# Patient Record
Sex: FEMALE | Race: WHITE | NOT HISPANIC OR LATINO
[De-identification: names, ages, dates, MRNs, and addresses within clinical notes are randomized per-mention and may not be internally consistent; named-entity substitution may affect disease eponyms.]

---

## 2019-04-04 ENCOUNTER — RX RENEWAL (OUTPATIENT)
Age: 74
End: 2019-04-04

## 2019-04-04 PROBLEM — Z00.00 ENCOUNTER FOR PREVENTIVE HEALTH EXAMINATION: Status: ACTIVE | Noted: 2019-04-04

## 2019-04-15 ENCOUNTER — MEDICATION RENEWAL (OUTPATIENT)
Age: 74
End: 2019-04-15

## 2019-04-15 ENCOUNTER — RX RENEWAL (OUTPATIENT)
Age: 74
End: 2019-04-15

## 2019-04-15 RX ORDER — BLOOD SUGAR DIAGNOSTIC
STRIP MISCELLANEOUS 3 TIMES DAILY
Qty: 3 | Refills: 3 | Status: ACTIVE | COMMUNITY
Start: 2019-04-04 | End: 1900-01-01

## 2019-05-20 ENCOUNTER — MEDICATION RENEWAL (OUTPATIENT)
Age: 74
End: 2019-05-20

## 2019-06-17 ENCOUNTER — RX RENEWAL (OUTPATIENT)
Age: 74
End: 2019-06-17

## 2019-07-24 ENCOUNTER — MEDICATION RENEWAL (OUTPATIENT)
Age: 74
End: 2019-07-24

## 2019-08-01 ENCOUNTER — RECORD ABSTRACTING (OUTPATIENT)
Age: 74
End: 2019-08-01

## 2019-08-01 DIAGNOSIS — E53.8 DEFICIENCY OF OTHER SPECIFIED B GROUP VITAMINS: ICD-10-CM

## 2019-08-01 DIAGNOSIS — H54.62 UNQUALIFIED VISUAL LOSS, LEFT EYE, NORMAL VISION RIGHT EYE: ICD-10-CM

## 2019-08-01 DIAGNOSIS — Z78.9 OTHER SPECIFIED HEALTH STATUS: ICD-10-CM

## 2019-08-01 DIAGNOSIS — R60.0 LOCALIZED EDEMA: ICD-10-CM

## 2019-08-01 DIAGNOSIS — Z82.49 FAMILY HISTORY OF ISCHEMIC HEART DISEASE AND OTHER DISEASES OF THE CIRCULATORY SYSTEM: ICD-10-CM

## 2019-08-01 DIAGNOSIS — Z86.59 PERSONAL HISTORY OF OTHER MENTAL AND BEHAVIORAL DISORDERS: ICD-10-CM

## 2019-08-01 DIAGNOSIS — H35.81 RETINAL EDEMA: ICD-10-CM

## 2019-08-01 DIAGNOSIS — Z86.39 PERSONAL HISTORY OF OTHER ENDOCRINE, NUTRITIONAL AND METABOLIC DISEASE: ICD-10-CM

## 2019-08-01 DIAGNOSIS — Z79.899 OTHER LONG TERM (CURRENT) DRUG THERAPY: ICD-10-CM

## 2019-08-01 DIAGNOSIS — Z87.898 PERSONAL HISTORY OF OTHER SPECIFIED CONDITIONS: ICD-10-CM

## 2019-08-05 ENCOUNTER — RX RENEWAL (OUTPATIENT)
Age: 74
End: 2019-08-05

## 2019-08-15 ENCOUNTER — RX RENEWAL (OUTPATIENT)
Age: 74
End: 2019-08-15

## 2019-08-15 ENCOUNTER — MEDICATION RENEWAL (OUTPATIENT)
Age: 74
End: 2019-08-15

## 2019-09-10 ENCOUNTER — RX RENEWAL (OUTPATIENT)
Age: 74
End: 2019-09-10

## 2019-09-10 ENCOUNTER — MEDICATION RENEWAL (OUTPATIENT)
Age: 74
End: 2019-09-10

## 2019-09-19 ENCOUNTER — RX RENEWAL (OUTPATIENT)
Age: 74
End: 2019-09-19

## 2019-09-26 ENCOUNTER — APPOINTMENT (OUTPATIENT)
Dept: CARDIOLOGY | Facility: CLINIC | Age: 74
End: 2019-09-26

## 2019-09-26 ENCOUNTER — APPOINTMENT (OUTPATIENT)
Dept: ENDOCRINOLOGY | Facility: CLINIC | Age: 74
End: 2019-09-26
Payer: MEDICARE

## 2019-09-26 ENCOUNTER — LABORATORY RESULT (OUTPATIENT)
Age: 74
End: 2019-09-26

## 2019-09-26 ENCOUNTER — NON-APPOINTMENT (OUTPATIENT)
Age: 74
End: 2019-09-26

## 2019-09-26 ENCOUNTER — APPOINTMENT (OUTPATIENT)
Dept: CARDIOLOGY | Facility: CLINIC | Age: 74
End: 2019-09-26
Payer: MEDICARE

## 2019-09-26 VITALS
HEIGHT: 57.09 IN | OXYGEN SATURATION: 96 % | WEIGHT: 203 LBS | HEART RATE: 82 BPM | DIASTOLIC BLOOD PRESSURE: 62 MMHG | SYSTOLIC BLOOD PRESSURE: 128 MMHG | BODY MASS INDEX: 43.8 KG/M2

## 2019-09-26 VITALS
BODY MASS INDEX: 43.9 KG/M2 | TEMPERATURE: 98 F | HEIGHT: 57.09 IN | HEART RATE: 86 BPM | OXYGEN SATURATION: 96 % | SYSTOLIC BLOOD PRESSURE: 120 MMHG | DIASTOLIC BLOOD PRESSURE: 80 MMHG | WEIGHT: 203.5 LBS

## 2019-09-26 DIAGNOSIS — Z86.79 PERSONAL HISTORY OF OTHER DISEASES OF THE CIRCULATORY SYSTEM: ICD-10-CM

## 2019-09-26 DIAGNOSIS — E11.319 TYPE 2 DIABETES MELLITUS WITH UNSPECIFIED DIABETIC RETINOPATHY W/OUT MACULAR EDEMA: ICD-10-CM

## 2019-09-26 DIAGNOSIS — Z00.00 ENCOUNTER FOR GENERAL ADULT MEDICAL EXAMINATION W/OUT ABNORMAL FINDINGS: ICD-10-CM

## 2019-09-26 DIAGNOSIS — I73.9 PERIPHERAL VASCULAR DISEASE, UNSPECIFIED: ICD-10-CM

## 2019-09-26 DIAGNOSIS — I35.1 NONRHEUMATIC AORTIC (VALVE) INSUFFICIENCY: ICD-10-CM

## 2019-09-26 DIAGNOSIS — R09.89 OTHER SPECIFIED SYMPTOMS AND SIGNS INVOLVING THE CIRCULATORY AND RESPIRATORY SYSTEMS: ICD-10-CM

## 2019-09-26 DIAGNOSIS — R01.1 CARDIAC MURMUR, UNSPECIFIED: ICD-10-CM

## 2019-09-26 DIAGNOSIS — E11.9 TYPE 2 DIABETES MELLITUS W/OUT COMPLICATIONS: ICD-10-CM

## 2019-09-26 DIAGNOSIS — E11.51 TYPE 2 DIABETES MELLITUS WITH DIABETIC PERIPHERAL ANGIOPATHY W/OUT GANGRENE: ICD-10-CM

## 2019-09-26 DIAGNOSIS — E78.5 HYPERLIPIDEMIA, UNSPECIFIED: ICD-10-CM

## 2019-09-26 DIAGNOSIS — E03.9 HYPOTHYROIDISM, UNSPECIFIED: ICD-10-CM

## 2019-09-26 DIAGNOSIS — Z91.19 PATIENT'S NONCOMPLIANCE WITH OTHER MEDICAL TREATMENT AND REGIMEN: ICD-10-CM

## 2019-09-26 DIAGNOSIS — R26.81 UNSTEADINESS ON FEET: ICD-10-CM

## 2019-09-26 PROCEDURE — 99215 OFFICE O/P EST HI 40 MIN: CPT

## 2019-09-26 PROCEDURE — 82962 GLUCOSE BLOOD TEST: CPT

## 2019-09-26 PROCEDURE — 99214 OFFICE O/P EST MOD 30 MIN: CPT

## 2019-09-26 PROCEDURE — 83036 HEMOGLOBIN GLYCOSYLATED A1C: CPT | Mod: QW

## 2019-09-26 PROCEDURE — 93000 ELECTROCARDIOGRAM COMPLETE: CPT

## 2019-09-26 RX ORDER — INSULIN HUMAN 100 [IU]/ML
100 INJECTION, SUSPENSION SUBCUTANEOUS
Qty: 4 | Refills: 2 | Status: ACTIVE | COMMUNITY
Start: 2019-09-26 | End: 1900-01-01

## 2019-09-28 PROBLEM — E11.319 TYPE 2 DIABETES MELLITUS WITH RETINOPATHY: Status: ACTIVE | Noted: 2019-08-01

## 2019-09-28 PROBLEM — Z91.19 MEDICALLY NONCOMPLIANT: Status: ACTIVE | Noted: 2019-09-28

## 2019-09-28 PROBLEM — Z00.00 PREVENTATIVE HEALTH CARE: Status: ACTIVE | Noted: 2019-09-28

## 2019-09-28 LAB
GLUCOSE BLDC GLUCOMTR-MCNC: 187
HBA1C MFR BLD HPLC: 7.7

## 2019-09-28 NOTE — REASON FOR VISIT
[FreeTextEntry1] : The patient presents for evaluation of high blood pressure. Patient is currently tolerating the current antihypertensive regime and they deny headaches, stiff neck, visual changes, pedal Edema or PND. They also are here for follow-up of elevated cholesterol and continued care of diabetes mellitus. Patient is currently tolerating medication and does not complain of new  muscle pain, joint pain, back pain, tea, nausea, vomiting, abdominal pain or diarrhea. The patient is trying to follow a low cholesterol diet.  The patient is following the diabetic regimen and is tolerating hypoglycemic agents and following the diet. \par The patient admits to noncompliance with my medical  and endo  follow-up, treatment plan.  The patient states that they do understand that noncompliance can lead to their worsening overall health and severe health consequences.  Patient was accompanied by her daughter today and all questions were answered in full.\par \par \par

## 2019-09-28 NOTE — PHYSICAL EXAM
[General Appearance - Well Developed] : well developed [Well Groomed] : well groomed [Normal Appearance] : normal appearance [General Appearance - Well Nourished] : well nourished [No Deformities] : no deformities [Normal Conjunctiva] : the conjunctiva exhibited no abnormalities [General Appearance - In No Acute Distress] : no acute distress [Eyelids - No Xanthelasma] : the eyelids demonstrated no xanthelasmas [Normal Oral Mucosa] : normal oral mucosa [No Oral Pallor] : no oral pallor [No Oral Cyanosis] : no oral cyanosis [Normal Jugular Venous V Waves Present] : normal jugular venous V waves present [Normal Jugular Venous A Waves Present] : normal jugular venous A waves present [No Jugular Venous Santana A Waves] : no jugular venous santana A waves [Exaggerated Use Of Accessory Muscles For Inspiration] : no accessory muscle use [Respiration, Rhythm And Depth] : normal respiratory rhythm and effort [Auscultation Breath Sounds / Voice Sounds] : lungs were clear to auscultation bilaterally [Abdomen Soft] : soft [Abdomen Tenderness] : non-tender [Abdomen Mass (___ Cm)] : no abdominal mass palpated [Abnormal Walk] : normal gait [Gait - Sufficient For Exercise Testing] : the gait was sufficient for exercise testing [Nail Clubbing] : no clubbing of the fingernails [Cyanosis, Localized] : no localized cyanosis [Petechial Hemorrhages (___cm)] : no petechial hemorrhages [Skin Color & Pigmentation] : normal skin color and pigmentation [No Venous Stasis] : no venous stasis [] : no rash [No Skin Ulcers] : no skin ulcer [Skin Lesions] : no skin lesions [Oriented To Time, Place, And Person] : oriented to person, place, and time [No Xanthoma] : no  xanthoma was observed [Mood] : the mood was normal [Affect] : the affect was normal [No Anxiety] : not feeling anxious [FreeTextEntry1] : Pt has _1+edema, pitting bilaterally legs

## 2019-09-30 ENCOUNTER — RX RENEWAL (OUTPATIENT)
Age: 74
End: 2019-09-30

## 2019-09-30 LAB
ALBUMIN SERPL ELPH-MCNC: 4.6 G/DL
ALP BLD-CCNC: 76 U/L
ALT SERPL-CCNC: 19 U/L
ANION GAP SERPL CALC-SCNC: 16 MMOL/L
APPEARANCE: CLEAR
AST SERPL-CCNC: 14 U/L
BACTERIA: NEGATIVE
BASOPHILS # BLD AUTO: 0.1 K/UL
BASOPHILS NFR BLD AUTO: 1 %
BILIRUB SERPL-MCNC: 0.2 MG/DL
BILIRUBIN URINE: NEGATIVE
BLOOD URINE: NEGATIVE
BUN SERPL-MCNC: 34 MG/DL
CALCIUM SERPL-MCNC: 10.4 MG/DL
CHLORIDE SERPL-SCNC: 103 MMOL/L
CHOLEST SERPL-MCNC: 196 MG/DL
CHOLEST/HDLC SERPL: 3.6 RATIO
CO2 SERPL-SCNC: 20 MMOL/L
COLOR: NORMAL
CREAT 24H UR-MCNC: NORMAL G/24 H
CREAT ?TM UR-MCNC: 63 MG/DL
CREAT SERPL-MCNC: 1.4 MG/DL
EOSINOPHIL # BLD AUTO: 0.55 K/UL
EOSINOPHIL NFR BLD AUTO: 5.7 %
ESTIMATED AVERAGE GLUCOSE: 171 MG/DL
GLUCOSE QUALITATIVE U: NEGATIVE
GLUCOSE SERPL-MCNC: 173 MG/DL
HBA1C MFR BLD HPLC: 7.6 %
HCT VFR BLD CALC: 35.6 %
HDLC SERPL-MCNC: 54 MG/DL
HGB BLD-MCNC: 11.2 G/DL
HYALINE CASTS: 0 /LPF
IMM GRANULOCYTES NFR BLD AUTO: 0.3 %
KETONES URINE: NEGATIVE
LDLC SERPL CALC-MCNC: 88 MG/DL
LEUKOCYTE ESTERASE URINE: ABNORMAL
LYMPHOCYTES # BLD AUTO: 1.96 K/UL
LYMPHOCYTES NFR BLD AUTO: 20.3 %
MAGNESIUM SERPL-MCNC: 1.6 MG/DL
MAN DIFF?: NORMAL
MCHC RBC-ENTMCNC: 29.5 PG
MCHC RBC-ENTMCNC: 31.5 GM/DL
MCV RBC AUTO: 93.7 FL
MICROALBUMIN 24H UR DL<=1MG/L-MCNC: 2.2 MG/DL
MICROALBUMIN 24H UR DL<=1MG/L-MRATE: NORMAL MG/24HR
MICROALBUMIN ?TM UR-MRATE: NORMAL UG/MIN
MICROSCOPIC-UA: NORMAL
MONOCYTES # BLD AUTO: 0.77 K/UL
MONOCYTES NFR BLD AUTO: 8 %
NEUTROPHILS # BLD AUTO: 6.25 K/UL
NEUTROPHILS NFR BLD AUTO: 64.7 %
NITRITE URINE: NEGATIVE
PH URINE: 5.5
PHOSPHATE SERPL-MCNC: 4.1 MG/DL
PLATELET # BLD AUTO: 292 K/UL
POTASSIUM SERPL-SCNC: 5.2 MMOL/L
PROT ?TM UR-MCNC: NORMAL
PROT SERPL-MCNC: 7.3 G/DL
PROTEIN URINE: NORMAL
RBC # BLD: 3.8 M/UL
RBC # FLD: 13 %
RED BLOOD CELLS URINE: 3 /HPF
SODIUM SERPL-SCNC: 139 MMOL/L
SPECIFIC GRAVITY URINE: 1.02
SPECIMEN VOL 24H UR: NORMAL
SQUAMOUS EPITHELIAL CELLS: 1 /HPF
T3RU NFR SERPL: 0.9 TBI
T4 FREE SERPL-MCNC: 1.3 NG/DL
T4 SERPL-MCNC: 6.6 UG/DL
TRIGL SERPL-MCNC: 271 MG/DL
TSH SERPL-ACNC: 2.81 UIU/ML
URATE SERPL-MCNC: 9.6 MG/DL
UROBILINOGEN URINE: NORMAL
WBC # FLD AUTO: 9.66 K/UL
WHITE BLOOD CELLS URINE: 8 /HPF

## 2019-09-30 RX ORDER — FLUOXETINE HYDROCHLORIDE 20 MG/1
20 TABLET ORAL
Qty: 30 | Refills: 1 | Status: DISCONTINUED | COMMUNITY
Start: 2019-09-26 | End: 2019-09-30

## 2019-09-30 RX ORDER — SYRINGE AND NEEDLE,INSULIN,1ML 31 GX5/16"
31G X 5/16" SYRINGE, EMPTY DISPOSABLE MISCELLANEOUS
Qty: 1 | Refills: 3 | Status: ACTIVE | COMMUNITY
Start: 2019-09-30 | End: 1900-01-01

## 2019-10-04 ENCOUNTER — APPOINTMENT (OUTPATIENT)
Dept: CARDIOLOGY | Facility: CLINIC | Age: 74
End: 2019-10-04
Payer: MEDICARE

## 2019-10-04 PROCEDURE — 93306 TTE W/DOPPLER COMPLETE: CPT

## 2019-10-04 PROCEDURE — 93925 LOWER EXTREMITY STUDY: CPT

## 2019-10-04 PROCEDURE — 93880 EXTRACRANIAL BILAT STUDY: CPT

## 2019-10-09 PROBLEM — R09.89 DECREASED DORSALIS PEDIS PULSE: Status: ACTIVE | Noted: 2019-08-01

## 2019-10-09 PROBLEM — E11.9 DIABETES: Status: ACTIVE | Noted: 2019-04-04

## 2019-10-09 PROBLEM — E03.9 HYPOTHYROIDISM: Status: ACTIVE | Noted: 2019-08-01

## 2019-10-09 PROBLEM — R09.89 CAROTID BRUIT: Status: ACTIVE | Noted: 2019-08-01

## 2019-10-09 PROBLEM — E78.5 HYPERLIPEMIA: Status: ACTIVE | Noted: 2019-09-28

## 2019-10-15 ENCOUNTER — MEDICATION RENEWAL (OUTPATIENT)
Age: 74
End: 2019-10-15

## 2019-10-15 RX ORDER — GLIMEPIRIDE 2 MG/1
2 TABLET ORAL DAILY
Qty: 90 | Refills: 3 | Status: ACTIVE | COMMUNITY
Start: 2019-10-15 | End: 1900-01-01

## 2019-10-15 RX ORDER — SITAGLIPTIN 50 MG/1
50 TABLET, FILM COATED ORAL
Qty: 90 | Refills: 0 | Status: DISCONTINUED | COMMUNITY
Start: 2019-08-15 | End: 2019-10-15

## 2019-10-15 RX ORDER — SIMVASTATIN 20 MG/1
20 TABLET, FILM COATED ORAL
Qty: 45 | Refills: 1 | Status: ACTIVE | COMMUNITY
Start: 2019-05-20 | End: 1900-01-01

## 2019-10-22 ENCOUNTER — MEDICATION RENEWAL (OUTPATIENT)
Age: 74
End: 2019-10-22

## 2019-10-22 RX ORDER — ATENOLOL 50 MG/1
50 TABLET ORAL
Qty: 90 | Refills: 1 | Status: ACTIVE | COMMUNITY
Start: 2019-04-15 | End: 1900-01-01

## 2019-10-28 ENCOUNTER — RX RENEWAL (OUTPATIENT)
Age: 74
End: 2019-10-28

## 2019-12-06 ENCOUNTER — MEDICATION RENEWAL (OUTPATIENT)
Age: 74
End: 2019-12-06

## 2019-12-10 ENCOUNTER — MEDICATION RENEWAL (OUTPATIENT)
Age: 74
End: 2019-12-10

## 2019-12-11 ENCOUNTER — RX RENEWAL (OUTPATIENT)
Age: 74
End: 2019-12-11

## 2019-12-16 ENCOUNTER — RX RENEWAL (OUTPATIENT)
Age: 74
End: 2019-12-16

## 2019-12-16 RX ORDER — HUMAN INSULIN 100 [IU]/ML
100 INJECTION, SUSPENSION SUBCUTANEOUS
Qty: 6 | Refills: 1 | Status: ACTIVE | COMMUNITY
Start: 2019-09-30 | End: 1900-01-01

## 2020-01-15 RX ORDER — INDAPAMIDE 2.5 MG/1
2.5 TABLET, FILM COATED ORAL DAILY
Qty: 90 | Refills: 1 | Status: ACTIVE | COMMUNITY
Start: 2020-01-15 | End: 1900-01-01

## 2020-01-16 ENCOUNTER — RX RENEWAL (OUTPATIENT)
Age: 75
End: 2020-01-16

## 2020-01-16 RX ORDER — FLUOXETINE HYDROCHLORIDE 20 MG/1
20 CAPSULE ORAL
Qty: 30 | Refills: 1 | Status: ACTIVE | COMMUNITY
Start: 2019-09-30 | End: 1900-01-01

## 2020-01-24 ENCOUNTER — RX RENEWAL (OUTPATIENT)
Age: 75
End: 2020-01-24

## 2020-01-24 RX ORDER — METFORMIN HYDROCHLORIDE 1000 MG/1
1000 TABLET, COATED ORAL
Qty: 180 | Refills: 0 | Status: ACTIVE | COMMUNITY
Start: 2019-06-17 | End: 1900-01-01

## 2020-03-06 RX ORDER — AMLODIPINE BESYLATE 5 MG/1
5 TABLET ORAL
Qty: 90 | Refills: 1 | Status: ACTIVE | COMMUNITY
Start: 2019-08-15 | End: 1900-01-01

## 2020-03-09 RX ORDER — LEVOTHYROXINE SODIUM 25 UG/1
25 TABLET ORAL
Qty: 135 | Refills: 0 | Status: ACTIVE | COMMUNITY
Start: 2019-09-10 | End: 1900-01-01

## 2020-03-10 RX ORDER — IRBESARTAN 300 MG/1
300 TABLET ORAL
Qty: 90 | Refills: 1 | Status: ACTIVE | COMMUNITY
Start: 2019-09-10 | End: 1900-01-01

## 2020-03-26 ENCOUNTER — INPATIENT (INPATIENT)
Facility: HOSPITAL | Age: 75
LOS: 20 days | DRG: 870 | End: 2020-04-16
Attending: HOSPITALIST | Admitting: HOSPITALIST
Payer: MEDICARE

## 2020-03-26 ENCOUNTER — TRANSCRIPTION ENCOUNTER (OUTPATIENT)
Age: 75
End: 2020-03-26

## 2020-03-26 VITALS
HEIGHT: 58 IN | WEIGHT: 199.96 LBS | TEMPERATURE: 101 F | OXYGEN SATURATION: 88 % | HEART RATE: 76 BPM | DIASTOLIC BLOOD PRESSURE: 68 MMHG | RESPIRATION RATE: 18 BRPM | SYSTOLIC BLOOD PRESSURE: 117 MMHG

## 2020-03-26 DIAGNOSIS — F41.9 ANXIETY DISORDER, UNSPECIFIED: ICD-10-CM

## 2020-03-26 DIAGNOSIS — E78.5 HYPERLIPIDEMIA, UNSPECIFIED: ICD-10-CM

## 2020-03-26 DIAGNOSIS — E03.9 HYPOTHYROIDISM, UNSPECIFIED: ICD-10-CM

## 2020-03-26 DIAGNOSIS — B34.2 CORONAVIRUS INFECTION, UNSPECIFIED: ICD-10-CM

## 2020-03-26 DIAGNOSIS — B97.21 SARS-ASSOCIATED CORONAVIRUS AS THE CAUSE OF DISEASES CLASSIFIED ELSEWHERE: ICD-10-CM

## 2020-03-26 DIAGNOSIS — E11.9 TYPE 2 DIABETES MELLITUS WITHOUT COMPLICATIONS: ICD-10-CM

## 2020-03-26 DIAGNOSIS — J96.01 ACUTE RESPIRATORY FAILURE WITH HYPOXIA: ICD-10-CM

## 2020-03-26 LAB
ALBUMIN SERPL ELPH-MCNC: 2.9 G/DL — LOW (ref 3.3–5)
ALP SERPL-CCNC: 61 U/L — SIGNIFICANT CHANGE UP (ref 30–120)
ALT FLD-CCNC: 24 U/L DA — SIGNIFICANT CHANGE UP (ref 10–60)
ANION GAP SERPL CALC-SCNC: 13 MMOL/L — SIGNIFICANT CHANGE UP (ref 5–17)
APPEARANCE UR: ABNORMAL
AST SERPL-CCNC: 19 U/L — SIGNIFICANT CHANGE UP (ref 10–40)
BASOPHILS # BLD AUTO: 0.01 K/UL — SIGNIFICANT CHANGE UP (ref 0–0.2)
BASOPHILS NFR BLD AUTO: 0.1 % — SIGNIFICANT CHANGE UP (ref 0–2)
BILIRUB SERPL-MCNC: 0.4 MG/DL — SIGNIFICANT CHANGE UP (ref 0.2–1.2)
BILIRUB UR-MCNC: NEGATIVE — SIGNIFICANT CHANGE UP
BUN SERPL-MCNC: 42 MG/DL — HIGH (ref 7–23)
CALCIUM SERPL-MCNC: 8.9 MG/DL — SIGNIFICANT CHANGE UP (ref 8.4–10.5)
CHLORIDE SERPL-SCNC: 100 MMOL/L — SIGNIFICANT CHANGE UP (ref 96–108)
CO2 SERPL-SCNC: 21 MMOL/L — LOW (ref 22–31)
COLOR SPEC: YELLOW — SIGNIFICANT CHANGE UP
CREAT SERPL-MCNC: 1.91 MG/DL — HIGH (ref 0.5–1.3)
DIFF PNL FLD: ABNORMAL
EOSINOPHIL # BLD AUTO: 0.01 K/UL — SIGNIFICANT CHANGE UP (ref 0–0.5)
EOSINOPHIL NFR BLD AUTO: 0.1 % — SIGNIFICANT CHANGE UP (ref 0–6)
GLUCOSE BLDC GLUCOMTR-MCNC: 372 MG/DL — HIGH (ref 70–99)
GLUCOSE SERPL-MCNC: 175 MG/DL — HIGH (ref 70–99)
GLUCOSE UR QL: 250 MG/DL
HCT VFR BLD CALC: 28.4 % — LOW (ref 34.5–45)
HGB BLD-MCNC: 9.2 G/DL — LOW (ref 11.5–15.5)
IMM GRANULOCYTES NFR BLD AUTO: 0.5 % — SIGNIFICANT CHANGE UP (ref 0–1.5)
KETONES UR-MCNC: NEGATIVE — SIGNIFICANT CHANGE UP
LACTATE SERPL-SCNC: 1.9 MMOL/L — SIGNIFICANT CHANGE UP (ref 0.7–2)
LEUKOCYTE ESTERASE UR-ACNC: NEGATIVE — SIGNIFICANT CHANGE UP
LYMPHOCYTES # BLD AUTO: 0.78 K/UL — LOW (ref 1–3.3)
LYMPHOCYTES # BLD AUTO: 7.5 % — LOW (ref 13–44)
MCHC RBC-ENTMCNC: 28.8 PG — SIGNIFICANT CHANGE UP (ref 27–34)
MCHC RBC-ENTMCNC: 32.4 GM/DL — SIGNIFICANT CHANGE UP (ref 32–36)
MCV RBC AUTO: 89 FL — SIGNIFICANT CHANGE UP (ref 80–100)
MONOCYTES # BLD AUTO: 0.39 K/UL — SIGNIFICANT CHANGE UP (ref 0–0.9)
MONOCYTES NFR BLD AUTO: 3.8 % — SIGNIFICANT CHANGE UP (ref 2–14)
NEUTROPHILS # BLD AUTO: 9.14 K/UL — HIGH (ref 1.8–7.4)
NEUTROPHILS NFR BLD AUTO: 88 % — HIGH (ref 43–77)
NITRITE UR-MCNC: NEGATIVE — SIGNIFICANT CHANGE UP
NRBC # BLD: 0 /100 WBCS — SIGNIFICANT CHANGE UP (ref 0–0)
PH UR: 5 — SIGNIFICANT CHANGE UP (ref 5–8)
PLATELET # BLD AUTO: 271 K/UL — SIGNIFICANT CHANGE UP (ref 150–400)
POTASSIUM SERPL-MCNC: 4 MMOL/L — SIGNIFICANT CHANGE UP (ref 3.5–5.3)
POTASSIUM SERPL-SCNC: 4 MMOL/L — SIGNIFICANT CHANGE UP (ref 3.5–5.3)
PROT SERPL-MCNC: 7.8 G/DL — SIGNIFICANT CHANGE UP (ref 6–8.3)
PROT UR-MCNC: 100 MG/DL
RAPID RVP RESULT: SIGNIFICANT CHANGE UP
RBC # BLD: 3.19 M/UL — LOW (ref 3.8–5.2)
RBC # FLD: 13.5 % — SIGNIFICANT CHANGE UP (ref 10.3–14.5)
SODIUM SERPL-SCNC: 134 MMOL/L — LOW (ref 135–145)
SP GR SPEC: 1.02 — SIGNIFICANT CHANGE UP (ref 1.01–1.02)
UROBILINOGEN FLD QL: NEGATIVE MG/DL — SIGNIFICANT CHANGE UP
WBC # BLD: 10.38 K/UL — SIGNIFICANT CHANGE UP (ref 3.8–10.5)
WBC # FLD AUTO: 10.38 K/UL — SIGNIFICANT CHANGE UP (ref 3.8–10.5)

## 2020-03-26 PROCEDURE — 99223 1ST HOSP IP/OBS HIGH 75: CPT

## 2020-03-26 PROCEDURE — 71045 X-RAY EXAM CHEST 1 VIEW: CPT | Mod: 26,77

## 2020-03-26 PROCEDURE — 93010 ELECTROCARDIOGRAM REPORT: CPT

## 2020-03-26 PROCEDURE — 71045 X-RAY EXAM CHEST 1 VIEW: CPT | Mod: 26

## 2020-03-26 PROCEDURE — 99285 EMERGENCY DEPT VISIT HI MDM: CPT | Mod: CS

## 2020-03-26 RX ORDER — HYDROXYCHLOROQUINE SULFATE 200 MG
400 TABLET ORAL EVERY 12 HOURS
Refills: 0 | Status: COMPLETED | OUTPATIENT
Start: 2020-03-26 | End: 2020-03-27

## 2020-03-26 RX ORDER — PROPOFOL 10 MG/ML
73.5 INJECTION, EMULSION INTRAVENOUS
Qty: 1000 | Refills: 0 | Status: DISCONTINUED | OUTPATIENT
Start: 2020-03-26 | End: 2020-04-06

## 2020-03-26 RX ORDER — AZITHROMYCIN 500 MG/1
500 TABLET, FILM COATED ORAL ONCE
Refills: 0 | Status: COMPLETED | OUTPATIENT
Start: 2020-03-26 | End: 2020-03-26

## 2020-03-26 RX ORDER — HEPARIN SODIUM 5000 [USP'U]/ML
5000 INJECTION INTRAVENOUS; SUBCUTANEOUS EVERY 8 HOURS
Refills: 0 | Status: DISCONTINUED | OUTPATIENT
Start: 2020-03-26 | End: 2020-03-29

## 2020-03-26 RX ORDER — INSULIN LISPRO 100/ML
VIAL (ML) SUBCUTANEOUS EVERY 6 HOURS
Refills: 0 | Status: DISCONTINUED | OUTPATIENT
Start: 2020-03-26 | End: 2020-04-16

## 2020-03-26 RX ORDER — AZITHROMYCIN 500 MG/1
500 TABLET, FILM COATED ORAL EVERY 24 HOURS
Refills: 0 | Status: DISCONTINUED | OUTPATIENT
Start: 2020-03-27 | End: 2020-03-27

## 2020-03-26 RX ORDER — INSULIN LISPRO 100/ML
VIAL (ML) SUBCUTANEOUS
Refills: 0 | Status: DISCONTINUED | OUTPATIENT
Start: 2020-03-26 | End: 2020-03-26

## 2020-03-26 RX ORDER — SIMVASTATIN 20 MG/1
20 TABLET, FILM COATED ORAL AT BEDTIME
Refills: 0 | Status: DISCONTINUED | OUTPATIENT
Start: 2020-03-26 | End: 2020-04-16

## 2020-03-26 RX ORDER — HYDROXYCHLOROQUINE SULFATE 200 MG
TABLET ORAL
Refills: 0 | Status: COMPLETED | OUTPATIENT
Start: 2020-03-26 | End: 2020-03-31

## 2020-03-26 RX ORDER — DEXTROSE 50 % IN WATER 50 %
25 SYRINGE (ML) INTRAVENOUS ONCE
Refills: 0 | Status: DISCONTINUED | OUTPATIENT
Start: 2020-03-26 | End: 2020-04-16

## 2020-03-26 RX ORDER — ACETAMINOPHEN 500 MG
650 TABLET ORAL EVERY 6 HOURS
Refills: 0 | Status: DISCONTINUED | OUTPATIENT
Start: 2020-03-26 | End: 2020-04-16

## 2020-03-26 RX ORDER — FENTANYL CITRATE 50 UG/ML
50 INJECTION INTRAVENOUS ONCE
Refills: 0 | Status: DISCONTINUED | OUTPATIENT
Start: 2020-03-26 | End: 2020-03-26

## 2020-03-26 RX ORDER — SODIUM CHLORIDE 9 MG/ML
1000 INJECTION, SOLUTION INTRAVENOUS
Refills: 0 | Status: DISCONTINUED | OUTPATIENT
Start: 2020-03-26 | End: 2020-04-16

## 2020-03-26 RX ORDER — CEFTRIAXONE 500 MG/1
1000 INJECTION, POWDER, FOR SOLUTION INTRAMUSCULAR; INTRAVENOUS ONCE
Refills: 0 | Status: COMPLETED | OUTPATIENT
Start: 2020-03-26 | End: 2020-03-26

## 2020-03-26 RX ORDER — VECURONIUM BROMIDE 20 MG/1
1 INJECTION, POWDER, FOR SOLUTION INTRAVENOUS
Qty: 100 | Refills: 0 | Status: DISCONTINUED | OUTPATIENT
Start: 2020-03-26 | End: 2020-03-31

## 2020-03-26 RX ORDER — CHLORHEXIDINE GLUCONATE 213 G/1000ML
15 SOLUTION TOPICAL EVERY 12 HOURS
Refills: 0 | Status: DISCONTINUED | OUTPATIENT
Start: 2020-03-26 | End: 2020-04-13

## 2020-03-26 RX ORDER — DEXTROSE 50 % IN WATER 50 %
12.5 SYRINGE (ML) INTRAVENOUS ONCE
Refills: 0 | Status: DISCONTINUED | OUTPATIENT
Start: 2020-03-26 | End: 2020-04-16

## 2020-03-26 RX ORDER — SODIUM CHLORIDE 9 MG/ML
1000 INJECTION INTRAMUSCULAR; INTRAVENOUS; SUBCUTANEOUS ONCE
Refills: 0 | Status: COMPLETED | OUTPATIENT
Start: 2020-03-26 | End: 2020-03-26

## 2020-03-26 RX ORDER — CEFTRIAXONE 500 MG/1
1000 INJECTION, POWDER, FOR SOLUTION INTRAMUSCULAR; INTRAVENOUS EVERY 24 HOURS
Refills: 0 | Status: DISCONTINUED | OUTPATIENT
Start: 2020-03-27 | End: 2020-03-27

## 2020-03-26 RX ORDER — GLUCAGON INJECTION, SOLUTION 0.5 MG/.1ML
1 INJECTION, SOLUTION SUBCUTANEOUS ONCE
Refills: 0 | Status: DISCONTINUED | OUTPATIENT
Start: 2020-03-26 | End: 2020-04-16

## 2020-03-26 RX ORDER — PANTOPRAZOLE SODIUM 20 MG/1
40 TABLET, DELAYED RELEASE ORAL DAILY
Refills: 0 | Status: DISCONTINUED | OUTPATIENT
Start: 2020-03-26 | End: 2020-04-16

## 2020-03-26 RX ORDER — DEXTROSE 50 % IN WATER 50 %
15 SYRINGE (ML) INTRAVENOUS ONCE
Refills: 0 | Status: DISCONTINUED | OUTPATIENT
Start: 2020-03-26 | End: 2020-04-16

## 2020-03-26 RX ORDER — FENTANYL CITRATE 50 UG/ML
1 INJECTION INTRAVENOUS
Qty: 2500 | Refills: 0 | Status: DISCONTINUED | OUTPATIENT
Start: 2020-03-26 | End: 2020-04-01

## 2020-03-26 RX ORDER — ACETAMINOPHEN 500 MG
650 TABLET ORAL ONCE
Refills: 0 | Status: COMPLETED | OUTPATIENT
Start: 2020-03-26 | End: 2020-03-26

## 2020-03-26 RX ADMIN — HEPARIN SODIUM 5000 UNIT(S): 5000 INJECTION INTRAVENOUS; SUBCUTANEOUS at 22:07

## 2020-03-26 RX ADMIN — AZITHROMYCIN 500 MILLIGRAM(S): 500 TABLET, FILM COATED ORAL at 16:30

## 2020-03-26 RX ADMIN — SODIUM CHLORIDE 1000 MILLILITER(S): 9 INJECTION INTRAMUSCULAR; INTRAVENOUS; SUBCUTANEOUS at 15:10

## 2020-03-26 RX ADMIN — Medication 650 MILLIGRAM(S): at 15:32

## 2020-03-26 RX ADMIN — SODIUM CHLORIDE 1000 MILLILITER(S): 9 INJECTION INTRAMUSCULAR; INTRAVENOUS; SUBCUTANEOUS at 13:59

## 2020-03-26 RX ADMIN — FENTANYL CITRATE 50 MICROGRAM(S): 50 INJECTION INTRAVENOUS at 22:31

## 2020-03-26 RX ADMIN — Medication 650 MILLIGRAM(S): at 13:58

## 2020-03-26 RX ADMIN — FENTANYL CITRATE 50 MICROGRAM(S): 50 INJECTION INTRAVENOUS at 22:01

## 2020-03-26 RX ADMIN — PROPOFOL 40 MICROGRAM(S)/KG/MIN: 10 INJECTION, EMULSION INTRAVENOUS at 20:32

## 2020-03-26 RX ADMIN — FENTANYL CITRATE 1.81 MICROGRAM(S)/KG/HR: 50 INJECTION INTRAVENOUS at 23:04

## 2020-03-26 RX ADMIN — FENTANYL CITRATE 50 MICROGRAM(S): 50 INJECTION INTRAVENOUS at 22:09

## 2020-03-26 RX ADMIN — VECURONIUM BROMIDE 5.44 MICROGRAM(S)/KG/MIN: 20 INJECTION, POWDER, FOR SOLUTION INTRAVENOUS at 23:50

## 2020-03-26 RX ADMIN — PROPOFOL 40 MICROGRAM(S)/KG/MIN: 10 INJECTION, EMULSION INTRAVENOUS at 23:07

## 2020-03-26 RX ADMIN — AZITHROMYCIN 255 MILLIGRAM(S): 500 TABLET, FILM COATED ORAL at 15:27

## 2020-03-26 RX ADMIN — Medication 10: at 23:13

## 2020-03-26 RX ADMIN — CEFTRIAXONE 100 MILLIGRAM(S): 500 INJECTION, POWDER, FOR SOLUTION INTRAMUSCULAR; INTRAVENOUS at 15:27

## 2020-03-26 RX ADMIN — CEFTRIAXONE 1000 MILLIGRAM(S): 500 INJECTION, POWDER, FOR SOLUTION INTRAMUSCULAR; INTRAVENOUS at 16:00

## 2020-03-26 NOTE — H&P ADULT - HISTORY OF PRESENT ILLNESS
Patient is a 74y old  Female who presents with a chief complaint of shob    HPI:  75 y/o F with hx of DM, HTN, HLD, hypothyroid, anxiety biba with c/o fever and cough x 6 days. Pt states that Tmax was 101.8F, took one tab of tylenol XS at 9am this morning. Pt states that she has associated dry cough, body aches and mild generalized weakness. States that she has had one episode of loose stool daily. Denies recent travel, sick contacts, known covid-19 exposure, CP, SOB, runny nose, sore throat, headache, rash, urinary symptoms.  she was sating 94% ON 4l and was then put on non rebreather and was transferred to spcu.    2 hr after initial exam around 7pm  due to increased work of breathing and shob anesthesisa was called for intubation.  covid was sent.        PAST MEDICAL & SURGICAL HISTORY:  DJD (degenerative joint disease)  DM (diabetes mellitus)  Hyperlipidemia  Hypothyroid  HTN (hypertension)  History of vitamin D deficiency  B12 deficiency  Bronchitis  Anxiety  S/P cataract surgery  S/P eye surgery: left eye retinal surgery x2  S/P  section: x2      FAMILY HISTORY:      SOCIAL HISTORY:    Allergies    No Known Allergies    Intolerances          REVIEW OF SYSEMS:  General: + weakness, + fever/chills, no weight loss/gain  Skin/Breast: no rash, no jaundice  Ophthalmologic: no vision changes, no dry eyes   Respiratory and Thorax: no cough, no wheezing, no hemoptysis, no dyspnea  Cardiovascular: no chest pain, no shortness of breath, no orthopnea  Gastrointestinal: no n/v/d, no abdominal pain, no dysphagia   Genitourinary: no dysuria, no frequency, no nocturia, no hematuria  Musculoskeletal: no trauma, no sprain/strain, no myalgias, no arthralgias, no fracture  Neurological: no HA, no dizziness, no weakness, no numbness  Psychiatric: no depression, no SI/HI  Hematology/Lymphatics: no easy bruising  Endocrine: no heat or cold intolerance. no weight gain or loss  Allergic/Immunologic: no allergy or recent reaction       Vital Signs Last 24 Hrs  T(C): 36.7 (26 Mar 2020 17:53), Max: 38.4 (26 Mar 2020 13:17)  T(F): 98 (26 Mar 2020 17:53), Max: 101.2 (26 Mar 2020 13:17)  HR: 74 (26 Mar 2020 17:53) (70 - 76)  BP: 129/72 (26 Mar 2020 17:53) (117/68 - 129/72)  BP(mean): --  RR: 17 (26 Mar 2020 17:53) (16 - 18)  SpO2: 84% (26 Mar 2020 17:53) (84% - 94%)  I&O's Summary      PHYSICAL EXAM:  GENERAL: on nc  HEAD:  Atraumatic, Normocephalic  EYES: EOMI, PERRLA, conjunctiva and sclera clear  NECK: Supple, No JVD  CHEST/LUNG: + rhonchi  HEART: Regular rate and rhythm; No murmurs, rubs, or gallops  ABDOMEN: Soft, Nontender, Nondistended; Bowel sounds present  Neuro: AAOx3, no focal deficit, 5/5 b/l extremities  EXTREMITIES:  2+ Peripheral Pulses, No clubbing, cyanosis, or edema  SKIN: No rashes or lesions    LABS:                        9.2    10.38 )-----------( 271      ( 26 Mar 2020 14:21 )             28.4     03-26    134<L>  |  100  |  42<H>  ----------------------------<  175<H>  4.0   |  21<L>  |  1.91<H>    Ca    8.9      26 Mar 2020 14:21    TPro  7.8  /  Alb  2.9<L>  /  TBili  0.4  /  DBili  x   /  AST  19  /  ALT  24  /  AlkPhos  61  03-26      CAPILLARY BLOOD GLUCOSE                RADIOLOGY & ADDITIONAL TESTS:    Imaging Personally Reviewed:  [x] YES  [ ] NO    Consultant(s) Notes Reviewed:  [x] YES  [ ] NO      MEDICATIONS  (STANDING):  azithromycin  IVPB 500 milliGRAM(s) IV Intermittent every 24 hours  cefTRIAXone   IVPB 1000 milliGRAM(s) IV Intermittent every 24 hours    MEDICATIONS  (PRN):  acetaminophen   Tablet .. 650 milliGRAM(s) Oral every 6 hours PRN Temp greater or equal to 38C (100.4F), Mild Pain (1 - 3)      Care Discussed with Consultants/Other Providers [x] YES  [ ] NO    HEALTH ISSUES - PROBLEM Dx:

## 2020-03-26 NOTE — ED PROVIDER NOTE - ENMT, MLM
DISPLAY PLAN FREE TEXT Airway patent, Nasal mucosa clear. Mouth with normal mucosa. Throat has no vesicles, no oropharyngeal exudates and uvula is midline.

## 2020-03-26 NOTE — ED PROVIDER NOTE - PROGRESS NOTE DETAILS
Anika FRANCISCO for ED Attending Dr. Wade: 73 y/o female with PMHx of DM, HTN, HLD, hypothyroidism presents to the ED c/o fever, cough x6 days. Tmax of 101.8F. Pt also with nonproductive cough, generalized myalgias, loose stool. Denies HA, SOB, abd pain, CP, sore throat, urinary complaints, N/V, rash, neck stiffness, photophobia. No recent travel. No sick contacts. Last took Tylenol at 9am.  on exam, Well developed, well nourished, NAD. pink conjunctivae. Moist mucous membranes. heart S1 S2 Regular rate and rhythm. lungs CTA b/l. abd soft, nontender. Spoke to hospitalist, Dr. Redmond, discussed case and results, accepted admission. Pt with abnormal cxr c/w possible covid-19, will admit pt. Spoke to hospitalist, Dr. Redmond, discussed case and results, accepted admission. As per RN, pt c/o difficulty breathing and noted to be hypoxic at 76% on 4L oxygen via NC. Spoke to hospitalist, Dr. Redmond, advised to place on 100% NRB and will see pt in ED, also will upgrade to [facility] as per hospitalist.

## 2020-03-26 NOTE — H&P ADULT - ASSESSMENT
73 y/o F with hx of DM, HTN, HLD, hypothyroid, anxiety biba with c/o fever and cough x 6 days. Pt states that Tmax was 101.8F, took one tab of tylenol XS at 9am this morning. Pt states that she has associated dry cough, body aches and mild generalized weakness concerning for sars pna.

## 2020-03-26 NOTE — ED ADULT NURSE NOTE - CHPI ED NUR SYMPTOMS POS
[FreeTextEntry1] : Dietary education and behavioral modification to begin. \par Seek insurance certification but if denied appeal through [facility]. 
FEVER/COUGH/body aches

## 2020-03-26 NOTE — ED PROVIDER NOTE - LAB INTERPRETATION
Rapid Respiratory Viral Panel (03.26.20 @ 14:21)    Rapid RVP Result: NotDetec: This Respiratory Panel does NOT detect the 2019 novel coronavirus  (2019-nCoV) involved with the outbreak originating in [facility].  This Respiratory Panel uses polymerase chain reaction (PCR) to detect for  adenovirus; coronavirus (HKU1, NL63, 229E, OC43); human metapneumovirus  (hMPV); human enterovirus/rhinovirus (Entero/RV); influenza A; influenza  A/H1; influenza A/H3; influenza A/H1-2009; influenza B; parainfluenza  viruses 1, 2, 3, 4; respiratory syncytial virus; Mycoplasma pneumoniae;  and Chlamydophila pneumoniae.

## 2020-03-26 NOTE — PROCEDURE NOTE - NSTRACHINTUB_RESP_A_CORE
VACCINE ADMINISTRATION RECORD  PARENT / GUARDIAN APPROVAL  Date: 10/23/2024  Vaccine administered to: Cha Duffy     : 2007    MRN: [id number]    A copy of the appropriate Centers for Disease Control and Prevention Vaccine Information statement has been provided. I have read or have had explained the information about the diseases and the vaccines listed below. There was an opportunity to ask questions and any questions were answered satisfactorily. I believe that I understand the benefits and risks of the vaccine cited and ask that the vaccine(s) listed below be given to me or to the person named above (for whom I am authorized to make this request).    VACCINES ADMINISTERED:  Menveo  Flu vaccine    I have read and hereby agree to be bound by the terms of this agreement as stated above. My signature is valid until revoked by me in writing.  This document is signed by parent, relationship: parent on 10/23/2024.:                                                                                                                                         Parent / Guardian Signature                                                Date    Marisela Erazo RN served as a witness to authentication that the identity of the person signing electronically is in fact the person represented as signing.    This document was generated by Marisela Erazo RN on 10/23/2024.   glidescope

## 2020-03-26 NOTE — PROCEDURE NOTE - NSTRACHPOSTINTU_RESP_A_CORE
Breath sounds bilateral/Breath sounds equal/Chest excursion noted/Chest X-Ray/Appropriate capnography

## 2020-03-26 NOTE — ED ADULT NURSE NOTE - OBJECTIVE STATEMENT
75 yo female presents to the ED with cough and fever weakness and generalized body aches  for more than a week , denies SOB , dizziness ,chest pain and chills at this time. Lungs CTA , denies any pain , cough non-productive cough.

## 2020-03-26 NOTE — ED ADULT NURSE REASSESSMENT NOTE - NS ED NURSE REASSESS COMMENT FT1
Spoke with Dr Redmond in regarding pt O2sat going down to 77% on non-rebreather , states she needs to be intubated and he will get into touch with family, no orders given at this time.

## 2020-03-26 NOTE — GOALS OF CARE CONVERSATION - ADVANCED CARE PLANNING - CONVERSATION DETAILS
Explained the Severity of COVID-18 in patients age group with her comorbidities.  Explained that she is now on Full Life support with Ventilator, The difficulty with intubation and the brief Cardiac Arrest she had during.    I explained in detail the extent of her CXR findings and difficulty she is having in oxygenation.  Explained the Code process and how neurologic outcomes are worse in cardiac Arrests precipitated by hypoxia, and the inability to correct the oxygenation in profound PNA / ARDS from COVID-19.    They both understand the severity and plan going forward tonight.  They both were in agreement that the patient would NOT  wish to be resuscitated, but want all other aggressive measures that are required including central access, NGT ECT. Explained the Severity of COVID-19 in patients age group with her comorbidities.  Explained that she is now on Full Life support with Ventilator.  I explained the difficulty that was had with intubation and the brief Cardiac Arrest she had during.    I explained in detail the extent of her CXR findings and difficulty she is having in oxygenation despite MAX ventilator support.  I  explained the Code process and how neurologic outcomes are worse in Cardiac Arrests precipitated by hypoxia, and the inability to correct the oxygenation in profound PNA / ARDS from COVID-19.    They both understand the severity and plan going forward tonight.  They both were in agreement that the patient would NOT  wish to be resuscitated, but want all other aggressive measures that are required including central access, NGT ECT.    Patient is now a DNR.

## 2020-03-26 NOTE — ED PROVIDER NOTE - CLINICAL SUMMARY MEDICAL DECISION MAKING FREE TEXT BOX
75 y/o 75 y/o F with hx of dm, htn co fever (Tmax:101.8F), cough, bodyaches and weakness, one loose stools daily x 6 days, went to urgent care today for covid-19 testing and noted to be hypoxic and sent to ED, O2 sat 88% RA, temp: 101.2F in ED, took tylenol at 9am, lungs cta, no n/v/sob/cp, will get labs, cultures, cxr, ekg, covid and rvp testing, ivf, tylenol, abx and admission if warranted

## 2020-03-26 NOTE — ED ADULT NURSE NOTE - CHPI ED NUR SYMPTOMS NEG
no wheezing/no chest pain/no chills/no diaphoresis/no hemoptysis/no edema/no headache/no shortness of breath

## 2020-03-26 NOTE — H&P ADULT - PROBLEM SELECTOR PLAN 6
pt in resp distress likely 2/2 viral pna and will get eval for intuabtion now    scd and gi ppx  guarded prognosis

## 2020-03-26 NOTE — ED ADULT NURSE NOTE - PMH
Anxiety    B12 deficiency    Bronchitis    DJD (degenerative joint disease)    DM (diabetes mellitus)    History of vitamin D deficiency    HTN (hypertension)    Hyperlipidemia    Hypothyroid

## 2020-03-27 DIAGNOSIS — R57.9 SHOCK, UNSPECIFIED: ICD-10-CM

## 2020-03-27 LAB
ALBUMIN SERPL ELPH-MCNC: 2.3 G/DL — LOW (ref 3.3–5)
ALP SERPL-CCNC: 55 U/L — SIGNIFICANT CHANGE UP (ref 30–120)
ALT FLD-CCNC: 44 U/L DA — SIGNIFICANT CHANGE UP (ref 10–60)
ANION GAP SERPL CALC-SCNC: 11 MMOL/L — SIGNIFICANT CHANGE UP (ref 5–17)
AST SERPL-CCNC: 51 U/L — HIGH (ref 10–40)
BASE EXCESS BLDA CALC-SCNC: -10.9 MMOL/L — LOW (ref -2–2)
BASOPHILS # BLD AUTO: 0 K/UL — SIGNIFICANT CHANGE UP (ref 0–0.2)
BASOPHILS NFR BLD AUTO: 0 % — SIGNIFICANT CHANGE UP (ref 0–2)
BILIRUB SERPL-MCNC: 0.2 MG/DL — SIGNIFICANT CHANGE UP (ref 0.2–1.2)
BLOOD GAS SOURCE: SIGNIFICANT CHANGE UP
BUN SERPL-MCNC: 45 MG/DL — HIGH (ref 7–23)
CALCIUM SERPL-MCNC: 8.1 MG/DL — LOW (ref 8.4–10.5)
CHLORIDE SERPL-SCNC: 102 MMOL/L — SIGNIFICANT CHANGE UP (ref 96–108)
CO2 SERPL-SCNC: 21 MMOL/L — LOW (ref 22–31)
CREAT SERPL-MCNC: 2.42 MG/DL — HIGH (ref 0.5–1.3)
CRP SERPL-MCNC: 12.72 MG/DL — HIGH (ref 0–0.4)
EOSINOPHIL # BLD AUTO: 0 K/UL — SIGNIFICANT CHANGE UP (ref 0–0.5)
EOSINOPHIL NFR BLD AUTO: 0 % — SIGNIFICANT CHANGE UP (ref 0–6)
GLUCOSE BLDC GLUCOMTR-MCNC: 163 MG/DL — HIGH (ref 70–99)
GLUCOSE BLDC GLUCOMTR-MCNC: 189 MG/DL — HIGH (ref 70–99)
GLUCOSE BLDC GLUCOMTR-MCNC: 194 MG/DL — HIGH (ref 70–99)
GLUCOSE BLDC GLUCOMTR-MCNC: 241 MG/DL — HIGH (ref 70–99)
GLUCOSE SERPL-MCNC: 233 MG/DL — HIGH (ref 70–99)
HBA1C BLD-MCNC: 8.1 % — HIGH (ref 4–5.6)
HCO3 BLDA-SCNC: 16 MMOL/L — LOW (ref 21–29)
HCT VFR BLD CALC: 27 % — LOW (ref 34.5–45)
HCV AB S/CO SERPL IA: 0.68 S/CO — SIGNIFICANT CHANGE UP (ref 0–0.99)
HCV AB SERPL-IMP: SIGNIFICANT CHANGE UP
HGB BLD-MCNC: 8.5 G/DL — LOW (ref 11.5–15.5)
LACTATE SERPL-SCNC: 1 MMOL/L — SIGNIFICANT CHANGE UP (ref 0.7–2)
LDH SERPL L TO P-CCNC: 473 U/L — HIGH (ref 50–242)
LYMPHOCYTES # BLD AUTO: 0.8 K/UL — LOW (ref 1–3.3)
LYMPHOCYTES # BLD AUTO: 6 % — LOW (ref 13–44)
MANUAL SMEAR VERIFICATION: SIGNIFICANT CHANGE UP
MCHC RBC-ENTMCNC: 29 PG — SIGNIFICANT CHANGE UP (ref 27–34)
MCHC RBC-ENTMCNC: 31.5 GM/DL — LOW (ref 32–36)
MCV RBC AUTO: 92.2 FL — SIGNIFICANT CHANGE UP (ref 80–100)
MONOCYTES # BLD AUTO: 0.4 K/UL — SIGNIFICANT CHANGE UP (ref 0–0.9)
MONOCYTES NFR BLD AUTO: 3 % — SIGNIFICANT CHANGE UP (ref 2–14)
NEUTROPHILS # BLD AUTO: 12.17 K/UL — HIGH (ref 1.8–7.4)
NEUTROPHILS NFR BLD AUTO: 76 % — SIGNIFICANT CHANGE UP (ref 43–77)
NEUTS BAND # BLD: 15 % — HIGH (ref 0–8)
NRBC # BLD: 0 — SIGNIFICANT CHANGE UP
NRBC # BLD: SIGNIFICANT CHANGE UP /100 WBCS (ref 0–0)
PCO2 BLDA: 34 MMHG — SIGNIFICANT CHANGE UP (ref 32–46)
PH BLD: 7.26 — LOW (ref 7.35–7.45)
PLAT MORPH BLD: NORMAL — SIGNIFICANT CHANGE UP
PLATELET # BLD AUTO: 296 K/UL — SIGNIFICANT CHANGE UP (ref 150–400)
PO2 BLDA: 97 MMHG — SIGNIFICANT CHANGE UP (ref 74–108)
POTASSIUM SERPL-MCNC: 4.2 MMOL/L — SIGNIFICANT CHANGE UP (ref 3.5–5.3)
POTASSIUM SERPL-SCNC: 4.2 MMOL/L — SIGNIFICANT CHANGE UP (ref 3.5–5.3)
PROCALCITONIN SERPL-MCNC: 34.2 NG/ML — HIGH (ref 0.02–0.1)
PROT SERPL-MCNC: 6.7 G/DL — SIGNIFICANT CHANGE UP (ref 6–8.3)
RBC # BLD: 2.93 M/UL — LOW (ref 3.8–5.2)
RBC # FLD: 14.2 % — SIGNIFICANT CHANGE UP (ref 10.3–14.5)
RBC BLD AUTO: NORMAL — SIGNIFICANT CHANGE UP
SAO2 % BLDA: 96 % — SIGNIFICANT CHANGE UP (ref 92–96)
SARS-COV-2 RNA SPEC QL NAA+PROBE: DETECTED
SODIUM SERPL-SCNC: 134 MMOL/L — LOW (ref 135–145)
WBC # BLD: 13.37 K/UL — HIGH (ref 3.8–10.5)
WBC # FLD AUTO: 13.37 K/UL — HIGH (ref 3.8–10.5)

## 2020-03-27 PROCEDURE — 71045 X-RAY EXAM CHEST 1 VIEW: CPT | Mod: 26

## 2020-03-27 PROCEDURE — 99233 SBSQ HOSP IP/OBS HIGH 50: CPT

## 2020-03-27 RX ORDER — CHLORHEXIDINE GLUCONATE 213 G/1000ML
1 SOLUTION TOPICAL
Refills: 0 | Status: DISCONTINUED | OUTPATIENT
Start: 2020-03-27 | End: 2020-04-16

## 2020-03-27 RX ORDER — HYDROXYCHLOROQUINE SULFATE 200 MG
200 TABLET ORAL EVERY 12 HOURS
Refills: 0 | Status: COMPLETED | OUTPATIENT
Start: 2020-03-28 | End: 2020-03-31

## 2020-03-27 RX ORDER — PIPERACILLIN AND TAZOBACTAM 4; .5 G/20ML; G/20ML
3.38 INJECTION, POWDER, LYOPHILIZED, FOR SOLUTION INTRAVENOUS EVERY 8 HOURS
Refills: 0 | Status: DISCONTINUED | OUTPATIENT
Start: 2020-03-27 | End: 2020-03-30

## 2020-03-27 RX ORDER — NOREPINEPHRINE BITARTRATE/D5W 8 MG/250ML
0.05 PLASTIC BAG, INJECTION (ML) INTRAVENOUS
Qty: 8 | Refills: 0 | Status: DISCONTINUED | OUTPATIENT
Start: 2020-03-27 | End: 2020-03-31

## 2020-03-27 RX ORDER — PIPERACILLIN AND TAZOBACTAM 4; .5 G/20ML; G/20ML
3.38 INJECTION, POWDER, LYOPHILIZED, FOR SOLUTION INTRAVENOUS ONCE
Refills: 0 | Status: COMPLETED | OUTPATIENT
Start: 2020-03-27 | End: 2020-03-27

## 2020-03-27 RX ORDER — SODIUM CHLORIDE 9 MG/ML
10 INJECTION INTRAMUSCULAR; INTRAVENOUS; SUBCUTANEOUS
Refills: 0 | Status: DISCONTINUED | OUTPATIENT
Start: 2020-03-27 | End: 2020-04-16

## 2020-03-27 RX ADMIN — Medication 2: at 17:42

## 2020-03-27 RX ADMIN — PROPOFOL 40 MICROGRAM(S)/KG/MIN: 10 INJECTION, EMULSION INTRAVENOUS at 05:09

## 2020-03-27 RX ADMIN — CEFTRIAXONE 100 MILLIGRAM(S): 500 INJECTION, POWDER, FOR SOLUTION INTRAMUSCULAR; INTRAVENOUS at 14:00

## 2020-03-27 RX ADMIN — Medication 400 MILLIGRAM(S): at 06:10

## 2020-03-27 RX ADMIN — PROPOFOL 40 MICROGRAM(S)/KG/MIN: 10 INJECTION, EMULSION INTRAVENOUS at 14:00

## 2020-03-27 RX ADMIN — HEPARIN SODIUM 5000 UNIT(S): 5000 INJECTION INTRAVENOUS; SUBCUTANEOUS at 05:09

## 2020-03-27 RX ADMIN — PANTOPRAZOLE SODIUM 40 MILLIGRAM(S): 20 TABLET, DELAYED RELEASE ORAL at 11:09

## 2020-03-27 RX ADMIN — CHLORHEXIDINE GLUCONATE 1 APPLICATION(S): 213 SOLUTION TOPICAL at 06:37

## 2020-03-27 RX ADMIN — HEPARIN SODIUM 5000 UNIT(S): 5000 INJECTION INTRAVENOUS; SUBCUTANEOUS at 13:41

## 2020-03-27 RX ADMIN — Medication 650 MILLIGRAM(S): at 17:42

## 2020-03-27 RX ADMIN — CHLORHEXIDINE GLUCONATE 15 MILLILITER(S): 213 SOLUTION TOPICAL at 05:11

## 2020-03-27 RX ADMIN — SIMVASTATIN 20 MILLIGRAM(S): 20 TABLET, FILM COATED ORAL at 23:39

## 2020-03-27 RX ADMIN — Medication 4: at 05:15

## 2020-03-27 RX ADMIN — Medication 8.5 MICROGRAM(S)/KG/MIN: at 15:09

## 2020-03-27 RX ADMIN — Medication 8.5 MICROGRAM(S)/KG/MIN: at 07:46

## 2020-03-27 RX ADMIN — Medication 2: at 23:48

## 2020-03-27 RX ADMIN — Medication 650 MILLIGRAM(S): at 06:56

## 2020-03-27 RX ADMIN — Medication 650 MILLIGRAM(S): at 06:10

## 2020-03-27 RX ADMIN — HEPARIN SODIUM 5000 UNIT(S): 5000 INJECTION INTRAVENOUS; SUBCUTANEOUS at 23:39

## 2020-03-27 RX ADMIN — CHLORHEXIDINE GLUCONATE 15 MILLILITER(S): 213 SOLUTION TOPICAL at 17:42

## 2020-03-27 RX ADMIN — Medication 650 MILLIGRAM(S): at 18:04

## 2020-03-27 RX ADMIN — Medication 400 MILLIGRAM(S): at 17:42

## 2020-03-27 RX ADMIN — PIPERACILLIN AND TAZOBACTAM 200 GRAM(S): 4; .5 INJECTION, POWDER, LYOPHILIZED, FOR SOLUTION INTRAVENOUS at 20:30

## 2020-03-27 RX ADMIN — AZITHROMYCIN 255 MILLIGRAM(S): 500 TABLET, FILM COATED ORAL at 15:09

## 2020-03-27 RX ADMIN — Medication 2: at 11:08

## 2020-03-27 RX ADMIN — PROPOFOL 40 MICROGRAM(S)/KG/MIN: 10 INJECTION, EMULSION INTRAVENOUS at 22:27

## 2020-03-27 NOTE — CONSULT NOTE ADULT - SUBJECTIVE AND OBJECTIVE BOX
I  HPI:  75 y/o F with hx of DM, HTN, HLD, hypothyroid, anxiety presented with CC of fever and cough x 6 days decompensated in ED and was intubated, Had cardiorespiratory arrest. Febrile to 101.2. Pt unable to provide history. On vent and on pressors. COVID 19 +.    Infectious Disease consult was called to evaluate pt.    Past Medical & Surgical Hx:  PAST MEDICAL & SURGICAL HISTORY:  DJD (degenerative joint disease)  DM (diabetes mellitus)  Hyperlipidemia  Hypothyroid  HTN (hypertension)  History of vitamin D deficiency  B12 deficiency  Bronchitis  Anxiety  S/P cataract surgery  S/P eye surgery: left eye retinal surgery x2  S/P  section: x2    Social History--  EtOH: denies   Tobacco: denies   Drug Use: denies     FAMILY HISTORY:  Noncontributory    Allergies  No Known Allergies    Intolerances  NONE    Home Medications:    Current Inpatient Medications :    ANTIBIOTICS:   azithromycin  IVPB 500 milliGRAM(s) IV Intermittent every 24 hours  cefTRIAXone   IVPB 1000 milliGRAM(s) IV Intermittent every 24 hours  hydroxychloroquine   Oral       OTHER RELEVANT MEDICATIONS :  acetaminophen   Tablet .. 650 milliGRAM(s) Oral every 6 hours PRN  chlorhexidine 0.12% Liquid 15 milliLiter(s) Oral Mucosa every 12 hours  chlorhexidine 2% Cloths 1 Application(s) Topical <User Schedule>  dextrose 40% Gel 15 Gram(s) Oral once PRN  dextrose 5%. 1000 milliLiter(s) IV Continuous <Continuous>  dextrose 50% Injectable 12.5 Gram(s) IV Push once  dextrose 50% Injectable 25 Gram(s) IV Push once  dextrose 50% Injectable 25 Gram(s) IV Push once  fentaNYL   Infusion. 1 MICROgram(s)/kG/Hr IV Continuous <Continuous>  glucagon  Injectable 1 milliGRAM(s) IntraMuscular once PRN  heparin  Injectable 5000 Unit(s) SubCutaneous every 8 hours  insulin lispro (HumaLOG) corrective regimen sliding scale   SubCutaneous every 6 hours  norepinephrine Infusion 0.05 MICROgram(s)/kG/Min IV Continuous <Continuous>  pantoprazole  Injectable 40 milliGRAM(s) IV Push daily  propofol Infusion 73.502 MICROgram(s)/kG/Min IV Continuous <Continuous>  simvastatin 20 milliGRAM(s) Oral at bedtime  sodium chloride 0.9% lock flush 10 milliLiter(s) IV Push every 1 hour PRN  veCURonium Infusion 1 MICROgram(s)/kG/Min IV Continuous <Continuous>    ROS:  Unable to obtain due to : Pt's condition    I&O's Detail    26 Mar 2020 07:01  -  27 Mar 2020 07:00  --------------------------------------------------------  IN:    fentaNYL Infusion.: 23.4 mL    propofol Infusion: 228.5 mL    veCURonium Infusion: 47.7 mL  Total IN: 299.6 mL    OUT:    Indwelling Catheter - Urethral: 100 mL  Total OUT: 100 mL    Total NET: 199.6 mL      27 Mar 2020 07:01  -  27 Mar 2020 17:51  --------------------------------------------------------  IN:    fentaNYL Infusion.: 27 mL    IV PiggyBack: 300 mL    norepinephrine Infusion: 212.5 mL    propofol Infusion: 109 mL    veCURonium Infusion: 5.9 mL  Total IN: 654.4 mL    OUT:    Indwelling Catheter - Urethral: 150 mL  Total OUT: 150 mL    Total NET: 504.4 mL    Physical Exam:  Vital Signs Last 24 Hrs  T(C): 38.3 (27 Mar 2020 17:30), Max: 38.6 (27 Mar 2020 04:30)  T(F): 101 (27 Mar 2020 17:30), Max: 101.4 (27 Mar 2020 04:30)  HR: 84 (27 Mar 2020 17:30) (55 - 98)  BP: 144/72 (27 Mar 2020 17:30) (79/41 - 178/72)  BP(mean): 91 (27 Mar 2020 17:30) (53 - 109)  RR: 27 (27 Mar 2020 17:30) (0 - 42)  SpO2: 99% (27 Mar 2020 17:30) (76% - 100%)      General: vented sedated obese  Eyes: sclera anicteric, pupils equal and reactive to light  ENMT: buccal mucosa moist, pharynx not injected  Neck: supple, trachea midline  Lungs: Decreased, no wheeze/rhonchi  Cardiovascular: regular rate and rhythm, S1 S2  Abdomen: soft, nontender, no organomegaly present, bowel sounds normal  Neurological: sedated  Skin:no increased ecchymosis/petechiae/purpura  Lymph Nodes: no palpable cervical/supraclavicular lymph nodes enlargements  Extremities: no cyanosis/clubbing/edema    Labs:                         8.5    13.37 )-----------( 296      ( 27 Mar 2020 07:23 )             27.0       134<L>  |  102  |  45<H>  ----------------------------<  233<H>  4.2   |  21<L>  |  2.42<H>    Ca    8.1<L>      27 Mar 2020 06:54    TPro  6.7  /  Alb  2.3<L>  /  TBili  0.2  /  DBili  x   /  AST  51<H>  /  ALT  44  /  AlkPhos  55      Procalcitonin, Serum (20 @ 11:29)    Procalcitonin, Serum: 34.20    Lactate Dehydrogenase, Serum (20 @ 12:10)    Lactate Dehydrogenase, Serum: 473 U/L    RECENT CULTURES:  pending    COVID-19 PCR . (20 @ 22:21)    COVID-19 PCR: Detected: All “detected” results on this new test are considered presumptively  positive results, are clinically actionable, and specimens will be  forwarded to [facility] for confirmation testing.  Another report (corrected report) will only be issued if discordant  results occur.  This test has been validated by [employer] to be accurate;  though it has not been FDA cleared/approved by the usual pathway.  As with all laboratory tests, results should be correlated with clinical  findings.      RADIOLOGY & ADDITIONAL STUDIES:  EXAM:  XR CHEST PORTABLE IMMED 1V                            PROCEDURE DATE:  2020      INTERPRETATION:  CLINICAL STATEMENT: Follow-up chest pain.    TECHNIQUE: AP view of the chest.    COMPARISON: 3/26/2020    FINDINGS/  IMPRESSION:  ET tube and feeding tube noted. Tip of ET tube and feeding tube not well seen.    Increased interstitial lung markings without significant change. Superimposed bilateral airspace opacities overall improving.      Assessment :   75 y/o F with hx of DM, HTN, HLD, hypothyroid, anxiety presented admitted with severe sepsis with septic shock and acute hypoxic respiratory failure with MSOF sec COVID 19 pneumonia. Sp Cardiorespiratory arrest. Febrile to 101.2.  Procalcitonin markedly elevated- concern for superimposed bacterial process.    Plan :   Cont Hydroxychloroquine  Start Zosyn  Get sputum culture  Fu cultures  Vent per pulm ICU  Wean off pressors  Trend temps    COVID-19---high risk period for decompensation  (7-14 days post symptom onset), avoid aerosolizing procedures,  steroids, NSAIDs ---no compelling evidence to date DO NOT recommend any specific therapies outside of established protocols or controlled trials -would support focus on supportive care  avoid excessive blood draws but do recommend for hospitalized patients  -daily CBC w diff to follow eos, lymphocytes and neutrophils and daily NLR calculation (NLR <3 low vs >5 high) -other labs that may be selectively used to risk stratify and predict clinical course,   Procalcitonin (<0.2 associated with more severe disease),  Ferritin (lower risk <450 vs >850) CRP (low risk <2 and higher risk >6) D-dimer (<1,000 vs >1,000) LDH, ESR, PT/PTT, CK, lactate, troponin, IL-6  -antibiotics only if there is concern for a bacterial process  neutrophil/lymphocyte ratio 15    D/w  [facility] team    Continue with present regime .  Approptiate use of antibiotics and adverse effects reviewed.      I have discussed the above plan of care with patient/family in detail. They expressed understanding of the treatment plan . Risks, benefits and alternatives discussed in detail. I have asked if they have any questions or concerns and appropriately addressed them to the best of my ability .      > 45 minutes spent in direct patient care reviewing  the notes, lab data/ imaging , discussion with multidisciplinary team. All questions were addressed and answered to the best of my capacity .    Thank you for allowing me to participate in the care of your patient .      Eder Brown MD  Infectious Disease  [phone] HPI:  73 y/o F with hx of DM, HTN, HLD, hypothyroid, anxiety presented with CC of fever and cough x 6 days decompensated in ED and was intubated, Had cardiorespiratory arrest. Febrile to 101.2. Pt unable to provide history. On vent and on pressors. COVID 19 +.    Infectious Disease consult was called to evaluate pt.    Past Medical & Surgical Hx:  PAST MEDICAL & SURGICAL HISTORY:  DJD (degenerative joint disease)  DM (diabetes mellitus)  Hyperlipidemia  Hypothyroid  HTN (hypertension)  History of vitamin D deficiency  B12 deficiency  Bronchitis  Anxiety  S/P cataract surgery  S/P eye surgery: left eye retinal surgery x2  S/P  section: x2    Social History--  EtOH: denies   Tobacco: denies   Drug Use: denies     FAMILY HISTORY:  Noncontributory    Allergies  No Known Allergies    Intolerances  NONE    Home Medications:    Current Inpatient Medications :    ANTIBIOTICS:   azithromycin  IVPB 500 milliGRAM(s) IV Intermittent every 24 hours  cefTRIAXone   IVPB 1000 milliGRAM(s) IV Intermittent every 24 hours  hydroxychloroquine   Oral       OTHER RELEVANT MEDICATIONS :  acetaminophen   Tablet .. 650 milliGRAM(s) Oral every 6 hours PRN  chlorhexidine 0.12% Liquid 15 milliLiter(s) Oral Mucosa every 12 hours  chlorhexidine 2% Cloths 1 Application(s) Topical <User Schedule>  dextrose 40% Gel 15 Gram(s) Oral once PRN  dextrose 5%. 1000 milliLiter(s) IV Continuous <Continuous>  dextrose 50% Injectable 12.5 Gram(s) IV Push once  dextrose 50% Injectable 25 Gram(s) IV Push once  dextrose 50% Injectable 25 Gram(s) IV Push once  fentaNYL   Infusion. 1 MICROgram(s)/kG/Hr IV Continuous <Continuous>  glucagon  Injectable 1 milliGRAM(s) IntraMuscular once PRN  heparin  Injectable 5000 Unit(s) SubCutaneous every 8 hours  insulin lispro (HumaLOG) corrective regimen sliding scale   SubCutaneous every 6 hours  norepinephrine Infusion 0.05 MICROgram(s)/kG/Min IV Continuous <Continuous>  pantoprazole  Injectable 40 milliGRAM(s) IV Push daily  propofol Infusion 73.502 MICROgram(s)/kG/Min IV Continuous <Continuous>  simvastatin 20 milliGRAM(s) Oral at bedtime  sodium chloride 0.9% lock flush 10 milliLiter(s) IV Push every 1 hour PRN  veCURonium Infusion 1 MICROgram(s)/kG/Min IV Continuous <Continuous>    ROS:  Unable to obtain due to : Pt's condition    I&O's Detail    26 Mar 2020 07:01  -  27 Mar 2020 07:00  --------------------------------------------------------  IN:    fentaNYL Infusion.: 23.4 mL    propofol Infusion: 228.5 mL    veCURonium Infusion: 47.7 mL  Total IN: 299.6 mL    OUT:    Indwelling Catheter - Urethral: 100 mL  Total OUT: 100 mL    Total NET: 199.6 mL      27 Mar 2020 07:01  -  27 Mar 2020 17:51  --------------------------------------------------------  IN:    fentaNYL Infusion.: 27 mL    IV PiggyBack: 300 mL    norepinephrine Infusion: 212.5 mL    propofol Infusion: 109 mL    veCURonium Infusion: 5.9 mL  Total IN: 654.4 mL    OUT:    Indwelling Catheter - Urethral: 150 mL  Total OUT: 150 mL    Total NET: 504.4 mL    Physical Exam:  Vital Signs Last 24 Hrs  T(C): 38.3 (27 Mar 2020 17:30), Max: 38.6 (27 Mar 2020 04:30)  T(F): 101 (27 Mar 2020 17:30), Max: 101.4 (27 Mar 2020 04:30)  HR: 84 (27 Mar 2020 17:30) (55 - 98)  BP: 144/72 (27 Mar 2020 17:30) (79/41 - 178/72)  BP(mean): 91 (27 Mar 2020 17:30) (53 - 109)  RR: 27 (27 Mar 2020 17:30) (0 - 42)  SpO2: 99% (27 Mar 2020 17:30) (76% - 100%)      General: vented sedated obese  Eyes: sclera anicteric, pupils equal and reactive to light  ENMT: buccal mucosa moist, pharynx not injected  Neck: supple, trachea midline  Lungs: Decreased, no wheeze/rhonchi  Cardiovascular: regular rate and rhythm, S1 S2  Abdomen: soft, nontender, no organomegaly present, bowel sounds normal  Neurological: sedated  Skin:no increased ecchymosis/petechiae/purpura  Lymph Nodes: no palpable cervical/supraclavicular lymph nodes enlargements  Extremities: no cyanosis/clubbing/edema    Labs:                         8.5    13.37 )-----------( 296      ( 27 Mar 2020 07:23 )             27.0       134<L>  |  102  |  45<H>  ----------------------------<  233<H>  4.2   |  21<L>  |  2.42<H>    Ca    8.1<L>      27 Mar 2020 06:54    TPro  6.7  /  Alb  2.3<L>  /  TBili  0.2  /  DBili  x   /  AST  51<H>  /  ALT  44  /  AlkPhos  55      Procalcitonin, Serum (20 @ 11:29)    Procalcitonin, Serum: 34.20    Lactate Dehydrogenase, Serum (20 @ 12:10)    Lactate Dehydrogenase, Serum: 473 U/L    RECENT CULTURES:  pending    COVID-19 PCR . (20 @ 22:21)    COVID-19 PCR: Detected: All “detected” results on this new test are considered presumptively  positive results, are clinically actionable, and specimens will be  forwarded to [facility] for confirmation testing.  Another report (corrected report) will only be issued if discordant  results occur.  This test has been validated by [employer] to be accurate;  though it has not been FDA cleared/approved by the usual pathway.  As with all laboratory tests, results should be correlated with clinical  findings.      RADIOLOGY & ADDITIONAL STUDIES:  EXAM:  XR CHEST PORTABLE IMMED 1V                            PROCEDURE DATE:  2020      INTERPRETATION:  CLINICAL STATEMENT: Follow-up chest pain.    TECHNIQUE: AP view of the chest.    COMPARISON: 3/26/2020    FINDINGS/  IMPRESSION:  ET tube and feeding tube noted. Tip of ET tube and feeding tube not well seen.    Increased interstitial lung markings without significant change. Superimposed bilateral airspace opacities overall improving.      Assessment :   73 y/o F with hx of DM, HTN, HLD, hypothyroid, anxiety presented admitted with severe sepsis with septic shock and acute hypoxic respiratory failure with MSOF sec COVID 19 pneumonia. Sp Cardiorespiratory arrest. Febrile to 101.2.  Procalcitonin markedly elevated- concern for superimposed bacterial process.    Plan :   Cont Hydroxychloroquine  Start Zosyn  Get sputum culture  Fu cultures  Vent per pulm ICU  Wean off pressors  Trend temps    COVID-19---high risk period for decompensation  (7-14 days post symptom onset), avoid aerosolizing procedures,  steroids, NSAIDs ---no compelling evidence to date DO NOT recommend any specific therapies outside of established protocols or controlled trials -would support focus on supportive care  avoid excessive blood draws but do recommend for hospitalized patients  -daily CBC w diff to follow eos, lymphocytes and neutrophils and daily NLR calculation (NLR <3 low vs >5 high) -other labs that may be selectively used to risk stratify and predict clinical course,   Procalcitonin (<0.2 associated with more severe disease),  Ferritin (lower risk <450 vs >850) CRP (low risk <2 and higher risk >6) D-dimer (<1,000 vs >1,000) LDH, ESR, PT/PTT, CK, lactate, troponin, IL-6  -antibiotics only if there is concern for a bacterial process  neutrophil/lymphocyte ratio 15    D/w  [facility] team    Continue with present regime .  Approptiate use of antibiotics and adverse effects reviewed.      I have discussed the above plan of care with patient/family in detail. They expressed understanding of the treatment plan . Risks, benefits and alternatives discussed in detail. I have asked if they have any questions or concerns and appropriately addressed them to the best of my ability .      Critical care >65 minutes spent in direct patient care reviewing  the notes, lab data/ imaging , discussion with multidisciplinary team. All questions were addressed and answered to the best of my capacity .    Thank you for allowing me to participate in the care of your patient .      Eder Brown MD  Infectious Disease  [phone]

## 2020-03-27 NOTE — PROGRESS NOTE ADULT - ASSESSMENT
75 y/o F with hx of DM, HTN, HLD, hypothyroid, anxiety biba with c/o fever and cough x 6 days. Pt states that Tmax was 101.8F, took one tab of tylenol XS at 9am this morning. Pt states that she has associated dry cough, body aches and mild generalized weakness concerning for sars pna.

## 2020-03-27 NOTE — DIETITIAN INITIAL EVALUATION ADULT. - PERTINENT LABORATORY DATA
3/27 - WBC 13.37H, H/H 8.5L/27.0L, Na+ 134L, Co2 21L, BUN 45H, Creat 2.42H, Glucose 233H, Ca++ 8.1L, Alb 2.3L, GFR 19L; POCT [phone]

## 2020-03-27 NOTE — PROGRESS NOTE ADULT - ASSESSMENT
74 year old female PMHx Morbid Obesity,  DM2, HTN, HLD, Hypothyroid, Anxiety.   Presents via EMS to ED with fever and cough x 6 days.   Acute hypoxic Respiratory Failure Likely from Viral PNA / COVID-19     Hosp day 1  Vent day 1      Neuro - Sedation neuromuscular blockade to facilitate safe ventilation - Fentanyl Gtt / Diprivan to achieve RASS -2 to -4 and Veccuronium Gtt to 2/4    CV -  Pressor support as needed to maintain MAP 65.  Avoiding fluid challenges.  QTC monitoring while on azithro and  hydroxychloroquine.    Pulm -  ARDS-NET 4-6cc/kg IBW TV as able to maintain plateau pressures <30.             Prone ventilation consideration as feasible            Patient not yet at [place] Bodyweight TV Goal 200-300 current 350 PIP's OK             No SAT / SBT given High vent requirements               GI -  PPI  Enteric feeds as tolerated in tandem with NMB and prone ventilation    Renal - Even to negative fluid balance as tolerated by hemodynamics and renal fx.  Feeds to be provided in lieu of IVF.     Heme -  Pharmacologic DVT PPx in addition to SCD's    ID - ABX discontinuation based on discussion with ID in conjunction with clinical features, culture data, and judicious procalcitonin monitoring.           Endo - Aggressive glycemic control to limit FS glucose to < 180mg/dl.      COVID 19 specific considerations and therapeutic options based on the available and rapidly changing literature    Goals of care considerations:  See Separate GOC Note - Patient Now DNR 74 year old female PMHx Morbid Obesity,  DM2, HTN, HLD, Hypothyroid, Anxiety.   Presents via EMS to ED with fever and cough x 6 days.   Acute hypoxic Respiratory Failure Likely from Viral PNA / COVID-19     Hosp day 1  Vent day 1      Neuro - Sedation neuromuscular blockade to facilitate safe ventilation - Fentanyl Gtt / Diprivan to achieve RASS -2 to -4 and Veccuronium Gtt to 2/4    CV -  Pressor support as needed to maintain MAP 65.  Avoiding fluid challenges.  QTC monitoring while on azithro and  hydroxychloroquine.    Pulm - ARDS-NET 4-6cc/kg IBW TV as able to maintain plateau pressures <30.             Prone ventilation consideration as feasible            Patient not yet at [place] Bodyweight TV Goal 200-300 current 350 PIP's OK             No SAT / SBT given High vent requirements             Vent bundle in place               GI -  PPI  Enteric feeds as tolerated in tandem with NMB and prone ventilation    Renal - Even to negative fluid balance as tolerated by hemodynamics and renal fx.  Feeds to be provided in lieu of IVF.     Heme -  Pharmacologic DVT PPx in addition to SCD's    ID - ABX discontinuation based on discussion with ID in conjunction with clinical features, culture data, and judicious procalcitonin monitoring.                    Endo - Aggressive glycemic control to limit FS glucose to < 180mg/dl.    COVID 19 specific considerations and therapeutic options based on the available and rapidly changing literature    Goals of care considerations:  See Separate GOC Note - Patient Now DNR

## 2020-03-27 NOTE — PROVIDER CONTACT NOTE (EICU) - SITUATION
E-alerted by bedside RN who reports pt is acutely hypotensive, currently intubated for for ARDS secondary to SARS-CoV2. Pt also receiving paralytics with minimal sedation however not overbreathing ventilator. Will start on levohped and discuss with bedside critical care PA.

## 2020-03-27 NOTE — CONSULT NOTE ADULT - SUBJECTIVE AND OBJECTIVE BOX
Date/Time Patient Seen:  		  Referring MD:   Data Reviewed	       Patient is a 74y old  Female who presents with a chief complaint of shob (27 Mar 2020 03:05)      Subjective/HPI    in bed  seen and examined  vs and meds reviewed  H and P reviewed  ER provider note reviewed  labs reviewed  cxr reviewed  covid testing in progress    HPI:  75 y/o F with hx of DM, HTN, HLD, hypothyroid, anxiety biba with c/o fever and cough x 6 days. Pt states that Tmax was 101.8F, took one tab of tylenol XS at 9am this morning. Pt states that she has associated dry cough, body aches and mild generalized weakness. States that she has had one episode of loose stool daily. Denies recent travel, sick contacts, known covid-19 exposure, CP, SOB, runny nose, sore throat, headache, rash, urinary symptoms.  she was sating 94% ON 4l and was then put on non rebreather and was transferred to sp[facility].    2 hr after initial exam around 7pm  due to increased work of breathing and shob anesthesisa was called for intubation.  covid was sent.         PAST MEDICAL & SURGICAL HISTORY:  DJD (degenerative joint disease)  DM (diabetes mellitus)  Hyperlipidemia  Hypothyroid  HTN (hypertension)  History of vitamin D deficiency  B12 deficiency  Bronchitis  Anxiety  S/P cataract surgery  S/P eye surgery: left eye retinal surgery x2  S/P  section: x2        Medication list         MEDICATIONS  (STANDING):  azithromycin  IVPB 500 milliGRAM(s) IV Intermittent every 24 hours  cefTRIAXone   IVPB 1000 milliGRAM(s) IV Intermittent every 24 hours  chlorhexidine 0.12% Liquid 15 milliLiter(s) Oral Mucosa every 12 hours  chlorhexidine 2% Cloths 1 Application(s) Topical <User Schedule>  dextrose 5%. 1000 milliLiter(s) (50 mL/Hr) IV Continuous <Continuous>  dextrose 50% Injectable 12.5 Gram(s) IV Push once  dextrose 50% Injectable 25 Gram(s) IV Push once  dextrose 50% Injectable 25 Gram(s) IV Push once  fentaNYL   Infusion. 1 MICROgram(s)/kG/Hr (1.81 mL/Hr) IV Continuous <Continuous>  heparin  Injectable 5000 Unit(s) SubCutaneous every 8 hours  hydroxychloroquine 400 milliGRAM(s) Oral every 12 hours  hydroxychloroquine   Oral   insulin lispro (HumaLOG) corrective regimen sliding scale   SubCutaneous every 6 hours  pantoprazole  Injectable 40 milliGRAM(s) IV Push daily  propofol Infusion 73.502 MICROgram(s)/kG/Min (40 mL/Hr) IV Continuous <Continuous>  simvastatin 20 milliGRAM(s) Oral at bedtime  veCURonium Infusion 1 MICROgram(s)/kG/Min (5.44 mL/Hr) IV Continuous <Continuous>    MEDICATIONS  (PRN):  acetaminophen   Tablet .. 650 milliGRAM(s) Oral every 6 hours PRN Temp greater or equal to 38C (100.4F), Mild Pain (1 - 3)  dextrose 40% Gel 15 Gram(s) Oral once PRN Blood Glucose LESS THAN 70 milliGRAM(s)/deciliter  glucagon  Injectable 1 milliGRAM(s) IntraMuscular once PRN Glucose LESS THAN 70 milligrams/deciliter  sodium chloride 0.9% lock flush 10 milliLiter(s) IV Push every 1 hour PRN Pre/post blood products, medications, blood draw, and to maintain line patency         Vitals log        ICU Vital Signs Last 24 Hrs  T(C): 38.6 (27 Mar 2020 04:30), Max: 38.6 (27 Mar 2020 04:30)  T(F): 101.4 (27 Mar 2020 04:30), Max: 101.4 (27 Mar 2020 04:30)  HR: 61 (27 Mar 2020 06:40) (55 - 98)  BP: 111/55 (27 Mar 2020 06:30) (79/41 - 178/72)  BP(mean): 70 (27 Mar 2020 06:30) (53 - 109)  ABP: 110/45 (27 Mar 2020 06:30) (93/44 - 174/70)  ABP(mean): 64 (27 Mar 2020 06:30) (58 - 104)  RR: 26 (27 Mar 2020 06:40) (16 - 42)  SpO2: 96% (27 Mar 2020 06:40) (76% - 99%)       Mode: AC/ CMV (Assist Control/ Continuous Mandatory Ventilation)  RR (machine): 26  TV (machine): 350  FiO2: 80  PEEP: 16  ITime: 1  MAP: 20  PIP: 32      Input and Output:  I&O's Detail    26 Mar 2020 07:01  -  27 Mar 2020 07:00  --------------------------------------------------------  IN:    fentaNYL Infusion.: 23.4 mL    propofol Infusion: 228.5 mL    veCURonium Infusion: 47.7 mL  Total IN: 299.6 mL    OUT:    Indwelling Catheter - Urethral: 100 mL  Total OUT: 100 mL    Total NET: 199.6 mL          Lab Data                        9.2    10.38 )-----------( 271      ( 26 Mar 2020 14:21 )             28.4     03-26    134<L>  |  100  |  42<H>  ----------------------------<  175<H>  4.0   |  21<L>  |  1.91<H>    Ca    8.9      26 Mar 2020 14:21    TPro  7.8  /  Alb  2.9<L>  /  TBili  0.4  /  DBili  x   /  AST  19  /  ALT  24  /  AlkPhos  61      ABG - ( 27 Mar 2020 01:32 )  pH, Arterial: x     pH, Blood: 7.26  /  pCO2: 34    /  pO2: 97    / HCO3: 16    / Base Excess: -10.9 /  SaO2: 96                      Review of Systems	  resp failure    Objective     Physical Examination  obese  head at  head nc  heart s1s2  lung dec BS        Pertinent Lab findings & Imaging      Justin:  NO   Adequate UO     I&O's Detail    26 Mar 2020 07:01  -  27 Mar 2020 07:00  --------------------------------------------------------  IN:    fentaNYL Infusion.: 23.4 mL    propofol Infusion: 228.5 mL    veCURonium Infusion: 47.7 mL  Total IN: 299.6 mL    OUT:    Indwelling Catheter - Urethral: 100 mL  Total OUT: 100 mL    Total NET: 199.6 mL               Discussed with:     Cultures:	        Radiology          EXAM:  XR CHEST PORTABLE URGENT 1V                                  PROCEDURE DATE:  2020          INTERPRETATION:  Chest one view    HISTORY: Intubation, CPR    COMPARISON STUDY: Earlier the same day    Frontal expiratory view of the chest shows the heart to be similar in size. Endotracheal tube has been placed. The lungs show progression of right upper lobe and mid left lung infiltrates. Bilateral subcutaneous emphysema is present and there is no definite evidence of pneumothorax.     IMPRESSION:  Progression of infiltrates. Postintubation. No definite pneumothorax.    Thank you for the courtesy of this referral.                  MARCELLUS ARREDONDO M.D., ATTENDING RADIOLOGIST  This document has been electronically signed. Mar 26 2020  8:10PM

## 2020-03-27 NOTE — CONSULT NOTE ADULT - SUBJECTIVE AND OBJECTIVE BOX
History of Present Illness: The patient is a 74 year old female with a history of HTN, DM, HL, hypothyroidism, anxiety who presented with fevers, cough, shortness of breath. The patient is unable to provide history to me. She was found to have worsening hypoxia on NRB and emergently intubated. Due to difficult intubation, she briefly went into cardiac arrest; ROSC achieved after ventilation and brief CPR.    Past Medical/Surgical History:  HTN, DM, HL, hypothyroidism, anxiety    Medications:  MEDICATIONS  (STANDING):  azithromycin  IVPB 500 milliGRAM(s) IV Intermittent every 24 hours  cefTRIAXone   IVPB 1000 milliGRAM(s) IV Intermittent every 24 hours  chlorhexidine 0.12% Liquid 15 milliLiter(s) Oral Mucosa every 12 hours  chlorhexidine 2% Cloths 1 Application(s) Topical <User Schedule>  dextrose 5%. 1000 milliLiter(s) (50 mL/Hr) IV Continuous <Continuous>  dextrose 50% Injectable 12.5 Gram(s) IV Push once  dextrose 50% Injectable 25 Gram(s) IV Push once  dextrose 50% Injectable 25 Gram(s) IV Push once  fentaNYL   Infusion. 1 MICROgram(s)/kG/Hr (1.81 mL/Hr) IV Continuous <Continuous>  heparin  Injectable 5000 Unit(s) SubCutaneous every 8 hours  hydroxychloroquine 400 milliGRAM(s) Oral every 12 hours  hydroxychloroquine   Oral   insulin lispro (HumaLOG) corrective regimen sliding scale   SubCutaneous every 6 hours  norepinephrine Infusion 0.05 MICROgram(s)/kG/Min (8.5 mL/Hr) IV Continuous <Continuous>  pantoprazole  Injectable 40 milliGRAM(s) IV Push daily  propofol Infusion 73.502 MICROgram(s)/kG/Min (40 mL/Hr) IV Continuous <Continuous>  simvastatin 20 milliGRAM(s) Oral at bedtime  veCURonium Infusion 1 MICROgram(s)/kG/Min (5.44 mL/Hr) IV Continuous <Continuous>    MEDICATIONS  (PRN):  acetaminophen   Tablet .. 650 milliGRAM(s) Oral every 6 hours PRN Temp greater or equal to 38C (100.4F), Mild Pain (1 - 3)  dextrose 40% Gel 15 Gram(s) Oral once PRN Blood Glucose LESS THAN 70 milliGRAM(s)/deciliter  glucagon  Injectable 1 milliGRAM(s) IntraMuscular once PRN Glucose LESS THAN 70 milligrams/deciliter  sodium chloride 0.9% lock flush 10 milliLiter(s) IV Push every 1 hour PRN Pre/post blood products, medications, blood draw, and to maintain line patency      Family History: Non-contributory family history of premature cardiovascular atherosclerotic disease    Social History: No tobacco, alcohol or drug use    Review of Systems:  General: No fevers, chills, weight loss or gain  Skin: No rashes, color changes  Cardiovascular: No chest pain, orthopnea  Respiratory: No shortness of breath, cough  Gastrointestinal: No nausea, abdominal pain  Genitourinary: No incontinence, pain with urination  Musculoskeletal: No pain, swelling, decreased range of motion  Neurological: No headache, weakness  Psychiatric: No depression, anxiety  Endocrine: No weight loss or gain, increased thirst  All other systems are comprehensively negative.    Physical Exam:  Vitals:        Vital Signs Last 24 Hrs  T(C): 37.8 (27 Mar 2020 08:30), Max: 38.6 (27 Mar 2020 04:30)  T(F): 100.1 (27 Mar 2020 08:30), Max: 101.4 (27 Mar 2020 04:30)  HR: 60 (27 Mar 2020 11:00) (55 - 98)  BP: 116/48 (27 Mar 2020 11:00) (79/41 - 178/72)  BP(mean): 68 (27 Mar 2020 11:00) (53 - 109)  RR: 26 (27 Mar 2020 11:00) (0 - 42)  SpO2: 98% (27 Mar 2020 11:00) (76% - 99%)  General: NAD  HEENT: MMM  Neck: No JVD, no carotid bruit  Lungs: CTAB  CV: RRR, nl S1/S2, no M/R/G  Abdomen: S/NT/ND, +BS  Extremities: No LE edema, no cyanosis  Neuro: AAOx3, non-focal  Skin: No rash    Labs:                        8.5    13.37 )-----------( 296      ( 27 Mar 2020 07:23 )             27.0     03-27    134<L>  |  102  |  45<H>  ----------------------------<  233<H>  4.2   |  21<L>  |  2.42<H>    Ca    8.1<L>      27 Mar 2020 06:54    TPro  6.7  /  Alb  2.3<L>  /  TBili  0.2  /  DBili  x   /  AST  51<H>  /  ALT  44  /  AlkPhos  55  03-27            ECG: NSR, normal axis, no ST abnormality

## 2020-03-27 NOTE — PROGRESS NOTE ADULT - SUBJECTIVE AND OBJECTIVE BOX
Patient is a 74y old  Female who presents with a chief complaint of shob (27 Mar 2020 07:16)      SUBJECTIVE / OVERNIGHT EVENTS:  pt seen and examined. intubated, dicussed with family        Vital Signs Last 24 Hrs  T(C): 37.8 (27 Mar 2020 08:30), Max: 38.6 (27 Mar 2020 04:30)  T(F): 100.1 (27 Mar 2020 08:30), Max: 101.4 (27 Mar 2020 04:30)  HR: 60 (27 Mar 2020 11:00) (55 - 98)  BP: 116/48 (27 Mar 2020 11:00) (79/41 - 178/72)  BP(mean): 68 (27 Mar 2020 11:00) (53 - 109)  RR: 26 (27 Mar 2020 11:00) (0 - 42)  SpO2: 98% (27 Mar 2020 11:00) (76% - 99%)  I&O's Summary    26 Mar 2020 07:  -  27 Mar 2020 07:00  --------------------------------------------------------  IN: 299.6 mL / OUT: 100 mL / NET: 199.6 mL    27 Mar 2020 07:01  -  27 Mar 2020 11:31  --------------------------------------------------------  IN: 138.5 mL / OUT: 0 mL / NET: 138.5 mL        PHYSICAL EXAM:  GENERAL: intubated  HEAD:  Atraumatic, Normocephalic  CHEST/LUNG: dec bs at bases  HEART: Regular rate and rhythm; No murmurs, rubs, or gallops  ABDOMEN: Soft, Nontender, Nondistended; Bowel sounds present  SKIN: No rashes or lesions    LABS:                        8.5    13.37 )-----------( 296      ( 27 Mar 2020 07:23 )             27.0     03-27    134<L>  |  102  |  45<H>  ----------------------------<  233<H>  4.2   |  21<L>  |  2.42<H>    Ca    8.1<L>      27 Mar 2020 06:54    TPro  6.7  /  Alb  2.3<L>  /  TBili  0.2  /  DBili  x   /  AST  51<H>  /  ALT  44  /  AlkPhos  55  03-      CAPILLARY BLOOD GLUCOSE      POCT Blood Glucose.: 163 mg/dL (27 Mar 2020 11:06)  POCT Blood Glucose.: 241 mg/dL (27 Mar 2020 05:13)  POCT Blood Glucose.: 372 mg/dL (26 Mar 2020 23:11)        Urinalysis Basic - ( 26 Mar 2020 22:14 )    Color: Yellow / Appearance: Slightly Turbid / S.020 / pH: x  Gluc: x / Ketone: Negative  / Bili: Negative / Urobili: Negative mg/dL   Blood: x / Protein: 100 mg/dL / Nitrite: Negative   Leuk Esterase: Negative / RBC: 6-10 /HPF / WBC 0-2   Sq Epi: x / Non Sq Epi: Few / Bacteria: Moderate        RADIOLOGY & ADDITIONAL TESTS:    Imaging Personally Reviewed:  [x] YES  [ ] NO    Consultant(s) Notes Reviewed:  [x] YES  [ ] NO      MEDICATIONS  (STANDING):  azithromycin  IVPB 500 milliGRAM(s) IV Intermittent every 24 hours  cefTRIAXone   IVPB 1000 milliGRAM(s) IV Intermittent every 24 hours  chlorhexidine 0.12% Liquid 15 milliLiter(s) Oral Mucosa every 12 hours  chlorhexidine 2% Cloths 1 Application(s) Topical <User Schedule>  dextrose 5%. 1000 milliLiter(s) (50 mL/Hr) IV Continuous <Continuous>  dextrose 50% Injectable 12.5 Gram(s) IV Push once  dextrose 50% Injectable 25 Gram(s) IV Push once  dextrose 50% Injectable 25 Gram(s) IV Push once  fentaNYL   Infusion. 1 MICROgram(s)/kG/Hr (1.81 mL/Hr) IV Continuous <Continuous>  heparin  Injectable 5000 Unit(s) SubCutaneous every 8 hours  hydroxychloroquine 400 milliGRAM(s) Oral every 12 hours  hydroxychloroquine   Oral   insulin lispro (HumaLOG) corrective regimen sliding scale   SubCutaneous every 6 hours  norepinephrine Infusion 0.05 MICROgram(s)/kG/Min (8.5 mL/Hr) IV Continuous <Continuous>  pantoprazole  Injectable 40 milliGRAM(s) IV Push daily  propofol Infusion 73.502 MICROgram(s)/kG/Min (40 mL/Hr) IV Continuous <Continuous>  simvastatin 20 milliGRAM(s) Oral at bedtime  veCURonium Infusion 1 MICROgram(s)/kG/Min (5.44 mL/Hr) IV Continuous <Continuous>    MEDICATIONS  (PRN):  acetaminophen   Tablet .. 650 milliGRAM(s) Oral every 6 hours PRN Temp greater or equal to 38C (100.4F), Mild Pain (1 - 3)  dextrose 40% Gel 15 Gram(s) Oral once PRN Blood Glucose LESS THAN 70 milliGRAM(s)/deciliter  glucagon  Injectable 1 milliGRAM(s) IntraMuscular once PRN Glucose LESS THAN 70 milligrams/deciliter  sodium chloride 0.9% lock flush 10 milliLiter(s) IV Push every 1 hour PRN Pre/post blood products, medications, blood draw, and to maintain line patency      Care Discussed with Consultants/Other Providers [x] YES  [ ] NO    HEALTH ISSUES - PROBLEM Dx:  Anxiety: Anxiety  Hypothyroid: Hypothyroid  Hyperlipidemia: Hyperlipidemia  DM (diabetes mellitus): DM (diabetes mellitus)  SARS (severe acute respiratory syndrome): SARS (severe acute respiratory syndrome)  Acute respiratory failure with hypoxia: Acute respiratory failure with hypoxia

## 2020-03-27 NOTE — DIETITIAN INITIAL EVALUATION ADULT. - PERTINENT MEDS FT
diprivan, protonix, zocor, tylenol, heparin, humalog, plaquenil, NaCl IV, peridex, fentanyl infusion, levophed, norcuron

## 2020-03-27 NOTE — CONSULT NOTE ADULT - PROBLEM SELECTOR RECOMMENDATION 9
ards  likely covid  testing in progress  on plaquenil and ABX regimen -   I and O  peep and fio2 titration as tolerated - lung protective vent support - monitor Plateau pressures -   paralyzed - Verc. - sedated - RASS - monitoring -   icu care and management  permissive hypercapnea  prognosis guarded to poor  multiple comorbidities  severe and acute lung injury  strong suspicion for Covid 19 illness  pt is DNR  family aware  will follow

## 2020-03-27 NOTE — PROGRESS NOTE ADULT - PROBLEM SELECTOR PLAN 6
pt in resp distress likely 2/2 viral pna and will get eval for intuabtion now    scd and gi ppx  guarded prognosis on levothyroxine

## 2020-03-27 NOTE — PROGRESS NOTE ADULT - SUBJECTIVE AND OBJECTIVE BOX
Patient is a 74y old  Female who presents with a chief complaint of shob (26 Mar 2020 18:50)      BRIEF HOSPITAL COURSE: 74 year old female PMHx Morbid Obesity,  DM2, HTN, HLD, Hypothyroid, Anxiety.   Presents via EMS to ED with fever and cough x 6 days. Pt states that Tmax was 101.8F, took  this morning.  Acoriding to chart record the patient had stated  that she has associated dry cough, body aches and mild generalized weaknes and an episode of loose stool daily.  Chart record indicated that patient had no  recent travel, sick contacts, known covid-19 exposure, CP, SOB, runny nose, sore throat, headache, rash, urinary symptoms.  Suspect COVID-19.  patient unable to assist in HPI or ROS given her Respiratory distress.       Events last 24 hours: Upon Arrival to see patient in ED   - The patient was in Respiratory Extremis with o2 sats 70's on NRM.  Patient emergently intubated by myself with RSI and glydescope with difficulty secondary to body habitus and profoundly anterior Cords.  Brief Cardiac Arrest during intubation ( CPR only - No meds given) and eventually intubated with ROSC.   Now Sedated and paralysed on Full vent support.  FIO2 100% Peep +16cm.    PAST MEDICAL & SURGICAL HISTORY:  DJD (degenerative joint disease)  DM (diabetes mellitus)  Hyperlipidemia  Hypothyroid  HTN (hypertension)  History of vitamin D deficiency  B12 deficiency  Bronchitis  Anxiety  S/P cataract surgery  S/P eye surgery: left eye retinal surgery x2  S/P  section: x2      Review of Systems: Unable to access       Medications:  azithromycin  IVPB 500 milliGRAM(s) IV Intermittent every 24 hours  cefTRIAXone   IVPB 1000 milliGRAM(s) IV Intermittent every 24 hours  hydroxychloroquine 400 milliGRAM(s) Oral every 12 hours  hydroxychloroquine   Oral   acetaminophen   Tablet .. 650 milliGRAM(s) Oral every 6 hours PRN  fentaNYL   Infusion. 1 MICROgram(s)/kG/Hr IV Continuous <Continuous>  propofol Infusion 73.502 MICROgram(s)/kG/Min IV Continuous <Continuous>  veCURonium Infusion 1 MICROgram(s)/kG/Min IV Continuous <Continuous>  heparin  Injectable 5000 Unit(s) SubCutaneous every 8 hours  pantoprazole  Injectable 40 milliGRAM(s) IV Push daily  dextrose 40% Gel 15 Gram(s) Oral once PRN  dextrose 50% Injectable 12.5 Gram(s) IV Push once  dextrose 50% Injectable 25 Gram(s) IV Push once  dextrose 50% Injectable 25 Gram(s) IV Push once  glucagon  Injectable 1 milliGRAM(s) IntraMuscular once PRN  insulin lispro (HumaLOG) corrective regimen sliding scale   SubCutaneous every 6 hours  simvastatin 20 milliGRAM(s) Oral at bedtime  dextrose 5%. 1000 milliLiter(s) IV Continuous <Continuous>  chlorhexidine 0.12% Liquid 15 milliLiter(s) Oral Mucosa every 12 hours        Mode: AC/ CMV (Assist Control/ Continuous Mandatory Ventilation)  RR (machine): 26  TV (machine): 350  FiO2: 100  PEEP: 16  ITime: 0.6  MAP: 14  PIP: 21      ICU Vital Signs Last 24 Hrs  T(C): 36.4 (26 Mar 2020 20:32), Max: 38.4 (26 Mar 2020 13:17)  T(F): 97.6 (26 Mar 2020 20:32), Max: 101.2 (26 Mar 2020 13:17)  HR: 57 (27 Mar 2020 02:30) (55 - 98)  BP: 105/45 (27 Mar 2020 02:30) (79/41 - 178/72)  BP(mean): 62 (27 Mar 2020 02:30) (53 - 109)  ABP: 105/45 (27 Mar 2020 02:30) (93/44 - 105/45)  ABP(mean): 62 (27 Mar 2020 02:30) (58 - 62)  RR: 26 (27 Mar 2020 02:30) (16 - 42)  SpO2: 98% (27 Mar 2020 02:30) (76% - 99%)      ABG - ( 27 Mar 2020 01:32 )  pH, Arterial: x     pH, Blood: 7.26  /  pCO2: 34    /  pO2: 97    / HCO3: 16    / Base Excess: -10.9 /  SaO2: 96                  I&O's Detail    26 Mar 2020 07:01  -  27 Mar 2020 03:05  --------------------------------------------------------  IN:    fentaNYL Infusion.: 9.9 mL    IV PiggyBack: 19.5 mL    propofol Infusion: 160.5 mL  Total IN: 189.9 mL    OUT:  Total OUT: 0 mL    Total NET: 189.9 mL          LABS:                        9.2    10.38 )-----------( 271      ( 26 Mar 2020 14:21 )             28.4         134<L>  |  100  |  42<H>  ----------------------------<  175<H>  4.0   |  21<L>  |  1.91<H>    Ca    8.9      26 Mar 2020 14:21    TPro  7.8  /  Alb  2.9<L>  /  TBili  0.4  /  DBili  x   /  AST  19  /  ALT  24  /  AlkPhos  61            Urinalysis Basic - ( 26 Mar 2020 22:14 )    Color: Yellow / Appearance: Slightly Turbid / S.020 / pH: x  Gluc: x / Ketone: Negative  / Bili: Negative / Urobili: Negative mg/dL   Blood: x / Protein: 100 mg/dL / Nitrite: Negative   Leuk Esterase: Negative / RBC: 6-10 /HPF / WBC 0-2   Sq Epi: x / Non Sq Epi: Few / Bacteria: Moderate      CULTURES:  Rapid RVP Result: NotDetec ( @ 14:21)          Physical Examination:    General:  Sedated / paralysed     HEENT: Crepitis to B/L neck - large Neck initially difficult to access added swelling, No JVD     PULM:  Coarse rhonchi predominate all anterior fields, No Sifnigigant sputum production     CVS: S1, S2,  no murmurs, rubs, or gallops    ABD: Soft, Obese, nondistended, nontender, normoactive bowel sounds, no masses    EXT: No edema, nontender    SKIN: Warm and well perfused, no rashes noted.        RADIOLOGY: < from: Xray Chest 1 View- PORTABLE-Urgent (20 @ 20:07) >  XAM:  XR CHEST PORTABLE URGENT 1V                                  PROCEDURE DATE:  2020          INTERPRETATION:  Chest one view    HISTORY: Intubation, CPR    COMPARISON STUDY: Earlier the same day    Frontal expiratory view of the chest shows the heart to be similar in size. Endotracheal tube has been placed. The lungs show progression of right upper lobe and mid left lung infiltrates. Bilateral subcutaneous emphysema is present and there is no definite evidence of pneumothorax.     IMPRESSION:  Progression of infiltrates. Postintubation. No definite pneumothorax.    Thank you for the courtesy of this referral.    < end of copied text >      CRITICAL CARE TIME SPENT: 38 minutes of critical care time spent in the management of this critically ill COVID-19 patient/PUI patient with continuous assessments and interventions based on the interpretation of multiple databases.        (Assessing presenting problems of acute illness, which pose high probability of life threatening deterioration or end organ damage/dysfunction, as well as medical decision making including initiating plan of care, reviewing data, reviewing radiologic exams, discussing with multidisciplinary team,  discussing goals of care with patient/family, and writing this note.  Non-inclusive of procedures performed)

## 2020-03-27 NOTE — DIETITIAN INITIAL EVALUATION ADULT. - OTHER INFO
73 yo female due for assessment per policy of SCU LOS; patient is NPO @ this time; admit to the hospital to r/o COVID19, requiring intubation and central line insertion; patient is sedated with full vent support; patient is also s/p cardiac arrest and required CPR; as per records, patient c/o fever/cough x 6Days PTA along with body aches, general weakness and loose stools; patient is now a DNR;  spoke with Dtr Zahra and son Rodrigo via phone; they report that mother has not eaten well > 1 week and generally eats ~50-75% of her meals; patient lives with the dtr and grandson who cook and shop for her; no weight loss reported and UBW ~200#; patient controls her BS readings and normal range 180-230mg/dl; however, BS readings have reached > 300mg/dl @ times over the last week; patient normally tests her BS readings and administers insulin without difficulty; diet education is not warranted @ present due to NPO status; unable to perform NFPE due to COVID19 precautions; due to intubation, patient would benefit from alternate means of nutrition/hydration; recommendation for enteral feedings of Glucerna 1.5 calorie @ 30mL/hr x 20 hrs with 10mL increase every 8hours until goal rate of 50mL/hr x 20 hrs (total formula = 1000mL, [id number] and 82.5gm protein); Dtr and son agreeable to the same; will monitor; RD to remain available

## 2020-03-28 DIAGNOSIS — I48.91 UNSPECIFIED ATRIAL FIBRILLATION: ICD-10-CM

## 2020-03-28 LAB
ALBUMIN SERPL ELPH-MCNC: 2 G/DL — LOW (ref 3.3–5)
ALP SERPL-CCNC: 64 U/L — SIGNIFICANT CHANGE UP (ref 30–120)
ALT FLD-CCNC: 30 U/L DA — SIGNIFICANT CHANGE UP (ref 10–60)
ANION GAP SERPL CALC-SCNC: 13 MMOL/L — SIGNIFICANT CHANGE UP (ref 5–17)
AST SERPL-CCNC: 26 U/L — SIGNIFICANT CHANGE UP (ref 10–40)
BASE EXCESS BLDA CALC-SCNC: -9.6 MMOL/L — LOW (ref -2–2)
BASOPHILS # BLD AUTO: 0.02 K/UL — SIGNIFICANT CHANGE UP (ref 0–0.2)
BASOPHILS NFR BLD AUTO: 0.2 % — SIGNIFICANT CHANGE UP (ref 0–2)
BILIRUB SERPL-MCNC: 0.3 MG/DL — SIGNIFICANT CHANGE UP (ref 0.2–1.2)
BLOOD GAS SOURCE: SIGNIFICANT CHANGE UP
BUN SERPL-MCNC: 56 MG/DL — HIGH (ref 7–23)
CALCIUM SERPL-MCNC: 8.4 MG/DL — SIGNIFICANT CHANGE UP (ref 8.4–10.5)
CHLORIDE SERPL-SCNC: 102 MMOL/L — SIGNIFICANT CHANGE UP (ref 96–108)
CO2 SERPL-SCNC: 20 MMOL/L — LOW (ref 22–31)
CREAT SERPL-MCNC: 3.14 MG/DL — HIGH (ref 0.5–1.3)
CULTURE RESULTS: NO GROWTH — SIGNIFICANT CHANGE UP
EOSINOPHIL # BLD AUTO: 0.1 K/UL — SIGNIFICANT CHANGE UP (ref 0–0.5)
EOSINOPHIL NFR BLD AUTO: 0.8 % — SIGNIFICANT CHANGE UP (ref 0–6)
GLUCOSE BLDC GLUCOMTR-MCNC: 152 MG/DL — HIGH (ref 70–99)
GLUCOSE BLDC GLUCOMTR-MCNC: 166 MG/DL — HIGH (ref 70–99)
GLUCOSE BLDC GLUCOMTR-MCNC: 194 MG/DL — HIGH (ref 70–99)
GLUCOSE BLDC GLUCOMTR-MCNC: 282 MG/DL — HIGH (ref 70–99)
GLUCOSE SERPL-MCNC: 172 MG/DL — HIGH (ref 70–99)
HCO3 BLDA-SCNC: 17 MMOL/L — LOW (ref 21–29)
HCT VFR BLD CALC: 26.4 % — LOW (ref 34.5–45)
HGB BLD-MCNC: 8.4 G/DL — LOW (ref 11.5–15.5)
HOROWITZ INDEX BLDA+IHG-RTO: 60 — SIGNIFICANT CHANGE UP
IMM GRANULOCYTES NFR BLD AUTO: 1.4 % — SIGNIFICANT CHANGE UP (ref 0–1.5)
LEGIONELLA AG UR QL: NEGATIVE — SIGNIFICANT CHANGE UP
LYMPHOCYTES # BLD AUTO: 1 K/UL — SIGNIFICANT CHANGE UP (ref 1–3.3)
LYMPHOCYTES # BLD AUTO: 7.7 % — LOW (ref 13–44)
MAGNESIUM SERPL-MCNC: 1.6 MG/DL — SIGNIFICANT CHANGE UP (ref 1.6–2.6)
MCHC RBC-ENTMCNC: 28.4 PG — SIGNIFICANT CHANGE UP (ref 27–34)
MCHC RBC-ENTMCNC: 31.8 GM/DL — LOW (ref 32–36)
MCV RBC AUTO: 89.2 FL — SIGNIFICANT CHANGE UP (ref 80–100)
MONOCYTES # BLD AUTO: 0.49 K/UL — SIGNIFICANT CHANGE UP (ref 0–0.9)
MONOCYTES NFR BLD AUTO: 3.8 % — SIGNIFICANT CHANGE UP (ref 2–14)
NEUTROPHILS # BLD AUTO: 11.15 K/UL — HIGH (ref 1.8–7.4)
NEUTROPHILS NFR BLD AUTO: 86.1 % — HIGH (ref 43–77)
NRBC # BLD: 0 /100 WBCS — SIGNIFICANT CHANGE UP (ref 0–0)
PCO2 BLDA: 33 MMHG — SIGNIFICANT CHANGE UP (ref 32–46)
PH BLD: 7.29 — LOW (ref 7.35–7.45)
PHOSPHATE SERPL-MCNC: 4.8 MG/DL — HIGH (ref 2.5–4.5)
PLATELET # BLD AUTO: 339 K/UL — SIGNIFICANT CHANGE UP (ref 150–400)
PO2 BLDA: 67 MMHG — LOW (ref 74–108)
POTASSIUM SERPL-MCNC: 4 MMOL/L — SIGNIFICANT CHANGE UP (ref 3.5–5.3)
POTASSIUM SERPL-SCNC: 4 MMOL/L — SIGNIFICANT CHANGE UP (ref 3.5–5.3)
PROT SERPL-MCNC: 6.6 G/DL — SIGNIFICANT CHANGE UP (ref 6–8.3)
RBC # BLD: 2.96 M/UL — LOW (ref 3.8–5.2)
RBC # FLD: 14.4 % — SIGNIFICANT CHANGE UP (ref 10.3–14.5)
SAO2 % BLDA: 92 % — SIGNIFICANT CHANGE UP (ref 92–96)
SODIUM SERPL-SCNC: 135 MMOL/L — SIGNIFICANT CHANGE UP (ref 135–145)
SPECIMEN SOURCE: SIGNIFICANT CHANGE UP
WBC # BLD: 12.94 K/UL — HIGH (ref 3.8–10.5)
WBC # FLD AUTO: 12.94 K/UL — HIGH (ref 3.8–10.5)

## 2020-03-28 PROCEDURE — 93010 ELECTROCARDIOGRAM REPORT: CPT

## 2020-03-28 PROCEDURE — 99232 SBSQ HOSP IP/OBS MODERATE 35: CPT

## 2020-03-28 RX ORDER — METOPROLOL TARTRATE 50 MG
5 TABLET ORAL ONCE
Refills: 0 | Status: COMPLETED | OUTPATIENT
Start: 2020-03-28 | End: 2020-03-28

## 2020-03-28 RX ORDER — METOPROLOL TARTRATE 50 MG
25 TABLET ORAL
Refills: 0 | Status: DISCONTINUED | OUTPATIENT
Start: 2020-03-28 | End: 2020-04-01

## 2020-03-28 RX ADMIN — HEPARIN SODIUM 5000 UNIT(S): 5000 INJECTION INTRAVENOUS; SUBCUTANEOUS at 06:41

## 2020-03-28 RX ADMIN — Medication 25 MILLIGRAM(S): at 17:38

## 2020-03-28 RX ADMIN — Medication 200 MILLIGRAM(S): at 06:41

## 2020-03-28 RX ADMIN — HEPARIN SODIUM 5000 UNIT(S): 5000 INJECTION INTRAVENOUS; SUBCUTANEOUS at 21:44

## 2020-03-28 RX ADMIN — PANTOPRAZOLE SODIUM 40 MILLIGRAM(S): 20 TABLET, DELAYED RELEASE ORAL at 11:31

## 2020-03-28 RX ADMIN — CHLORHEXIDINE GLUCONATE 15 MILLILITER(S): 213 SOLUTION TOPICAL at 17:38

## 2020-03-28 RX ADMIN — PROPOFOL 40 MICROGRAM(S)/KG/MIN: 10 INJECTION, EMULSION INTRAVENOUS at 15:00

## 2020-03-28 RX ADMIN — Medication 2: at 11:32

## 2020-03-28 RX ADMIN — CHLORHEXIDINE GLUCONATE 1 APPLICATION(S): 213 SOLUTION TOPICAL at 06:41

## 2020-03-28 RX ADMIN — Medication 5 MILLIGRAM(S): at 07:01

## 2020-03-28 RX ADMIN — HEPARIN SODIUM 5000 UNIT(S): 5000 INJECTION INTRAVENOUS; SUBCUTANEOUS at 15:00

## 2020-03-28 RX ADMIN — PIPERACILLIN AND TAZOBACTAM 25 GRAM(S): 4; .5 INJECTION, POWDER, LYOPHILIZED, FOR SOLUTION INTRAVENOUS at 07:01

## 2020-03-28 RX ADMIN — CHLORHEXIDINE GLUCONATE 15 MILLILITER(S): 213 SOLUTION TOPICAL at 06:41

## 2020-03-28 RX ADMIN — Medication 200 MILLIGRAM(S): at 17:38

## 2020-03-28 RX ADMIN — PIPERACILLIN AND TAZOBACTAM 25 GRAM(S): 4; .5 INJECTION, POWDER, LYOPHILIZED, FOR SOLUTION INTRAVENOUS at 21:44

## 2020-03-28 RX ADMIN — Medication 2: at 07:02

## 2020-03-28 RX ADMIN — Medication 2: at 17:42

## 2020-03-28 RX ADMIN — SIMVASTATIN 20 MILLIGRAM(S): 20 TABLET, FILM COATED ORAL at 21:44

## 2020-03-28 RX ADMIN — Medication 6: at 23:58

## 2020-03-28 RX ADMIN — PIPERACILLIN AND TAZOBACTAM 25 GRAM(S): 4; .5 INJECTION, POWDER, LYOPHILIZED, FOR SOLUTION INTRAVENOUS at 15:00

## 2020-03-28 NOTE — PROGRESS NOTE ADULT - SUBJECTIVE AND OBJECTIVE BOX
Patient is a 74y old  Female who presents with a chief complaint of shob (27 Mar 2020 16:47)      BRIEF HOSPITAL COURSE: ***    Events last 24 hours: ***    PAST MEDICAL & SURGICAL HISTORY:  DJD (degenerative joint disease)  DM (diabetes mellitus)  Hyperlipidemia  Hypothyroid  HTN (hypertension)  History of vitamin D deficiency  B12 deficiency  Bronchitis  Anxiety  S/P cataract surgery  S/P eye surgery: left eye retinal surgery x2  S/P  section: x2    Allergies    No Known Allergies    Intolerances      FAMILY HISTORY:      Review of Systems:  CONSTITUTIONAL: No fever, chills, or fatigue  EYES: No eye pain, visual disturbances, or discharge  ENMT:  No difficulty hearing, tinnitus, vertigo; No sinus or throat pain  NECK: No pain or stiffness  RESPIRATORY: No cough, wheezing, chills or hemoptysis; No shortness of breath  CARDIOVASCULAR: No chest pain, palpitations, dizziness, or leg swelling  GASTROINTESTINAL: No abdominal or epigastric pain. No nausea, vomiting, or hematemesis; No diarrhea or constipation. No melena or hematochezia.  GENITOURINARY: No dysuria, frequency, hematuria, or incontinence  NEUROLOGICAL: No headaches, memory loss, loss of strength, numbness, or tremors  SKIN: No itching, burning, rashes, or lesions   MUSCULOSKELETAL: No joint pain or swelling; No muscle, back, or extremity pain  PSYCHIATRIC: No depression, anxiety, mood swings, or difficulty sleeping      Medications:  hydroxychloroquine 200 milliGRAM(s) Oral every 12 hours  hydroxychloroquine   Oral   piperacillin/tazobactam IVPB.. 3.375 Gram(s) IV Intermittent every 8 hours    norepinephrine Infusion 0.05 MICROgram(s)/kG/Min IV Continuous <Continuous>      acetaminophen   Tablet .. 650 milliGRAM(s) Oral every 6 hours PRN  fentaNYL   Infusion. 1 MICROgram(s)/kG/Hr IV Continuous <Continuous>  propofol Infusion 73.502 MICROgram(s)/kG/Min IV Continuous <Continuous>  veCURonium Infusion 1 MICROgram(s)/kG/Min IV Continuous <Continuous>      heparin  Injectable 5000 Unit(s) SubCutaneous every 8 hours    pantoprazole  Injectable 40 milliGRAM(s) IV Push daily      dextrose 40% Gel 15 Gram(s) Oral once PRN  dextrose 50% Injectable 12.5 Gram(s) IV Push once  dextrose 50% Injectable 25 Gram(s) IV Push once  dextrose 50% Injectable 25 Gram(s) IV Push once  glucagon  Injectable 1 milliGRAM(s) IntraMuscular once PRN  insulin lispro (HumaLOG) corrective regimen sliding scale   SubCutaneous every 6 hours  simvastatin 20 milliGRAM(s) Oral at bedtime    dextrose 5%. 1000 milliLiter(s) IV Continuous <Continuous>  sodium chloride 0.9% lock flush 10 milliLiter(s) IV Push every 1 hour PRN      chlorhexidine 0.12% Liquid 15 milliLiter(s) Oral Mucosa every 12 hours  chlorhexidine 2% Cloths 1 Application(s) Topical <User Schedule>        Mode: AC/ CMV (Assist Control/ Continuous Mandatory Ventilation)  RR (machine): 26  TV (machine): 350  FiO2: 70  PEEP: 14  ITime: 1  MAP: 13  PIP: 29      ICU Vital Signs Last 24 Hrs  T(C): 37.2 (27 Mar 2020 21:30), Max: 38.6 (27 Mar 2020 04:30)  T(F): 99 (27 Mar 2020 21:30), Max: 101.4 (27 Mar 2020 04:30)  HR: 61 (28 Mar 2020 01:06) (55 - 84)  BP: 123/51 (28 Mar 2020 00:30) (86/44 - 155/74)  BP(mean): 70 (28 Mar 2020 00:30) (57 - 99)  ABP: 118/43 (28 Mar 2020 00:30) (83/42 - 174/70)  ABP(mean): 65 (28 Mar 2020 00:30) (51 - 104)  RR: 26 (28 Mar 2020 00:30) (0 - 27)  SpO2: 95% (28 Mar 2020 01:06) (88% - 100%)    Vital Signs Last 24 Hrs  T(C): 37.2 (27 Mar 2020 21:30), Max: 38.6 (27 Mar 2020 04:30)  T(F): 99 (27 Mar 2020 21:30), Max: 101.4 (27 Mar 2020 04:30)  HR: 61 (28 Mar 2020 01:06) (55 - 84)  BP: 123/51 (28 Mar 2020 00:30) (86/44 - 155/74)  BP(mean): 70 (28 Mar 2020 00:30) (57 - 99)  RR: 26 (28 Mar 2020 00:30) (0 - 27)  SpO2: 95% (28 Mar 2020 01:06) (88% - 100%)    ABG - ( 27 Mar 2020 01:32 )  pH, Arterial: x     pH, Blood: 7.26  /  pCO2: 34    /  pO2: 97    / HCO3: 16    / Base Excess: -10.9 /  SaO2: 96                  I&O's Detail    26 Mar 2020 07:01  -  27 Mar 2020 07:00  --------------------------------------------------------  IN:    fentaNYL Infusion.: 23.4 mL    propofol Infusion: 228.5 mL    veCURonium Infusion: 47.7 mL  Total IN: 299.6 mL    OUT:    Indwelling Catheter - Urethral: 100 mL  Total OUT: 100 mL    Total NET: 199.6 mL      27 Mar 2020 07:01  -  28 Mar 2020 03:27  --------------------------------------------------------  IN:    fentaNYL Infusion.: 47.4 mL    IV PiggyBack: 400 mL    norepinephrine Infusion: 299.7 mL    propofol Infusion: 185.8 mL    veCURonium Infusion: 5.9 mL  Total IN: 938.8 mL    OUT:    Indwelling Catheter - Urethral: 150 mL  Total OUT: 150 mL    Total NET: 788.8 mL            LABS:                        8.5    13.37 )-----------( 296      ( 27 Mar 2020 07:23 )             27.0     03    134<L>  |  102  |  45<H>  ----------------------------<  233<H>  4.2   |  21<L>  |  2.42<H>    Ca    8.1<L>      27 Mar 2020 06:54    TPro  6.7  /  Alb  2.3<L>  /  TBili  0.2  /  DBili  x   /  AST  51<H>  /  ALT  44  /  AlkPhos  55  0327          CAPILLARY BLOOD GLUCOSE      POCT Blood Glucose.: 189 mg/dL (27 Mar 2020 23:43)      Urinalysis Basic - ( 26 Mar 2020 22:14 )    Color: Yellow / Appearance: Slightly Turbid / S.020 / pH: x  Gluc: x / Ketone: Negative  / Bili: Negative / Urobili: Negative mg/dL   Blood: x / Protein: 100 mg/dL / Nitrite: Negative   Leuk Esterase: Negative / RBC: 6-10 /HPF / WBC 0-2   Sq Epi: x / Non Sq Epi: Few / Bacteria: Moderate      CULTURES:  Culture Results:   No growth to date. ( @ 23:00)  Culture Results:   No growth to date. ( @ 23:00)      Physical Examination:    General: No acute distress.  Alert, oriented, interactive, nonfocal    HEENT: Pupils equal, reactive to light.  Symmetric.    PULM: Clear to auscultation bilaterally, no significant sputum production    CVS: Regular rate and rhythm, no murmurs, rubs, or gallops    ABD: Soft, nondistended, nontender, normoactive bowel sounds, no masses    EXT: No edema, nontender    SKIN: Warm and well perfused, no rashes noted.    RADIOLOGY: ***    CRITICAL CARE TIME SPENT: *** Patient is a 74y old  Female who presents with a chief complaint of shob (27 Mar 2020 16:47)      BRIEF HOSPITAL COURSE:   74 year old female PMHx Morbid Obesity,  DM2, HTN, HLD, Hypothyroid, Anxiety.   Presents via EMS to ED with fever and cough x 6 days. Pt states that Tmax was 101.8F, took  this morning.  Acoriding to chart record the patient had stated  that she has associated dry cough, body aches and mild generalized weaknes and an episode of loose stool daily.  Chart record indicated that patient had no  recent travel, sick contacts, known covid-19 exposure, CP, SOB, runny nose, sore throat, headache, rash, urinary symptoms. Upon Arrival to see patient in ED   - The patient was in Respiratory Extremis with o2 sats 70's on NRM.  Patient emergently intubated with difficulty secondary to body habitus and profoundly anterior Cords.  Brief Cardiac Arrest during intubation ( CPR only - No meds given) and eventually intubated with ROSC.   Now Sedated and paralysed on Full vent support.        Events last 24 hours:   - off paralysis   - titrated fi02 down overnight         PAST MEDICAL & SURGICAL HISTORY:  DJD (degenerative joint disease)  DM (diabetes mellitus)  Hyperlipidemia  Hypothyroid  HTN (hypertension)  History of vitamin D deficiency  B12 deficiency  Bronchitis  Anxiety  S/P cataract surgery  S/P eye surgery: left eye retinal surgery x2  S/P  section: x2    Allergies    No Known Allergies    Intolerances      FAMILY HISTORY:  unable to assess intubation       Review of Systems:  unable to assess in intubation       Medications:  hydroxychloroquine 200 milliGRAM(s) Oral every 12 hours  hydroxychloroquine   Oral   piperacillin/tazobactam IVPB.. 3.375 Gram(s) IV Intermittent every 8 hours  norepinephrine Infusion 0.05 MICROgram(s)/kG/Min IV Continuous <Continuous>  acetaminophen   Tablet .. 650 milliGRAM(s) Oral every 6 hours PRN  fentaNYL   Infusion. 1 MICROgram(s)/kG/Hr IV Continuous <Continuous>  propofol Infusion 73.502 MICROgram(s)/kG/Min IV Continuous <Continuous>  veCURonium Infusion 1 MICROgram(s)/kG/Min IV Continuous <Continuous>  heparn  Injectable 5000 Unit(s) SubCutaneous every 8 hours  pantorazole  Injectable 40 milliGRAM(s) IV Push daily  dextrose 40% Gel 15 Gram(s) Oral once PRN  dextrose 50% Injectable 12.5 Gram(s) IV Push once  dextrose 50% Injectable 25 Gram(s) IV Push once  dextrose 50% Injectable 25 Gram(s) IV Push once  glucagon  Injectable 1 milliGRAM(s) IntraMuscular once PRN  insulin lispro (HumaLOG) corrective regimen sliding scale   SubCutaneous every 6 hours  simvastatin 20 milliGRAM(s) Oral at bedtime  dextrose 5%. 1000 milliLiter(s) IV Continuous <Continuous>  sodium chloride 0.9% lock flush 10 milliLiter(s) IV Push every 1 hour PRN  chlorhexidine 0.12% Liquid 15 milliLiter(s) Oral Mucosa every 12 hours  chlorhexidine 2% Cloths 1 Application(s) Topical <User Schedule>        Mode: AC/ CMV (Assist Control/ Continuous Mandatory Ventilation)  RR (machine): 26  TV (machine): 350  FiO2: 70  PEEP: 14  ITime: 1  MAP: 13  PIP: 29      ICU Vital Signs Last 24 Hrs  T(C): 37.2 (27 Mar 2020 21:30), Max: 38.6 (27 Mar 2020 04:30)  T(F): 99 (27 Mar 2020 21:30), Max: 101.4 (27 Mar 2020 04:30)  HR: 61 (28 Mar 2020 01:06) (55 - 84)  BP: 123/51 (28 Mar 2020 00:30) (86/44 - 155/74)  BP(mean): 70 (28 Mar 2020 00:30) (57 - 99)  ABP: 118/43 (28 Mar 2020 00:30) (83/42 - 174/70)  ABP(mean): 65 (28 Mar 2020 00:30) (51 - 104)  RR: 26 (28 Mar 2020 00:30) (0 - 27)  SpO2: 95% (28 Mar 2020 01:06) (88% - 100%)      ABG - ( 27 Mar 2020 01:32 )  pH, Arterial: x     pH, Blood: 7.26  /  pCO2: 34    /  pO2: 97    / HCO3: 16    / Base Excess: -10.9 /  SaO2: 96          I&O's Detail    26 Mar 2020 07:01  -  27 Mar 2020 07:00  --------------------------------------------------------  IN:    fentaNYL Infusion.: 23.4 mL    propofol Infusion: 228.5 mL    veCURonium Infusion: 47.7 mL  Total IN: 299.6 mL    OUT:    Indwelling Catheter - Urethral: 100 mL  Total OUT: 100 mL    Total NET: 199.6 mL      27 Mar 2020 07:01  -  28 Mar 2020 03:27  --------------------------------------------------------  IN:    fentaNYL Infusion.: 47.4 mL    IV PiggyBack: 400 mL    norepinephrine Infusion: 299.7 mL    propofol Infusion: 185.8 mL    veCURonium Infusion: 5.9 mL  Total IN: 938.8 mL    OUT:    Indwelling Catheter - Urethral: 150 mL  Total OUT: 150 mL    Total NET: 788.8 mL            LABS:                        8.5    13.37 )-----------( 296      ( 27 Mar 2020 07:23 )             27.0         134<L>  |  102  |  45<H>  ----------------------------<  233<H>  4.2   |  21<L>  |  2.42<H>    Ca    8.1<L>      27 Mar 2020 06:54    TPro  6.7  /  Alb  2.3<L>  /  TBili  0.2  /  DBili  x   /  AST  51<H>  /  ALT  44  /  AlkPhos  55            CAPILLARY BLOOD GLUCOSE      POCT Blood Glucose.: 189 mg/dL (27 Mar 2020 23:43)      Urinalysis Basic - ( 26 Mar 2020 22:14 )    Color: Yellow / Appearance: Slightly Turbid / S.020 / pH: x  Gluc: x / Ketone: Negative  / Bili: Negative / Urobili: Negative mg/dL   Blood: x / Protein: 100 mg/dL / Nitrite: Negative   Leuk Esterase: Negative / RBC: 6-10 /HPF / WBC 0-2   Sq Epi: x / Non Sq Epi: Few / Bacteria: Moderate      CULTURES:  Culture Results:   No growth to date. ( @ 23:00)  Culture Results:   No growth to date. ( @ 23:00)    Vitalsigns/reviewed and physical exam performed where pertinent and urgently required    Lab/radiology studies/ABG/Meds reviewed and interpreted into the assesment and treatment plan.    Radiology  < from: Xray Chest 1 View-PORTABLE IMMEDIATE (20 @ 06:19) >    INTERPRETATION:  CLINICAL STATEMENT: Follow-up chest pain.    TECHNIQUE: AP view of the chest.    COMPARISON: 3/26/2020    FINDINGS/  IMPRESSION:  ET tube and feeding tube noted. Tip of ET tube and feeding tube not well seen.    Increased interstitial lung markings without significant change. Superimposed bilateral airspace opacities overall improving.        No significant pleural effusion    Heart size within normal limits    < end of copied text >    Critical Care time: 35 mins assessing presenting problems of acute illness that poses high probability of life threatening deterioration or end organ damage/dysfunction.  Medical decision making inculding Initiating plan of care, reviewing data, reviewing radiology,direct patient bedside evaluation and interpretation of vital signs, any necessary ventilator management , discusion with multidisciplinary team, discussing goals of care with patient/family, all non inclusive of procedures

## 2020-03-28 NOTE — PROGRESS NOTE ADULT - ASSESSMENT
The patient is a 74 year old female with a history of HTN, DM, HL, hypothyroidism, anxiety who presented with fevers, cough, shortness of breath in the setting of viral PNA, COVID-19, respiratory failure, septic shock.     Plan:  - CXR consistent with PNA  - COVID-19 positive  - On hydroxychloroquine, azithromycin, ceftriaxone  - Norepinephrine weaned off this morning  - Attempt due keep net even given volume load from drips  - Rapid atrial fibrillation - start metoprolol tartrate 25 mg bid now that pressors are off. If recurrent atrial fibrillation, can use intermittent IV metoprolol. If atrial fibrillation persists with low BPs, can start amiodarone drip.  - Continue mechanical ventilation; on high PEEP for ARDS  - Pulm and ID follow-up  - On hydroxychloroquine and zosyn

## 2020-03-28 NOTE — CHART NOTE - NSCHARTNOTEFT_GEN_A_CORE
Called to bedside for tachy arrythmia   levo stopped, BP stable   appeared to be in afib with rvr (new onset) vs sinus tachy with PAC?   was given 5 mg of lopressor with control of heart rate   EKG done showing NSR(possibly converted back to NSr)  If goes into afib again patient could possibly be a good candidate for amio  would discuss with cardio in am

## 2020-03-28 NOTE — CHART NOTE - NSCHARTNOTEFT_GEN_A_CORE
will attempt to lower fio2 and peep throughout the course of the day  sat goal - > 90 pct  lung protective vent support with permissive hypercapnea

## 2020-03-28 NOTE — PROGRESS NOTE ADULT - SUBJECTIVE AND OBJECTIVE BOX
ADELIA BALDWIN is a 74yFemale , patient examined and chart reviewed.     INTERVAL HPI/ OVERNIGHT EVENTS:  Vented sedated off pressors.  Low grade temps.    Past Medical History--  PAST MEDICAL & SURGICAL HISTORY:  DJD (degenerative joint disease)  DM (diabetes mellitus)  Hyperlipidemia  Hypothyroid  HTN (hypertension)  History of vitamin D deficiency  B12 deficiency  Bronchitis  Anxiety  S/P cataract surgery  S/P eye surgery: left eye retinal surgery x2  S/P  section: x2      For details regarding the patient's social history, family history, and other miscellaneous elements, please refer the initial infectious diseases consultation and/or the admitting history and physical examination for this admission.      ROS:  Unable to obtain due to : pt's condition      Current inpatient medications :    ANTIBIOTICS/RELEVANT:  hydroxychloroquine 200 milliGRAM(s) Oral every 12 hours  hydroxychloroquine   Oral   piperacillin/tazobactam IVPB.. 3.375 Gram(s) IV Intermittent every 8 hours      acetaminophen   Tablet .. 650 milliGRAM(s) Oral every 6 hours PRN  chlorhexidine 0.12% Liquid 15 milliLiter(s) Oral Mucosa every 12 hours  chlorhexidine 2% Cloths 1 Application(s) Topical <User Schedule>  dextrose 40% Gel 15 Gram(s) Oral once PRN  dextrose 5%. 1000 milliLiter(s) IV Continuous <Continuous>  dextrose 50% Injectable 12.5 Gram(s) IV Push once  dextrose 50% Injectable 25 Gram(s) IV Push once  dextrose 50% Injectable 25 Gram(s) IV Push once  fentaNYL   Infusion. 1 MICROgram(s)/kG/Hr IV Continuous <Continuous>  glucagon  Injectable 1 milliGRAM(s) IntraMuscular once PRN  heparin  Injectable 5000 Unit(s) SubCutaneous every 8 hours  insulin lispro (HumaLOG) corrective regimen sliding scale   SubCutaneous every 6 hours  metoprolol tartrate 25 milliGRAM(s) Oral two times a day  norepinephrine Infusion 0.05 MICROgram(s)/kG/Min IV Continuous <Continuous>  pantoprazole  Injectable 40 milliGRAM(s) IV Push daily  propofol Infusion 73.502 MICROgram(s)/kG/Min IV Continuous <Continuous>  simvastatin 20 milliGRAM(s) Oral at bedtime  sodium chloride 0.9% lock flush 10 milliLiter(s) IV Push every 1 hour PRN  veCURonium Infusion 1 MICROgram(s)/kG/Min IV Continuous <Continuous>      Objective:     @ 07:01  -   @ 07:00  --------------------------------------------------------  IN: 1129.8 mL / OUT: 450 mL / NET: 679.8 mL     @ 07:01  -   @ 21:37  --------------------------------------------------------  IN: 1185.7 mL / OUT: 400 mL / NET: 785.7 mL      T(C): 37.3 (20 @ 21:00), Max: 38.3 (20 @ 12:45)  HR: 73 (20 @ 20:30) (61 - 145)  BP: 105/52 (20 @ 20:00) (95/44 - 153/55)  RR: 26 (20 @ 20:30) (23 - 28)  SpO2: 92% (20 @ 20:30) (90% - 100%)    Mode: AC/ CMV (Assist Control/ Continuous Mandatory Ventilation), RR (machine): 26, TV (machine): 350, FiO2: 50, PEEP: 10, ITime: 0.6, MAP: 14, PIP: 24    Physical Exam:  GEN: Intubaed sedated  HEENT: normocephalic and atraumatic. EOMI. OLGA. Moist mucosa. Clear Posterior pharynx.  NECK: Supple. No carotid bruits.  No lymphadenopathy or thyromegaly.  LUNGS: Decreased to auscultation.  HEART: Regular rate and rhythm without murmur.  ABDOMEN: Soft, nontender, and nondistended.  Positive bowel sounds.   EXTREMITIES: +edema.  NEUROLOGIC: Sedaed   SKIN: No ulceration or induration present.      LABS:                        8.4    12.94 )-----------( 339      ( 28 Mar 2020 07:07 )             26.4           135  |  102  |  56<H>  ----------------------------<  172<H>  4.0   |  20<L>  |  3.14<H>    Ca    8.4      28 Mar 2020 07:07  Phos  4.8       Mg     1.6         TPro  6.6  /  Alb  2.0<L>  /  TBili  0.3  /  DBili  x   /  AST  26  /  ALT  30  /  AlkPhos  64          Urinalysis Basic - ( 26 Mar 2020 22:14 )    Color: Yellow / Appearance: Slightly Turbid / S.020 / pH: x  Gluc: x / Ketone: Negative  / Bili: Negative / Urobili: Negative mg/dL   Blood: x / Protein: 100 mg/dL / Nitrite: Negative   Leuk Esterase: Negative / RBC: 6-10 /HPF / WBC 0-2   Sq Epi: x / Non Sq Epi: Few / Bacteria: Moderate      ABG - ( 28 Mar 2020 05:14 )  pH, Arterial: x     pH, Blood: 7.29  /  pCO2: 33    /  pO2: 67    / HCO3: 17    / Base Excess: -9.6  /  SaO2: 92        MICROBIOLOGY:    Culture - Urine (collected 27 Mar 2020 11:03)  Source: .Urine Clean Catch (Midstream)  Final Report (28 Mar 2020 10:55):    No growth    Culture - Blood (collected 26 Mar 2020 23:00)  Source: .Blood Blood  Preliminary Report (28 Mar 2020 01:03):    No growth to date.    Culture - Blood (collected 26 Mar 2020 23:00)  Source: .Blood Blood  Preliminary Report (28 Mar 2020 01:02):    No growth to date.    COVID-19 PCR . (20 @ 22:21)    COVID-19 PCR: Detected: All “detected” results on this new test are considered presumptively  positive results, are clinically actionable, and specimens will be  forwarded to [facility] for confirmation testing.  Another report (corrected report) will only be issued if discordant  results occur.  This test has been validated by [employer] to be accurate;  though it has not been FDA cleared/approved by the usual pathway.  As with all laboratory tests, results should be correlated with clinical  findings.      RADIOLOGY & ADDITIONAL STUDIES:  EXAM:  XR CHEST PORTABLE IMMED 1V                            PROCEDURE DATE:  2020          INTERPRETATION:  CLINICAL STATEMENT: Follow-up chest pain.    TECHNIQUE: AP view of the chest.    COMPARISON: 3/26/2020    FINDINGS/  IMPRESSION:  ET tube and feeding tube noted. Tip of ET tube and feeding tube not well seen.    Increased interstitial lung markings without significant change. Superimposed bilateral airspace opacities overall improving.        No significant pleural effusion    Assessment :  75 y/o F with hx of DM, HTN, HLD, hypothyroid, anxiety presented admitted with severe sepsis with septic shock and acute hypoxic respiratory failure with MSOF sec COVID 19 pneumonia. Sp Cardiorespiratory arrest.  Procalcitonin markedly elevated- concern for superimposed bacterial process. Worsening ALIS.    Plan :   Cont Hydroxychloroquine  Cont  Zosyn  Fu cultures  Vent per pulm ICU  Wean off pressors  Trend temps  Prognosis guarded  COVID-19---high risk period for decompensation  (7-14 days post symptom onset), avoid aerosolizing procedures,  steroids, NSAIDs ---no compelling evidence to date DO NOT recommend any specific therapies outside of established protocols or controlled trials -would support focus on supportive care  avoid excessive blood draws but do recommend for hospitalized patients  -daily CBC w diff to follow eos, lymphocytes and neutrophils and daily NLR calculation (NLR <3 low vs >5 high) -other labs that may be selectively used to risk stratify and predict clinical course,   Procalcitonin (<0.2 associated with more severe disease),  Ferritin (lower risk <450 vs >850) CRP (low risk <2 and higher risk >6) D-dimer (<1,000 vs >1,000) LDH, ESR, PT/PTT, CK, lactate, troponin, IL-6  -antibiotics only if there is concern for a bacterial process  neutrophil/lymphocyte ratio 11    D/w SPCU team      Continue with present regiment.  Appropriate use of antibiotics and adverse effects reviewed.    I have discussed the above plan of care with patient/ family in detail. They expressed understanding of the the treatment plan . Risks, benefits and alternatives discussed in detail. I have asked if they have any questions or concerns and appropriately addressed them to the best of my ability .      Critical care time greater then 35 minutes reviewing notes, labs data/ imaging , discussion with multidisciplinary team.    Thank you for allowing me to participate in care of your patient .        Eder Brown MD  Infectious Disease  [phone]

## 2020-03-28 NOTE — PROGRESS NOTE ADULT - SUBJECTIVE AND OBJECTIVE BOX
Patient is a 74y old  Female who presents with a chief complaint of shob (28 Mar 2020 07:40)      SUBJECTIVE / OVERNIGHT EVENTS:pt seen and evaluated, intubated, wa sin afib last night and started on metoprolol        Vital Signs Last 24 Hrs  T(C): 37.3 (28 Mar 2020 08:18), Max: 38.3 (27 Mar 2020 17:30)  T(F): 99.2 (28 Mar 2020 08:18), Max: 101 (27 Mar 2020 17:30)  HR: 71 (28 Mar 2020 08:00) (60 - 145)  BP: 95/44 (28 Mar 2020 08:00) (95/44 - 153/55)  BP(mean): 59 (28 Mar 2020 08:00) (56 - 93)  RR: 26 (28 Mar 2020 08:00) (23 - 28)  SpO2: 94% (28 Mar 2020 08:00) (90% - 100%)  I&O's Summary    27 Mar 2020 07:01  -  28 Mar 2020 07:00  --------------------------------------------------------  IN: 1129.8 mL / OUT: 450 mL / NET: 679.8 mL        PHYSICAL EXAM:  cvs: s1s2  resp: dec bs    LABS:                        8.4    12.94 )-----------( 339      ( 28 Mar 2020 07:07 )             26.4     03-28    135  |  102  |  56<H>  ----------------------------<  172<H>  4.0   |  20<L>  |  3.14<H>    Ca    8.4      28 Mar 2020 07:07  Phos  4.8     -28  Mg     1.6         TPro  6.6  /  Alb  2.0<L>  /  TBili  0.3  /  DBili  x   /  AST  26  /  ALT  30  /  AlkPhos  64  28      CAPILLARY BLOOD GLUCOSE      POCT Blood Glucose.: 166 mg/dL (28 Mar 2020 06:54)  POCT Blood Glucose.: 189 mg/dL (27 Mar 2020 23:43)  POCT Blood Glucose.: 194 mg/dL (27 Mar 2020 17:18)  POCT Blood Glucose.: 163 mg/dL (27 Mar 2020 11:06)        Urinalysis Basic - ( 26 Mar 2020 22:14 )    Color: Yellow / Appearance: Slightly Turbid / S.020 / pH: x  Gluc: x / Ketone: Negative  / Bili: Negative / Urobili: Negative mg/dL   Blood: x / Protein: 100 mg/dL / Nitrite: Negative   Leuk Esterase: Negative / RBC: 6-10 /HPF / WBC 0-2   Sq Epi: x / Non Sq Epi: Few / Bacteria: Moderate        RADIOLOGY & ADDITIONAL TESTS:    Imaging Personally Reviewed:  [x] YES  [ ] NO    Consultant(s) Notes Reviewed:  [x] YES  [ ] NO      MEDICATIONS  (STANDING):  chlorhexidine 0.12% Liquid 15 milliLiter(s) Oral Mucosa every 12 hours  chlorhexidine 2% Cloths 1 Application(s) Topical <User Schedule>  dextrose 5%. 1000 milliLiter(s) (50 mL/Hr) IV Continuous <Continuous>  dextrose 50% Injectable 12.5 Gram(s) IV Push once  dextrose 50% Injectable 25 Gram(s) IV Push once  dextrose 50% Injectable 25 Gram(s) IV Push once  fentaNYL   Infusion. 1 MICROgram(s)/kG/Hr (1.81 mL/Hr) IV Continuous <Continuous>  heparin  Injectable 5000 Unit(s) SubCutaneous every 8 hours  hydroxychloroquine 200 milliGRAM(s) Oral every 12 hours  hydroxychloroquine   Oral   insulin lispro (HumaLOG) corrective regimen sliding scale   SubCutaneous every 6 hours  metoprolol tartrate 25 milliGRAM(s) Oral two times a day  norepinephrine Infusion 0.05 MICROgram(s)/kG/Min (8.5 mL/Hr) IV Continuous <Continuous>  pantoprazole  Injectable 40 milliGRAM(s) IV Push daily  piperacillin/tazobactam IVPB.. 3.375 Gram(s) IV Intermittent every 8 hours  propofol Infusion 73.502 MICROgram(s)/kG/Min (40 mL/Hr) IV Continuous <Continuous>  simvastatin 20 milliGRAM(s) Oral at bedtime  veCURonium Infusion 1 MICROgram(s)/kG/Min (5.44 mL/Hr) IV Continuous <Continuous>    MEDICATIONS  (PRN):  acetaminophen   Tablet .. 650 milliGRAM(s) Oral every 6 hours PRN Temp greater or equal to 38C (100.4F), Mild Pain (1 - 3)  dextrose 40% Gel 15 Gram(s) Oral once PRN Blood Glucose LESS THAN 70 milliGRAM(s)/deciliter  glucagon  Injectable 1 milliGRAM(s) IntraMuscular once PRN Glucose LESS THAN 70 milligrams/deciliter  sodium chloride 0.9% lock flush 10 milliLiter(s) IV Push every 1 hour PRN Pre/post blood products, medications, blood draw, and to maintain line patency      Care Discussed with Consultants/Other Providers [x] YES  [ ] NO    HEALTH ISSUES - PROBLEM Dx:  Shock: Shock  Anxiety: Anxiety  Hypothyroid: Hypothyroid  Hyperlipidemia: Hyperlipidemia  DM (diabetes mellitus): DM (diabetes mellitus)  SARS (severe acute respiratory syndrome): SARS (severe acute respiratory syndrome)  Acute respiratory failure with hypoxia: Acute respiratory failure with hypoxia

## 2020-03-28 NOTE — PROGRESS NOTE ADULT - SUBJECTIVE AND OBJECTIVE BOX
Chief Complaint: Fevers, shortness of breath    Interval Events: Remains intubated with high O2 requirements. Episode of atrial fibrillation overnight.    Review of Systems:  General: No fevers, chills, weight loss or gain  Skin: No rashes, color changes  Cardiovascular: No chest pain, orthopnea  Respiratory: No shortness of breath, cough  Gastrointestinal: No nausea, abdominal pain  Genitourinary: No incontinence, pain with urination  Musculoskeletal: No pain, swelling, decreased range of motion  Neurological: No headache, weakness  Psychiatric: No depression, anxiety  Endocrine: No weight loss or gain, increased thirst  All other systems are comprehensively negative.    Physical Exam:  Vitals:        Vital Signs Last 24 Hrs  T(C): 37.3 (28 Mar 2020 08:18), Max: 38.3 (27 Mar 2020 17:30)  T(F): 99.2 (28 Mar 2020 08:18), Max: 101 (27 Mar 2020 17:30)  HR: 71 (28 Mar 2020 08:00) (60 - 145)  BP: 95/44 (28 Mar 2020 08:00) (95/44 - 153/55)  BP(mean): 59 (28 Mar 2020 08:00) (56 - 93)  RR: 26 (28 Mar 2020 08:00) (23 - 28)  SpO2: 94% (28 Mar 2020 08:00) (90% - 100%)  General: Sedated  HEENT: Intubated  Neck: No JVD, no carotid bruit  Extremities: No LE edema, no cyanosis  Neuro: Non-focal  Skin: No rash    Labs:                        8.4    12.94 )-----------( 339      ( 28 Mar 2020 07:07 )             26.4     03-28    135  |  102  |  56<H>  ----------------------------<  172<H>  4.0   |  20<L>  |  3.14<H>    Ca    8.4      28 Mar 2020 07:07  Phos  4.8     03-28  Mg     1.6     03-28    TPro  6.6  /  Alb  2.0<L>  /  TBili  0.3  /  DBili  x   /  AST  26  /  ALT  30  /  AlkPhos  64  03-28            Telemetry: Sinus rhythm, atrial fibrillation

## 2020-03-28 NOTE — PROGRESS NOTE ADULT - ASSESSMENT
74 year old female PMHx Morbid Obesity,  DM2, HTN, HLD, Hypothyroid, Anxiety.   Presents via EMS to ED with fever and cough x 6 days, found to have:    1. CVOID +  2. Acute hypoxic resp failure   3. Cardiac arrest   4. ARDS   5. ALIS    Plan:  Neuro: sedated with propofol and fentanyl. off paralytic. would restart if not able to titrate vent settings  Cardio: levo to keep MAP 65-70. Actively titrate   Pulm: Remains intubated. Titrated down fi02 overnight. abg revewied   GI:  Renal:  ID:  Heme:  Endo:      Dispo: pt critically ill. not appropriate for CPAP trial 74 year old female PMHx Morbid Obesity,  DM2, HTN, HLD, Hypothyroid, Anxiety.   Presents via EMS to ED with fever and cough x 6 days, found to have:    1. CVOID +  2. Acute hypoxic resp failure   3. Cardiac arrest   4. ARDS   5. ALIS    Plan:  Neuro: sedated with propofol and fentanyl. off paralytic. would restart if not able to titrate vent settings  Cardio: levo to keep MAP 65-70. Actively titrate   Pulm: Remains intubated. Titrated down fi02 overnight. abg noted. vent bundle in place. Actively titrate to sat greater then 92%  GI: NPO would consider starting feeds this morning   Renal: Strict I/Os, renally dose meds   ID: cont zosyn. consider d/c of Plaquenil as it has not shown any proven benefit   Heme: heparin and SCDs for DVT ppx   Endo: ISS q6 with accu-checks       Dispo: pt critically ill. not appropriate for CPAP trial

## 2020-03-28 NOTE — PROGRESS NOTE ADULT - SUBJECTIVE AND OBJECTIVE BOX
Date/Time Patient Seen:  		  Referring MD:   Data Reviewed	       Patient is a 74y old  Female who presents with a chief complaint of shob (28 Mar 2020 03:27)      Subjective/HPI     PAST MEDICAL & SURGICAL HISTORY:  DJD (degenerative joint disease)  DM (diabetes mellitus)  Hyperlipidemia  Hypothyroid  HTN (hypertension)  History of vitamin D deficiency  B12 deficiency  Bronchitis  Anxiety  S/P cataract surgery  S/P eye surgery: left eye retinal surgery x2  S/P  section: x2        Medication list         MEDICATIONS  (STANDING):  chlorhexidine 0.12% Liquid 15 milliLiter(s) Oral Mucosa every 12 hours  chlorhexidine 2% Cloths 1 Application(s) Topical <User Schedule>  dextrose 5%. 1000 milliLiter(s) (50 mL/Hr) IV Continuous <Continuous>  dextrose 50% Injectable 12.5 Gram(s) IV Push once  dextrose 50% Injectable 25 Gram(s) IV Push once  dextrose 50% Injectable 25 Gram(s) IV Push once  fentaNYL   Infusion. 1 MICROgram(s)/kG/Hr (1.81 mL/Hr) IV Continuous <Continuous>  heparin  Injectable 5000 Unit(s) SubCutaneous every 8 hours  hydroxychloroquine 200 milliGRAM(s) Oral every 12 hours  hydroxychloroquine   Oral   insulin lispro (HumaLOG) corrective regimen sliding scale   SubCutaneous every 6 hours  norepinephrine Infusion 0.05 MICROgram(s)/kG/Min (8.5 mL/Hr) IV Continuous <Continuous>  pantoprazole  Injectable 40 milliGRAM(s) IV Push daily  piperacillin/tazobactam IVPB.. 3.375 Gram(s) IV Intermittent every 8 hours  propofol Infusion 73.502 MICROgram(s)/kG/Min (40 mL/Hr) IV Continuous <Continuous>  simvastatin 20 milliGRAM(s) Oral at bedtime  veCURonium Infusion 1 MICROgram(s)/kG/Min (5.44 mL/Hr) IV Continuous <Continuous>    MEDICATIONS  (PRN):  acetaminophen   Tablet .. 650 milliGRAM(s) Oral every 6 hours PRN Temp greater or equal to 38C (100.4F), Mild Pain (1 - 3)  dextrose 40% Gel 15 Gram(s) Oral once PRN Blood Glucose LESS THAN 70 milliGRAM(s)/deciliter  glucagon  Injectable 1 milliGRAM(s) IntraMuscular once PRN Glucose LESS THAN 70 milligrams/deciliter  sodium chloride 0.9% lock flush 10 milliLiter(s) IV Push every 1 hour PRN Pre/post blood products, medications, blood draw, and to maintain line patency         Vitals log        ICU Vital Signs Last 24 Hrs  T(C): 38.1 (28 Mar 2020 04:03), Max: 38.3 (27 Mar 2020 17:30)  T(F): 100.5 (28 Mar 2020 04:03), Max: 101 (27 Mar 2020 17:30)  HR: 65 (28 Mar 2020 05:24) (55 - 84)  BP: 120/46 (28 Mar 2020 05:00) (99/44 - 144/72)  BP(mean): 66 (28 Mar 2020 05:00) (60 - 91)  ABP: 129/47 (28 Mar 2020 05:00) (85/45 - 169/56)  ABP(mean): 72 (28 Mar 2020 05:00) (52 - 110)  RR: 26 (28 Mar 2020 05:00) (23 - 27)  SpO2: 96% (28 Mar 2020 05:24) (88% - 100%)       Mode: AC/ CMV (Assist Control/ Continuous Mandatory Ventilation)  RR (machine): 26  TV (machine): 350  FiO2: 60  PEEP: 14  ITime: 1  MAP: 18  PIP: 29      Input and Output:  I&O's Detail    27 Mar 2020 07:01  -  28 Mar 2020 07:00  --------------------------------------------------------  IN:    fentaNYL Infusion.: 56.4 mL    IV PiggyBack: 400 mL    norepinephrine Infusion: 359.7 mL    propofol Infusion: 218.8 mL    veCURonium Infusion: 5.9 mL  Total IN: 1040.8 mL    OUT:    Indwelling Catheter - Urethral: 150 mL  Total OUT: 150 mL    Total NET: 890.8 mL          Lab Data                        8.4    12 )-----------( 339      ( 28 Mar 2020 07:07 )             26.4     03-27    134<L>  |  102  |  45<H>  ----------------------------<  233<H>  4.2   |  21<L>  |  2.42<H>    Ca    8.1<L>      27 Mar 2020 06:54    TPro  6.7  /  Alb  2.3<L>  /  TBili  0.2  /  DBili  x   /  AST  51<H>  /  ALT  44  /  AlkPhos  55  -    ABG - ( 28 Mar 2020 05:14 )  pH, Arterial: x     pH, Blood: 7.29  /  pCO2: 33    /  pO2: 67    / HCO3: 17    / Base Excess: -9.6  /  SaO2: 92                      Review of Systems	      Objective     Physical Examination    heart 1s2  lung dec BS      Pertinent Lab findings & Imaging      Justin:  NO   Adequate UO     I&O's Detail    27 Mar 2020 07:01  -  28 Mar 2020 07:00  --------------------------------------------------------  IN:    fentaNYL Infusion.: 56.4 mL    IV PiggyBack: 400 mL    norepinephrine Infusion: 359.7 mL    propofol Infusion: 218.8 mL    veCURonium Infusion: 5.9 mL  Total IN: 1040.8 mL    OUT:    Indwelling Catheter - Urethral: 150 mL  Total OUT: 150 mL    Total NET: 890.8 mL               Discussed with:     Cultures:	        Radiology

## 2020-03-29 LAB
ANION GAP SERPL CALC-SCNC: 11 MMOL/L — SIGNIFICANT CHANGE UP (ref 5–17)
BASOPHILS # BLD AUTO: 0.01 K/UL — SIGNIFICANT CHANGE UP (ref 0–0.2)
BASOPHILS NFR BLD AUTO: 0.1 % — SIGNIFICANT CHANGE UP (ref 0–2)
BUN SERPL-MCNC: 67 MG/DL — HIGH (ref 7–23)
CALCIUM SERPL-MCNC: 8 MG/DL — LOW (ref 8.4–10.5)
CHLORIDE SERPL-SCNC: 100 MMOL/L — SIGNIFICANT CHANGE UP (ref 96–108)
CO2 SERPL-SCNC: 20 MMOL/L — LOW (ref 22–31)
CREAT SERPL-MCNC: 3.92 MG/DL — HIGH (ref 0.5–1.3)
EOSINOPHIL # BLD AUTO: 0.22 K/UL — SIGNIFICANT CHANGE UP (ref 0–0.5)
EOSINOPHIL NFR BLD AUTO: 2.4 % — SIGNIFICANT CHANGE UP (ref 0–6)
FERRITIN SERPL-MCNC: 579 NG/ML — HIGH (ref 15–150)
GLUCOSE BLDC GLUCOMTR-MCNC: 224 MG/DL — HIGH (ref 70–99)
GLUCOSE BLDC GLUCOMTR-MCNC: 230 MG/DL — HIGH (ref 70–99)
GLUCOSE BLDC GLUCOMTR-MCNC: 253 MG/DL — HIGH (ref 70–99)
GLUCOSE BLDC GLUCOMTR-MCNC: 286 MG/DL — HIGH (ref 70–99)
GLUCOSE SERPL-MCNC: 269 MG/DL — HIGH (ref 70–99)
GRAM STN FLD: SIGNIFICANT CHANGE UP
HCT VFR BLD CALC: 22.5 % — LOW (ref 34.5–45)
HGB BLD-MCNC: 7.2 G/DL — LOW (ref 11.5–15.5)
IMM GRANULOCYTES NFR BLD AUTO: 1.4 % — SIGNIFICANT CHANGE UP (ref 0–1.5)
LDH SERPL L TO P-CCNC: 270 U/L — HIGH (ref 50–242)
LYMPHOCYTES # BLD AUTO: 0.93 K/UL — LOW (ref 1–3.3)
LYMPHOCYTES # BLD AUTO: 10.1 % — LOW (ref 13–44)
MCHC RBC-ENTMCNC: 28.9 PG — SIGNIFICANT CHANGE UP (ref 27–34)
MCHC RBC-ENTMCNC: 32 GM/DL — SIGNIFICANT CHANGE UP (ref 32–36)
MCV RBC AUTO: 90.4 FL — SIGNIFICANT CHANGE UP (ref 80–100)
MONOCYTES # BLD AUTO: 0.4 K/UL — SIGNIFICANT CHANGE UP (ref 0–0.9)
MONOCYTES NFR BLD AUTO: 4.3 % — SIGNIFICANT CHANGE UP (ref 2–14)
NEUTROPHILS # BLD AUTO: 7.56 K/UL — HIGH (ref 1.8–7.4)
NEUTROPHILS NFR BLD AUTO: 81.7 % — HIGH (ref 43–77)
NRBC # BLD: 0 /100 WBCS — SIGNIFICANT CHANGE UP (ref 0–0)
PLATELET # BLD AUTO: 313 K/UL — SIGNIFICANT CHANGE UP (ref 150–400)
POTASSIUM SERPL-MCNC: 3.8 MMOL/L — SIGNIFICANT CHANGE UP (ref 3.5–5.3)
POTASSIUM SERPL-SCNC: 3.8 MMOL/L — SIGNIFICANT CHANGE UP (ref 3.5–5.3)
RBC # BLD: 2.49 M/UL — LOW (ref 3.8–5.2)
RBC # FLD: 14.4 % — SIGNIFICANT CHANGE UP (ref 10.3–14.5)
SODIUM SERPL-SCNC: 131 MMOL/L — LOW (ref 135–145)
SPECIMEN SOURCE: SIGNIFICANT CHANGE UP
WBC # BLD: 9.25 K/UL — SIGNIFICANT CHANGE UP (ref 3.8–10.5)
WBC # FLD AUTO: 9.25 K/UL — SIGNIFICANT CHANGE UP (ref 3.8–10.5)

## 2020-03-29 PROCEDURE — 99232 SBSQ HOSP IP/OBS MODERATE 35: CPT

## 2020-03-29 RX ORDER — MIDODRINE HYDROCHLORIDE 2.5 MG/1
5 TABLET ORAL EVERY 8 HOURS
Refills: 0 | Status: DISCONTINUED | OUTPATIENT
Start: 2020-03-29 | End: 2020-03-30

## 2020-03-29 RX ORDER — FUROSEMIDE 40 MG
20 TABLET ORAL ONCE
Refills: 0 | Status: COMPLETED | OUTPATIENT
Start: 2020-03-29 | End: 2020-03-29

## 2020-03-29 RX ADMIN — Medication 20 MILLIGRAM(S): at 11:44

## 2020-03-29 RX ADMIN — Medication 25 MILLIGRAM(S): at 06:08

## 2020-03-29 RX ADMIN — Medication 4: at 23:35

## 2020-03-29 RX ADMIN — PROPOFOL 40 MICROGRAM(S)/KG/MIN: 10 INJECTION, EMULSION INTRAVENOUS at 23:24

## 2020-03-29 RX ADMIN — PROPOFOL 40 MICROGRAM(S)/KG/MIN: 10 INJECTION, EMULSION INTRAVENOUS at 04:25

## 2020-03-29 RX ADMIN — CHLORHEXIDINE GLUCONATE 15 MILLILITER(S): 213 SOLUTION TOPICAL at 17:27

## 2020-03-29 RX ADMIN — HEPARIN SODIUM 5000 UNIT(S): 5000 INJECTION INTRAVENOUS; SUBCUTANEOUS at 06:08

## 2020-03-29 RX ADMIN — Medication 4: at 11:44

## 2020-03-29 RX ADMIN — Medication 200 MILLIGRAM(S): at 17:27

## 2020-03-29 RX ADMIN — CHLORHEXIDINE GLUCONATE 1 APPLICATION(S): 213 SOLUTION TOPICAL at 06:09

## 2020-03-29 RX ADMIN — PANTOPRAZOLE SODIUM 40 MILLIGRAM(S): 20 TABLET, DELAYED RELEASE ORAL at 11:44

## 2020-03-29 RX ADMIN — Medication 25 MILLIGRAM(S): at 18:36

## 2020-03-29 RX ADMIN — PIPERACILLIN AND TAZOBACTAM 25 GRAM(S): 4; .5 INJECTION, POWDER, LYOPHILIZED, FOR SOLUTION INTRAVENOUS at 21:25

## 2020-03-29 RX ADMIN — MIDODRINE HYDROCHLORIDE 5 MILLIGRAM(S): 2.5 TABLET ORAL at 21:25

## 2020-03-29 RX ADMIN — Medication 200 MILLIGRAM(S): at 06:08

## 2020-03-29 RX ADMIN — MIDODRINE HYDROCHLORIDE 5 MILLIGRAM(S): 2.5 TABLET ORAL at 14:14

## 2020-03-29 RX ADMIN — PIPERACILLIN AND TAZOBACTAM 25 GRAM(S): 4; .5 INJECTION, POWDER, LYOPHILIZED, FOR SOLUTION INTRAVENOUS at 06:08

## 2020-03-29 RX ADMIN — Medication 8.5 MICROGRAM(S)/KG/MIN: at 21:24

## 2020-03-29 RX ADMIN — CHLORHEXIDINE GLUCONATE 15 MILLILITER(S): 213 SOLUTION TOPICAL at 06:08

## 2020-03-29 RX ADMIN — SIMVASTATIN 20 MILLIGRAM(S): 20 TABLET, FILM COATED ORAL at 21:48

## 2020-03-29 RX ADMIN — Medication 6: at 06:09

## 2020-03-29 RX ADMIN — Medication 6: at 17:29

## 2020-03-29 RX ADMIN — PIPERACILLIN AND TAZOBACTAM 25 GRAM(S): 4; .5 INJECTION, POWDER, LYOPHILIZED, FOR SOLUTION INTRAVENOUS at 14:14

## 2020-03-29 NOTE — PROGRESS NOTE ADULT - SUBJECTIVE AND OBJECTIVE BOX
74 year old female PMHx Morbid Obesity,  DM2, HTN, HLD, Hypothyroid, Anxiety presents via EMS to ED with fever and cough x 6 days. Pt states that Tmax was 101.8F that morning.  According to chart record the patient had stated  that she has associated dry cough, body aches and mild generalized weaknes and an episode of loose stool daily.  No record of  recent travel, sick contacts, known covid-19 exposure, CP, SOB, runny nose, sore throat, headache, rash, urinary symptoms.  Suspect COVID-19. Intubated in ED 2/2 hypoxic respiratory distress. Brief cardiac arrest during intubation(CPR only), then ROSC.     Hospital Day 2  Vent Day 2                                                                                                           --- Medications ---    acetaminophen   Tablet .. 650 milliGRAM(s) PRN  chlorhexidine 0.12% Liquid 15 milliLiter(s)  chlorhexidine 2% Cloths 1 Application(s)  dextrose 40% Gel 15 Gram(s) PRN  dextrose 5%. 1000 milliLiter(s)  dextrose 50% Injectable 12.5 Gram(s)  dextrose 50% Injectable 25 Gram(s)  dextrose 50% Injectable 25 Gram(s)  fentaNYL   Infusion. 1 MICROgram(s)/kG/Hr  glucagon  Injectable 1 milliGRAM(s) PRN  heparin  Injectable 5000 Unit(s)  hydroxychloroquine 200 milliGRAM(s)  hydroxychloroquine    insulin lispro (HumaLOG) corrective regimen sliding scale    metoprolol tartrate 25 milliGRAM(s)  norepinephrine Infusion 0.05 MICROgram(s)/kG/Min  pantoprazole  Injectable 40 milliGRAM(s)  piperacillin/tazobactam IVPB.. 3.375 Gram(s)  propofol Infusion 73.502 MICROgram(s)/kG/Min  simvastatin 20 milliGRAM(s)  sodium chloride 0.9% lock flush 10 milliLiter(s) PRN  veCURonium Infusion 1 MICROgram(s)/kG/Min      DVT Prophylaxis : heparin sc    Advanced Directives : full code     T(C): 37.3 (03-28-20 @ 21:00), Max: 38.3 (03-28-20 @ 12:45)  HR: 80 (03-28-20 @ 22:00)  BP: 131/57 (03-28-20 @ 22:00)  RR: 24 (03-28-20 @ 22:00)  SpO2: 92% (03-28-20 @ 22:00)  Wt(kg): --    --- Physical Exam ---    Physical exam not performed where pertinent and urgently required     --- Labratories ---                           8.4    12.94 )-----------( 339      ( 28 Mar 2020 07:07 )             26.4    03-28    135  |  102  |  56<H>  ----------------------------<  172<H>  4.0   |  20<L>  |  3.14<H>    Ca    8.4      28 Mar 2020 07:07  Phos  4.8     03-28  Mg     1.6     03-28    TPro  6.6  /  Alb  2.0<L>  /  TBili  0.3  /  DBili  x   /  AST  26  /  ALT  30  /  AlkPhos  64  03-28    ABG - ( 28 Mar 2020 05:14 )  pH, Arterial: x     pH, Blood: 7.29  /  pCO2: 33    /  pO2: 67    / HCO3: 17    / Base Excess: -9.6  /  SaO2: 92            Radiology  / Ultrasound :   CXR>IMPRESSION:  ET tube and feeding tube noted. Tip of ET tube and feeding tube not well seen.    Increased interstitial lung markings without significant change. Superimposed bilateral airspace opacities overall improving.        No significant pleural effusion    Heart size within normal limits

## 2020-03-29 NOTE — PROGRESS NOTE ADULT - ASSESSMENT
The patient is a 74 year old female with a history of HTN, DM, HL, hypothyroidism, anxiety who presented with fevers, cough, shortness of breath in the setting of viral PNA, COVID-19, respiratory failure, septic shock.     Plan:  - CXR consistent with PNA  - COVID-19 positive  - On hydroxychloroquine, zosyn  - Off of pressors  - Start midodrine 5 mg q8h  - Attempt due keep net even given volume load from drips. Will give furosemide 20 mg IV now.  - Atrial fibrillation - brief, back in sinus rhythm. Continue metoprolol with holding parameters.  - Continue mechanical ventilation  - Pulm and ID follow-up  - On hydroxychloroquine and zosyn

## 2020-03-29 NOTE — PROGRESS NOTE ADULT - SUBJECTIVE AND OBJECTIVE BOX
Patient is a 74y old  Female who presents with a chief complaint of shob (29 Mar 2020 06:21)      SUBJECTIVE / OVERNIGHT EVENTS: pt seen and examined. remaisn intubated        Vital Signs Last 24 Hrs  T(C): 36.9 (29 Mar 2020 08:15), Max: 38.3 (28 Mar 2020 12:45)  T(F): 98.4 (29 Mar 2020 08:15), Max: 100.9 (28 Mar 2020 12:45)  HR: 62 (29 Mar 2020 09:27) (62 - 98)  BP: 88/35 (29 Mar 2020 07:00) (88/35 - 146/60)  BP(mean): 51 (29 Mar 2020 07:00) (51 - 84)  RR: 26 (29 Mar 2020 07:00) (19 - 28)  SpO2: 96% (29 Mar 2020 09:27) (91% - 100%)  I&O's Summary    28 Mar 2020 07:01  -  29 Mar 2020 07:00  --------------------------------------------------------  IN: 2389.4 mL / OUT: 750 mL / NET: 1639.4 mL        PHYSICAL EXAM:  GENERAL: NAD, Comfortable  remains intubated    LABS:                        7.2    9.25  )-----------( 313      ( 29 Mar 2020 07:56 )             22.5     03-29    131<L>  |  100  |  67<H>  ----------------------------<  269<H>  3.8   |  20<L>  |  3.92<H>    Ca    8.0<L>      29 Mar 2020 07:56  Phos  4.8     03-28  Mg     1.6     03-28    TPro  6.6  /  Alb  2.0<L>  /  TBili  0.3  /  DBili  x   /  AST  26  /  ALT  30  /  AlkPhos  64  03-28      CAPILLARY BLOOD GLUCOSE      POCT Blood Glucose.: 286 mg/dL (29 Mar 2020 05:57)  POCT Blood Glucose.: 282 mg/dL (28 Mar 2020 23:55)  POCT Blood Glucose.: 194 mg/dL (28 Mar 2020 17:36)  POCT Blood Glucose.: 152 mg/dL (28 Mar 2020 11:29)            RADIOLOGY & ADDITIONAL TESTS:    Imaging Personally Reviewed:  [x] YES  [ ] NO    Consultant(s) Notes Reviewed:  [x] YES  [ ] NO      MEDICATIONS  (STANDING):  chlorhexidine 0.12% Liquid 15 milliLiter(s) Oral Mucosa every 12 hours  chlorhexidine 2% Cloths 1 Application(s) Topical <User Schedule>  dextrose 5%. 1000 milliLiter(s) (50 mL/Hr) IV Continuous <Continuous>  dextrose 50% Injectable 12.5 Gram(s) IV Push once  dextrose 50% Injectable 25 Gram(s) IV Push once  dextrose 50% Injectable 25 Gram(s) IV Push once  fentaNYL   Infusion. 1 MICROgram(s)/kG/Hr (1.81 mL/Hr) IV Continuous <Continuous>  furosemide   Injectable 20 milliGRAM(s) IV Push once  hydroxychloroquine 200 milliGRAM(s) Oral every 12 hours  hydroxychloroquine   Oral   insulin lispro (HumaLOG) corrective regimen sliding scale   SubCutaneous every 6 hours  metoprolol tartrate 25 milliGRAM(s) Oral two times a day  midodrine 5 milliGRAM(s) Oral every 8 hours  norepinephrine Infusion 0.05 MICROgram(s)/kG/Min (8.5 mL/Hr) IV Continuous <Continuous>  pantoprazole  Injectable 40 milliGRAM(s) IV Push daily  piperacillin/tazobactam IVPB.. 3.375 Gram(s) IV Intermittent every 8 hours  propofol Infusion 73.502 MICROgram(s)/kG/Min (40 mL/Hr) IV Continuous <Continuous>  simvastatin 20 milliGRAM(s) Oral at bedtime  veCURonium Infusion 1 MICROgram(s)/kG/Min (5.44 mL/Hr) IV Continuous <Continuous>    MEDICATIONS  (PRN):  acetaminophen   Tablet .. 650 milliGRAM(s) Oral every 6 hours PRN Temp greater or equal to 38C (100.4F), Mild Pain (1 - 3)  dextrose 40% Gel 15 Gram(s) Oral once PRN Blood Glucose LESS THAN 70 milliGRAM(s)/deciliter  glucagon  Injectable 1 milliGRAM(s) IntraMuscular once PRN Glucose LESS THAN 70 milligrams/deciliter  sodium chloride 0.9% lock flush 10 milliLiter(s) IV Push every 1 hour PRN Pre/post blood products, medications, blood draw, and to maintain line patency      Care Discussed with Consultants/Other Providers [x] YES  [ ] NO    HEALTH ISSUES - PROBLEM Dx:  Afib: Afib  Shock: Shock  Anxiety: Anxiety  Hypothyroid: Hypothyroid  Hyperlipidemia: Hyperlipidemia  DM (diabetes mellitus): DM (diabetes mellitus)  SARS (severe acute respiratory syndrome): SARS (severe acute respiratory syndrome)  Acute respiratory failure with hypoxia: Acute respiratory failure with hypoxia

## 2020-03-29 NOTE — PROGRESS NOTE ADULT - ASSESSMENT
74 year old female PMHx Morbid Obesity,  DM2, HTN, HLD, Hypothyroid, Anxiety w/ hypoxic respiratory failure, ARDS 2/2 COVID-19, ARF.                        (Reviewing data, imaging, discussing with multidisciplinary team, non inclusive of procedures, discussing goals of care with patient/family) 74 year old female PMHx Morbid Obesity,  DM2, HTN, HLD, Hypothyroid, Anxiety w/ hypoxic respiratory failure, ARDS 2/2 COVID-19, ARF.    Neuro-sedated on Diprvan and fentanyl to facilitate safe effective ventilation   CV-BP wnl. Pressor support as needed to maintain MAP>65. Avoid volume challenges. QTC monitoring while plaquenil. DVT ppx   Pulm-cont vent support 4-6cc/kg IBW TV as able to keep plateau pressures <30, maintain o2S>90%. Consider proning to improve oxygenation.   GI- maitain TF, on PPI  Renal-ALIS 2/2 ATN/prerenal will maintain even to neg fluid balance as tolerated by hemodynamics and renal fx  ID-on Zopsyn and Hydroxychloriquine. monitor procalcitonin levels   Endo-glycemic control to limit FS glucose<180mg/dl  COVID-19 specific consideration and therapeutic options based on the avialable and rapidly changing literature.   GOC considerations: Ongoing assessment for patient specific treatment options based on progression or decline.             32min (Reviewing data, imaging, discussing with multidisciplinary team, non inclusive of procedures, discussing goals of care with patient/family)

## 2020-03-29 NOTE — PROGRESS NOTE ADULT - SUBJECTIVE AND OBJECTIVE BOX
Date/Time Patient Seen:  		  Referring MD:   Data Reviewed	       Patient is a 74y old  Female who presents with a chief complaint of shob (29 Mar 2020 01:01)      Subjective/HPI     PAST MEDICAL & SURGICAL HISTORY:  DJD (degenerative joint disease)  DM (diabetes mellitus)  Hyperlipidemia  Hypothyroid  HTN (hypertension)  History of vitamin D deficiency  B12 deficiency  Bronchitis  Anxiety  S/P cataract surgery  S/P eye surgery: left eye retinal surgery x2  S/P  section: x2        Medication list         MEDICATIONS  (STANDING):  chlorhexidine 0.12% Liquid 15 milliLiter(s) Oral Mucosa every 12 hours  chlorhexidine 2% Cloths 1 Application(s) Topical <User Schedule>  dextrose 5%. 1000 milliLiter(s) (50 mL/Hr) IV Continuous <Continuous>  dextrose 50% Injectable 12.5 Gram(s) IV Push once  dextrose 50% Injectable 25 Gram(s) IV Push once  dextrose 50% Injectable 25 Gram(s) IV Push once  fentaNYL   Infusion. 1 MICROgram(s)/kG/Hr (1.81 mL/Hr) IV Continuous <Continuous>  heparin  Injectable 5000 Unit(s) SubCutaneous every 8 hours  hydroxychloroquine 200 milliGRAM(s) Oral every 12 hours  hydroxychloroquine   Oral   insulin lispro (HumaLOG) corrective regimen sliding scale   SubCutaneous every 6 hours  metoprolol tartrate 25 milliGRAM(s) Oral two times a day  norepinephrine Infusion 0.05 MICROgram(s)/kG/Min (8.5 mL/Hr) IV Continuous <Continuous>  pantoprazole  Injectable 40 milliGRAM(s) IV Push daily  piperacillin/tazobactam IVPB.. 3.375 Gram(s) IV Intermittent every 8 hours  propofol Infusion 73.502 MICROgram(s)/kG/Min (40 mL/Hr) IV Continuous <Continuous>  simvastatin 20 milliGRAM(s) Oral at bedtime  veCURonium Infusion 1 MICROgram(s)/kG/Min (5.44 mL/Hr) IV Continuous <Continuous>    MEDICATIONS  (PRN):  acetaminophen   Tablet .. 650 milliGRAM(s) Oral every 6 hours PRN Temp greater or equal to 38C (100.4F), Mild Pain (1 - 3)  dextrose 40% Gel 15 Gram(s) Oral once PRN Blood Glucose LESS THAN 70 milliGRAM(s)/deciliter  glucagon  Injectable 1 milliGRAM(s) IntraMuscular once PRN Glucose LESS THAN 70 milligrams/deciliter  sodium chloride 0.9% lock flush 10 milliLiter(s) IV Push every 1 hour PRN Pre/post blood products, medications, blood draw, and to maintain line patency         Vitals log        ICU Vital Signs Last 24 Hrs  T(C): 37.1 (29 Mar 2020 04:03), Max: 38.3 (28 Mar 2020 12:45)  T(F): 98.7 (29 Mar 2020 04:03), Max: 100.9 (28 Mar 2020 12:45)  HR: 72 (29 Mar 2020 03:09) (62 - 145)  BP: 111/49 (29 Mar 2020 03:00) (95/44 - 153/55)  BP(mean): 67 (29 Mar 2020 03:00) (56 - 93)  ABP: 118/48 (29 Mar 2020 03:00) (98/44 - 175/60)  ABP(mean): 70 (29 Mar 2020 03:00) (57 - 102)  RR: 22 (29 Mar 2020 03:00) (22 - 28)  SpO2: 91% (29 Mar 2020 03:09) (90% - 100%)       Mode: AC/ CMV (Assist Control/ Continuous Mandatory Ventilation)  RR (machine): 26  TV (machine): 350  FiO2: 60  PEEP: 10  ITime: 0.6  MAP: 15  PIP: 25      Input and Output:  I&O's Detail    27 Mar 2020 07:01  -  28 Mar 2020 07:00  --------------------------------------------------------  IN:    fentaNYL Infusion.: 68.4 mL    IV PiggyBack: 400 mL    norepinephrine Infusion: 389.7 mL    propofol Infusion: 265.8 mL    veCURonium Infusion: 5.9 mL  Total IN: 1129.8 mL    OUT:    Indwelling Catheter - Urethral: 450 mL  Total OUT: 450 mL    Total NET: 679.8 mL      28 Mar 2020 07:01  -  29 Mar 2020 06:23  --------------------------------------------------------  IN:    fentaNYL Infusion.: 54.8 mL    Free Water: 600 mL    Glucerna 1.5: 900 mL    IV PiggyBack: 200 mL    propofol Infusion: 277.6 mL  Total IN: 2032.4 mL    OUT:    Indwelling Catheter - Urethral: 400 mL  Total OUT: 400 mL    Total NET: 1632.4 mL          Lab Data                        8.4    12.94 )-----------( 339      ( 28 Mar 2020 07:07 )             26.4         135  |  102  |  56<H>  ----------------------------<  172<H>  4.0   |  20<L>  |  3.14<H>    Ca    8.4      28 Mar 2020 07:07  Phos  4.8       Mg     1.6         TPro  6.6  /  Alb  2.0<L>  /  TBili  0.3  /  DBili  x   /  AST  26  /  ALT  30  /  AlkPhos  64      ABG - ( 28 Mar 2020 05:14 )  pH, Arterial: x     pH, Blood: 7.29  /  pCO2: 33    /  pO2: 67    / HCO3: 17    / Base Excess: -9.6  /  SaO2: 92                      Review of Systems	      Objective     Physical Examination    heart s1s2  lung dc BS      Pertinent Lab findings & Imaging      Justin:  NO   Adequate UO     I&O's Detail    27 Mar 2020 07:  -  28 Mar 2020 07:00  --------------------------------------------------------  IN:    fentaNYL Infusion.: 68.4 mL    IV PiggyBack: 400 mL    norepinephrine Infusion: 389.7 mL    propofol Infusion: 265.8 mL    veCURonium Infusion: 5.9 mL  Total IN: 1129.8 mL    OUT:    Indwelling Catheter - Urethral: 450 mL  Total OUT: 450 mL    Total NET: 679.8 mL      28 Mar 2020 07:01  -  29 Mar 2020 06:23  --------------------------------------------------------  IN:    fentaNYL Infusion.: 54.8 mL    Free Water: 600 mL    Glucerna 1.5: 900 mL    IV PiggyBack: 200 mL    propofol Infusion: 277.6 mL  Total IN: 2032.4 mL    OUT:    Indwelling Catheter - Urethral: 400 mL  Total OUT: 400 mL    Total NET: 1632.4 mL               Discussed with:     Cultures:	        Radiology

## 2020-03-29 NOTE — PROGRESS NOTE ADULT - SUBJECTIVE AND OBJECTIVE BOX
Chief Complaint: Fevers, shortness of breath    Interval Events: No significant changes overnight.    Review of Systems:  General: No fevers, chills, weight loss or gain  Skin: No rashes, color changes  Cardiovascular: No chest pain, orthopnea  Respiratory: No shortness of breath, cough  Gastrointestinal: No nausea, abdominal pain  Genitourinary: No incontinence, pain with urination  Musculoskeletal: No pain, swelling, decreased range of motion  Neurological: No headache, weakness  Psychiatric: No depression, anxiety  Endocrine: No weight loss or gain, increased thirst  All other systems are comprehensively negative.    Physical Exam:  Vital Signs Last 24 Hrs  T(C): 37.1 (29 Mar 2020 04:03), Max: 38.3 (28 Mar 2020 12:45)  T(F): 98.7 (29 Mar 2020 04:03), Max: 100.9 (28 Mar 2020 12:45)  HR: 63 (29 Mar 2020 07:00) (63 - 98)  BP: 88/35 (29 Mar 2020 07:00) (88/35 - 146/60)  BP(mean): 51 (29 Mar 2020 07:00) (51 - 84)  RR: 26 (29 Mar 2020 07:00) (19 - 28)  SpO2: 93% (29 Mar 2020 07:00) (91% - 100%)  General: Sedated  HEENT: Intubated  Neck: No JVD, no carotid bruit  Extremities: No LE edema, no cyanosis  Neuro: Non-focal  Skin: No rash    Labs:    03-29    131<L>  |  100  |  67<H>  ----------------------------<  269<H>  3.8   |  20<L>  |  3.92<H>    Ca    8.0<L>      29 Mar 2020 07:56  Phos  4.8     03-28  Mg     1.6     03-28    TPro  6.6  /  Alb  2.0<L>  /  TBili  0.3  /  DBili  x   /  AST  26  /  ALT  30  /  AlkPhos  64  03-28                        7.2    9.25  )-----------( 313      ( 29 Mar 2020 07:56 )             22.5       Telemetry: Sinus rhythm

## 2020-03-30 DIAGNOSIS — E53.8 DEFICIENCY OF OTHER SPECIFIED B GROUP VITAMINS: ICD-10-CM

## 2020-03-30 DIAGNOSIS — I10 ESSENTIAL (PRIMARY) HYPERTENSION: ICD-10-CM

## 2020-03-30 DIAGNOSIS — Z86.39 PERSONAL HISTORY OF OTHER ENDOCRINE, NUTRITIONAL AND METABOLIC DISEASE: ICD-10-CM

## 2020-03-30 DIAGNOSIS — E03.9 HYPOTHYROIDISM, UNSPECIFIED: ICD-10-CM

## 2020-03-30 DIAGNOSIS — D64.9 ANEMIA, UNSPECIFIED: ICD-10-CM

## 2020-03-30 DIAGNOSIS — I48.91 UNSPECIFIED ATRIAL FIBRILLATION: ICD-10-CM

## 2020-03-30 LAB
ABO RH CONFIRMATION: SIGNIFICANT CHANGE UP
ANION GAP SERPL CALC-SCNC: 13 MMOL/L — SIGNIFICANT CHANGE UP (ref 5–17)
BASOPHILS # BLD AUTO: 0.01 K/UL — SIGNIFICANT CHANGE UP (ref 0–0.2)
BASOPHILS NFR BLD AUTO: 0.1 % — SIGNIFICANT CHANGE UP (ref 0–2)
BLD GP AB SCN SERPL QL: SIGNIFICANT CHANGE UP
BUN SERPL-MCNC: 79 MG/DL — HIGH (ref 7–23)
CALCIUM SERPL-MCNC: 8.2 MG/DL — LOW (ref 8.4–10.5)
CHLORIDE SERPL-SCNC: 99 MMOL/L — SIGNIFICANT CHANGE UP (ref 96–108)
CO2 SERPL-SCNC: 19 MMOL/L — LOW (ref 22–31)
CREAT SERPL-MCNC: 4.54 MG/DL — HIGH (ref 0.5–1.3)
CULTURE RESULTS: SIGNIFICANT CHANGE UP
EOSINOPHIL # BLD AUTO: 0.38 K/UL — SIGNIFICANT CHANGE UP (ref 0–0.5)
EOSINOPHIL NFR BLD AUTO: 3.6 % — SIGNIFICANT CHANGE UP (ref 0–6)
GLUCOSE BLDC GLUCOMTR-MCNC: 124 MG/DL — HIGH (ref 70–99)
GLUCOSE BLDC GLUCOMTR-MCNC: 198 MG/DL — HIGH (ref 70–99)
GLUCOSE BLDC GLUCOMTR-MCNC: 201 MG/DL — HIGH (ref 70–99)
GLUCOSE BLDC GLUCOMTR-MCNC: 211 MG/DL — HIGH (ref 70–99)
GLUCOSE SERPL-MCNC: 187 MG/DL — HIGH (ref 70–99)
HCT VFR BLD CALC: 21.6 % — LOW (ref 34.5–45)
HGB BLD-MCNC: 6.9 G/DL — CRITICAL LOW (ref 11.5–15.5)
IMM GRANULOCYTES NFR BLD AUTO: 2.2 % — HIGH (ref 0–1.5)
LYMPHOCYTES # BLD AUTO: 0.98 K/UL — LOW (ref 1–3.3)
LYMPHOCYTES # BLD AUTO: 9.3 % — LOW (ref 13–44)
MCHC RBC-ENTMCNC: 29.1 PG — SIGNIFICANT CHANGE UP (ref 27–34)
MCHC RBC-ENTMCNC: 31.9 GM/DL — LOW (ref 32–36)
MCV RBC AUTO: 91.1 FL — SIGNIFICANT CHANGE UP (ref 80–100)
MONOCYTES # BLD AUTO: 0.73 K/UL — SIGNIFICANT CHANGE UP (ref 0–0.9)
MONOCYTES NFR BLD AUTO: 6.9 % — SIGNIFICANT CHANGE UP (ref 2–14)
NEUTROPHILS # BLD AUTO: 8.25 K/UL — HIGH (ref 1.8–7.4)
NEUTROPHILS NFR BLD AUTO: 77.9 % — HIGH (ref 43–77)
NRBC # BLD: 0 /100 WBCS — SIGNIFICANT CHANGE UP (ref 0–0)
PLATELET # BLD AUTO: 327 K/UL — SIGNIFICANT CHANGE UP (ref 150–400)
POTASSIUM SERPL-MCNC: 4.1 MMOL/L — SIGNIFICANT CHANGE UP (ref 3.5–5.3)
POTASSIUM SERPL-SCNC: 4.1 MMOL/L — SIGNIFICANT CHANGE UP (ref 3.5–5.3)
RBC # BLD: 2.37 M/UL — LOW (ref 3.8–5.2)
RBC # FLD: 14.6 % — HIGH (ref 10.3–14.5)
SODIUM SERPL-SCNC: 131 MMOL/L — LOW (ref 135–145)
SPECIMEN SOURCE: SIGNIFICANT CHANGE UP
WBC # BLD: 10.58 K/UL — HIGH (ref 3.8–10.5)
WBC # FLD AUTO: 10.58 K/UL — HIGH (ref 3.8–10.5)

## 2020-03-30 PROCEDURE — 99291 CRITICAL CARE FIRST HOUR: CPT

## 2020-03-30 RX ORDER — MIDODRINE HYDROCHLORIDE 2.5 MG/1
10 TABLET ORAL EVERY 8 HOURS
Refills: 0 | Status: DISCONTINUED | OUTPATIENT
Start: 2020-03-30 | End: 2020-04-02

## 2020-03-30 RX ORDER — PIPERACILLIN AND TAZOBACTAM 4; .5 G/20ML; G/20ML
3.38 INJECTION, POWDER, LYOPHILIZED, FOR SOLUTION INTRAVENOUS EVERY 12 HOURS
Refills: 0 | Status: COMPLETED | OUTPATIENT
Start: 2020-03-30 | End: 2020-04-04

## 2020-03-30 RX ADMIN — CHLORHEXIDINE GLUCONATE 15 MILLILITER(S): 213 SOLUTION TOPICAL at 18:07

## 2020-03-30 RX ADMIN — CHLORHEXIDINE GLUCONATE 15 MILLILITER(S): 213 SOLUTION TOPICAL at 05:06

## 2020-03-30 RX ADMIN — PIPERACILLIN AND TAZOBACTAM 25 GRAM(S): 4; .5 INJECTION, POWDER, LYOPHILIZED, FOR SOLUTION INTRAVENOUS at 13:01

## 2020-03-30 RX ADMIN — Medication 200 MILLIGRAM(S): at 05:04

## 2020-03-30 RX ADMIN — SIMVASTATIN 20 MILLIGRAM(S): 20 TABLET, FILM COATED ORAL at 21:50

## 2020-03-30 RX ADMIN — Medication 4: at 11:53

## 2020-03-30 RX ADMIN — PANTOPRAZOLE SODIUM 40 MILLIGRAM(S): 20 TABLET, DELAYED RELEASE ORAL at 11:26

## 2020-03-30 RX ADMIN — PROPOFOL 40 MICROGRAM(S)/KG/MIN: 10 INJECTION, EMULSION INTRAVENOUS at 13:03

## 2020-03-30 RX ADMIN — Medication 2: at 05:17

## 2020-03-30 RX ADMIN — MIDODRINE HYDROCHLORIDE 5 MILLIGRAM(S): 2.5 TABLET ORAL at 05:05

## 2020-03-30 RX ADMIN — PROPOFOL 40 MICROGRAM(S)/KG/MIN: 10 INJECTION, EMULSION INTRAVENOUS at 18:59

## 2020-03-30 RX ADMIN — PROPOFOL 40 MICROGRAM(S)/KG/MIN: 10 INJECTION, EMULSION INTRAVENOUS at 06:43

## 2020-03-30 RX ADMIN — PIPERACILLIN AND TAZOBACTAM 25 GRAM(S): 4; .5 INJECTION, POWDER, LYOPHILIZED, FOR SOLUTION INTRAVENOUS at 05:06

## 2020-03-30 RX ADMIN — MIDODRINE HYDROCHLORIDE 10 MILLIGRAM(S): 2.5 TABLET ORAL at 21:49

## 2020-03-30 RX ADMIN — Medication 200 MILLIGRAM(S): at 18:06

## 2020-03-30 RX ADMIN — CHLORHEXIDINE GLUCONATE 1 APPLICATION(S): 213 SOLUTION TOPICAL at 05:06

## 2020-03-30 RX ADMIN — MIDODRINE HYDROCHLORIDE 10 MILLIGRAM(S): 2.5 TABLET ORAL at 11:28

## 2020-03-30 RX ADMIN — Medication 4: at 18:07

## 2020-03-30 NOTE — PROGRESS NOTE ADULT - ASSESSMENT
The patient is a 74 year old female with a history of HTN, DM, HL, hypothyroidism, anxiety who presented with fevers, cough, shortness of breath in the setting of viral PNA, COVID-19, respiratory failure, septic shock.     Plan:  - CXR consistent with PNA  - COVID-19 positive  - On hydroxychloroquine, zosyn  - Off of pressors  - Increase midodrine to 10 mg q8h  - Attempt due keep net even given volume load from drips. May need diuretics later in day.  - Atrial fibrillation - brief, back in sinus rhythm. Continue metoprolol with holding parameters.  - Hemoglobin trended down to 6.9. Transfuse pRBC.  - Worsening renal function, likely shock, ATN, and direct injury from COVID-19  - Continue mechanical ventilation  - Pulm and ID follow-up  - Overall prognosis poor given multi-organ failure (respiratory failure, kidney failure, shock).

## 2020-03-30 NOTE — PROGRESS NOTE ADULT - ASSESSMENT
73 y/o F with hx of DM, HTN, HLD, hypothyroid, anxiety who presents with SARS pneumonia. Covid-positive, patient is currently sedated on propofol drip.

## 2020-03-30 NOTE — PROGRESS NOTE ADULT - SUBJECTIVE AND OBJECTIVE BOX
Chief Complaint: Fevers, shortness of breath    Interval Events: Remains intubated.    Review of Systems:  Unable to obtain    Physical Exam:  Vital Signs Last 24 Hrs  T(C): 37 (30 Mar 2020 04:19), Max: 37 (30 Mar 2020 04:19)  T(F): 98.6 (30 Mar 2020 04:19), Max: 98.6 (30 Mar 2020 04:19)  HR: 61 (30 Mar 2020 08:00) (57 - 71)  BP: 92/53 (30 Mar 2020 08:00) (92/53 - 123/43)  BP(mean): 57 (30 Mar 2020 08:00) (55 - 66)  RR: 26 (30 Mar 2020 08:00) (20 - 26)  SpO2: 93% (30 Mar 2020 06:30) (91% - 100%)  General: Sedated  HEENT: Intubated  Neck: No JVD, no carotid bruit  Extremities: No LE edema, no cyanosis  Neuro: Non-focal  Skin: No rash    Labs:    03-30    131<L>  |  99  |  79<H>  ----------------------------<  187<H>  4.1   |  19<L>  |  4.54<H>    Ca    8.2<L>      30 Mar 2020 06:29                          6.9    10.58 )-----------( 327      ( 30 Mar 2020 06:28 )             21.6       Telemetry: Sinus rhythm

## 2020-03-30 NOTE — PROGRESS NOTE ADULT - SUBJECTIVE AND OBJECTIVE BOX
Patient is a 74y old  Female who presents with a chief complaint of shob (29 Mar 2020 10:46)      BRIEF HOSPITAL COURSE: 74F with PMHx Morbid Obesity,  DM2, HTN, HLD, Hypothyroid, Anxiety presents via EMS to ED with fever and cough x 6 days. Pt states that Tmax was 101.8F that morning.  According to chart record the patient had stated  that she has associated dry cough, body aches and mild generalized weaknes and an episode of loose stool daily.  No record of  recent travel, sick contacts, known covid-19 exposure, CP, SOB, runny nose, sore throat, headache, rash, urinary symptoms.  Suspect COVID-19. Intubated in ED 2/2 hypoxic respiratory distress. Brief cardiac arrest during intubation(CPR only), then ROSC.     Events last 24 hours: Pt remains on vent support. FiO2 weaned to 4% from 50%.     PAST MEDICAL & SURGICAL HISTORY:  DJD (degenerative joint disease)  DM (diabetes mellitus)  Hyperlipidemia  Hypothyroid  HTN (hypertension)  History of vitamin D deficiency  B12 deficiency  Bronchitis  Anxiety  S/P cataract surgery  S/P eye surgery: left eye retinal surgery x2  S/P  section: x2        Hosp day #4    Vent day #4  Mode: AC/ CMV (Assist Control/ Continuous Mandatory Ventilation)  RR (machine): 26  TV (machine): 350  FiO2: 50 > 40%  PEEP: 8  ITime: 0.6  MAP: 15  PIP: 35        Vital signs / Reviewed and Physical Exam Performed where pertinent and urgently required    Lab / Radiology  studies / ABG / Meds -  reviewed and interpreted into the assessment and treatment plan.      A/P: 74F with PMHx as above, admitted with acute hypoxemic respiratory failure due to covid 19, ARDS, Brief episode of cardiac arrest during intubation, alis, septic shock    Neuro - Sedation neuromuscular blockade to facilitate safe ventilation  CV -  Pressor support as needed to maintain MAP 65. Avoiding fluid challenges. QTC monitoring while on Azithromycin and Hydroxychloroquine.  Pulm -  ARDS-NET 4-6cc/kg IBW TV as able to maintain plateau pressures <30. Prone ventilation consideration as feasible  Pa02/Fi02 < 150 on Fi02 >60% and PEEP at least 5. Vent bundle Reviewed   GI -  PPI  Enteric feeds as tolerated in tandem with NMB and prone ventilation  Renal - ALIS in setting of shock. Even to negative fluid balance as tolerated by hemodynamics and renal fx.  Feeds to be provided in lieu of IVF.   Heme -  Pharmacologic DVT PPx  in addition to SCD's  ID - ABX discontinuation based on discussion with ID in conjunction with clinical features, culture data, and judicious procalcitonin monitoring.    Endo -  Aggressive glycemic control to limit FS glucose to < 180mg/dl.    COVID 19 specific considerations and therapeutic  options based on the available and rapidly changing literature    Goals of care considerations:  Ongoing assessment for patient specific treatment options based on progression or decline.  I have involved the family with updates and requests in guidance for medical decision making.          41  Minutes of critical care time spent in the management of this critically ill COVID-19 patient/PUI patient with continuous assessments and interventions based on the interpretation of multiple databases. Patient is a 74y old  Female who presents with a chief complaint of shob (29 Mar 2020 10:46)      BRIEF HOSPITAL COURSE: 74F with PMHx Morbid Obesity,  DM2, HTN, HLD, Hypothyroid, Anxiety presents via EMS to ED with fever and cough x 6 days. Pt states that Tmax was 101.8F that morning.  According to chart record the patient had stated  that she has associated dry cough, body aches and mild generalized weaknes and an episode of loose stool daily.  No record of  recent travel, sick contacts, known covid-19 exposure, CP, SOB, runny nose, sore throat, headache, rash, urinary symptoms.  Suspect COVID-19. Intubated in ED 2/2 hypoxic respiratory distress. Brief cardiac arrest during intubation(CPR only), then ROSC.     Events last 24 hours: Pt remains on vent support. FiO2 weaned to 40% from 50%.     PAST MEDICAL & SURGICAL HISTORY:  DJD (degenerative joint disease)  DM (diabetes mellitus)  Hyperlipidemia  Hypothyroid  HTN (hypertension)  History of vitamin D deficiency  B12 deficiency  Bronchitis  Anxiety  S/P cataract surgery  S/P eye surgery: left eye retinal surgery x2  S/P  section: x2        Hosp day #4    Vent day #4  Mode: AC/ CMV (Assist Control/ Continuous Mandatory Ventilation)  RR (machine): 26  TV (machine): 350  FiO2: 50 > 40%  PEEP: 8  ITime: 0.6  MAP: 15  PIP: 35        Vital signs / Reviewed and Physical Exam Performed where pertinent and urgently required    Lab / Radiology  studies / ABG / Meds -  reviewed and interpreted into the assessment and treatment plan.      A/P: 74F with PMHx as above, admitted with acute hypoxemic respiratory failure due to covid 19, ARDS, Brief episode of cardiac arrest during intubation, alis, septic shock    Neuro - Sedation neuromuscular blockade to facilitate safe ventilation  CV -  Pressor support as needed to maintain MAP 65. Avoiding fluid challenges. QTC monitoring while on Azithromycin and Hydroxychloroquine.  Pulm -  ARDS-NET 4-6cc/kg IBW TV as able to maintain plateau pressures <30. Prone ventilation consideration as feasible  Pa02/Fi02 < 150 on Fi02 >60% and PEEP at least 5. Vent bundle Reviewed   GI -  PPI  Enteric feeds as tolerated in tandem with NMB and prone ventilation  Renal - ALIS in setting of shock. Even to negative fluid balance as tolerated by hemodynamics and renal fx.  Feeds to be provided in lieu of IVF.   Heme -  Pharmacologic DVT PPx  in addition to SCD's  ID - ABX discontinuation based on discussion with ID in conjunction with clinical features, culture data, and judicious procalcitonin monitoring.    Endo -  Aggressive glycemic control to limit FS glucose to < 180mg/dl.    COVID 19 specific considerations and therapeutic  options based on the available and rapidly changing literature    Goals of care considerations:  Ongoing assessment for patient specific treatment options based on progression or decline.  I have involved the family with updates and requests in guidance for medical decision making.          41  Minutes of critical care time spent in the management of this critically ill COVID-19 patient/PUI patient with continuous assessments and interventions based on the interpretation of multiple databases.

## 2020-03-30 NOTE — PROGRESS NOTE ADULT - PROBLEM SELECTOR PLAN 2
covid +  blood cx x2 negative, sputum cx no growth x 72 hours, negative legionella antigen  s/p rocephin and azithromycin  will monitor

## 2020-03-30 NOTE — PROGRESS NOTE ADULT - PROBLEM SELECTOR PLAN 9
Hx of B12 anemia, normal MCV at this time  will consult GI to r/o GI bleed  1u pRBC ordered, CBC followup pending  discussed with family who provided telephone consent  continue pantoprazole QD

## 2020-03-30 NOTE — CONSULT NOTE ADULT - CONSULT REASON
Acute respiratory failure
Severe sepsis with MSOf and Acute hypoxic respiratory failure sec COVId 19 pneumonia
gi bleed
resp failure  ards
ALIS

## 2020-03-30 NOTE — PROGRESS NOTE ADULT - SUBJECTIVE AND OBJECTIVE BOX
ADELIA BALDWIN Female  Patient is a 74y old  Female who presents with a chief complaint of shob (30 Mar 2020 05:26)      INTERVAL HPI/OVERNIGHT EVENTS:  HPI:  Patient is a 74y old  Female who presents with a chief complaint of shob    HPI:  73 y/o F with hx of DM, HTN, HLD, hypothyroid, anxiety biba with c/o fever and cough x 6 days. Pt states that Tmax was 101.8F, took one tab of tylenol XS at 9am this morning. Pt states that she has associated dry cough, body aches and mild generalized weakness. States that she has had one episode of loose stool daily. Denies recent travel, sick contacts, known covid-19 exposure, CP, SOB, runny nose, sore throat, headache, rash, urinary symptoms.  she was sating 94% ON 4l and was then put on non rebreather and was transferred to sp[facility].    2 hr after initial exam around 7pm  due to increased work of breathing and shob anesthesisa was called for intubation.  covid was sent.        PAST MEDICAL & SURGICAL HISTORY:  DJD (degenerative joint disease)  DM (diabetes mellitus)  Hyperlipidemia  Hypothyroid  HTN (hypertension)  History of vitamin D deficiency  B12 deficiency  Bronchitis  Anxiety  S/P cataract surgery  S/P eye surgery: left eye retinal surgery x2  S/P  section: x2      FAMILY HISTORY:      SOCIAL HISTORY:    Allergies    No Known Allergies    Intolerances          REVIEW OF SYSEMS:  General: + weakness, + fever/chills, no weight loss/gain  Skin/Breast: no rash, no jaundice  Ophthalmologic: no vision changes, no dry eyes   Respiratory and Thorax: no cough, no wheezing, no hemoptysis, no dyspnea  Cardiovascular: no chest pain, no shortness of breath, no orthopnea  Gastrointestinal: no n/v/d, no abdominal pain, no dysphagia   Genitourinary: no dysuria, no frequency, no nocturia, no hematuria  Musculoskeletal: no trauma, no sprain/strain, no myalgias, no arthralgias, no fracture  Neurological: no HA, no dizziness, no weakness, no numbness  Psychiatric: no depression, no SI/HI  Hematology/Lymphatics: no easy bruising  Endocrine: no heat or cold intolerance. no weight gain or loss  Allergic/Immunologic: no allergy or recent reaction       Vital Signs Last 24 Hrs  T(C): 36.7 (26 Mar 2020 17:53), Max: 38.4 (26 Mar 2020 13:17)  T(F): 98 (26 Mar 2020 17:53), Max: 101.2 (26 Mar 2020 13:17)  HR: 74 (26 Mar 2020 17:53) (70 - 76)  BP: 129/72 (26 Mar 2020 17:53) (117/68 - 129/72)  BP(mean): --  RR: 17 (26 Mar 2020 17:53) (16 - 18)  SpO2: 84% (26 Mar 2020 17:53) (84% - 94%)  I&O's Summary      PHYSICAL EXAM:  GENERAL: on nc  HEAD:  Atraumatic, Normocephalic  EYES: EOMI, PERRLA, conjunctiva and sclera clear  NECK: Supple, No JVD  CHEST/LUNG: + rhonchi  HEART: Regular rate and rhythm; No murmurs, rubs, or gallops  ABDOMEN: Soft, Nontender, Nondistended; Bowel sounds present  Neuro: AAOx3, no focal deficit, 5/5 b/l extremities  EXTREMITIES:  2+ Peripheral Pulses, No clubbing, cyanosis, or edema  SKIN: No rashes or lesions    LABS:                        9.2    10.38 )-----------( 271      ( 26 Mar 2020 14:21 )             28.4     03-26    134<L>  |  100  |  42<H>  ----------------------------<  175<H>  4.0   |  21<L>  |  1.91<H>    Ca    8.9      26 Mar 2020 14:21    TPro  7.8  /  Alb  2.9<L>  /  TBili  0.4  /  DBili  x   /  AST  19  /  ALT  24  /  AlkPhos  61  03-26      CAPILLARY BLOOD GLUCOSE                RADIOLOGY & ADDITIONAL TESTS:    Imaging Personally Reviewed:  [x] YES  [ ] NO    Consultant(s) Notes Reviewed:  [x] YES  [ ] NO      MEDICATIONS  (STANDING):  azithromycin  IVPB 500 milliGRAM(s) IV Intermittent every 24 hours  cefTRIAXone   IVPB 1000 milliGRAM(s) IV Intermittent every 24 hours    MEDICATIONS  (PRN):  acetaminophen   Tablet .. 650 milliGRAM(s) Oral every 6 hours PRN Temp greater or equal to 38C (100.4F), Mild Pain (1 - 3)      Care Discussed with Consultants/Other Providers [x] YES  [ ] NO    HEALTH ISSUES - PROBLEM Dx: (26 Mar 2020 18:50)      FAMILY HISTORY:    Allergies    No Known Allergies    Intolerances      MEDICATIONS  (STANDING):  chlorhexidine 0.12% Liquid 15 milliLiter(s) Oral Mucosa every 12 hours  chlorhexidine 2% Cloths 1 Application(s) Topical <User Schedule>  dextrose 5%. 1000 milliLiter(s) (50 mL/Hr) IV Continuous <Continuous>  dextrose 50% Injectable 12.5 Gram(s) IV Push once  dextrose 50% Injectable 25 Gram(s) IV Push once  dextrose 50% Injectable 25 Gram(s) IV Push once  fentaNYL   Infusion. 1 MICROgram(s)/kG/Hr (1.81 mL/Hr) IV Continuous <Continuous>  hydroxychloroquine 200 milliGRAM(s) Oral every 12 hours  hydroxychloroquine   Oral   insulin lispro (HumaLOG) corrective regimen sliding scale   SubCutaneous every 6 hours  metoprolol tartrate 25 milliGRAM(s) Oral two times a day  midodrine 5 milliGRAM(s) Oral every 8 hours  norepinephrine Infusion 0.05 MICROgram(s)/kG/Min (8.5 mL/Hr) IV Continuous <Continuous>  pantoprazole  Injectable 40 milliGRAM(s) IV Push daily  piperacillin/tazobactam IVPB.. 3.375 Gram(s) IV Intermittent every 8 hours  propofol Infusion 73.502 MICROgram(s)/kG/Min (40 mL/Hr) IV Continuous <Continuous>  simvastatin 20 milliGRAM(s) Oral at bedtime  veCURonium Infusion 1 MICROgram(s)/kG/Min (5.44 mL/Hr) IV Continuous <Continuous>    MEDICATIONS  (PRN):  acetaminophen   Tablet .. 650 milliGRAM(s) Oral every 6 hours PRN Temp greater or equal to 38C (100.4F), Mild Pain (1 - 3)  dextrose 40% Gel 15 Gram(s) Oral once PRN Blood Glucose LESS THAN 70 milliGRAM(s)/deciliter  glucagon  Injectable 1 milliGRAM(s) IntraMuscular once PRN Glucose LESS THAN 70 milligrams/deciliter  sodium chloride 0.9% lock flush 10 milliLiter(s) IV Push every 1 hour PRN Pre/post blood products, medications, blood draw, and to maintain line patency    REVIEW OF SYSTEMS:  CONSTITUTIONAL: No fever, weight loss, or fatigue  RESPIRATORY: No cough, wheezing, chills or hemoptysis; No shortness of breath  CARDIOVASCULAR: No chest pain, palpitations, dizziness, or leg swelling  GASTROINTESTINAL: No abdominal or epigastric pain. No nausea, vomiting, or hematemesis; No diarrhea or constipation. No melena or hematochezia.  NEUROLOGICAL: No headaches, memory loss, loss of strength, numbness, or tremors  MUSCULOSKELETAL: No joint pain or swelling; No muscle, back, or extremity pain  SKIN: No rashes or lesions    Vital Signs Last 24 Hrs  T(C): 37 (30 Mar 2020 04:19), Max: 37 (30 Mar 2020 04:19)  T(F): 98.6 (30 Mar 2020 04:), Max: 98.6 (30 Mar 2020 04:)  HR: 61 (30 Mar 2020 08:00) (57 - 71)  BP: 92/53 (30 Mar 2020 08:00) (92/53 - 123/43)  BP(mean): 57 (30 Mar 2020 08:00) (55 - 66)  RR: 26 (30 Mar 2020 08:00) (20 - 26)  SpO2: 93% (30 Mar 2020 06:30) (91% - 100%)    T(C): 37 (20 @ 04:19), Max: 37 (20 @ 04:19)  HR: 61 (20 @ 08:00) (57 - 71)  BP: 92/53 (20 @ 08:00) (92/53 - 123/43)  RR: 26 (20 @ 08:00) (20 - 26)  SpO2: 93% (20 @ 06:30) (91% - 100%)  Daily     Daily Weight in k.2 (30 Mar 2020 04:19)  CBC Full  -  ( 30 Mar 2020 06:28 )  WBC Count : 10.58 K/uL  RBC Count : 2.37 M/uL  Hemoglobin : 6.9 g/dL  Hematocrit : 21.6 %  Platelet Count - Automated : 327 K/uL  Mean Cell Volume : 91.1 fl  Mean Cell Hemoglobin : 29.1 pg  Mean Cell Hemoglobin Concentration : 31.9 gm/dL  Auto Neutrophil # : 8.25 K/uL  Auto Lymphocyte # : 0.98 K/uL  Auto Monocyte # : 0.73 K/uL  Auto Eosinophil # : 0.38 K/uL  Auto Basophil # : 0.01 K/uL  Auto Neutrophil % : 77.9 %  Auto Lymphocyte % : 9.3 %  Auto Monocyte % : 6.9 %  Auto Eosinophil % : 3.6 %  Auto Basophil % : 0.1 %        PHYSICAL EXAM:  GENERAL: NAD, well-groomed, well-developed  HEAD:  Atraumatic, Normocephalic  EYES: EOMI, PERRLA, conjunctiva and sclera clear  NECK: Supple, No JVD  NERVOUS SYSTEM:  Alert & Oriented X3, No gross focal deficits  CHEST/LUNG: Clear to percussion bilaterally; No rales, rhonchi, wheezing, or rubs  HEART: Regular rate and rhythm; S1S2 present  ABDOMEN: Soft, Nontender, Nondistended; Bowel sounds present  EXTREMITIES:  No clubbing, cyanosis, or edema  SKIN: No rashes or lesions        CAPILLARY BLOOD GLUCOSE      POCT Blood Glucose.: 198 mg/dL (30 Mar 2020 05:15)  POCT Blood Glucose.: 230 mg/dL (29 Mar 2020 23:32)  POCT Blood Glucose.: 253 mg/dL (29 Mar 2020 17:25)  POCT Blood Glucose.: 224 mg/dL (29 Mar 2020 11:42)                I&O's Summary    29 Mar 2020 07:  -  30 Mar 2020 07:00  --------------------------------------------------------  IN: 1896.6 mL / OUT: 600 mL / NET: 1296.6 mL    30 Mar 2020 07:  -  30 Mar 2020 09:35  --------------------------------------------------------  IN: 32.6 mL / OUT: 0 mL / NET: 32.6 mL      Last Menstrual Period    RADIOLOGY & ADDITIONAL TESTS:    Imaging Personally Reviewed:  [x ] YES  [ ] NO    Consultant(s) Notes Reviewed:  [ ] YES  [ ] NO    Care Discussed with Consultants/Other Providers [ ] YES  [ ] NO    Plan of Care discussed with Housestaff [ ]YES [ ] NO

## 2020-03-30 NOTE — PROGRESS NOTE ADULT - SUBJECTIVE AND OBJECTIVE BOX
ADELIA BALDWIN is a 74yFemale , patient examined and chart reviewed.     INTERVAL HPI/ OVERNIGHT EVENTS:  Vented sedated off pressors.  Afebrile.    Past Medical History--  PAST MEDICAL & SURGICAL HISTORY:  DJD (degenerative joint disease)  DM (diabetes mellitus)  Hyperlipidemia  Hypothyroid  HTN (hypertension)  History of vitamin D deficiency  B12 deficiency  Bronchitis  Anxiety  S/P cataract surgery  S/P eye surgery: left eye retinal surgery x2  S/P  section: x2      For details regarding the patient's social history, family history, and other miscellaneous elements, please refer the initial infectious diseases consultation and/or the admitting history and physical examination for this admission.      ROS:  Unable to obtain due to : pt's condition      Current inpatient medications :    ANTIBIOTICS/RELEVANT:  hydroxychloroquine 200 milliGRAM(s) Oral every 12 hours  hydroxychloroquine   Oral   piperacillin/tazobactam IVPB.. 3.375 Gram(s) IV Intermittent every 8 hours    MEDICATIONS  (STANDING):  chlorhexidine 0.12% Liquid 15 milliLiter(s) Oral Mucosa every 12 hours  chlorhexidine 2% Cloths 1 Application(s) Topical <User Schedule>  dextrose 5%. 1000 milliLiter(s) (50 mL/Hr) IV Continuous <Continuous>  dextrose 50% Injectable 12.5 Gram(s) IV Push once  dextrose 50% Injectable 25 Gram(s) IV Push once  dextrose 50% Injectable 25 Gram(s) IV Push once  fentaNYL   Infusion. 1 MICROgram(s)/kG/Hr (1.81 mL/Hr) IV Continuous <Continuous>  insulin lispro (HumaLOG) corrective regimen sliding scale   SubCutaneous every 6 hours  metoprolol tartrate 25 milliGRAM(s) Oral two times a day  midodrine 10 milliGRAM(s) Oral every 8 hours  norepinephrine Infusion 0.05 MICROgram(s)/kG/Min (8.5 mL/Hr) IV Continuous <Continuous>  pantoprazole  Injectable 40 milliGRAM(s) IV Push daily  propofol Infusion 73.502 MICROgram(s)/kG/Min (40 mL/Hr) IV Continuous <Continuous>  simvastatin 20 milliGRAM(s) Oral at bedtime  veCURonium Infusion 1 MICROgram(s)/kG/Min (5.44 mL/Hr) IV Continuous <Continuous>    MEDICATIONS  (PRN):  acetaminophen   Tablet .. 650 milliGRAM(s) Oral every 6 hours PRN Temp greater or equal to 38C (100.4F), Mild Pain (1 - 3)  dextrose 40% Gel 15 Gram(s) Oral once PRN Blood Glucose LESS THAN 70 milliGRAM(s)/deciliter  glucagon  Injectable 1 milliGRAM(s) IntraMuscular once PRN Glucose LESS THAN 70 milligrams/deciliter  sodium chloride 0.9% lock flush 10 milliLiter(s) IV Push every 1 hour PRN Pre/post blood products, medications, blood draw, and to maintain line patency        Objective:  Vital Signs Last 24 Hrs  T(C): 36.7 (30 Mar 2020 16:12), Max: 37 (30 Mar 2020 04:19)  T(F): 98 (30 Mar 2020 16:12), Max: 98.6 (30 Mar 2020 04:19)  HR: 55 (30 Mar 2020 18:00) (55 - 68)  BP: 122/44 (30 Mar 2020 18:00) (92/53 - 124/95)  BP(mean): 64 (30 Mar 2020 18:00) (57 - 70)  RR: 24 (30 Mar 2020 18:00) (16 - 26)  SpO2: 100% (30 Mar 2020 18:00) (91% - 100%)    Mode: AC/ CMV (Assist Control/ Continuous Mandatory Ventilation)  RR (machine): 26  TV (machine): 350  FiO2: 50  PEEP: 8  ITime: 0.6  MAP: 20  PIP: 50    Physical Exam:  GEN: Intubated sedated  HEENT: normocephalic and atraumatic. EOMI. OLGA. Moist mucosa. Clear Posterior pharynx.  NECK: Supple. No carotid bruits.  No lymphadenopathy or thyromegaly.  LUNGS: Decreased to auscultation.  HEART: Regular rate and rhythm without murmur.  ABDOMEN: Soft, nontender, and nondistended.  Positive bowel sounds.   EXTREMITIES: +edema.  NEUROLOGIC: Sedaed   SKIN: No ulceration or induration present.      LABS:                                 6.9    10.58 )-----------( 327      ( 30 Mar 2020 06:28 )             21.6   03-30    131<L>  |  99  |  79<H>  ----------------------------<  187<H>  4.1   |  19<L>  |  4.54<H>    Ca    8.2<L>      30 Mar 2020 06:29      Urinalysis Basic - ( 26 Mar 2020 22:14 )    Color: Yellow / Appearance: Slightly Turbid / S.020 / pH: x  Gluc: x / Ketone: Negative  / Bili: Negative / Urobili: Negative mg/dL   Blood: x / Protein: 100 mg/dL / Nitrite: Negative   Leuk Esterase: Negative / RBC: 6-10 /HPF / WBC 0-2   Sq Epi: x / Non Sq Epi: Few / Bacteria: Moderate      ABG - ( 28 Mar 2020 05:14 )  pH, Arterial: x     pH, Blood: 7.29  /  pCO2: 33    /  pO2: 67    / HCO3: 17    / Base Excess: -9.6  /  SaO2: 92        MICROBIOLOGY:    Culture - Urine (collected 27 Mar 2020 11:03)  Source: .Urine Clean Catch (Midstream)  Final Report (28 Mar 2020 10:55):    No growth    Culture - Blood (collected 26 Mar 2020 23:00)  Source: .Blood Blood  Preliminary Report (28 Mar 2020 01:03):    No growth to date.    Culture - Blood (collected 26 Mar 2020 23:00)  Source: .Blood Blood  Preliminary Report (28 Mar 2020 01:02):    No growth to date.    COVID-19 PCR . (20 @ 22:21)    COVID-19 PCR: Detected: All “detected” results on this new test are considered presumptively  positive results, are clinically actionable, and specimens will be  forwarded to CDC for confirmation testing.  Another report (corrected report) will only be issued if discordant  results occur.  This test has been validated by [employer] to be accurate;  though it has not been FDA cleared/approved by the usual pathway.  As with all laboratory tests, results should be correlated with clinical  findings.      RADIOLOGY & ADDITIONAL STUDIES:  EXAM:  XR CHEST PORTABLE IMMED 1V                            PROCEDURE DATE:  2020          INTERPRETATION:  CLINICAL STATEMENT: Follow-up chest pain.    TECHNIQUE: AP view of the chest.    COMPARISON: 3/26/2020    FINDINGS/  IMPRESSION:  ET tube and feeding tube noted. Tip of ET tube and feeding tube not well seen.    Increased interstitial lung markings without significant change. Superimposed bilateral airspace opacities overall improving.        No significant pleural effusion    Assessment :  73 y/o F with hx of DM, HTN, HLD, hypothyroid, anxiety presented admitted with severe sepsis with septic shock and acute hypoxic respiratory failure with MSOF sec COVID 19 pneumonia. Sp Cardiorespiratory arrest.  Procalcitonin markedly elevated- concern for superimposed bacterial process. Worsening ALIS. Anemic,    Plan :   Cont Hydroxychloroquine x 5 days  Cont  Zosyn renally dosed  Vent per pulm ICU  Wean off pressors  Trend temps  Prognosis guarded  COVID-19---high risk period for decompensation  (7-14 days post symptom onset), avoid aerosolizing procedures,  steroids, NSAIDs ---no compelling evidence to date DO NOT recommend any specific therapies outside of established protocols or controlled trials -would support focus on supportive care  avoid excessive blood draws but do recommend for hospitalized patients  -daily CBC w diff to follow eos, lymphocytes and neutrophils and daily NLR calculation (NLR <3 low vs >5 high) -other labs that may be selectively used to risk stratify and predict clinical course,   Procalcitonin (<0.2 associated with more severe disease),  Ferritin (lower risk <450 vs >850) CRP (low risk <2 and higher risk >6) D-dimer (<1,000 vs >1,000) LDH, ESR, PT/PTT, CK, lactate, troponin, IL-6  -antibiotics only if there is concern for a bacterial process  neutrophil/lymphocyte ratio 8.41    D/w ICU team      Continue with present regiment.  Appropriate use of antibiotics and adverse effects reviewed.    I have discussed the above plan of care with patient/ family in detail. They expressed understanding of the the treatment plan . Risks, benefits and alternatives discussed in detail. I have asked if they have any questions or concerns and appropriately addressed them to the best of my ability .      Critical care time greater then 35 minutes reviewing notes, labs data/ imaging , discussion with multidisciplinary team.    Thank you for allowing me to participate in care of your patient .        Eder Brown MD  Infectious Disease  [phone]

## 2020-03-30 NOTE — PROGRESS NOTE ADULT - SUBJECTIVE AND OBJECTIVE BOX
Patient is a 74y old  Female who presents with a chief complaint of shob (30 Mar 2020 16:09)      INTERVAL HPI/OVERNIGHT EVENTS:  No change reported by staff        MEDICATIONS  (STANDING):  chlorhexidine 0.12% Liquid 15 milliLiter(s) Oral Mucosa every 12 hours  chlorhexidine 2% Cloths 1 Application(s) Topical <User Schedule>  dextrose 5%. 1000 milliLiter(s) (50 mL/Hr) IV Continuous <Continuous>  dextrose 50% Injectable 12.5 Gram(s) IV Push once  dextrose 50% Injectable 25 Gram(s) IV Push once  dextrose 50% Injectable 25 Gram(s) IV Push once  fentaNYL   Infusion. 1 MICROgram(s)/kG/Hr (1.81 mL/Hr) IV Continuous <Continuous>  hydroxychloroquine 200 milliGRAM(s) Oral every 12 hours  hydroxychloroquine   Oral   insulin lispro (HumaLOG) corrective regimen sliding scale   SubCutaneous every 6 hours  metoprolol tartrate 25 milliGRAM(s) Oral two times a day  midodrine 10 milliGRAM(s) Oral every 8 hours  norepinephrine Infusion 0.05 MICROgram(s)/kG/Min (8.5 mL/Hr) IV Continuous <Continuous>  pantoprazole  Injectable 40 milliGRAM(s) IV Push daily  piperacillin/tazobactam IVPB.. 3.375 Gram(s) IV Intermittent every 8 hours  propofol Infusion 73.502 MICROgram(s)/kG/Min (40 mL/Hr) IV Continuous <Continuous>  simvastatin 20 milliGRAM(s) Oral at bedtime  veCURonium Infusion 1 MICROgram(s)/kG/Min (5.44 mL/Hr) IV Continuous <Continuous>    MEDICATIONS  (PRN):  acetaminophen   Tablet .. 650 milliGRAM(s) Oral every 6 hours PRN Temp greater or equal to 38C (100.4F), Mild Pain (1 - 3)  dextrose 40% Gel 15 Gram(s) Oral once PRN Blood Glucose LESS THAN 70 milliGRAM(s)/deciliter  glucagon  Injectable 1 milliGRAM(s) IntraMuscular once PRN Glucose LESS THAN 70 milligrams/deciliter  sodium chloride 0.9% lock flush 10 milliLiter(s) IV Push every 1 hour PRN Pre/post blood products, medications, blood draw, and to maintain line patency      Allergies    No Known Allergies    Intolerances        REVIEW OF SYSTEMS deferred due to patient sedated and unresponsive    Vital Signs Last 24 Hrs  T(C): 36.7 (30 Mar 2020 16:12), Max: 37 (30 Mar 2020 04:19)  T(F): 98 (30 Mar 2020 16:12), Max: 98.6 (30 Mar 2020 04:19)  HR: 55 (30 Mar 2020 18:00) (55 - 68)  BP: 122/44 (30 Mar 2020 18:00) (92/53 - 124/95)  BP(mean): 64 (30 Mar 2020 18:00) (57 - 70)  RR: 24 (30 Mar 2020 18:00) (16 - 26)  SpO2: 100% (30 Mar 2020 18:00) (91% - 100%)    PHYSICAL EXAM:  GENERAL: NAD, well-groomed, well-developed, obese  HEAD:  Atraumatic, Normocephalic  ENMT: Moist mucous membranes   NECK: Supple, No JVD appreciated  NERVOUS SYSTEM: Patient unresponsive to voice or touch  CHEST/LUNG: Loud blowing breath sounds b/l. No rales, rhonchi, wheezing, or rubs appreciated  HEART: Regular rate and rhythm on monitor, unable to auscultate heart sounds due to body habitus; No murmurs, rubs, or gallops appreciated  ABDOMEN: Soft, Nontender, Nondistended; Hyperactive bowel sounds present  EXTREMITIES:  2+ Peripheral Pulses, No clubbing or cyanosis  LYMPH: No lymphadenopathy noted  SKIN: No rashes or lesions appreciated. Central line in L neck, femoral line in groin, no discharge at sites.    LABS:                        6.9    10.58 )-----------( 327      ( 30 Mar 2020 06:28 )             21.6     30 Mar 2020 06:29    131    |  99     |  79     ----------------------------<  187    4.1     |  19     |  4.54     Ca    8.2        30 Mar 2020 06:29          CAPILLARY BLOOD GLUCOSE      POCT Blood Glucose.: 211 mg/dL (30 Mar 2020 17:40)  POCT Blood Glucose.: 201 mg/dL (30 Mar 2020 11:38)  POCT Blood Glucose.: 198 mg/dL (30 Mar 2020 05:15)  POCT Blood Glucose.: 230 mg/dL (29 Mar 2020 23:32)      RADIOLOGY & ADDITIONAL TESTS:    Imaging Personally Reviewed:      [X] Consultant(s) Notes Reviewed

## 2020-03-30 NOTE — PROGRESS NOTE ADULT - ASSESSMENT
LABS:                        6.9    10.58 )-----------( 327      ( 30 Mar 2020 06:28 )             21.6   03-30    131<L>  |  99  |  79<H>  ----------------------------<  187<H>  4.1   |  19<L>  |  4.54<H>    Ca    8.2<L>      30 Mar 2020 06:29          HEALTH ISSUES - PROBLEM Dx:  ANEMIA  R/O GIB  Afib: Afib  Shock: Shock  Anxiety: Anxiety  Hypothyroid: Hypothyroid  Hyperlipidemia: Hyperlipidemia  DM (diabetes mellitus): DM (diabetes mellitus)  SARS (severe acute respiratory syndrome): SARS (severe acute respiratory syndrome)  Acute respiratory failure with hypoxia: Acute respiratory failure with hypoxia

## 2020-03-30 NOTE — CONSULT NOTE ADULT - ASSESSMENT
1.	ALIS: ATN  2.	SARS-COVID 19, Resp failure on vent  3.	Anemia  4.	Diabetes    Worsening renal function. Pt remains non oliguric. To continue current meds. Optimize BP. Pressor support as needed.   Supportive care. PRBC transfusion. Monitor h/h trend. Monitor blood sugar levels. Insulin coverage as needed.   Avoid nephrotoxic meds as possible. Avoid ACEI, ARB, NSAIDs and IV contrast. Will follow electrolytes and renal function trend.   Further recommendations pending clinical course. Thank you for the courtesy of this referral.
The patient is a 74 year old female with a history of HTN, DM, HL, hypothyroidism, anxiety who presented with fevers, cough, shortness of breath.     Plan:  - ECG with no evidence of ischemia or infarction  - CXR consistent with PNA  - COVID-19 pending  - On hydroxychloroquine, azithromycin, ceftriaxone  - Continue norepinephrine and titrate to MAP of 65  - Attempt due keep net even given volume load from drips  - Continue mechanical ventilation; on high PEEP for ARDS  - Pulm and ID follow-up
anemia  covid positive  gi bleed diarrhea    plan  loose bm jiym from covid   to follow up in and out  ppi bid   hold on  upper gastrointestinal endoscopy unless bleeding occurs  gerd precautions  to follow up closely

## 2020-03-30 NOTE — CONSULT NOTE ADULT - SUBJECTIVE AND OBJECTIVE BOX
Patient is a 74y old  Female who presents with a chief complaint of shob (30 Mar 2020 14:10)    HPI:  Patient is a 74y old  Female who presents with a chief complaint of shob    HPI:  73 y/o F with hx of DM, HTN, HLD, hypothyroid, anxiety biba with c/o fever and cough x 6 days. Pt states that Tmax was 101.8F, took one tab of tylenol XS at 9am this morning. Pt states that she has associated dry cough, body aches and mild generalized weakness. States that she has had one episode of loose stool daily. Denies recent travel, sick contacts, known covid-19 exposure, CP, SOB, runny nose, sore throat, headache, rash, urinary symptoms.  she was sating 94% ON 4l and was then put on non rebreather and was transferred to spcu.    2 hr after initial exam around 7pm  due to increased work of breathing and shob anesthesisa was called for intubation.  covid was sent.    Renal consult called for ALIS. History obtained from chart.     PAST MEDICAL & SURGICAL HISTORY:  DJD (degenerative joint disease)  DM (diabetes mellitus)  Hyperlipidemia  Hypothyroid  HTN (hypertension)  History of vitamin D deficiency  B12 deficiency  Bronchitis  Anxiety  S/P cataract surgery  S/P eye surgery: left eye retinal surgery x2  S/P  section: x2      FAMILY HISTORY:      SOCIAL HISTORY: No smoking or alcohol use     Allergies    No Known Allergies    Intolerances          REVIEW OF SYSEMS:  on vent      Vital Signs Last 24 Hrs  T(C): 36.7 (26 Mar 2020 17:53), Max: 38.4 (26 Mar 2020 13:17)  T(F): 98 (26 Mar 2020 17:53), Max: 101.2 (26 Mar 2020 13:17)  HR: 74 (26 Mar 2020 17:53) (70 - 76)  BP: 129/72 (26 Mar 2020 17:53) (117/68 - 129/72)  BP(mean): --  RR: 17 (26 Mar 2020 17:53) (16 - 18)  SpO2: 84% (26 Mar 2020 17:53) (84% - 94%)  I&O's Summary      PHYSICAL EXAM:  on vent  Pt on COVID precautions. Chart reviewed and previous physical examination noted.     LABORATORY:                        6.9    10.58 )-----------( 327      ( 30 Mar 2020 06:28 )             21.6         131<L>  |  99  |  79<H>  ----------------------------<  187<H>  4.1   |  19<L>  |  4.54<H>    Ca    8.2<L>      30 Mar 2020 06:29      Sodium, Serum: 131 mmol/L ( @ 06:29)  Sodium, Serum: 131 mmol/L ( @ 07:56)    Potassium, Serum: 4.1 mmol/L ( @ :29)  Potassium, Serum: 3.8 mmol/L ( @ 07:56)    Hemoglobin: 6.9 g/dL ( @ 06:28)  Hemoglobin: 7.2 g/dL ( @ 07:56)  Hemoglobin: 8.4 g/dL ( @ 07:07)    Creatinine, Serum 4.54 ( @ :29)  Creatinine, Serum 3.92 ( @ 07:56)  Creatinine, Serum 3.14 ( @ 07:07)

## 2020-03-30 NOTE — CONSULT NOTE ADULT - SUBJECTIVE AND OBJECTIVE BOX
Chief Complaint:  Patient is a 74y old  Female who presents with a chief complaint of sob 73 y/o F with hx of DM, HTN, HLD, hypothyroid, anxiety biba with c/o fever and cough x 6 days. Pt states that Tmax was 101.8F, took one tab of tylenol XS at 9am this morning. Pt states that she has associated dry cough, body aches and mild generalized weakness. States that she has had one episode of loose stool daily. Denies recent travel, sick contacts, known covid-19 exposure, CP, SOB, runny nose, sore throat, headache, rash, urinary symptoms.  she was sating 94% ON 4l and was then put on non rebreather and was transferred to spcu.    2 hr after initial exam around 7pm  due to increased work of breathing and shob anesthesisa was called for intubation.  covid was sent.  loose stools denies any melena no brbpr  here for follow up     Allergies:  No Known Allergies      Medications:  acetaminophen   Tablet .. 650 milliGRAM(s) Oral every 6 hours PRN  chlorhexidine 0.12% Liquid 15 milliLiter(s) Oral Mucosa every 12 hours  chlorhexidine 2% Cloths 1 Application(s) Topical <User Schedule>  dextrose 40% Gel 15 Gram(s) Oral once PRN  dextrose 5%. 1000 milliLiter(s) IV Continuous <Continuous>  dextrose 50% Injectable 12.5 Gram(s) IV Push once  dextrose 50% Injectable 25 Gram(s) IV Push once  dextrose 50% Injectable 25 Gram(s) IV Push once  fentaNYL   Infusion. 1 MICROgram(s)/kG/Hr IV Continuous <Continuous>  glucagon  Injectable 1 milliGRAM(s) IntraMuscular once PRN  hydroxychloroquine 200 milliGRAM(s) Oral every 12 hours  hydroxychloroquine   Oral   insulin lispro (HumaLOG) corrective regimen sliding scale   SubCutaneous every 6 hours  metoprolol tartrate 25 milliGRAM(s) Oral two times a day  midodrine 10 milliGRAM(s) Oral every 8 hours  norepinephrine Infusion 0.05 MICROgram(s)/kG/Min IV Continuous <Continuous>  pantoprazole  Injectable 40 milliGRAM(s) IV Push daily  piperacillin/tazobactam IVPB.. 3.375 Gram(s) IV Intermittent every 8 hours  propofol Infusion 73.502 MICROgram(s)/kG/Min IV Continuous <Continuous>  simvastatin 20 milliGRAM(s) Oral at bedtime  sodium chloride 0.9% lock flush 10 milliLiter(s) IV Push every 1 hour PRN  veCURonium Infusion 1 MICROgram(s)/kG/Min IV Continuous <Continuous>      PMHX/PSHX:  DJD (degenerative joint disease)  DM (diabetes mellitus)  Hyperlipidemia  Hypothyroid  HTN (hypertension)  History of vitamin D deficiency  B12 deficiency  Bronchitis  Anxiety  S/P cataract surgery  S/P eye surgery  S/P  section      Family history:  no uc no cd     Social History: no etoh no cigs no ivda    ROS:     General:  No wt loss, fevers, chills, night sweats, fatigue,   Eyes:  Good vision, no reported pain  ENT:  No sore throat, pain, runny nose, dysphagia  CV:  No pain, palpitations, hypo/hypertension  Resp:  No dyspnea, cough, tachypnea, wheezing  GI:  No pain, No nausea, No vomiting, No diarrhea, No constipation, No weight loss, No fever, No pruritis, No rectal bleeding, No tarry stools, No dysphagia,  :  No pain, bleeding, incontinence, nocturia  Muscle:  No pain, weakness  Neuro:  No weakness, tingling, memory problems  Psych:  No fatigue, insomnia, mood problems, depression  Endocrine:  No polyuria, polydipsia, cold/heat intolerance  Heme:  No petechiae, ecchymosis, easy bruisability  Skin:  No rash, tattoos, scars, edema      PHYSICAL EXAM:   Vital Signs:  Vital Signs Last 24 Hrs  T(C): 37 (30 Mar 2020 04:19), Max: 37 (30 Mar 2020 04:19)  T(F): 98.6 (30 Mar 2020 04:19), Max: 98.6 (30 Mar 2020 04:19)  HR: 63 (30 Mar 2020 12:30) (57 - 71)  BP: 107/41 (30 Mar 2020 12:30) (92/53 - 124/52)  BP(mean): 70 (30 Mar 2020 12:00) (55 - 70)  RR: 16 (30 Mar 2020 12:30) (16 - 26)  SpO2: 98% (30 Mar 2020 12:30) (91% - 100%)  Daily     Daily Weight in k.2 (30 Mar 2020 04:19)    GENERAL:  Appears stated age, well-groomed, well-nourished, no distress  HEENT:  NC/AT,  conjunctivae clear and pink, no thyromegaly, nodules, adenopathy, no JVD, sclera -anicteric  CHEST:  Full & symmetric excursion, no increased effort, breath sounds clear  HEART:  Regular rhythm, S1, S2, no murmur/rub/S3/S4, no abdominal bruit, no edema  ABDOMEN:  Soft, non-tender, non-distended, normoactive bowel sounds,  no masses ,no hepato-splenomegaly, no signs of chronic liver disease  EXTEREMITIES:  no cyanosis,clubbing or edema  SKIN:  No rash/erythema/ecchymoses/petechiae/wounds/abscess/warm/dry  NEURO:  Alert, oriented, no asterixis, no tremor, no encephalopathy    LABS:                        6.9    10.58 )-----------( 327      ( 30 Mar 2020 06:28 )             21.6     03-30    131<L>  |  99  |  79<H>  ----------------------------<  187<H>  4.1   |  19<L>  |  4.54<H>    Ca    8.2<L>      30 Mar 2020 06:29                Imaging:

## 2020-03-31 LAB
ALBUMIN SERPL ELPH-MCNC: 1.8 G/DL — LOW (ref 3.3–5)
ALP SERPL-CCNC: 62 U/L — SIGNIFICANT CHANGE UP (ref 30–120)
ALT FLD-CCNC: 27 U/L DA — SIGNIFICANT CHANGE UP (ref 10–60)
ANION GAP SERPL CALC-SCNC: 15 MMOL/L — SIGNIFICANT CHANGE UP (ref 5–17)
AST SERPL-CCNC: 18 U/L — SIGNIFICANT CHANGE UP (ref 10–40)
BASOPHILS # BLD AUTO: 0.04 K/UL — SIGNIFICANT CHANGE UP (ref 0–0.2)
BASOPHILS NFR BLD AUTO: 0.3 % — SIGNIFICANT CHANGE UP (ref 0–2)
BILIRUB SERPL-MCNC: 0.3 MG/DL — SIGNIFICANT CHANGE UP (ref 0.2–1.2)
BUN SERPL-MCNC: 84 MG/DL — HIGH (ref 7–23)
CALCIUM SERPL-MCNC: 8.6 MG/DL — SIGNIFICANT CHANGE UP (ref 8.4–10.5)
CHLORIDE SERPL-SCNC: 98 MMOL/L — SIGNIFICANT CHANGE UP (ref 96–108)
CO2 SERPL-SCNC: 18 MMOL/L — LOW (ref 22–31)
CREAT SERPL-MCNC: 4.65 MG/DL — HIGH (ref 0.5–1.3)
EOSINOPHIL # BLD AUTO: 0.32 K/UL — SIGNIFICANT CHANGE UP (ref 0–0.5)
EOSINOPHIL NFR BLD AUTO: 2.5 % — SIGNIFICANT CHANGE UP (ref 0–6)
FERRITIN SERPL-MCNC: 414 NG/ML — HIGH (ref 15–150)
GLUCOSE BLDC GLUCOMTR-MCNC: 169 MG/DL — HIGH (ref 70–99)
GLUCOSE BLDC GLUCOMTR-MCNC: 192 MG/DL — HIGH (ref 70–99)
GLUCOSE BLDC GLUCOMTR-MCNC: 197 MG/DL — HIGH (ref 70–99)
GLUCOSE BLDC GLUCOMTR-MCNC: 222 MG/DL — HIGH (ref 70–99)
GLUCOSE SERPL-MCNC: 169 MG/DL — HIGH (ref 70–99)
HCT VFR BLD CALC: 26.5 % — LOW (ref 34.5–45)
HGB BLD-MCNC: 8.4 G/DL — LOW (ref 11.5–15.5)
IMM GRANULOCYTES NFR BLD AUTO: 4 % — HIGH (ref 0–1.5)
LDH SERPL L TO P-CCNC: 300 U/L — HIGH (ref 50–242)
LYMPHOCYTES # BLD AUTO: 1 K/UL — SIGNIFICANT CHANGE UP (ref 1–3.3)
LYMPHOCYTES # BLD AUTO: 7.9 % — LOW (ref 13–44)
MAGNESIUM SERPL-MCNC: 2.1 MG/DL — SIGNIFICANT CHANGE UP (ref 1.6–2.6)
MCHC RBC-ENTMCNC: 28 PG — SIGNIFICANT CHANGE UP (ref 27–34)
MCHC RBC-ENTMCNC: 31.7 GM/DL — LOW (ref 32–36)
MCV RBC AUTO: 88.3 FL — SIGNIFICANT CHANGE UP (ref 80–100)
MONOCYTES # BLD AUTO: 1.08 K/UL — HIGH (ref 0–0.9)
MONOCYTES NFR BLD AUTO: 8.5 % — SIGNIFICANT CHANGE UP (ref 2–14)
NEUTROPHILS # BLD AUTO: 9.75 K/UL — HIGH (ref 1.8–7.4)
NEUTROPHILS NFR BLD AUTO: 76.8 % — SIGNIFICANT CHANGE UP (ref 43–77)
NRBC # BLD: 0 /100 WBCS — SIGNIFICANT CHANGE UP (ref 0–0)
OB PNL STL: NEGATIVE — SIGNIFICANT CHANGE UP
PLATELET # BLD AUTO: 415 K/UL — HIGH (ref 150–400)
POTASSIUM SERPL-MCNC: 4.5 MMOL/L — SIGNIFICANT CHANGE UP (ref 3.5–5.3)
POTASSIUM SERPL-SCNC: 4.5 MMOL/L — SIGNIFICANT CHANGE UP (ref 3.5–5.3)
PROCALCITONIN SERPL-MCNC: 15.3 NG/ML — HIGH (ref 0.02–0.1)
PROT SERPL-MCNC: 6.4 G/DL — SIGNIFICANT CHANGE UP (ref 6–8.3)
RBC # BLD: 3 M/UL — LOW (ref 3.8–5.2)
RBC # FLD: 15.6 % — HIGH (ref 10.3–14.5)
SODIUM SERPL-SCNC: 131 MMOL/L — LOW (ref 135–145)
WBC # BLD: 12.7 K/UL — HIGH (ref 3.8–10.5)
WBC # FLD AUTO: 12.7 K/UL — HIGH (ref 3.8–10.5)

## 2020-03-31 PROCEDURE — 99233 SBSQ HOSP IP/OBS HIGH 50: CPT

## 2020-03-31 RX ORDER — MIDAZOLAM HYDROCHLORIDE 1 MG/ML
2 INJECTION, SOLUTION INTRAMUSCULAR; INTRAVENOUS ONCE
Refills: 0 | Status: DISCONTINUED | OUTPATIENT
Start: 2020-03-31 | End: 2020-03-31

## 2020-03-31 RX ORDER — SODIUM BICARBONATE 1 MEQ/ML
650 SYRINGE (ML) INTRAVENOUS THREE TIMES A DAY
Refills: 0 | Status: COMPLETED | OUTPATIENT
Start: 2020-03-31 | End: 2020-04-03

## 2020-03-31 RX ORDER — FUROSEMIDE 40 MG
20 TABLET ORAL ONCE
Refills: 0 | Status: COMPLETED | OUTPATIENT
Start: 2020-03-31 | End: 2020-03-31

## 2020-03-31 RX ADMIN — Medication 2: at 23:35

## 2020-03-31 RX ADMIN — Medication 200 MILLIGRAM(S): at 17:29

## 2020-03-31 RX ADMIN — Medication 2: at 17:26

## 2020-03-31 RX ADMIN — PIPERACILLIN AND TAZOBACTAM 25 GRAM(S): 4; .5 INJECTION, POWDER, LYOPHILIZED, FOR SOLUTION INTRAVENOUS at 05:19

## 2020-03-31 RX ADMIN — MIDODRINE HYDROCHLORIDE 10 MILLIGRAM(S): 2.5 TABLET ORAL at 12:05

## 2020-03-31 RX ADMIN — MIDAZOLAM HYDROCHLORIDE 2 MILLIGRAM(S): 1 INJECTION, SOLUTION INTRAMUSCULAR; INTRAVENOUS at 00:50

## 2020-03-31 RX ADMIN — MIDODRINE HYDROCHLORIDE 10 MILLIGRAM(S): 2.5 TABLET ORAL at 21:52

## 2020-03-31 RX ADMIN — MIDODRINE HYDROCHLORIDE 10 MILLIGRAM(S): 2.5 TABLET ORAL at 05:16

## 2020-03-31 RX ADMIN — CHLORHEXIDINE GLUCONATE 15 MILLILITER(S): 213 SOLUTION TOPICAL at 17:28

## 2020-03-31 RX ADMIN — Medication 200 MILLIGRAM(S): at 05:16

## 2020-03-31 RX ADMIN — PROPOFOL 40 MICROGRAM(S)/KG/MIN: 10 INJECTION, EMULSION INTRAVENOUS at 15:53

## 2020-03-31 RX ADMIN — PIPERACILLIN AND TAZOBACTAM 25 GRAM(S): 4; .5 INJECTION, POWDER, LYOPHILIZED, FOR SOLUTION INTRAVENOUS at 17:29

## 2020-03-31 RX ADMIN — CHLORHEXIDINE GLUCONATE 1 APPLICATION(S): 213 SOLUTION TOPICAL at 05:17

## 2020-03-31 RX ADMIN — Medication 4: at 12:06

## 2020-03-31 RX ADMIN — PROPOFOL 40 MICROGRAM(S)/KG/MIN: 10 INJECTION, EMULSION INTRAVENOUS at 21:58

## 2020-03-31 RX ADMIN — CHLORHEXIDINE GLUCONATE 15 MILLILITER(S): 213 SOLUTION TOPICAL at 05:16

## 2020-03-31 RX ADMIN — Medication 650 MILLIGRAM(S): at 21:34

## 2020-03-31 RX ADMIN — PANTOPRAZOLE SODIUM 40 MILLIGRAM(S): 20 TABLET, DELAYED RELEASE ORAL at 12:05

## 2020-03-31 RX ADMIN — PROPOFOL 40 MICROGRAM(S)/KG/MIN: 10 INJECTION, EMULSION INTRAVENOUS at 12:06

## 2020-03-31 RX ADMIN — Medication 2: at 05:53

## 2020-03-31 RX ADMIN — SIMVASTATIN 20 MILLIGRAM(S): 20 TABLET, FILM COATED ORAL at 21:52

## 2020-03-31 RX ADMIN — Medication 650 MILLIGRAM(S): at 15:53

## 2020-03-31 RX ADMIN — Medication 20 MILLIGRAM(S): at 12:05

## 2020-03-31 NOTE — PROVIDER CONTACT NOTE (EICU) - RECOMMENDATIONS
- change pantoprazole to PO  - d/c midodrine if unneeded or hold parameters  - review labetalol  - redose furosemide this afternoon.

## 2020-03-31 NOTE — PROGRESS NOTE ADULT - SUBJECTIVE AND OBJECTIVE BOX
Chief Complaint: Fevers, shortness of breath    Interval Events: Remains intubated.    Review of Systems:  Unable to obtain    Physical Exam:  Vital Signs Last 24 Hrs  T(C): 36.3 (31 Mar 2020 05:02), Max: 37.3 (31 Mar 2020 00:00)  T(F): 97.4 (31 Mar 2020 05:02), Max: 99.1 (31 Mar 2020 00:00)  HR: 60 (31 Mar 2020 08:35) (51 - 67)  BP: 111/42 (31 Mar 2020 06:00) (107/39 - 128/38)  BP(mean): 56 (31 Mar 2020 06:00) (56 - 70)  RR: 11 (31 Mar 2020 06:00) (11 - 26)  SpO2: 98% (31 Mar 2020 08:35) (93% - 100%)  General: Sedated  HEENT: Intubated  Neck: No JVD, no carotid bruit  Extremities: No LE edema, no cyanosis  Neuro: Non-focal  Skin: No rash    Labs:    03-31    131<L>  |  98  |  84<H>  ----------------------------<  169<H>  4.5   |  18<L>  |  4.65<H>    Ca    8.6      31 Mar 2020 07:29  Mg     2.1     03-31    TPro  6.4  /  Alb  1.8<L>  /  TBili  0.3  /  DBili  x   /  AST  18  /  ALT  27  /  AlkPhos  62  03-31                        8.4    12.70 )-----------( 415      ( 31 Mar 2020 07:29 )             26.5       Telemetry: Sinus rhythm

## 2020-03-31 NOTE — PROGRESS NOTE ADULT - SUBJECTIVE AND OBJECTIVE BOX
Critical Care Note    74y o patient presents with COVID pneumonia.  h/o difficult intubation with residual airway edema.    in renal failure.  hemoglobin had dropped to 6.9 yesterday and patient recieved a unit of blood.  AM hemoglobin = 8.2  patient remains on midronone and metoprolol per cardiology.    OBJECTIVE:   T(C): 36.4 (03-31-20 @ 08:00), Max: 37.3 (03-31-20 @ 00:00)  HR: 53 (03-31-20 @ 14:19) (51 - 67)  BP: 135/47 (03-31-20 @ 14:19) (107/39 - 135/47)  RR: 23 (03-31-20 @ 14:19) (11 - 26)  SpO2: 100% (03-31-20 @ 14:19) (96% - 100%)  Wt(kg): --  CAPILLARY BLOOD GLUCOSE      POCT Blood Glucose.: 222 mg/dL (31 Mar 2020 12:01)  POCT Blood Glucose.: 169 mg/dL (31 Mar 2020 05:48)  POCT Blood Glucose.: 124 mg/dL (30 Mar 2020 23:16)  POCT Blood Glucose.: 211 mg/dL (30 Mar 2020 17:40)    I&O's Detail    30 Mar 2020 07:01  -  31 Mar 2020 07:00  --------------------------------------------------------  IN:    fentaNYL Infusion.: 62 mL    Free Water: 720 mL    Glucerna 1.5: 1080 mL    IV PiggyBack: 200 mL    Packed Red Blood Cells: 333 mL    propofol Infusion: 312.8 mL  Total IN: 2707.8 mL    OUT:    Indwelling Catheter - Urethral: 877 mL  Total OUT: 877 mL    Total NET: 1830.8 mL      31 Mar 2020 07:01  -  31 Mar 2020 15:05  --------------------------------------------------------  IN:    fentaNYL Infusion.: 16.2 mL    Free Water: 120 mL    Glucerna 1.5: 180 mL    propofol Infusion: 100.6 mL  Total IN: 416.8 mL    OUT:  Total OUT: 0 mL    Total NET: 416.8 mL          MEDICATIONS  (STANDING):  chlorhexidine 0.12% Liquid 15 milliLiter(s) Oral Mucosa every 12 hours  chlorhexidine 2% Cloths 1 Application(s) Topical <User Schedule>  dextrose 5%. 1000 milliLiter(s) (50 mL/Hr) IV Continuous <Continuous>  dextrose 50% Injectable 12.5 Gram(s) IV Push once  dextrose 50% Injectable 25 Gram(s) IV Push once  dextrose 50% Injectable 25 Gram(s) IV Push once  fentaNYL   Infusion. 1 MICROgram(s)/kG/Hr (1.81 mL/Hr) IV Continuous <Continuous>  hydroxychloroquine 200 milliGRAM(s) Oral every 12 hours  hydroxychloroquine   Oral   insulin lispro (HumaLOG) corrective regimen sliding scale   SubCutaneous every 6 hours  metoprolol tartrate 25 milliGRAM(s) Oral two times a day  midodrine 10 milliGRAM(s) Oral every 8 hours  pantoprazole  Injectable 40 milliGRAM(s) IV Push daily  piperacillin/tazobactam IVPB.. 3.375 Gram(s) IV Intermittent every 12 hours  propofol Infusion 73.502 MICROgram(s)/kG/Min (40 mL/Hr) IV Continuous <Continuous>  simvastatin 20 milliGRAM(s) Oral at bedtime  sodium bicarbonate 650 milliGRAM(s) Oral three times a day    MEDICATIONS  (PRN):  acetaminophen   Tablet .. 650 milliGRAM(s) Oral every 6 hours PRN Temp greater or equal to 38C (100.4F), Mild Pain (1 - 3)  dextrose 40% Gel 15 Gram(s) Oral once PRN Blood Glucose LESS THAN 70 milliGRAM(s)/deciliter  glucagon  Injectable 1 milliGRAM(s) IntraMuscular once PRN Glucose LESS THAN 70 milligrams/deciliter  sodium chloride 0.9% lock flush 10 milliLiter(s) IV Push every 1 hour PRN Pre/post blood products, medications, blood draw, and to maintain line patency      LABS:                        8.4    12.70 )-----------( 415      ( 31 Mar 2020 07:29 )             26.5     03-31    131<L>  |  98  |  84<H>  ----------------------------<  169<H>  4.5   |  18<L>  |  4.65<H>    Ca    8.6      31 Mar 2020 07:29  Mg     2.1     03-31    TPro  6.4  /  Alb  1.8<L>  /  TBili  0.3  /  DBili  x   /  AST  18  /  ALT  27  /  AlkPhos  62  03-31          RADIOLOGY & ADDITIONAL STUDIES:    Assessment  Patient is a 74y old  Female who presents with a chief complaint of shob (31 Mar 2020 11:54)    Plan:  1. zosyn dose decreased to Q12H (decreased due to her renal failure) by ID  2. cardiology continues to monitor midronone and metoprolol for the purposes of (a) hemodynamic stability and need for pressors, and (b) avoiding recurrences of atrial fibrillation  3. continue to monitor hemoglobin  4. ventilatory support to manage pneumonia.  5. call family - they are requesting am chest xray 1 April 2020

## 2020-03-31 NOTE — PROGRESS NOTE ADULT - SUBJECTIVE AND OBJECTIVE BOX
ADELIA BALDWIN is a 74yFemale , patient examined and chart reviewed.     INTERVAL HPI/ OVERNIGHT EVENTS:  Vented sedated   Afebrile.    Past Medical History--  PAST MEDICAL & SURGICAL HISTORY:  DJD (degenerative joint disease)  DM (diabetes mellitus)  Hyperlipidemia  Hypothyroid  HTN (hypertension)  History of vitamin D deficiency  B12 deficiency  Bronchitis  Anxiety  S/P cataract surgery  S/P eye surgery: left eye retinal surgery x2  S/P  section: x2      For details regarding the patient's social history, family history, and other miscellaneous elements, please refer the initial infectious diseases consultation and/or the admitting history and physical examination for this admission.      ROS:  Unable to obtain due to : pt's condition      Current inpatient medications :    ANTIBIOTICS/RELEVANT:  piperacillin/tazobactam IVPB.. 3.375 Gram(s) IV Intermittent every 8 hours    MEDICATIONS  (STANDING):  chlorhexidine 0.12% Liquid 15 milliLiter(s) Oral Mucosa every 12 hours  chlorhexidine 2% Cloths 1 Application(s) Topical <User Schedule>  dextrose 5%. 1000 milliLiter(s) (50 mL/Hr) IV Continuous <Continuous>  dextrose 50% Injectable 12.5 Gram(s) IV Push once  dextrose 50% Injectable 25 Gram(s) IV Push once  dextrose 50% Injectable 25 Gram(s) IV Push once  fentaNYL   Infusion. 1 MICROgram(s)/kG/Hr (1.81 mL/Hr) IV Continuous <Continuous>  insulin lispro (HumaLOG) corrective regimen sliding scale   SubCutaneous every 6 hours  metoprolol tartrate 25 milliGRAM(s) Oral two times a day  midodrine 10 milliGRAM(s) Oral every 8 hours  pantoprazole  Injectable 40 milliGRAM(s) IV Push daily  propofol Infusion 73.502 MICROgram(s)/kG/Min (40 mL/Hr) IV Continuous <Continuous>  simvastatin 20 milliGRAM(s) Oral at bedtime  sodium bicarbonate 650 milliGRAM(s) Oral three times a day    MEDICATIONS  (PRN):  acetaminophen   Tablet .. 650 milliGRAM(s) Oral every 6 hours PRN Temp greater or equal to 38C (100.4F), Mild Pain (1 - 3)  dextrose 40% Gel 15 Gram(s) Oral once PRN Blood Glucose LESS THAN 70 milliGRAM(s)/deciliter  glucagon  Injectable 1 milliGRAM(s) IntraMuscular once PRN Glucose LESS THAN 70 milligrams/deciliter  sodium chloride 0.9% lock flush 10 milliLiter(s) IV Push every 1 hour PRN Pre/post blood products, medications, blood draw, and to maintain line patency        Objective:  ICU Vital Signs Last 24 Hrs  T(C): 37.2 (31 Mar 2020 18:00), Max: 37.3 (31 Mar 2020 00:00)  T(F): 99 (31 Mar 2020 18:00), Max: 99.1 (31 Mar 2020 00:00)  HR: 53 (31 Mar 2020 16:00) (51 - 67)  BP: 131/45 (31 Mar 2020 18:00) (107/39 - 135/47)  BP(mean): 66 (31 Mar 2020 18:00) (56 - 77)  ABP: 134/59 (31 Mar 2020 18:00) (82/51 - 135/59)  ABP(mean): 83 (31 Mar 2020 18:00) (65 - 86)  RR: 23 (31 Mar 2020 18:00) (11 - 26)  SpO2: 95% (31 Mar 2020 18:00) (95% - 100%)      Mode: AC/ CMV (Assist Control/ Continuous Mandatory Ventilation)  RR (machine): 26  TV (machine): 350  FiO2: 40  PEEP: 5  ITime: 0.6  MAP: 21  PIP: 54      Physical Exam:  GEN: Intubated sedated  HEENT: normocephalic and atraumatic. EOMI. OLGA. Moist mucosa. Clear Posterior pharynx.  NECK: Supple. No carotid bruits.  No lymphadenopathy or thyromegaly.  LUNGS: Decreased to auscultation.  HEART: Regular rate and rhythm without murmur.  ABDOMEN: Soft, nontender, and nondistended.  Positive bowel sounds.   EXTREMITIES: +edema.  NEUROLOGIC: Sedaed   SKIN: No ulceration or induration present.      LABS:                        8.4    12.70 )-----------( 415      ( 31 Mar 2020 07:29 )             26.5   03-31    131<L>  |  98  |  84<H>  ----------------------------<  169<H>  4.5   |  18<L>  |  4.65<H>    Ca    8.6      31 Mar 2020 07:29  Mg     2.1         TPro  6.4  /  Alb  1.8<L>  /  TBili  0.3  /  DBili  x   /  AST  18  /  ALT  27  /  AlkPhos  62      Urinalysis Basic - ( 26 Mar 2020 22:14 )    Color: Yellow / Appearance: Slightly Turbid / S.020 / pH: x  Gluc: x / Ketone: Negative  / Bili: Negative / Urobili: Negative mg/dL   Blood: x / Protein: 100 mg/dL / Nitrite: Negative   Leuk Esterase: Negative / RBC: 6-10 /HPF / WBC 0-2   Sq Epi: x / Non Sq Epi: Few / Bacteria: Moderate      ABG - ( 28 Mar 2020 05:14 )  pH, Arterial: x     pH, Blood: 7.29  /  pCO2: 33    /  pO2: 67    / HCO3: 17    / Base Excess: -9.6  /  SaO2: 92        MICROBIOLOGY:    Culture - Urine (collected 27 Mar 2020 11:03)  Source: .Urine Clean Catch (Midstream)  Final Report (28 Mar 2020 10:55):    No growth    Culture - Blood (collected 26 Mar 2020 23:00)  Source: .Blood Blood  Preliminary Report (28 Mar 2020 01:03):    No growth to date.    Culture - Blood (collected 26 Mar 2020 23:00)  Source: .Blood Blood  Preliminary Report (28 Mar 2020 01:02):    No growth to date.    COVID-19 PCR . (20 @ 22:21)    COVID-19 PCR: Detected: All “detected” results on this new test are considered presumptively  positive results, are clinically actionable, and specimens will be  forwarded to [facility] for confirmation testing.  Another report (corrected report) will only be issued if discordant  results occur.  This test has been validated by [facility] to be accurate;  though it has not been FDA cleared/approved by the usual pathway.  As with all laboratory tests, results should be correlated with clinical  findings.      RADIOLOGY & ADDITIONAL STUDIES:  EXAM:  XR CHEST PORTABLE IMMED 1V                            PROCEDURE DATE:  2020          INTERPRETATION:  CLINICAL STATEMENT: Follow-up chest pain.    TECHNIQUE: AP view of the chest.    COMPARISON: 3/26/2020    FINDINGS/  IMPRESSION:  ET tube and feeding tube noted. Tip of ET tube and feeding tube not well seen.    Increased interstitial lung markings without significant change. Superimposed bilateral airspace opacities overall improving.        No significant pleural effusion    Assessment :  75 y/o F with hx of DM, HTN, HLD, hypothyroid, anxiety presented admitted with severe sepsis with septic shock and acute hypoxic respiratory failure with MSOF sec COVID 19 pneumonia. Sp Cardiorespiratory arrest.  Worsening ALIS. Anemic. Procalcitonin markedly elevated- concern for superimposed bacterial process.     Plan :   Completed Hydroxychloroquine x 5 days  Cont  Zosyn renally dosed  Vent per pulm ICU  Wean off pressors  Trend temps  Prognosis guarded  COVID-19---high risk period for decompensation  (7-14 days post symptom onset), avoid aerosolizing procedures,  steroids, NSAIDs ---no compelling evidence to date DO NOT recommend any specific therapies outside of established protocols or controlled trials -would support focus on supportive care  avoid excessive blood draws but do recommend for hospitalized patients  -daily CBC w diff to follow eos, lymphocytes and neutrophils and daily NLR calculation (NLR <3 low vs >5 high) -other labs that may be selectively used to risk stratify and predict clinical course,   Procalcitonin (<0.2 associated with more severe disease),  Ferritin (lower risk <450 vs >850) CRP (low risk <2 and higher risk >6) D-dimer (<1,000 vs >1,000) LDH, ESR, PT/PTT, CK, lactate, troponin, IL-6  -antibiotics only if there is concern for a bacterial process  neutrophil/lymphocyte ratio    D/w ICU team      Continue with present regiment.  Appropriate use of antibiotics and adverse effects reviewed.    I have discussed the above plan of care with patient/ family in detail. They expressed understanding of the the treatment plan . Risks, benefits and alternatives discussed in detail. I have asked if they have any questions or concerns and appropriately addressed them to the best of my ability .      Critical care time greater then 35 minutes reviewing notes, labs data/ imaging , discussion with multidisciplinary team.    Thank you for allowing me to participate in care of your patient .        Eder Brown MD  Infectious Disease  [phone]

## 2020-03-31 NOTE — PROGRESS NOTE ADULT - PROBLEM SELECTOR PLAN 9
Hx of B12 anemia, normal MCV at this time  negative stool guaic, continuing PPI  CBC stable s/p 1u pRBC, will monitor   continue pantoprazole QD

## 2020-03-31 NOTE — PROGRESS NOTE ADULT - ASSESSMENT
gerd  ftt  anemia    plan  adv diet to goal via ngt  in and out  ppi once a day  gerd precautions  to follow up

## 2020-03-31 NOTE — PROGRESS NOTE ADULT - ASSESSMENT
73 y/o F with hx of DM, HTN, HLD, hypothyroid, anxiety who presents with Covid-19 pneumonia. Patient is currently sedated on propofol drip.

## 2020-03-31 NOTE — CHART NOTE - NSCHARTNOTEFT_GEN_A_CORE
Assessment:     Factors impacting intake: [ ] none [ ] nausea  [ ] vomiting [ ] diarrhea [ ] constipation  [ ]chewing problems [ ] swallowing issues  [ ] other:     Diet Presciption: Diet, NPO with Tube Feed:   Tube Feeding Modality: Nasogastric  Glucerna 1.5 Car  Total Volume for 24 Hours (mL): 1080  Bolus  Total Volume of Bolus (mL):  180  Total # of Feeds: 6  Tube Feed Frequency: Every 4 hours   Tube Feed Start Time: 05:00  Bolus Feed Rate (mL per Hour): 180   Bolus Feed Duration (in Hours): 1  Bolus Feed Instructions:  give bolus every 4 hours with 60mL water flush before and after each bolus (03-28-20 @ 15:35)    Intake:     Current Weight: Weight (kg): 90.7 (03-26 @ 13:17)  % Weight Change    Pertinent Medications: MEDICATIONS  (STANDING):  chlorhexidine 0.12% Liquid 15 milliLiter(s) Oral Mucosa every 12 hours  chlorhexidine 2% Cloths 1 Application(s) Topical <User Schedule>  dextrose 5%. 1000 milliLiter(s) (50 mL/Hr) IV Continuous <Continuous>  dextrose 50% Injectable 12.5 Gram(s) IV Push once  dextrose 50% Injectable 25 Gram(s) IV Push once  dextrose 50% Injectable 25 Gram(s) IV Push once  fentaNYL   Infusion. 1 MICROgram(s)/kG/Hr (1.81 mL/Hr) IV Continuous <Continuous>  furosemide   Injectable 20 milliGRAM(s) IV Push once  hydroxychloroquine 200 milliGRAM(s) Oral every 12 hours  hydroxychloroquine   Oral   insulin lispro (HumaLOG) corrective regimen sliding scale   SubCutaneous every 6 hours  metoprolol tartrate 25 milliGRAM(s) Oral two times a day  midodrine 10 milliGRAM(s) Oral every 8 hours  pantoprazole  Injectable 40 milliGRAM(s) IV Push daily  piperacillin/tazobactam IVPB.. 3.375 Gram(s) IV Intermittent every 12 hours  propofol Infusion 73.502 MICROgram(s)/kG/Min (40 mL/Hr) IV Continuous <Continuous>  simvastatin 20 milliGRAM(s) Oral at bedtime    MEDICATIONS  (PRN):  acetaminophen   Tablet .. 650 milliGRAM(s) Oral every 6 hours PRN Temp greater or equal to 38C (100.4F), Mild Pain (1 - 3)  dextrose 40% Gel 15 Gram(s) Oral once PRN Blood Glucose LESS THAN 70 milliGRAM(s)/deciliter  glucagon  Injectable 1 milliGRAM(s) IntraMuscular once PRN Glucose LESS THAN 70 milligrams/deciliter  sodium chloride 0.9% lock flush 10 milliLiter(s) IV Push every 1 hour PRN Pre/post blood products, medications, blood draw, and to maintain line patency    Pertinent Labs: 03-31 Na131 mmol/L<L> Glu 169 mg/dL<H> K+ 4.5 mmol/L Cr  4.65 mg/dL<H> BUN 84 mg/dL<H> 03-28 Phos 4.8 mg/dL<H> 03-31 Alb 1.8 g/dL<L> 03-27 [id number] 8.1 %<H>     CAPILLARY BLOOD GLUCOSE      POCT Blood Glucose.: 169 mg/dL (31 Mar 2020 05:48)  POCT Blood Glucose.: 124 mg/dL (30 Mar 2020 23:16)  POCT Blood Glucose.: 211 mg/dL (30 Mar 2020 17:40)  POCT Blood Glucose.: 201 mg/dL (30 Mar 2020 11:38)    Skin:     Estimated Needs:   [ ] no change since previous assessment  [ ] recalculated:     Previous Nutrition Diagnosis:   [ ] Inadequate Energy Intake [ ]Inadequate Oral Intake [ ] Excessive Energy Intake   [ ] Underweight [ ] Increased Nutrient Needs [ ] Overweight/Obesity   [ ] Altered GI Function [ ] Unintended Weight Loss [ ] Food & Nutrition Related Knowledge Deficit [ ] Malnutrition     Nutrition Diagnosis is [ ] ongoing  [ ] resolved [ ] not applicable     New Nutrition Diagnosis: [ ] not applicable       Interventions:   Recommend  [ ] Change Diet To:  [ ] Nutrition Supplement  [ ] Nutrition Support  [ ] Other:     Monitoring and Evaluation:   [ ] PO intake [ x ] Tolerance to diet prescription [ x ] weights [ x ] labs[ x ] follow up per protocol  [ ] other: Assessment:     73 yo female with hx DM2. HTN, Vit B, Vit D deficiency, afib, hypothyroidism admitted  to the hospital tofor + COVID19,  Pt required  intubation and central line insertion; patient is sedated with full vent support; patient is also s/p cardiac arrest. Pt  receiving Glucerna 1.5 180 ml/hr q 6 hrs.  As per RN, pt having a lot of diarrhea. Pt did report loose stool PTA. In addition, she is on antibiotics which can contribute to diarrhea. As per GI, loose stool could be COVID+ related. Noted worsening renal labs: May consider changing formula to Nepro given the aforementioned but this formula is higher in fat which may not help with loose BMs.  Per MD note, decreased renal function may be from shock, ATN, and direct injury from COVID-19. -Overall prognosis poor given multi-organ failure. Ideally, tube feed bolus should be increased to 200ml q 4 hrs to meet needs but given pt's current status, may not be beneficial.           Factors impacting intake: [ ] none [ ] nausea  [ ] vomiting [ ] diarrhea [ ] constipation  [ ]chewing problems [ ] swallowing issues  [ x] other: intubated    Diet Presciption: Diet, NPO with Tube Feed:   Tube Feeding Modality: Nasogastric  Glucerna 1.5 Car  Total Volume for 24 Hours (mL): 1080  Bolus  Total Volume of Bolus (mL):  180  Total # of Feeds: 6  Tube Feed Frequency: Every 4 hours   Tube Feed Start Time: 05:00  Bolus Feed Rate (mL per Hour): 180   Bolus Feed Duration (in Hours): 1  Bolus Feed Instructions:  give bolus every 4 hours with 60mL water flush before and after each bolus (03-28-20 @ 15:35)    Intake: NA    Current Weight: Weight (kg): 90.7 (03-26 @ 13:17)  % Weight Change    Pertinent Medications: MEDICATIONS  (STANDING):  chlorhexidine 0.12% Liquid 15 milliLiter(s) Oral Mucosa every 12 hours  chlorhexidine 2% Cloths 1 Application(s) Topical <User Schedule>  dextrose 5%. 1000 milliLiter(s) (50 mL/Hr) IV Continuous <Continuous>  dextrose 50% Injectable 12.5 Gram(s) IV Push once  dextrose 50% Injectable 25 Gram(s) IV Push once  dextrose 50% Injectable 25 Gram(s) IV Push once  fentaNYL   Infusion. 1 MICROgram(s)/kG/Hr (1.81 mL/Hr) IV Continuous <Continuous>  furosemide   Injectable 20 milliGRAM(s) IV Push once  hydroxychloroquine 200 milliGRAM(s) Oral every 12 hours  hydroxychloroquine   Oral   insulin lispro (HumaLOG) corrective regimen sliding scale   SubCutaneous every 6 hours  metoprolol tartrate 25 milliGRAM(s) Oral two times a day  midodrine 10 milliGRAM(s) Oral every 8 hours  pantoprazole  Injectable 40 milliGRAM(s) IV Push daily  piperacillin/tazobactam IVPB.. 3.375 Gram(s) IV Intermittent every 12 hours  propofol Infusion 73.502 MICROgram(s)/kG/Min (40 mL/Hr) IV Continuous <Continuous>  simvastatin 20 milliGRAM(s) Oral at bedtime    MEDICATIONS  (PRN):  acetaminophen   Tablet .. 650 milliGRAM(s) Oral every 6 hours PRN Temp greater or equal to 38C (100.4F), Mild Pain (1 - 3)  dextrose 40% Gel 15 Gram(s) Oral once PRN Blood Glucose LESS THAN 70 milliGRAM(s)/deciliter  glucagon  Injectable 1 milliGRAM(s) IntraMuscular once PRN Glucose LESS THAN 70 milligrams/deciliter  sodium chloride 0.9% lock flush 10 milliLiter(s) IV Push every 1 hour PRN Pre/post blood products, medications, blood draw, and to maintain line patency    Pertinent Labs: 03-31 Na131 mmol/L<L> Glu 169 mg/dL<H> K+ 4.5 mmol/L Cr  4.65 mg/dL<H> BUN 84 mg/dL<H> 03-28 Phos 4.8 mg/dL<H> 03-31 Alb 1.8 g/dL<L> 03-27 [id number] 8.1 %<H>     CAPILLARY BLOOD GLUCOSE      POCT Blood Glucose.: 169 mg/dL (31 Mar 2020 05:48)  POCT Blood Glucose.: 124 mg/dL (30 Mar 2020 23:16)  POCT Blood Glucose.: 211 mg/dL (30 Mar 2020 17:40)  POCT Blood Glucose.: 201 mg/dL (30 Mar 2020 11:38)    Skin:     Estimated Needs:   [ ] no change since previous assessment  [ ] recalculated:     Previous Nutrition Diagnosis:   [ ] Inadequate Energy Intake [ ]Inadequate Oral Intake [ ] Excessive Energy Intake   [ ] Underweight [ ] Increased Nutrient Needs [ ] Overweight/Obesity   [ ] Altered GI Function [ ] Unintended Weight Loss [ ] Food & Nutrition Related Knowledge Deficit [ ] Malnutrition     Nutrition Diagnosis is [ ] ongoing  [ ] resolved [ ] not applicable     New Nutrition Diagnosis: [ ] not applicable       Interventions:   Recommend  [ ] Change Diet To:  [ ] Nutrition Supplement  [ ] Nutrition Support  [ ] Other:     Monitoring and Evaluation:   [ ] PO intake [ x ] Tolerance to diet prescription [ x ] weights [ x ] labs[ x ] follow up per protocol  [ ] other: Assessment:     75 yo female with hx DM2. HTN, Vit B, Vit D deficiency, afib, hypothyroidism admitted  to the hospital tofor + COVID19,  Pt required  intubation and central line insertion; patient is sedated with full vent support; patient is also s/p cardiac arrest. Pt  receiving Glucerna 1.5 180 ml/hr q 6 hrs.  As per RN, pt having a lot of diarrhea. Pt did report loose stool PTA. In addition, she is on antibiotics which can contribute to diarrhea. As per GI, loose stool could be COVID+ related. Noted worsening renal labs: May consider changing formula to Nepro given the aforementioned but this formula is higher in fat which may not help with loose BMs.  Per MD note, decreased renal function may be from shock, ATN, and direct injury from COVID-19. In addition, renal function may have plateaued. Overall prognosis poor given multi-organ failure. Ideally, tube feed bolus should be increased to 200ml q 4 hrs to meet needs but given pt's current status, may not be beneficial.           Factors impacting intake: [ ] none [ ] nausea  [ ] vomiting [ ] diarrhea [ ] constipation  [ ]chewing problems [ ] swallowing issues  [ x] other: intubated    Diet Presciption: Diet, NPO with Tube Feed:   Tube Feeding Modality: Nasogastric  Glucerna 1.5 Car  Total Volume for 24 Hours (mL): 1080  Bolus  Total Volume of Bolus (mL):  180  Total # of Feeds: 6  Tube Feed Frequency: Every 4 hours   Tube Feed Start Time: 05:00  Bolus Feed Rate (mL per Hour): 180   Bolus Feed Duration (in Hours): 1  Bolus Feed Instructions:  give bolus every 4 hours with 60mL water flush before and after each bolus (03-28-20 @ 15:35)    Intake: NA    Current Weight: Weight (kg): 90.7 (03-26 @ 13:17)  % Weight Change    Pertinent Medications: MEDICATIONS  (STANDING):  chlorhexidine 0.12% Liquid 15 milliLiter(s) Oral Mucosa every 12 hours  chlorhexidine 2% Cloths 1 Application(s) Topical <User Schedule>  dextrose 5%. 1000 milliLiter(s) (50 mL/Hr) IV Continuous <Continuous>  dextrose 50% Injectable 12.5 Gram(s) IV Push once  dextrose 50% Injectable 25 Gram(s) IV Push once  dextrose 50% Injectable 25 Gram(s) IV Push once  fentaNYL   Infusion. 1 MICROgram(s)/kG/Hr (1.81 mL/Hr) IV Continuous <Continuous>  furosemide   Injectable 20 milliGRAM(s) IV Push once  hydroxychloroquine 200 milliGRAM(s) Oral every 12 hours  hydroxychloroquine   Oral   insulin lispro (HumaLOG) corrective regimen sliding scale   SubCutaneous every 6 hours  metoprolol tartrate 25 milliGRAM(s) Oral two times a day  midodrine 10 milliGRAM(s) Oral every 8 hours  pantoprazole  Injectable 40 milliGRAM(s) IV Push daily  piperacillin/tazobactam IVPB.. 3.375 Gram(s) IV Intermittent every 12 hours  propofol Infusion 73.502 MICROgram(s)/kG/Min (40 mL/Hr) IV Continuous <Continuous>  simvastatin 20 milliGRAM(s) Oral at bedtime    MEDICATIONS  (PRN):  acetaminophen   Tablet .. 650 milliGRAM(s) Oral every 6 hours PRN Temp greater or equal to 38C (100.4F), Mild Pain (1 - 3)  dextrose 40% Gel 15 Gram(s) Oral once PRN Blood Glucose LESS THAN 70 milliGRAM(s)/deciliter  glucagon  Injectable 1 milliGRAM(s) IntraMuscular once PRN Glucose LESS THAN 70 milligrams/deciliter  sodium chloride 0.9% lock flush 10 milliLiter(s) IV Push every 1 hour PRN Pre/post blood products, medications, blood draw, and to maintain line patency    Pertinent Labs: 03-31 Na131 mmol/L<L> Glu 169 mg/dL<H> K+ 4.5 mmol/L Cr  4.65 mg/dL<H> BUN 84 mg/dL<H> 03-28 Phos 4.8 mg/dL<H> 03-31 Alb 1.8 g/dL<L> 03-27 [id number] 8.1 %<H>     CAPILLARY BLOOD GLUCOSE      POCT Blood Glucose.: 169 mg/dL (31 Mar 2020 05:48)  POCT Blood Glucose.: 124 mg/dL (30 Mar 2020 23:16)  POCT Blood Glucose.: 211 mg/dL (30 Mar 2020 17:40)  POCT Blood Glucose.: 201 mg/dL (30 Mar 2020 11:38)    Skin:     Estimated Needs:   [ ] no change since previous assessment  [ ] recalculated:     Previous Nutrition Diagnosis:   [ ] Inadequate Energy Intake [ ]Inadequate Oral Intake [ ] Excessive Energy Intake   [ ] Underweight [ ] Increased Nutrient Needs [ ] Overweight/Obesity   [ ] Altered GI Function [ ] Unintended Weight Loss [ ] Food & Nutrition Related Knowledge Deficit [ ] Malnutrition     Nutrition Diagnosis is [ ] ongoing  [ ] resolved [ ] not applicable     New Nutrition Diagnosis: [ ] not applicable       Interventions:   Recommend  [ ] Change Diet To:  [ ] Nutrition Supplement  [ ] Nutrition Support  [ ] Other:     Monitoring and Evaluation:   [ ] PO intake [ x ] Tolerance to diet prescription [ x ] weights [ x ] labs[ x ] follow up per protocol  [ ] other: Assessment:     73 yo female with hx DM2. HTN, Vit B, Vit D deficiency, afib, hypothyroidism admitted  to the hospital tofor + COVID19,  Pt required  intubation and central line insertion; patient is sedated with full vent support; patient is also s/p cardiac arrest. Pt  receiving Glucerna 1.5 180 ml/hr q 6 hrs.  As per RN, pt having a lot of diarrhea. Pt did report loose stool PTA. In addition, she is on antibiotics which can contribute to diarrhea. As per GI, loose stool could be COVID+ related. Noted worsening renal labs: May consider changing formula to Nepro given the aforementioned but this formula is higher in fat which may not help with loose BMs.  Per MD note, decreased renal function may be from shock, ATN, and direct injury from COVID-19. In addition, renal function may have plateaued. Overall prognosis poor given multi-organ failure. Ideally, tube feed bolus should be increased to 200ml q 4 hrs to meet needs but given pt's current status, may not be beneficial. Pt is meeting >75% needs with current diet rx.           Factors impacting intake: [ ] none [ ] nausea  [ ] vomiting [ ] diarrhea [ ] constipation  [ ]chewing problems [ ] swallowing issues  [ x] other: intubated    Diet Presciption: Diet, NPO with Tube Feed:   Tube Feeding Modality: Nasogastric  Glucerna 1.5 Car  Total Volume for 24 Hours (mL): 1080  Bolus  Total Volume of Bolus (mL):  180  Total # of Feeds: 6  Tube Feed Frequency: Every 4 hours   Tube Feed Start Time: 05:00  Bolus Feed Rate (mL per Hour): 180   Bolus Feed Duration (in Hours): 1  Bolus Feed Instructions:  give bolus every 4 hours with 60mL water flush before and after each bolus (03-28-20 @ 15:35)    Intake: NA    Current Weight: Weight (kg): 90.7 (03-26 @ 13:17)  % Weight Change    Pertinent Medications: MEDICATIONS  (STANDING):  chlorhexidine 0.12% Liquid 15 milliLiter(s) Oral Mucosa every 12 hours  chlorhexidine 2% Cloths 1 Application(s) Topical <User Schedule>  dextrose 5%. 1000 milliLiter(s) (50 mL/Hr) IV Continuous <Continuous>  dextrose 50% Injectable 12.5 Gram(s) IV Push once  dextrose 50% Injectable 25 Gram(s) IV Push once  dextrose 50% Injectable 25 Gram(s) IV Push once  fentaNYL   Infusion. 1 MICROgram(s)/kG/Hr (1.81 mL/Hr) IV Continuous <Continuous>  furosemide   Injectable 20 milliGRAM(s) IV Push once  hydroxychloroquine 200 milliGRAM(s) Oral every 12 hours  hydroxychloroquine   Oral   insulin lispro (HumaLOG) corrective regimen sliding scale   SubCutaneous every 6 hours  metoprolol tartrate 25 milliGRAM(s) Oral two times a day  midodrine 10 milliGRAM(s) Oral every 8 hours  pantoprazole  Injectable 40 milliGRAM(s) IV Push daily  piperacillin/tazobactam IVPB.. 3.375 Gram(s) IV Intermittent every 12 hours  propofol Infusion 73.502 MICROgram(s)/kG/Min (40 mL/Hr) IV Continuous <Continuous>  simvastatin 20 milliGRAM(s) Oral at bedtime    MEDICATIONS  (PRN):  acetaminophen   Tablet .. 650 milliGRAM(s) Oral every 6 hours PRN Temp greater or equal to 38C (100.4F), Mild Pain (1 - 3)  dextrose 40% Gel 15 Gram(s) Oral once PRN Blood Glucose LESS THAN 70 milliGRAM(s)/deciliter  glucagon  Injectable 1 milliGRAM(s) IntraMuscular once PRN Glucose LESS THAN 70 milligrams/deciliter  sodium chloride 0.9% lock flush 10 milliLiter(s) IV Push every 1 hour PRN Pre/post blood products, medications, blood draw, and to maintain line patency    Pertinent Labs: 03-31 Na131 mmol/L<L> Glu 169 mg/dL<H> K+ 4.5 mmol/L Cr  4.65 mg/dL<H> BUN 84 mg/dL<H> 03-28 Phos 4.8 mg/dL<H> 03-31 Alb 1.8 g/dL<L> 03-27 [id number] 8.1 %<H>     CAPILLARY BLOOD GLUCOSE      POCT Blood Glucose.: 169 mg/dL (31 Mar 2020 05:48)  POCT Blood Glucose.: 124 mg/dL (30 Mar 2020 23:16)  POCT Blood Glucose.: 211 mg/dL (30 Mar 2020 17:40)  POCT Blood Glucose.: 201 mg/dL (30 Mar 2020 11:38)    Skin:     Estimated Needs:   [ ] no change since previous assessment  [ ] recalculated:     Previous Nutrition Diagnosis:   [ ] Inadequate Energy Intake [ ]Inadequate Oral Intake [ ] Excessive Energy Intake   [ ] Underweight [ ] Increased Nutrient Needs [ ] Overweight/Obesity   [ ] Altered GI Function [ ] Unintended Weight Loss [ ] Food & Nutrition Related Knowledge Deficit [ ] Malnutrition     Nutrition Diagnosis is [ ] ongoing  [ ] resolved [ ] not applicable     New Nutrition Diagnosis: [ ] not applicable       Interventions:   Recommend  [ ] Change Diet To:  [ ] Nutrition Supplement  [ ] Nutrition Support  [ ] Other:     Monitoring and Evaluation:   [ ] PO intake [ x ] Tolerance to diet prescription [ x ] weights [ x ] labs[ x ] follow up per protocol  [ ] other: Assessment:     75 yo female with hx DM2. HTN, Vit B, Vit D deficiency, afib, hypothyroidism admitted  to the hospital tofor + COVID19,  Pt required  intubation and central line insertion; patient is sedated with full vent support; patient is also s/p cardiac arrest. Pt  receiving Glucerna 1.5 180 ml/hr q 6 hrs.  As per RN, pt having a lot of diarrhea. Pt did report loose stool PTA. In addition, she is on antibiotics which can contribute to diarrhea. As per GI, loose stool could be COVID+ related. Noted worsening renal labs: May consider changing formula to Nepro given the aforementioned but this formula is higher in fat which may not help with loose BMs.  Per MD note, decreased renal function may be from shock, ATN, and direct injury from COVID-19. In addition, renal function may have plateaued. Overall prognosis poor given multi-organ failure. Ideally, tube feed bolus should be increased to 200ml q 4 hrs to meet needs but given pt's current status, may not be beneficial. Pt is meeting >75% needs with current diet rx.           Factors impacting intake: [ ] none [ ] nausea  [ ] vomiting [ ] diarrhea [ ] constipation  [ ]chewing problems [ ] swallowing issues  [ x] other: intubated    Diet Presciption: Diet, NPO with Tube Feed:   Tube Feeding Modality: Nasogastric  Glucerna 1.5 Car  Total Volume for 24 Hours (mL): 1080  Bolus  Total Volume of Bolus (mL):  180  Total # of Feeds: 6  Tube Feed Frequency: Every 4 hours   Tube Feed Start Time: 05:00  Bolus Feed Rate (mL per Hour): 180   Bolus Feed Duration (in Hours): 1  Bolus Feed Instructions:  give bolus every 4 hours with 60mL water flush before and after each bolus (03-28-20 @ 15:35)    Intake: NA    Current Weight: Weight (kg): 90.7 (03-26 @ 13:17)  % Weight Change    3/30 203.2# generalized edema +1    Pertinent Medications: MEDICATIONS  (STANDING):  chlorhexidine 0.12% Liquid 15 milliLiter(s) Oral Mucosa every 12 hours  chlorhexidine 2% Cloths 1 Application(s) Topical <User Schedule>  dextrose 5%. 1000 milliLiter(s) (50 mL/Hr) IV Continuous <Continuous>  dextrose 50% Injectable 12.5 Gram(s) IV Push once  dextrose 50% Injectable 25 Gram(s) IV Push once  dextrose 50% Injectable 25 Gram(s) IV Push once  fentaNYL   Infusion. 1 MICROgram(s)/kG/Hr (1.81 mL/Hr) IV Continuous <Continuous>  furosemide   Injectable 20 milliGRAM(s) IV Push once  hydroxychloroquine 200 milliGRAM(s) Oral every 12 hours  hydroxychloroquine   Oral   insulin lispro (HumaLOG) corrective regimen sliding scale   SubCutaneous every 6 hours  metoprolol tartrate 25 milliGRAM(s) Oral two times a day  midodrine 10 milliGRAM(s) Oral every 8 hours  pantoprazole  Injectable 40 milliGRAM(s) IV Push daily  piperacillin/tazobactam IVPB.. 3.375 Gram(s) IV Intermittent every 12 hours  propofol Infusion 73.502 MICROgram(s)/kG/Min (40 mL/Hr) IV Continuous <Continuous>  simvastatin 20 milliGRAM(s) Oral at bedtime    MEDICATIONS  (PRN):  acetaminophen   Tablet .. 650 milliGRAM(s) Oral every 6 hours PRN Temp greater or equal to 38C (100.4F), Mild Pain (1 - 3)  dextrose 40% Gel 15 Gram(s) Oral once PRN Blood Glucose LESS THAN 70 milliGRAM(s)/deciliter  glucagon  Injectable 1 milliGRAM(s) IntraMuscular once PRN Glucose LESS THAN 70 milligrams/deciliter  sodium chloride 0.9% lock flush 10 milliLiter(s) IV Push every 1 hour PRN Pre/post blood products, medications, blood draw, and to maintain line patency    Pertinent Labs: 03-31 Na131 mmol/L<L> Glu 169 mg/dL<H> K+ 4.5 mmol/L Cr  4.65 mg/dL<H> BUN 84 mg/dL<H> 03-28 Phos 4.8 mg/dL<H> 03-31 Alb 1.8 g/dL<L> 03-27 [id number] 8.1 %<H>     CAPILLARY BLOOD GLUCOSE      POCT Blood Glucose.: 169 mg/dL (31 Mar 2020 05:48)  POCT Blood Glucose.: 124 mg/dL (30 Mar 2020 23:16)  POCT Blood Glucose.: 211 mg/dL (30 Mar 2020 17:40)  POCT Blood Glucose.: 201 mg/dL (30 Mar 2020 11:38)    Skin: intact  generalized edema +1    Estimated Needs:   [x ] no change since previous assessment  [ ] recalculated:     Previous Nutrition Diagnosis:   [x ] Inadequate Energy Intake [ ]Inadequate Oral Intake [ ] Excessive Energy Intake   [ ] Underweight [ ] Increased Nutrient Needs [ ] Overweight/Obesity   [ ] Altered GI Function [ ] Unintended Weight Loss [ ] Food & Nutrition Related Knowledge Deficit [ ] Malnutrition     Nutrition Diagnosis is [ x] ongoing  [ ] resolved [ ] not applicable     New Nutrition Diagnosis: [ ] not applicable       Interventions:   Recommend  continue POC  [ ] Change Diet To:  [ ] Nutrition Supplement  [ ] Nutrition Support  [ ] Other:     Monitoring and Evaluation:   [ ] PO intake [ x ] Tolerance to diet prescription [ x ] weights [ x ] labs[ x ] follow up per protocol  [ ] other:

## 2020-03-31 NOTE — PROGRESS NOTE ADULT - ASSESSMENT
1.	ALIS: ATN  2.	SARS-COVID 19, Resp failure on vent  3.	Anemia  4.	Diabetes    UO acceptable. Hopefully creatinine has plateaued. Avoid over diuresis. To continue current meds. Optimize BP. On PO midodrine.   Supportive care. PRN PRBC transfusion. Monitor h/h trend. Monitor blood sugar levels. Insulin coverage as needed. Strict I & O.   Add bicarb. Avoid nephrotoxic meds as possible. Avoid ACEI, ARB, NSAIDs and IV contrast. Will follow electrolytes and renal function trend.

## 2020-03-31 NOTE — PROGRESS NOTE ADULT - SUBJECTIVE AND OBJECTIVE BOX
INTERVAL HPI/OVERNIGHT EVENTS:  No new overnight event.  No N/V/D.  Tolerating diet ngt    Allergies    No Known Allergies    Intolerances    REVIEW OF SYSEMS:  General: + weakness, + fever/chills, no weight loss/gain  Skin/Breast: no rash, no jaundice  Ophthalmologic: no vision changes, no dry eyes   Respiratory and Thorax: no cough, no wheezing, no hemoptysis, no dyspnea  Cardiovascular: no chest pain, no shortness of breath, no orthopnea  Gastrointestinal: no n/v/d, no abdominal pain, no dysphagia   Genitourinary: no dysuria, no frequency, no nocturia, no hematuria  Musculoskeletal: no trauma, no sprain/strain, no myalgias, no arthralgias, no fracture  Neurological: no HA, no dizziness, no weakness, no numbness  Psychiatric: no depression, no SI/HI  Hematology/Lymphatics: no easy bruising  Endocrine: no heat or cold intolerance. no weight gain or loss  Allergic/Immunologic: no allergy or recent reaction       Vital Signs Last 24 Hrs  T(C): 36.7 (26 Mar 2020 17:53), Max: 38.4 (26 Mar 2020 13:17)  T(F): 98 (26 Mar 2020 17:53), Max: 101.2 (26 Mar 2020 13:17)  HR: 74 (26 Mar 2020 17:53) (70 - 76)  BP: 129/72 (26 Mar 2020 17:53) (117/68 - 129/72)  BP(mean): --  RR: 17 (26 Mar 2020 17:53) (16 - 18)  SpO2: 84% (26 Mar 2020 17:53) (84% - 94%)  I&O's Summary      PHYSICAL EXAM:  GENERAL: on nc  HEAD:  Atraumatic, Normocephalic  EYES: EOMI, PERRLA, conjunctiva and sclera clear  NECK: Supple, No JVD  CHEST/LUNG: + rhonchi  HEART: Regular rate and rhythm; No murmurs, rubs, or gallops  ABDOMEN: Soft, Nontender, Nondistended; Bowel sounds present  Neuro: AAOx3, no focal deficit, 5/5 b/l extremities  EXTREMITIES:  2+ Peripheral Pulses, No clubbing, cyanosis, or edema  SKIN: No rashes or lesions    LABS:                        9.2    10.38 )-----------( 271      ( 26 Mar 2020 14:21 )             28.4     03-    134<L>  |  100  |  42<H>  ----------------------------<  175<H>  4.0   |  21<L>  |  1.91<H>    Ca    8.9      26 Mar 2020 14:21    TPro  7.8  /  Alb  2.9<L>  /  TBili  0.4  /  DBili  x   /  AST  19  /  ALT  24  /  AlkPhos  61  03      CAPILLARY BLOOD GLUCOSE                RADIOLOGY & ADDITIONAL TESTS:    Imaging Personally Reviewed:  [x] YES  [ ] NO    Consultant(s) Notes Reviewed:  [x] YES  [ ] NO      MEDICATIONS  (STANDING):  azithromycin  IVPB 500 milliGRAM(s) IV Intermittent every 24 hours  cefTRIAXone   IVPB 1000 milliGRAM(s) IV Intermittent every 24 hours    MEDICATIONS  (PRN):  acetaminophen   Tablet .. 650 milliGRAM(s) Oral every 6 hours PRN Temp greater or equal to 38C (100.4F), Mild Pain (1 - 3)      Care Discussed with Consultants/Other Providers [x] YES  [ ] NO    HEALTH ISSUES - PROBLEM Dx: (26 Mar 2020 18:50)    MEDICATIONS  (STANDING):  chlorhexidine 0.12% Liquid 15 milliLiter(s) Oral Mucosa every 12 hours  chlorhexidine 2% Cloths 1 Application(s) Topical <User Schedule>  dextrose 5%. 1000 milliLiter(s) (50 mL/Hr) IV Continuous <Continuous>  dextrose 50% Injectable 12.5 Gram(s) IV Push once  dextrose 50% Injectable 25 Gram(s) IV Push once  dextrose 50% Injectable 25 Gram(s) IV Push once  fentaNYL   Infusion. 1 MICROgram(s)/kG/Hr (1.81 mL/Hr) IV Continuous <Continuous>  hydroxychloroquine 200 milliGRAM(s) Oral every 12 hours  hydroxychloroquine   Oral   insulin lispro (HumaLOG) corrective regimen sliding scale   SubCutaneous every 6 hours  metoprolol tartrate 25 milliGRAM(s) Oral two times a day  midodrine 10 milliGRAM(s) Oral every 8 hours  pantoprazole  Injectable 40 milliGRAM(s) IV Push daily  piperacillin/tazobactam IVPB.. 3.375 Gram(s) IV Intermittent every 12 hours  propofol Infusion 73.502 MICROgram(s)/kG/Min (40 mL/Hr) IV Continuous <Continuous>  simvastatin 20 milliGRAM(s) Oral at bedtime  sodium bicarbonate 650 milliGRAM(s) Oral three times a day    MEDICATIONS  (PRN):  acetaminophen   Tablet .. 650 milliGRAM(s) Oral every 6 hours PRN Temp greater or equal to 38C (100.4F), Mild Pain (1 - 3)  dextrose 40% Gel 15 Gram(s) Oral once PRN Blood Glucose LESS THAN 70 milliGRAM(s)/deciliter  glucagon  Injectable 1 milliGRAM(s) IntraMuscular once PRN Glucose LESS THAN 70 milligrams/deciliter  sodium chloride 0.9% lock flush 10 milliLiter(s) IV Push every 1 hour PRN Pre/post blood products, medications, blood draw, and to maintain line patency      Allergies    No Known Allergies    Intolerances          General:  No wt loss, fevers, chills, night sweats, fatigue,   Eyes:  Good vision, no reported pain  ENT:  No sore throat, pain, runny nose, dysphagia  CV:  No pain, palpitations, hypo/hypertension  Resp:  No dyspnea, cough, tachypnea, wheezing  GI:  No pain, No nausea, No vomiting, No diarrhea, No constipation, No weight loss, No fever, No pruritis, No rectal bleeding, No tarry stools, No dysphagia,  :  No pain, bleeding, incontinence, nocturia  Muscle:  No pain, weakness  Neuro:  No weakness, tingling, memory problems  Psych:  No fatigue, insomnia, mood problems, depression  Endocrine:  No polyuria, polydipsia, cold/heat intolerance  Heme:  No petechiae, ecchymosis, easy bruisability  Skin:  No rash, tattoos, scars, edema      PHYSICAL EXAM:   Vital Signs:  Vital Signs Last 24 Hrs  T(C): 36.7 (31 Mar 2020 12:00), Max: 37.3 (31 Mar 2020 00:00)  T(F): 98 (31 Mar 2020 12:00), Max: 99.1 (31 Mar 2020 00:00)  HR: 53 (31 Mar 2020 14:19) (51 - 67)  BP: 135/47 (31 Mar 2020 14:19) (107/39 - 135/47)  BP(mean): 68 (31 Mar 2020 14:19) (56 - 77)  RR: 23 (31 Mar 2020 14:19) (11 - 26)  SpO2: 100% (31 Mar 2020 14:19) (96% - 100%)  Daily     Daily Weight in k.5 (31 Mar 2020 05:02)I&O's Summary    30 Mar 2020 07:01  -  31 Mar 2020 07:00  --------------------------------------------------------  IN: 2707.8 mL / OUT: 877 mL / NET: 1830.8 mL    31 Mar 2020 07:01  -  31 Mar 2020 15:43  --------------------------------------------------------  IN: 724.9 mL / OUT: 0 mL / NET: 724.9 mL        GENERAL:  Appears stated age, well-groomed, well-nourished, no distress  HEENT:  NC/AT,  conjunctivae clear and pink, no thyromegaly, nodules, adenopathy, no JVD, sclera -anicteric  CHEST:  Full & symmetric excursion, no increased effort, breath sounds clear  HEART:  Regular rhythm, S1, S2, no murmur/rub/S3/S4, no abdominal bruit, no edema  ABDOMEN:  Soft, non-tender, non-distended, normoactive bowel sounds,  no masses ,no hepato-splenomegaly, no signs of chronic liver disease  EXTEREMITIES:  no cyanosis,clubbing or edema  SKIN:  No rash/erythema/ecchymoses/petechiae/wounds/abscess/warm/dry  NEURO:  Alert, oriented, no asterixis, no tremor, no encephalopathy      LABS:                        8.4    12.70 )-----------( 415      ( 31 Mar 2020 07:29 )             26.5     03-31    131<L>  |  98  |  84<H>  ----------------------------<  169<H>  4.5   |  18<L>  |  4.65<H>    Ca    8.6      31 Mar 2020 07:29  Mg     2.1     03-31    TPro  6.4  /  Alb  1.8<L>  /  TBili  0.3  /  DBili  x   /  AST  18  /  ALT  27  /  AlkPhos  62  03-31        amylase   lipase  RADIOLOGY & ADDITIONAL TESTS:

## 2020-03-31 NOTE — PROGRESS NOTE ADULT - ASSESSMENT
74 year old female PMHx Morbid Obesity,  DM2, HTN, HLD, Hypothyroid, Anxiety w/ hypoxic respiratory failure, ARDS 2/2 COVID-19, ARF.    Neuro-sedated on Diprvan and fentanyl to facilitate safe effective ventilation   CV-BP wnl. Pressor support as needed to maintain MAP>65. Avoid volume challenges. QTC monitoring while plaquenil. DVT ppx   Pulm-cont vent support 4-6cc/kg IBW TV as able to keep plateau pressures <30, maintain o2S>90%. Consider proning to improve oxygenation.   GI- maitain TF, on PPI  Renal-ALIS 2/2 ATN/prerenal will maintain even to neg fluid balance as tolerated by hemodynamics and renal fx  ID-on Zopsyn and Hydroxychloriquine. monitor procalcitonin levels   Endo-glycemic control to limit FS glucose<180mg/dl  COVID-19 specific consideration and therapeutic options based on the avialable and rapidly changing literature.   GOC considerations: Ongoing assessment for patient specific treatment options based on progression or decline.                     (Reviewing data, imaging, discussing with multidisciplinary team, non inclusive of procedures, discussing goals of care with patient/family) [age] year old female PMHx Morbid Obesity,  DM2, HTN, HLD, Hypothyroid, Anxiety w/ hypoxic respiratory failure, ARDS 2/2 COVID-19, ARF.    Neuro-sedated on Diprivan and, fentanyl drips  to facilitate safe effective ventilation   CV-BP wnl. Pressor support as needed to maintain MAP>65. Avoid volume challenges. QTC monitoring while plaquenil. DVT ppx   Pulm-cont vent support 4-6cc/kg IBW TV as able to keep plateau pressures <30, maintain o2S>90%. Consider proning to improve oxygenation.   GI- maitain TF, on PPI  Renal-ALIS 2/2 ATN/prerenal will maintain even to neg fluid balance as tolerated by hemodynamics and renal fx  ID-on Zopsyn and Hydroxychloriquine. monitor procalcitonin levels   Endo-glycemic control to limit FS glucose<180mg/dl  COVID-19 specific consideration and therapeutic options based on the avialable and rapidly changing literature.   GOC considerations: Ongoing assessment for patient specific treatment options based on progression or decline.             36min CCReviewing data, imaging, discussing with multidisciplinary team, non inclusive of procedures, discussing goals of care with patient/family)

## 2020-03-31 NOTE — PROGRESS NOTE ADULT - SUBJECTIVE AND OBJECTIVE BOX
74 year old female PMHx Morbid Obesity,  DM2, HTN, HLD, Hypothyroid, Anxiety presents via EMS to ED with fever and cough x 6 days. Pt states that Tmax was 101.8F that morning.  According to chart record the patient had stated  that she has associated dry cough, body aches and mild generalized weaknes and an episode of loose stool daily.  No record of  recent travel, sick contacts, known covid-19 exposure, CP, SOB, runny nose, sore throat, headache, rash, urinary symptoms.  Suspect COVID-19. Intubated in ED 2/2 hypoxic respiratory distress. Brief cardiac arrest during intubation(CPR only), then ROSC.     Hospital Day 4  Vent Day 4    AC 26/350/50 +8    Drips Diprivan, Fentanyl    --- PMHx.---    DJD (degenerative joint disease)  DM (diabetes mellitus)  Hyperlipidemia  Hypothyroid  HTN (hypertension)  History of vitamin D deficiency  B12 deficiency  Bronchitis  Anxiety         Review Of Systems: UTO pt intubated       --- Medications ---    acetaminophen   Tablet .. 650 milliGRAM(s) PRN  chlorhexidine 0.12% Liquid 15 milliLiter(s)  chlorhexidine 2% Cloths 1 Application(s)  dextrose 40% Gel 15 Gram(s) PRN  dextrose 5%. 1000 milliLiter(s)  dextrose 50% Injectable 12.5 Gram(s)  dextrose 50% Injectable 25 Gram(s)  dextrose 50% Injectable 25 Gram(s)  fentaNYL   Infusion. 1 MICROgram(s)/kG/Hr  glucagon  Injectable 1 milliGRAM(s) PRN  hydroxychloroquine 200 milliGRAM(s)  hydroxychloroquine    insulin lispro (HumaLOG) corrective regimen sliding scale    metoprolol tartrate 25 milliGRAM(s)  midodrine 10 milliGRAM(s)  norepinephrine Infusion 0.05 MICROgram(s)/kG/Min  pantoprazole  Injectable 40 milliGRAM(s)  piperacillin/tazobactam IVPB.. 3.375 Gram(s)  propofol Infusion 73.502 MICROgram(s)/kG/Min  simvastatin 20 milliGRAM(s)  sodium chloride 0.9% lock flush 10 milliLiter(s) PRN  veCURonium Infusion 1 MICROgram(s)/kG/Min      DVT Prophylaxis : SCD's    Advanced Directives : full code     T(C): 36.3 (03-31-20 @ 05:02), Max: 37.3 (03-31-20 @ 00:00)  HR: 53 (03-31-20 @ 02:00)  BP: 111/41 (03-31-20 @ 02:00)  RR: 26 (03-31-20 @ 02:00)  SpO2: 97% (03-31-20 @ 02:00)  Wt(kg): --    --- Physical Exam ---    Physical exam performed where pertinent and urgently required.    --- Labratories ---                           6.9    10.58 )-----------( 327      ( 30 Mar 2020 06:28 )             21.6    03-30    131<L>  |  99  |  79<H>  ----------------------------<  187<H>  4.1   |  19<L>  |  4.54<H>    Ca    8.2<L>      30 Mar 2020 06:29            Radiology  / Ultrasound :

## 2020-03-31 NOTE — PROGRESS NOTE ADULT - SUBJECTIVE AND OBJECTIVE BOX
Patient is a 74y old  Female who presents with a chief complaint of shob (31 Mar 2020 17:09)      INTERVAL HPI/OVERNIGHT EVENTS:    Pain Location & Control:     MEDICATIONS  (STANDING):  chlorhexidine 0.12% Liquid 15 milliLiter(s) Oral Mucosa every 12 hours  chlorhexidine 2% Cloths 1 Application(s) Topical <User Schedule>  dextrose 5%. 1000 milliLiter(s) (50 mL/Hr) IV Continuous <Continuous>  dextrose 50% Injectable 12.5 Gram(s) IV Push once  dextrose 50% Injectable 25 Gram(s) IV Push once  dextrose 50% Injectable 25 Gram(s) IV Push once  fentaNYL   Infusion. 1 MICROgram(s)/kG/Hr (1.81 mL/Hr) IV Continuous <Continuous>  insulin lispro (HumaLOG) corrective regimen sliding scale   SubCutaneous every 6 hours  metoprolol tartrate 25 milliGRAM(s) Oral two times a day  midodrine 10 milliGRAM(s) Oral every 8 hours  pantoprazole  Injectable 40 milliGRAM(s) IV Push daily  piperacillin/tazobactam IVPB.. 3.375 Gram(s) IV Intermittent every 12 hours  propofol Infusion 73.502 MICROgram(s)/kG/Min (40 mL/Hr) IV Continuous <Continuous>  simvastatin 20 milliGRAM(s) Oral at bedtime  sodium bicarbonate 650 milliGRAM(s) Oral three times a day    MEDICATIONS  (PRN):  acetaminophen   Tablet .. 650 milliGRAM(s) Oral every 6 hours PRN Temp greater or equal to 38C (100.4F), Mild Pain (1 - 3)  dextrose 40% Gel 15 Gram(s) Oral once PRN Blood Glucose LESS THAN 70 milliGRAM(s)/deciliter  glucagon  Injectable 1 milliGRAM(s) IntraMuscular once PRN Glucose LESS THAN 70 milligrams/deciliter  sodium chloride 0.9% lock flush 10 milliLiter(s) IV Push every 1 hour PRN Pre/post blood products, medications, blood draw, and to maintain line patency      Allergies    No Known Allergies    Intolerances        REVIEW OF SYSTEMS:  CONSTITUTIONAL: No fever, weight loss, or fatigue  EYES: No eye pain, visual disturbances, or discharge  ENMT:  No difficulty hearing, tinnitus, vertigo; No sinus or throat pain  NECK: No pain or stiffness  BREASTS: No pain, masses, or nipple discharge  RESPIRATORY: No cough, wheezing, chills or hemoptysis; No shortness of breath  CARDIOVASCULAR: No chest pain, palpitations, or lightheadedness  GASTROINTESTINAL: No abdominal or epigastric pain. No nausea, vomiting, or hematemesis; No diarrhea or constipation. No melena or hematochezia.  GENITOURINARY: No dysuria, frequency, hematuria, or incontinence  NEUROLOGICAL: No headaches, vertigo, memory loss, loss of strength, numbness, or tremors  SKIN: No itching, burning, rashes, or lesions   LYMPH NODES: No enlarged glands  ENDOCRINE: No heat or cold intolerance; No hair loss; No polydipsia or polyuria  MUSCULOSKELETAL: No back pain  PSYCHIATRIC: No depression, anxiety, or mood swings  HEME/LYMPH: No easy bruising, or bleeding gums  ALLERGY AND IMMUNOLOGIC: No hives or eczema    Vital Signs Last 24 Hrs  T(C): 37.2 (31 Mar 2020 18:00), Max: 37.3 (31 Mar 2020 00:00)  T(F): 99 (31 Mar 2020 18:00), Max: 99.1 (31 Mar 2020 00:00)  HR: 53 (31 Mar 2020 16:00) (51 - 67)  BP: 131/45 (31 Mar 2020 18:00) (107/39 - 135/47)  BP(mean): 66 (31 Mar 2020 18:00) (56 - 77)  RR: 23 (31 Mar 2020 18:00) (11 - 26)  SpO2: 95% (31 Mar 2020 18:00) (95% - 100%)    PHYSICAL EXAM:  GENERAL: NAD, obese, well-groomed, well-developed, sedated  HEAD:  Atraumatic, Normocephalic  EYES: constricted pupils b/l  ENMT: Moist mucous membranes, Good dentition, No lesions; No tonsillar erythema, exudates, or enlargement   NECK: Supple, No JVD appreciated  NERVOUS SYSTEM:  sedated, not responding to voice or touch  CHEST/LUNG: Clear to auscultation bilaterally; No rales, rhonchi, wheezing, or rubs  HEART: Regular rate and rhythm; No murmurs, rubs, or gallops  ABDOMEN: Soft, Nontender, Nondistended; Bowel sounds present, green urine in botello bag  EXTREMITIES:  2+ Peripheral Pulses, No clubbing or cyanosis  LYMPH: No lymphadenopathy noted  SKIN: No rashes, catheter dressing dry and intact      LABS:                        8.4    12.70 )-----------( 415      ( 31 Mar 2020 07:29 )             26.5     31 Mar 2020 07:29    131    |  98     |  84     ----------------------------<  169    4.5     |  18     |  4.65     Ca    8.6        31 Mar 2020 07:29  Mg     2.1       31 Mar 2020 07:29    TPro  6.4    /  Alb  1.8    /  TBili  0.3    /  DBili  x      /  AST  18     /  ALT  27     /  AlkPhos  62     31 Mar 2020 07:29        CAPILLARY BLOOD GLUCOSE      POCT Blood Glucose.: 197 mg/dL (31 Mar 2020 16:25)  POCT Blood Glucose.: 222 mg/dL (31 Mar 2020 12:01)  POCT Blood Glucose.: 169 mg/dL (31 Mar 2020 05:48)  POCT Blood Glucose.: 124 mg/dL (30 Mar 2020 23:16)

## 2020-03-31 NOTE — PROGRESS NOTE ADULT - ASSESSMENT
The patient is a 74 year old female with a history of HTN, DM, HL, hypothyroidism, anxiety who presented with fevers, cough, shortness of breath in the setting of viral PNA, COVID-19, respiratory failure, septic shock.     Plan:  - CXR consistent with PNA  - COVID-19 positive  - On hydroxychloroquine, zosyn  - Off of pressors  - Continue midodrine 10 mg q8h  - Will give furosemide 20 mg IV to balance I/O  - Atrial fibrillation - brief, back in sinus rhythm. Continue metoprolol with holding parameters.  - Hemoglobin improved after transfusion  - Worsening renal function, likely shock, ATN, and direct injury from COVID-19. Might be plateauing.  - Continue mechanical ventilation  - Pulm and ID follow-up  - Overall prognosis poor given multi-organ failure (respiratory failure, kidney failure, shock).

## 2020-03-31 NOTE — PROGRESS NOTE ADULT - SUBJECTIVE AND OBJECTIVE BOX
Patient is a 74y old  Female who presents with a chief complaint of SOB (31 Mar 2020 05:05)    Patient seen in follow up for ALIS, ATN.        PAST MEDICAL HISTORY:  DJD (degenerative joint disease)  DM (diabetes mellitus)  Hyperlipidemia  Hypothyroid  HTN (hypertension)  History of vitamin D deficiency  B12 deficiency  Bronchitis  Anxiety    MEDICATIONS  (STANDING):  chlorhexidine 0.12% Liquid 15 milliLiter(s) Oral Mucosa every 12 hours  chlorhexidine 2% Cloths 1 Application(s) Topical <User Schedule>  dextrose 5%. 1000 milliLiter(s) (50 mL/Hr) IV Continuous <Continuous>  dextrose 50% Injectable 12.5 Gram(s) IV Push once  dextrose 50% Injectable 25 Gram(s) IV Push once  dextrose 50% Injectable 25 Gram(s) IV Push once  fentaNYL   Infusion. 1 MICROgram(s)/kG/Hr (1.81 mL/Hr) IV Continuous <Continuous>  furosemide   Injectable 20 milliGRAM(s) IV Push once  hydroxychloroquine 200 milliGRAM(s) Oral every 12 hours  hydroxychloroquine   Oral   insulin lispro (HumaLOG) corrective regimen sliding scale   SubCutaneous every 6 hours  metoprolol tartrate 25 milliGRAM(s) Oral two times a day  midodrine 10 milliGRAM(s) Oral every 8 hours  pantoprazole  Injectable 40 milliGRAM(s) IV Push daily  piperacillin/tazobactam IVPB.. 3.375 Gram(s) IV Intermittent every 12 hours  propofol Infusion 73.502 MICROgram(s)/kG/Min (40 mL/Hr) IV Continuous <Continuous>  simvastatin 20 milliGRAM(s) Oral at bedtime    MEDICATIONS  (PRN):  acetaminophen   Tablet .. 650 milliGRAM(s) Oral every 6 hours PRN Temp greater or equal to 38C (100.4F), Mild Pain (1 - 3)  dextrose 40% Gel 15 Gram(s) Oral once PRN Blood Glucose LESS THAN 70 milliGRAM(s)/deciliter  glucagon  Injectable 1 milliGRAM(s) IntraMuscular once PRN Glucose LESS THAN 70 milligrams/deciliter  sodium chloride 0.9% lock flush 10 milliLiter(s) IV Push every 1 hour PRN Pre/post blood products, medications, blood draw, and to maintain line patency    T(C): 36.4 (03-31-20 @ 08:00), Max: 37.3 (03-31-20 @ 00:00)  HR: 60 (03-31-20 @ 08:35) (51 - 68)  BP: 133/44 (03-31-20 @ 08:00) (92/53 - 133/44)  RR: 26 (03-31-20 @ 08:00) (11 - 26)  SpO2: 98% (03-31-20 @ 08:35) (91% - 100%)  Wt(kg): --  I&O's Detail    30 Mar 2020 07:01  -  31 Mar 2020 07:00  --------------------------------------------------------  IN:    fentaNYL Infusion.: 62 mL    Free Water: 720 mL    Glucerna 1.5: 1080 mL    IV PiggyBack: 200 mL    Packed Red Blood Cells: 333 mL    propofol Infusion: 312.8 mL  Total IN: 2707.8 mL    OUT:    Indwelling Catheter - Urethral: 877 mL  Total OUT: 877 mL    Total NET: 1830.8 mL      31 Mar 2020 07:01  -  31 Mar 2020 11:54  --------------------------------------------------------  IN:    fentaNYL Infusion.: 5.4 mL    propofol Infusion: 27.2 mL  Total IN: 32.6 mL    OUT:  Total OUT: 0 mL    Total NET: 32.6 mL          PHYSICAL EXAM:  General: on vent  Respiratory: b/l air entry  Cardiovascular: S1 S2  Gastrointestinal: soft  Extremities:  edema    Pt on COVID precautions. Chart reviewed and previous physical examination noted.                           8.4    12.70 )-----------( 415      ( 31 Mar 2020 07:29 )             26.5     03-31    131<L>  |  98  |  84<H>  ----------------------------<  169<H>  4.5   |  18<L>  |  4.65<H>    Ca    8.6      31 Mar 2020 07:29  Mg     2.1     03-31    TPro  6.4  /  Alb  1.8<L>  /  TBili  0.3  /  DBili  x   /  AST  18  /  ALT  27  /  AlkPhos  62  03-31        LIVER FUNCTIONS - ( 31 Mar 2020 07:29 )  Alb: 1.8 g/dL / Pro: 6.4 g/dL / ALK PHOS: 62 U/L / ALT: 27 U/L DA / AST: 18 U/L / GGT: x               Sodium, Serum: 131 (03-31 @ 07:29)  Sodium, Serum: 131 (03-30 @ 06:29)  Sodium, Serum: 131 (03-29 @ 07:56)  Sodium, Serum: 135 (03-28 @ 07:07)    Creatinine, Serum: 4.65 (03-31 @ 07:29)  Creatinine, Serum: 4.54 (03-30 @ 06:29)  Creatinine, Serum: 3.92 (03-29 @ 07:56)  Creatinine, Serum: 3.14 (03-28 @ 07:07)    Potassium, Serum: 4.5 (03-31 @ 07:29)  Potassium, Serum: 4.1 (03-30 @ 06:29)  Potassium, Serum: 3.8 (03-29 @ 07:56)  Potassium, Serum: 4.0 (03-28 @ 07:07)    Hemoglobin: 8.4 (03-31 @ 07:29)  Hemoglobin: 6.9 (03-30 @ 06:28)  Hemoglobin: 7.2 (03-29 @ 07:56)  Hemoglobin: 8.4 (03-28 @ 07:07)

## 2020-04-01 LAB
ALBUMIN SERPL ELPH-MCNC: 1.9 G/DL — LOW (ref 3.3–5)
ALP SERPL-CCNC: 79 U/L — SIGNIFICANT CHANGE UP (ref 30–120)
ALT FLD-CCNC: 26 U/L DA — SIGNIFICANT CHANGE UP (ref 10–60)
ANION GAP SERPL CALC-SCNC: 15 MMOL/L — SIGNIFICANT CHANGE UP (ref 5–17)
AST SERPL-CCNC: 18 U/L — SIGNIFICANT CHANGE UP (ref 10–40)
BASE EXCESS BLDA CALC-SCNC: -11.8 MMOL/L — LOW (ref -2–2)
BASE EXCESS BLDA CALC-SCNC: -13.2 MMOL/L — LOW (ref -2–2)
BILIRUB SERPL-MCNC: 0.3 MG/DL — SIGNIFICANT CHANGE UP (ref 0.2–1.2)
BLOOD GAS SOURCE: SIGNIFICANT CHANGE UP
BLOOD GAS SOURCE: SIGNIFICANT CHANGE UP
BUN SERPL-MCNC: 90 MG/DL — HIGH (ref 7–23)
CALCIUM SERPL-MCNC: 8.7 MG/DL — SIGNIFICANT CHANGE UP (ref 8.4–10.5)
CHLORIDE SERPL-SCNC: 97 MMOL/L — SIGNIFICANT CHANGE UP (ref 96–108)
CO2 SERPL-SCNC: 19 MMOL/L — LOW (ref 22–31)
CREAT SERPL-MCNC: 4.58 MG/DL — HIGH (ref 0.5–1.3)
GLUCOSE BLDC GLUCOMTR-MCNC: 154 MG/DL — HIGH (ref 70–99)
GLUCOSE BLDC GLUCOMTR-MCNC: 214 MG/DL — HIGH (ref 70–99)
GLUCOSE BLDC GLUCOMTR-MCNC: 234 MG/DL — HIGH (ref 70–99)
GLUCOSE BLDC GLUCOMTR-MCNC: 277 MG/DL — HIGH (ref 70–99)
GLUCOSE SERPL-MCNC: 231 MG/DL — HIGH (ref 70–99)
HCO3 BLDA-SCNC: 14 MMOL/L — LOW (ref 21–29)
HCO3 BLDA-SCNC: 15 MMOL/L — LOW (ref 21–29)
HCT VFR BLD CALC: 28.7 % — LOW (ref 34.5–45)
HGB BLD-MCNC: 9.1 G/DL — LOW (ref 11.5–15.5)
HOROWITZ INDEX BLDA+IHG-RTO: 21 — SIGNIFICANT CHANGE UP
HOROWITZ INDEX BLDA+IHG-RTO: 40 — SIGNIFICANT CHANGE UP
MCHC RBC-ENTMCNC: 28.3 PG — SIGNIFICANT CHANGE UP (ref 27–34)
MCHC RBC-ENTMCNC: 31.7 GM/DL — LOW (ref 32–36)
MCV RBC AUTO: 89.4 FL — SIGNIFICANT CHANGE UP (ref 80–100)
NRBC # BLD: 0 /100 WBCS — SIGNIFICANT CHANGE UP (ref 0–0)
PCO2 BLDA: 35 MMHG — SIGNIFICANT CHANGE UP (ref 32–46)
PCO2 BLDA: 41 MMHG — SIGNIFICANT CHANGE UP (ref 32–46)
PH BLD: 7.16 — CRITICAL LOW (ref 7.35–7.45)
PH BLD: 7.23 — LOW (ref 7.35–7.45)
PLATELET # BLD AUTO: 511 K/UL — HIGH (ref 150–400)
PO2 BLDA: 73 MMHG — LOW (ref 74–108)
PO2 BLDA: 86 MMHG — SIGNIFICANT CHANGE UP (ref 74–108)
POTASSIUM SERPL-MCNC: 3.9 MMOL/L — SIGNIFICANT CHANGE UP (ref 3.5–5.3)
POTASSIUM SERPL-SCNC: 3.9 MMOL/L — SIGNIFICANT CHANGE UP (ref 3.5–5.3)
PROT SERPL-MCNC: 7.1 G/DL — SIGNIFICANT CHANGE UP (ref 6–8.3)
RBC # BLD: 3.21 M/UL — LOW (ref 3.8–5.2)
RBC # FLD: 15.2 % — HIGH (ref 10.3–14.5)
SAO2 % BLDA: 92 % — SIGNIFICANT CHANGE UP (ref 92–96)
SAO2 % BLDA: 94 % — SIGNIFICANT CHANGE UP (ref 92–96)
SODIUM SERPL-SCNC: 131 MMOL/L — LOW (ref 135–145)
WBC # BLD: 17.9 K/UL — HIGH (ref 3.8–10.5)
WBC # FLD AUTO: 17.9 K/UL — HIGH (ref 3.8–10.5)

## 2020-04-01 PROCEDURE — 99233 SBSQ HOSP IP/OBS HIGH 50: CPT

## 2020-04-01 PROCEDURE — 99291 CRITICAL CARE FIRST HOUR: CPT | Mod: CS

## 2020-04-01 PROCEDURE — 71045 X-RAY EXAM CHEST 1 VIEW: CPT | Mod: 26,77

## 2020-04-01 PROCEDURE — 71045 X-RAY EXAM CHEST 1 VIEW: CPT | Mod: 26

## 2020-04-01 RX ORDER — FENTANYL CITRATE 50 UG/ML
100 INJECTION INTRAVENOUS ONCE
Refills: 0 | Status: DISCONTINUED | OUTPATIENT
Start: 2020-04-01 | End: 2020-04-01

## 2020-04-01 RX ORDER — FENTANYL CITRATE 50 UG/ML
50 INJECTION INTRAVENOUS ONCE
Refills: 0 | Status: DISCONTINUED | OUTPATIENT
Start: 2020-04-01 | End: 2020-04-01

## 2020-04-01 RX ORDER — LEVOTHYROXINE SODIUM 125 MCG
12.5 TABLET ORAL AT BEDTIME
Refills: 0 | Status: DISCONTINUED | OUTPATIENT
Start: 2020-04-01 | End: 2020-04-16

## 2020-04-01 RX ORDER — FUROSEMIDE 40 MG
80 TABLET ORAL ONCE
Refills: 0 | Status: COMPLETED | OUTPATIENT
Start: 2020-04-01 | End: 2020-04-01

## 2020-04-01 RX ORDER — HEPARIN SODIUM 5000 [USP'U]/ML
5000 INJECTION INTRAVENOUS; SUBCUTANEOUS EVERY 8 HOURS
Refills: 0 | Status: DISCONTINUED | OUTPATIENT
Start: 2020-04-01 | End: 2020-04-13

## 2020-04-01 RX ORDER — SODIUM BICARBONATE 1 MEQ/ML
50 SYRINGE (ML) INTRAVENOUS ONCE
Refills: 0 | Status: COMPLETED | OUTPATIENT
Start: 2020-04-01 | End: 2020-04-01

## 2020-04-01 RX ORDER — BRIMONIDINE TARTRATE 2 MG/MG
1 SOLUTION/ DROPS OPHTHALMIC DAILY
Refills: 0 | Status: DISCONTINUED | OUTPATIENT
Start: 2020-04-01 | End: 2020-04-16

## 2020-04-01 RX ORDER — LATANOPROST 0.05 MG/ML
1 SOLUTION/ DROPS OPHTHALMIC; TOPICAL AT BEDTIME
Refills: 0 | Status: DISCONTINUED | OUTPATIENT
Start: 2020-04-01 | End: 2020-04-16

## 2020-04-01 RX ORDER — FENTANYL CITRATE 50 UG/ML
1 INJECTION INTRAVENOUS
Qty: 2500 | Refills: 0 | Status: DISCONTINUED | OUTPATIENT
Start: 2020-04-01 | End: 2020-04-03

## 2020-04-01 RX ORDER — NOREPINEPHRINE BITARTRATE/D5W 8 MG/250ML
0.02 PLASTIC BAG, INJECTION (ML) INTRAVENOUS
Qty: 8 | Refills: 0 | Status: DISCONTINUED | OUTPATIENT
Start: 2020-04-01 | End: 2020-04-02

## 2020-04-01 RX ORDER — FUROSEMIDE 40 MG
80 TABLET ORAL ONCE
Refills: 0 | Status: DISCONTINUED | OUTPATIENT
Start: 2020-04-01 | End: 2020-04-01

## 2020-04-01 RX ORDER — MIDAZOLAM HYDROCHLORIDE 1 MG/ML
2 INJECTION, SOLUTION INTRAMUSCULAR; INTRAVENOUS ONCE
Refills: 0 | Status: DISCONTINUED | OUTPATIENT
Start: 2020-04-01 | End: 2020-04-01

## 2020-04-01 RX ORDER — TIMOLOL 0.5 %
1 DROPS OPHTHALMIC (EYE) DAILY
Refills: 0 | Status: DISCONTINUED | OUTPATIENT
Start: 2020-04-01 | End: 2020-04-16

## 2020-04-01 RX ADMIN — Medication 4: at 06:24

## 2020-04-01 RX ADMIN — CHLORHEXIDINE GLUCONATE 1 APPLICATION(S): 213 SOLUTION TOPICAL at 05:51

## 2020-04-01 RX ADMIN — Medication 4: at 17:55

## 2020-04-01 RX ADMIN — Medication 3.4 MICROGRAM(S)/KG/MIN: at 08:34

## 2020-04-01 RX ADMIN — SIMVASTATIN 20 MILLIGRAM(S): 20 TABLET, FILM COATED ORAL at 21:39

## 2020-04-01 RX ADMIN — LATANOPROST 1 DROP(S): 0.05 SOLUTION/ DROPS OPHTHALMIC; TOPICAL at 21:43

## 2020-04-01 RX ADMIN — MIDODRINE HYDROCHLORIDE 10 MILLIGRAM(S): 2.5 TABLET ORAL at 05:51

## 2020-04-01 RX ADMIN — Medication 2: at 23:40

## 2020-04-01 RX ADMIN — PROPOFOL 40 MICROGRAM(S)/KG/MIN: 10 INJECTION, EMULSION INTRAVENOUS at 04:38

## 2020-04-01 RX ADMIN — HEPARIN SODIUM 5000 UNIT(S): 5000 INJECTION INTRAVENOUS; SUBCUTANEOUS at 21:39

## 2020-04-01 RX ADMIN — PIPERACILLIN AND TAZOBACTAM 25 GRAM(S): 4; .5 INJECTION, POWDER, LYOPHILIZED, FOR SOLUTION INTRAVENOUS at 05:50

## 2020-04-01 RX ADMIN — PROPOFOL 40 MICROGRAM(S)/KG/MIN: 10 INJECTION, EMULSION INTRAVENOUS at 09:10

## 2020-04-01 RX ADMIN — MIDAZOLAM HYDROCHLORIDE 2 MILLIGRAM(S): 1 INJECTION, SOLUTION INTRAMUSCULAR; INTRAVENOUS at 06:50

## 2020-04-01 RX ADMIN — Medication 80 MILLIGRAM(S): at 12:04

## 2020-04-01 RX ADMIN — Medication 650 MILLIGRAM(S): at 05:51

## 2020-04-01 RX ADMIN — PIPERACILLIN AND TAZOBACTAM 25 GRAM(S): 4; .5 INJECTION, POWDER, LYOPHILIZED, FOR SOLUTION INTRAVENOUS at 17:55

## 2020-04-01 RX ADMIN — Medication 25 MILLIGRAM(S): at 05:51

## 2020-04-01 RX ADMIN — FENTANYL CITRATE 100 MICROGRAM(S): 50 INJECTION INTRAVENOUS at 08:32

## 2020-04-01 RX ADMIN — Medication 650 MILLIGRAM(S): at 11:36

## 2020-04-01 RX ADMIN — FENTANYL CITRATE 50 MICROGRAM(S): 50 INJECTION INTRAVENOUS at 07:37

## 2020-04-01 RX ADMIN — Medication 50 MILLIEQUIVALENT(S): at 11:34

## 2020-04-01 RX ADMIN — CHLORHEXIDINE GLUCONATE 15 MILLILITER(S): 213 SOLUTION TOPICAL at 17:55

## 2020-04-01 RX ADMIN — HEPARIN SODIUM 5000 UNIT(S): 5000 INJECTION INTRAVENOUS; SUBCUTANEOUS at 13:06

## 2020-04-01 RX ADMIN — PANTOPRAZOLE SODIUM 40 MILLIGRAM(S): 20 TABLET, DELAYED RELEASE ORAL at 11:35

## 2020-04-01 RX ADMIN — Medication 12.5 MICROGRAM(S): at 21:47

## 2020-04-01 RX ADMIN — MIDODRINE HYDROCHLORIDE 10 MILLIGRAM(S): 2.5 TABLET ORAL at 21:39

## 2020-04-01 RX ADMIN — CHLORHEXIDINE GLUCONATE 15 MILLILITER(S): 213 SOLUTION TOPICAL at 05:51

## 2020-04-01 RX ADMIN — FENTANYL CITRATE 1.81 MICROGRAM(S)/KG/HR: 50 INJECTION INTRAVENOUS at 14:25

## 2020-04-01 RX ADMIN — Medication 650 MILLIGRAM(S): at 21:39

## 2020-04-01 RX ADMIN — Medication 6: at 11:35

## 2020-04-01 NOTE — PROGRESS NOTE ADULT - SUBJECTIVE AND OBJECTIVE BOX
ADELIA BALDWIN is a 74yFemale , patient examined and chart reviewed.     INTERVAL HPI/ OVERNIGHT EVENTS:  Vented sedated   Afebrile.    Past Medical History--  PAST MEDICAL & SURGICAL HISTORY:  DJD (degenerative joint disease)  DM (diabetes mellitus)  Hyperlipidemia  Hypothyroid  HTN (hypertension)  History of vitamin D deficiency  B12 deficiency  Bronchitis  Anxiety  S/P cataract surgery  S/P eye surgery: left eye retinal surgery x2  S/P  section: x2      For details regarding the patient's social history, family history, and other miscellaneous elements, please refer the initial infectious diseases consultation and/or the admitting history and physical examination for this admission.      ROS:  Unable to obtain due to : pt's condition      Current inpatient medications :    ANTIBIOTICS/RELEVANT:  piperacillin/tazobactam IVPB.. 3.375 Gram(s) IV Intermittent every 8 hours    MEDICATIONS  (STANDING):  brimonidine 0.2% Ophthalmic Solution 1 Drop(s) Left EYE daily  chlorhexidine 0.12% Liquid 15 milliLiter(s) Oral Mucosa every 12 hours  chlorhexidine 2% Cloths 1 Application(s) Topical <User Schedule>  dextrose 5%. 1000 milliLiter(s) (50 mL/Hr) IV Continuous <Continuous>  dextrose 50% Injectable 12.5 Gram(s) IV Push once  dextrose 50% Injectable 25 Gram(s) IV Push once  dextrose 50% Injectable 25 Gram(s) IV Push once  fentaNYL   Infusion. 0.998 MICROgram(s)/kG/Hr (1.81 mL/Hr) IV Continuous <Continuous>  heparin  Injectable 5000 Unit(s) SubCutaneous every 8 hours  insulin lispro (HumaLOG) corrective regimen sliding scale   SubCutaneous every 6 hours  latanoprost 0.005% Ophthalmic Solution 1 Drop(s) Both EYES at bedtime  levothyroxine Injectable 12.5 MICROGram(s) IV Push at bedtime  midodrine 10 milliGRAM(s) Oral every 8 hours  norepinephrine Infusion 0.02 MICROgram(s)/kG/Min (3.4 mL/Hr) IV Continuous <Continuous>  pantoprazole  Injectable 40 milliGRAM(s) IV Push daily  propofol Infusion 73.502 MICROgram(s)/kG/Min (40 mL/Hr) IV Continuous <Continuous>  simvastatin 20 milliGRAM(s) Oral at bedtime  sodium bicarbonate 650 milliGRAM(s) Oral three times a day  timolol 0.5% Solution 1 Drop(s) Left EYE daily    MEDICATIONS  (PRN):  acetaminophen   Tablet .. 650 milliGRAM(s) Oral every 6 hours PRN Temp greater or equal to 38C (100.4F), Mild Pain (1 - 3)  dextrose 40% Gel 15 Gram(s) Oral once PRN Blood Glucose LESS THAN 70 milliGRAM(s)/deciliter  glucagon  Injectable 1 milliGRAM(s) IntraMuscular once PRN Glucose LESS THAN 70 milligrams/deciliter  sodium chloride 0.9% lock flush 10 milliLiter(s) IV Push every 1 hour PRN Pre/post blood products, medications, blood draw, and to maintain line patency        Objective:  ICU Vital Signs Last 24 Hrs  T(C): 36.8 (2020 16:00), Max: 36.8 (2020 16:00)  T(F): 98.2 (2020 16:00), Max: 98.2 (2020 16:00)  HR: 53 (2020 22:00) (49 - 63)  BP: 120/41 (2020 22:00) (120/41 - 141/48)  BP(mean): 60 (2020 22:00) (60 - 71)  ABP: 114/50 (2020 22:00) (89/36 - 152/66)  ABP(mean): 70 (2020 22:00) (47 - 81)  RR: 16 (2020 22:00) (15 - 21)  SpO2: 93% (2020 22:00) (92% - 97%)      Mode: AC/ CMV (Assist Control/ Continuous Mandatory Ventilation)  RR (machine): 15  TV (machine): 400  FiO2: 40  PEEP: 8  ITime: 1  MAP: 14  PIP: 28      Physical Exam:  GEN: Intubated sedated  HEENT: normocephalic and atraumatic. EOMI. OLGA. Moist mucosa. Clear Posterior pharynx.  NECK: Supple. No carotid bruits.  No lymphadenopathy or thyromegaly.  LUNGS: Decreased to auscultation.  HEART: Regular rate and rhythm without murmur.  ABDOMEN: Soft, nontender, and nondistended.  Positive bowel sounds.   EXTREMITIES: +edema.  NEUROLOGIC: Sedaed   SKIN: No ulceration or induration present.      LABS:                        9.1    17.90 )-----------( 511      ( 2020 06:46 )             28.7   04-    131<L>  |  97  |  90<H>  ----------------------------<  231<H>  3.9   |  19<L>  |  4.58<H>    Ca    8.7      2020 06:46  Mg     2.1     03-31    TPro  7.1  /  Alb  1.9<L>  /  TBili  0.3  /  DBili  x   /  AST  18  /  ALT  26  /  AlkPhos  79  04-      Urinalysis Basic - ( 26 Mar 2020 22:14 )    Color: Yellow / Appearance: Slightly Turbid / S.020 / pH: x  Gluc: x / Ketone: Negative  / Bili: Negative / Urobili: Negative mg/dL   Blood: x / Protein: 100 mg/dL / Nitrite: Negative   Leuk Esterase: Negative / RBC: 6-10 /HPF / WBC 0-2   Sq Epi: x / Non Sq Epi: Few / Bacteria: Moderate      ABG - ( 28 Mar 2020 05:14 )  pH, Arterial: x     pH, Blood: 7.29  /  pCO2: 33    /  pO2: 67    / HCO3: 17    / Base Excess: -9.6  /  SaO2: 92        MICROBIOLOGY:    Culture - Urine (collected 27 Mar 2020 11:03)  Source: .Urine Clean Catch (Midstream)  Final Report (28 Mar 2020 10:55):    No growth    Culture - Blood (collected 26 Mar 2020 23:00)  Source: .Blood Blood  Preliminary Report (28 Mar 2020 01:03):    No growth to date.    Culture - Blood (collected 26 Mar 2020 23:00)  Source: .Blood Blood  Preliminary Report (28 Mar 2020 01:02):    No growth to date.    COVID-19 PCR . (20 @ 22:21)    COVID-19 PCR: Detected: All “detected” results on this new test are considered presumptively  positive results, are clinically actionable, and specimens will be  forwarded to [facility] in [place] for confirmation testing.  Another report (corrected report) will only be issued if discordant  results occur.  This test has been validated by [employer] to be accurate;  though it has not been FDA cleared/approved by the usual pathway.  As with all laboratory tests, results should be correlated with clinical  findings.      RADIOLOGY & ADDITIONAL STUDIES:    PROCEDURE DATE:  2020      INTERPRETATION:  AP erect chest on 2020 at 8:26 AM. Patient is desaturating and requires intubation.    Heart is magnified by technique. Calcified mitral area again seen.    There are infiltrates in the mid lower lung fields that are increasing from study earlier in the day.    Endotracheal tube remains.    IMPRESSION: Increasing basilar infiltrates.    No significant pleural effusion    Assessment :  75 y/o F with hx of DM, HTN, HLD, hypothyroid, anxiety presented admitted with severe sepsis with septic shock and acute hypoxic respiratory failure with MSOF sec COVID 19 pneumonia. Sp Cardiorespiratory arrest.  Worsening ALIS. Anemic. Procalcitonin markedly elevated- concern for superimposed bacterial process. Doing poor;ly.    Plan :   Completed Hydroxychloroquine x 5 days  Cont  Zosyn renally dosed or possible superimposed bacterial process  Vent per pulm ICU  Wean off pressors  Trend temps  Prognosis guarded  DNR  COVID-19---high risk period for decompensation  (7-14 days post symptom onset), avoid aerosolizing procedures,, NSAIDs ---no compelling evidence to date DO NOT recommend any specific therapies outside of established protocols or controlled trials -would support focus on supportive care  avoid excessive blood draws but do recommend for hospitalized patients  -daily CBC w diff to follow eos, lymphocytes and neutrophils and daily NLR calculation (NLR <3 low vs >5 high) -other labs that may be selectively used to risk stratify and predict clinical course,   Procalcitonin (<0.2 associated with more severe disease),  Ferritin (lower risk <450 vs >850) CRP (low risk <2 and higher risk >6) D-dimer (<1,000 vs >1,000) LDH, ESR, PT/PTT, CK, lactate, troponin, IL-6  -antibiotics only if there is concern for a bacterial process  neutrophil/lymphocyte ratio    D/w ICU team      Continue with present regiment.  Appropriate use of antibiotics and adverse effects reviewed.    I have discussed the above plan of care with patient/ family in detail. They expressed understanding of the the treatment plan . Risks, benefits and alternatives discussed in detail. I have asked if they have any questions or concerns and appropriately addressed them to the best of my ability .      Critical care time greater then 35 minutes reviewing notes, labs data/ imaging , discussion with multidisciplinary team.    Thank you for allowing me to participate in care of your patient .        Eder Brown MD  Infectious Disease  [phone]

## 2020-04-01 NOTE — PROGRESS NOTE ADULT - SUBJECTIVE AND OBJECTIVE BOX
Patient is a 74y old  Female who presents with a chief complaint of SOB (31 Mar 2020 05:05)  Patient seen in follow up for ALIS, ATN.      Events noted. chart reviewed.     PAST MEDICAL HISTORY:  DJD (degenerative joint disease)  DM (diabetes mellitus)  Hyperlipidemia  Hypothyroid  HTN (hypertension)  History of vitamin D deficiency  B12 deficiency  Bronchitis  Anxiety      MEDICATIONS  (STANDING):  chlorhexidine 0.12% Liquid 15 milliLiter(s) Oral Mucosa every 12 hours  chlorhexidine 2% Cloths 1 Application(s) Topical <User Schedule>  dextrose 5%. 1000 milliLiter(s) (50 mL/Hr) IV Continuous <Continuous>  dextrose 50% Injectable 12.5 Gram(s) IV Push once  dextrose 50% Injectable 25 Gram(s) IV Push once  dextrose 50% Injectable 25 Gram(s) IV Push once  fentaNYL   Infusion. 0.998 MICROgram(s)/kG/Hr (1.81 mL/Hr) IV Continuous <Continuous>  heparin  Injectable 5000 Unit(s) SubCutaneous every 8 hours  insulin lispro (HumaLOG) corrective regimen sliding scale   SubCutaneous every 6 hours  midodrine 10 milliGRAM(s) Oral every 8 hours  norepinephrine Infusion 0.02 MICROgram(s)/kG/Min (3.4 mL/Hr) IV Continuous <Continuous>  pantoprazole  Injectable 40 milliGRAM(s) IV Push daily  piperacillin/tazobactam IVPB.. 3.375 Gram(s) IV Intermittent every 12 hours  propofol Infusion 73.502 MICROgram(s)/kG/Min (40 mL/Hr) IV Continuous <Continuous>  simvastatin 20 milliGRAM(s) Oral at bedtime  sodium bicarbonate 650 milliGRAM(s) Oral three times a day    MEDICATIONS  (PRN):  acetaminophen   Tablet .. 650 milliGRAM(s) Oral every 6 hours PRN Temp greater or equal to 38C (100.4F), Mild Pain (1 - 3)  dextrose 40% Gel 15 Gram(s) Oral once PRN Blood Glucose LESS THAN 70 milliGRAM(s)/deciliter  glucagon  Injectable 1 milliGRAM(s) IntraMuscular once PRN Glucose LESS THAN 70 milligrams/deciliter  sodium chloride 0.9% lock flush 10 milliLiter(s) IV Push every 1 hour PRN Pre/post blood products, medications, blood draw, and to maintain line patency    T(C): 36.4 (04-01-20 @ 12:00), Max: 37.3 (03-31-20 @ 00:00)  HR: 58 (04-01-20 @ 10:39) (49 - 67)  BP: 133/47 (04-01-20 @ 12:00) (107/39 - 135/47)  RR: 15 (04-01-20 @ 12:00)  SpO2: 93% (04-01-20 @ 12:00)  Wt(kg): --  I&O's Detail    31 Mar 2020 07:01  -  01 Apr 2020 07:00  --------------------------------------------------------  IN:    fentaNYL Infusion.: 56.7 mL    Free Water: 720 mL    Glucerna 1.5: 1080 mL    IV PiggyBack: 100 mL    propofol Infusion: 366.6 mL  Total IN: 2323.3 mL    OUT:    Indwelling Catheter - Urethral: 800 mL  Total OUT: 800 mL    Total NET: 1523.3 mL      01 Apr 2020 07:01  -  01 Apr 2020 12:58  --------------------------------------------------------  IN:    fentaNYL Infusion.: 27.3 mL    fentaNYL Infusion.: 18.2 mL    Free Water: 120 mL    Glucerna 1.5: 180 mL    norepinephrine Infusion: 28.9 mL    propofol Infusion: 133.3 mL  Total IN: 507.7 mL    OUT:  Total OUT: 0 mL    Total NET: 507.7 mL          PHYSICAL EXAM:  Pt on COVID precautions. Chart reviewed and previous physical examination noted.                        LABORATORY:                        9.1    17.90 )-----------( 511      ( 01 Apr 2020 06:46 )             28.7     04-01    131<L>  |  97  |  90<H>  ----------------------------<  231<H>  3.9   |  19<L>  |  4.58<H>    Ca    8.7      01 Apr 2020 06:46  Mg     2.1     03-31    TPro  7.1  /  Alb  1.9<L>  /  TBili  0.3  /  DBili  x   /  AST  18  /  ALT  26  /  AlkPhos  79  04-01    Sodium, Serum: 131 mmol/L (04-01 @ 06:46)  Sodium, Serum: 131 mmol/L (03-31 @ 07:29)    Potassium, Serum: 3.9 mmol/L (04-01 @ 06:46)  Potassium, Serum: 4.5 mmol/L (03-31 @ 07:29)    Hemoglobin: 9.1 g/dL (04-01 @ 06:46)  Hemoglobin: 8.4 g/dL (03-31 @ 07:29)  Hemoglobin: 6.9 g/dL (03-30 @ 06:28)    Creatinine, Serum 4.58 (04-01 @ 06:46)  Creatinine, Serum 4.65 (03-31 @ 07:29)  Creatinine, Serum 4.54 (03-30 @ 06:29)        LIVER FUNCTIONS - ( 01 Apr 2020 06:46 )  Alb: 1.9 g/dL / Pro: 7.1 g/dL / ALK PHOS: 79 U/L / ALT: 26 U/L DA / AST: 18 U/L / GGT: x             ABG - ( 01 Apr 2020 09:12 )  pH, Arterial: x     pH, Blood: 7.16  /  pCO2: 41    /  pO2: 86    / HCO3: 14    / Base Excess: -13.2 /  SaO2: 94

## 2020-04-01 NOTE — PROGRESS NOTE ADULT - SUBJECTIVE AND OBJECTIVE BOX
Chief Complaint: Fevers, shortness of breath    Interval Events: Hypoxic this morning on vent. Likely due to secretions through tube.    Review of Systems:  Unable to obtain    Physical Exam:  Vital Signs Last 24 Hrs  T(C): 36.3 (31 Mar 2020 05:02), Max: 37.3 (31 Mar 2020 00:00)  T(F): 97.4 (31 Mar 2020 05:02), Max: 99.1 (31 Mar 2020 00:00)  HR: 60 (31 Mar 2020 08:35) (51 - 67)  BP: 111/42 (31 Mar 2020 06:00) (107/39 - 128/38)  BP(mean): 56 (31 Mar 2020 06:00) (56 - 70)  RR: 11 (31 Mar 2020 06:00) (11 - 26)  SpO2: 98% (31 Mar 2020 08:35) (93% - 100%)  General: Sedated  HEENT: Intubated  Neck: No JVD, no carotid bruit  Extremities: No LE edema, no cyanosis  Neuro: Non-focal  Skin: No rash    Labs:    04-01    131<L>  |  97  |  90<H>  ----------------------------<  231<H>  3.9   |  19<L>  |  4.58<H>    Ca    8.7      01 Apr 2020 06:46  Mg     2.1     03-31    TPro  7.1  /  Alb  1.9<L>  /  TBili  0.3  /  DBili  x   /  AST  18  /  ALT  26  /  AlkPhos  79  04-01                        9.1    17.90 )-----------( 511      ( 01 Apr 2020 06:46 )             28.7       Telemetry: Sinus rhythm

## 2020-04-01 NOTE — PROGRESS NOTE ADULT - PROBLEM SELECTOR PLAN 2
covid +  blood cx x2 negative, sputum cx no growth x 72 hours, negative legionella antigen  s/p rocephin and azithromycin  will monitor with trending CBC w diff

## 2020-04-01 NOTE — PROGRESS NOTE ADULT - SUBJECTIVE AND OBJECTIVE BOX
Patient is a 74y old  Female who presents with a chief complaint of shob (31 Mar 2020 21:20)      BRIEF HOSPITAL COURSE: 74F with PMHx Morbid Obesity,  DM2, HTN, HLD, Hypothyroid, Anxiety presents via EMS to ED with fever and cough x 6 days. Pt states that Tmax was 101.8F that morning.  According to chart record the patient had stated  that she has associated dry cough, body aches and mild generalized weaknes and an episode of loose stool daily.  No record of  recent travel, sick contacts, known covid-19 exposure, CP, SOB, runny nose, sore throat, headache, rash, urinary symptoms.  Suspect COVID-19. Intubated in ED 2/2 hypoxic respiratory distress. Brief cardiac arrest during intubation(CPR only), then ROSC.     Events last 24 hours: Pt remains intubated. Failed wean trial. Right femoral TLC and arterial line in place, will change over to either IJ or SCL central line    PAST MEDICAL & SURGICAL HISTORY:  DJD (degenerative joint disease)  DM (diabetes mellitus)  Hyperlipidemia  Hypothyroid  HTN (hypertension)  History of vitamin D deficiency  B12 deficiency  Bronchitis  Anxiety  S/P cataract surgery  S/P eye surgery: left eye retinal surgery x2  S/P  section: x2        Hosp day #6    Vent day #6  Mode: AC/ CMV (Assist Control/ Continuous Mandatory Ventilation)  RR (machine): 26  TV (machine): 350  FiO2: 40  PEEP: 5  ITime: 0.6  MAP: 14  PIP: 39        Vital signs / Reviewed and Physical Exam Performed where pertinent and urgently required    Lab / Radiology  studies / ABG / Meds -  reviewed and interpreted into the assessment and treatment plan.      A/P: 74F with PMHx as above, admitted with acute hypoxemic respiratory failure due to covid 19, ARDS, Brief episode of cardiac arrest during intubation, alis, septic shock    Neuro - Sedation neuromuscular blockade to facilitate safe ventilation  CV -  Pressor support as needed to maintain MAP 65. Avoiding fluid challenges. QTC monitoring while on Azithromycin and Hydroxychloroquine, now completed  Pulm -  ARDS-NET 4-6cc/kg IBW TV as able to maintain plateau pressures <30. Prone ventilation consideration as feasible  Pa02/Fi02 < 150 on Fi02 >60% and PEEP at least 5. Vent bundle Reviewed   GI -  PPI  Enteric feeds as tolerated in tandem with NMB and prone ventilation  Renal - ALIS in setting of shock. Even to negative fluid balance as tolerated by hemodynamics and renal fx.  Feeds to be provided in lieu of IVF.   Heme -  Pharmacologic DVT PPx  in addition to SCD's  ID - ABX discontinuation based on discussion with ID in conjunction with clinical features, culture data, and judicious procalcitonin monitoring.    Endo -  Aggressive glycemic control to limit FS glucose to < 180mg/dl.    COVID 19 specific considerations and therapeutic  options based on the available and rapidly changing literature    Goals of care considerations:  Ongoing assessment for patient specific treatment options based on progression or decline.  I have involved the family with updates and requests in guidance for medical decision making.             33  Minutes of critical care tiem spent in the management of this critically ill COVID-19 patient/PUI patient with continuous assessments and interventions based on the interpretation of multiple databases.

## 2020-04-01 NOTE — PROGRESS NOTE ADULT - ASSESSMENT
1.	ALIS: ATN, COVID renal injury  2.	SARS-COVID 19, Resp failure on vent  3.	Anemia  4.	Diabetes    Stable renal indices. UO acceptable. Avoid over diuresis. To continue current meds. Optimize BP. On PO midodrine.   Supportive care. PRN PRBC transfusion. Monitor h/h trend. Monitor blood sugar levels. Insulin coverage as needed. Strict I & O.   On bicarb. Avoid nephrotoxic meds as possible. Avoid ACEI, ARB, NSAIDs and IV contrast. Will follow electrolytes and renal function trend.

## 2020-04-01 NOTE — PROGRESS NOTE ADULT - SUBJECTIVE AND OBJECTIVE BOX
Patient is a 74y old  Female who presents with a chief complaint of SOB (01 Apr 2020 14:04)      INTERVAL HPI/OVERNIGHT EVENTS:  Patient had an episode of ventilator alarms, rapid response to unit. RT reported that her ETT was occluded and suctioned tube. PEEP lowered to 15, patient did not experience further desaturation. Patient unresponsive to voice or touch        MEDICATIONS  (STANDING):  brimonidine 0.2% Ophthalmic Solution 1 Drop(s) Left EYE daily  chlorhexidine 0.12% Liquid 15 milliLiter(s) Oral Mucosa every 12 hours  chlorhexidine 2% Cloths 1 Application(s) Topical <User Schedule>  dextrose 5%. 1000 milliLiter(s) (50 mL/Hr) IV Continuous <Continuous>  dextrose 50% Injectable 12.5 Gram(s) IV Push once  dextrose 50% Injectable 25 Gram(s) IV Push once  dextrose 50% Injectable 25 Gram(s) IV Push once  fentaNYL   Infusion. 0.998 MICROgram(s)/kG/Hr (1.81 mL/Hr) IV Continuous <Continuous>  heparin  Injectable 5000 Unit(s) SubCutaneous every 8 hours  insulin lispro (HumaLOG) corrective regimen sliding scale   SubCutaneous every 6 hours  latanoprost 0.005% Ophthalmic Solution 1 Drop(s) Both EYES at bedtime  levothyroxine Injectable 12.5 MICROGram(s) IV Push at bedtime  midodrine 10 milliGRAM(s) Oral every 8 hours  norepinephrine Infusion 0.02 MICROgram(s)/kG/Min (3.4 mL/Hr) IV Continuous <Continuous>  pantoprazole  Injectable 40 milliGRAM(s) IV Push daily  piperacillin/tazobactam IVPB.. 3.375 Gram(s) IV Intermittent every 12 hours  propofol Infusion 73.502 MICROgram(s)/kG/Min (40 mL/Hr) IV Continuous <Continuous>  simvastatin 20 milliGRAM(s) Oral at bedtime  sodium bicarbonate 650 milliGRAM(s) Oral three times a day  timolol 0.5% Solution 1 Drop(s) Left EYE daily    MEDICATIONS  (PRN):  acetaminophen   Tablet .. 650 milliGRAM(s) Oral every 6 hours PRN Temp greater or equal to 38C (100.4F), Mild Pain (1 - 3)  dextrose 40% Gel 15 Gram(s) Oral once PRN Blood Glucose LESS THAN 70 milliGRAM(s)/deciliter  glucagon  Injectable 1 milliGRAM(s) IntraMuscular once PRN Glucose LESS THAN 70 milligrams/deciliter  sodium chloride 0.9% lock flush 10 milliLiter(s) IV Push every 1 hour PRN Pre/post blood products, medications, blood draw, and to maintain line patency      Allergies    No Known Allergies    Intolerances        REVIEW OF SYSTEMS deferred due to patient sedated and unresponsive    Vital Signs Last 24 Hrs  T(C): 36.8 (01 Apr 2020 16:00), Max: 36.8 (01 Apr 2020 16:00)  T(F): 98.2 (01 Apr 2020 16:00), Max: 98.2 (01 Apr 2020 16:00)  HR: 49 (01 Apr 2020 20:00) (49 - 63)  BP: 132/44 (01 Apr 2020 20:00) (129/46 - 141/48)  BP(mean): 66 (01 Apr 2020 20:00) (66 - 71)  RR: 15 (01 Apr 2020 20:00) (15 - 21)  SpO2: 96% (01 Apr 2020 20:00) (92% - 96%)    PHYSICAL EXAM:  GENERAL: NAD, well-groomed, well-developed, obese  HEAD:  Atraumatic, Normocephalic  ENMT: Moist mucous membranes   NECK: Supple, No JVD appreciated  NERVOUS SYSTEM: Patient unresponsive to voice or touch  CHEST/LUNG: Loud blowing breath sounds b/l. No rales, rhonchi, wheezing, or rubs appreciated  HEART: Regular rate and rhythm on monitor, unable to auscultate heart sounds due to body habitus; No murmurs, rubs, or gallops appreciated  ABDOMEN: Soft, Nontender, Nondistended; Hyperactive bowel sounds present  EXTREMITIES:  2+ Peripheral Pulses, No clubbing or cyanosis  LYMPH: No lymphadenopathy noted  SKIN: No rashes or lesions appreciated. Central line in L neck, femoral line in groin, no discharge at sites.    LABS:                        9.1    17.90 )-----------( 511      ( 01 Apr 2020 06:46 )             28.7     01 Apr 2020 06:46    131    |  97     |  90     ----------------------------<  231    3.9     |  19     |  4.58     Ca    8.7        01 Apr 2020 06:46    TPro  7.1    /  Alb  1.9    /  TBili  0.3    /  DBili  x      /  AST  18     /  ALT  26     /  AlkPhos  79     01 Apr 2020 06:46        CAPILLARY BLOOD GLUCOSE      POCT Blood Glucose.: 214 mg/dL (01 Apr 2020 17:46)  POCT Blood Glucose.: 277 mg/dL (01 Apr 2020 11:04)  POCT Blood Glucose.: 234 mg/dL (01 Apr 2020 06:22)  POCT Blood Glucose.: 192 mg/dL (31 Mar 2020 23:34)      RADIOLOGY & ADDITIONAL TESTS:    Imaging Personally Reviewed:      [ ] Consultant(s) Notes Reviewed  [x] Care Discussed with Consultants/Other Providers:  Ortho PA- plan of care

## 2020-04-01 NOTE — PROGRESS NOTE ADULT - SUBJECTIVE AND OBJECTIVE BOX
INTERVAL HPI/OVERNIGHT EVENTS:  chart reviewed    MEDICATIONS  (STANDING):  chlorhexidine 0.12% Liquid 15 milliLiter(s) Oral Mucosa every 12 hours  chlorhexidine 2% Cloths 1 Application(s) Topical <User Schedule>  dextrose 5%. 1000 milliLiter(s) (50 mL/Hr) IV Continuous <Continuous>  dextrose 50% Injectable 12.5 Gram(s) IV Push once  dextrose 50% Injectable 25 Gram(s) IV Push once  dextrose 50% Injectable 25 Gram(s) IV Push once  fentaNYL   Infusion. 0.998 MICROgram(s)/kG/Hr (1.81 mL/Hr) IV Continuous <Continuous>  heparin  Injectable 5000 Unit(s) SubCutaneous every 8 hours  insulin lispro (HumaLOG) corrective regimen sliding scale   SubCutaneous every 6 hours  midodrine 10 milliGRAM(s) Oral every 8 hours  norepinephrine Infusion 0.02 MICROgram(s)/kG/Min (3.4 mL/Hr) IV Continuous <Continuous>  pantoprazole  Injectable 40 milliGRAM(s) IV Push daily  piperacillin/tazobactam IVPB.. 3.375 Gram(s) IV Intermittent every 12 hours  propofol Infusion 73.502 MICROgram(s)/kG/Min (40 mL/Hr) IV Continuous <Continuous>  simvastatin 20 milliGRAM(s) Oral at bedtime  sodium bicarbonate 650 milliGRAM(s) Oral three times a day    MEDICATIONS  (PRN):  acetaminophen   Tablet .. 650 milliGRAM(s) Oral every 6 hours PRN Temp greater or equal to 38C (100.4F), Mild Pain (1 - 3)  dextrose 40% Gel 15 Gram(s) Oral once PRN Blood Glucose LESS THAN 70 milliGRAM(s)/deciliter  glucagon  Injectable 1 milliGRAM(s) IntraMuscular once PRN Glucose LESS THAN 70 milligrams/deciliter  sodium chloride 0.9% lock flush 10 milliLiter(s) IV Push every 1 hour PRN Pre/post blood products, medications, blood draw, and to maintain line patency      Allergies    No Known Allergies    Intolerances        T(C): 36.4 (01 Apr 2020 12:00), Max: 37.2 (31 Mar 2020 18:00)  T(F): 97.5 (01 Apr 2020 12:00), Max: 99 (31 Mar 2020 18:00)  HR: 58 (01 Apr 2020 10:39) (49 - 63)  BP: 133/47 (01 Apr 2020 12:00) (131/45 - 135/47)  BP(mean): 68 (01 Apr 2020 12:00) (66 - 68)  RR: 15 (01 Apr 2020 12:00) (15 - 26)  SpO2: 93% (01 Apr 2020 12:00) (93% - 100%)      LABS:                        9.1    17.90 )-----------( 511      ( 01 Apr 2020 06:46 )             28.7     04-01    131<L>  |  97  |  90<H>  ----------------------------<  231<H>  3.9   |  19<L>  |  4.58<H>    Ca    8.7      01 Apr 2020 06:46  Mg     2.1     03-31    TPro  7.1  /  Alb  1.9<L>  /  TBili  0.3  /  DBili  x   /  AST  18  /  ALT  26  /  AlkPhos  79  04-01          RADIOLOGY & ADDITIONAL TESTS:

## 2020-04-01 NOTE — PROGRESS NOTE ADULT - SUBJECTIVE AND OBJECTIVE BOX
Admission  -CBC w Diff, CMP/mg/Phos, caogs, D-dimer, Procal, ESR/CRP, LDH, Ferritin, ABG with lactate, G6PD, CKs, troponins, Pro-BNP, T cell subsets, (Cytokine Panel/ IL-6, IL-8- Paper requistion form)  - Please check UA, Ucx, blood Cx if not already performed.  Urine legionella,        Critical Care PA Note    Hospital Day 6  Overnight Events:  acute desaturation, ET tube lavaged and now satting 97%    HPI:  Patient is a 74y old  Female who presents with a chief complaint of shob    HPI:  73 y/o F with hx of DM, HTN, HLD, hypothyroid, anxiety biba with c/o fever and cough x 6 days. Pt states that Tmax was 101.8F, took one tab of tylenol XS at 9am this morning. Pt states that she has associated dry cough, body aches and mild generalized weakness. States that she has had one episode of loose stool daily. Denies recent travel, sick contacts, known covid-19 exposure, CP, SOB, runny nose, sore throat, headache, rash, urinary symptoms.  she was sating 94% ON 4l and was then put on non rebreather and was transferred to sp[facility].    2 hr after initial exam around 7pm  due to increased work of breathing and shob anesthesisa was called for intubation.  covid was sent.        PAST MEDICAL & SURGICAL HISTORY:  DJD (degenerative joint disease)  DM (diabetes mellitus)  Hyperlipidemia  Hypothyroid  HTN (hypertension)  History of vitamin D deficiency  B12 deficiency  Bronchitis  Anxiety  S/P cataract surgery  S/P eye surgery: left eye retinal surgery x2  S/P  section: x2      FAMILY HISTORY:      SOCIAL HISTORY:    Allergies    No Known Allergies    Intolerances          REVIEW OF SYSEMS:  General: + weakness, + fever/chills, no weight loss/gain  Skin/Breast: no rash, no jaundice  Ophthalmologic: no vision changes, no dry eyes   Respiratory and Thorax: no cough, no wheezing, no hemoptysis, no dyspnea  Cardiovascular: no chest pain, no shortness of breath, no orthopnea  Gastrointestinal: no n/v/d, no abdominal pain, no dysphagia   Genitourinary: no dysuria, no frequency, no nocturia, no hematuria  Musculoskeletal: no trauma, no sprain/strain, no myalgias, no arthralgias, no fracture  Neurological: no HA, no dizziness, no weakness, no numbness  Psychiatric: no depression, no SI/HI  Hematology/Lymphatics: no easy bruising  Endocrine: no heat or cold intolerance. no weight gain or loss  Allergic/Immunologic: no allergy or recent reaction       Vital Signs Last 24 Hrs  T(C): 36.7 (26 Mar 2020 17:53), Max: 38.4 (26 Mar 2020 13:17)  T(F): 98 (26 Mar 2020 17:53), Max: 101.2 (26 Mar 2020 13:17)  HR: 74 (26 Mar 2020 17:53) (70 - 76)  BP: 129/72 (26 Mar 2020 17:53) (117/68 - 129/72)  BP(mean): --  RR: 17 (26 Mar 2020 17:53) (16 - 18)  SpO2: 84% (26 Mar 2020 17:53) (84% - 94%)  I&O's Summary      PHYSICAL EXAM:  GENERAL: on nc  HEAD:  Atraumatic, Normocephalic  EYES: EOMI, PERRLA, conjunctiva and sclera clear  NECK: Supple, No JVD  CHEST/LUNG: + rhonchi  HEART: Regular rate and rhythm; No murmurs, rubs, or gallops  ABDOMEN: Soft, Nontender, Nondistended; Bowel sounds present  Neuro: AAOx3, no focal deficit, 5/5 b/l extremities  EXTREMITIES:  2+ Peripheral Pulses, No clubbing, cyanosis, or edema  SKIN: No rashes or lesions    LABS:                        9.2    10.38 )-----------( 271      ( 26 Mar 2020 14:21 )             28.4     03-26    134<L>  |  100  |  42<H>  ----------------------------<  175<H>  4.0   |  21<L>  |  1.91<H>    Ca    8.9      26 Mar 2020 14:21    TPro  7.8  /  Alb  2.9<L>  /  TBili  0.4  /  DBili  x   /  AST  19  /  ALT  24  /  AlkPhos  61  03-26      CAPILLARY BLOOD GLUCOSE                RADIOLOGY & ADDITIONAL TESTS:    Imaging Personally Reviewed:  [x] YES  [ ] NO    Consultant(s) Notes Reviewed:  [x] YES  [ ] NO      MEDICATIONS  (STANDING):  azithromycin  IVPB 500 milliGRAM(s) IV Intermittent every 24 hours  cefTRIAXone   IVPB 1000 milliGRAM(s) IV Intermittent every 24 hours    MEDICATIONS  (PRN):  acetaminophen   Tablet .. 650 milliGRAM(s) Oral every 6 hours PRN Temp greater or equal to 38C (100.4F), Mild Pain (1 - 3)      Care Discussed with Consultants/Other Providers [x] YES  [ ] NO    HEALTH ISSUES - PROBLEM Dx: (26 Mar 2020 18:50)      PAST MEDICAL & SURGICAL HISTORY:  DJD (degenerative joint disease)  DM (diabetes mellitus)  Hyperlipidemia  Hypothyroid  HTN (hypertension)  History of vitamin D deficiency  B12 deficiency  Bronchitis  Anxiety  S/P cataract surgery  S/P eye surgery: left eye retinal surgery x2  S/P  section: x2        ***Neurologic    - intubated and sedated    PE  - RASS of -3/-4    Medications    - Fentanyl @ 1   - Propafol @ 50     Plan  - C/W sedation with propafol and fentayl  - Sedations vacations when able.       ***Cardiac    MAP @ 75      Troponin ###  Pro-BNP- ###  Lactate- ###    PE  - S1, S2 tachycardic,     Medications  - Levophed at .08  - metoprolol IV hold for now  - simvastatin        Plan  - EKG BID, monitor for prolonged QTc >550  - Trend Troponins, Pro-BNP every 2-3 days  - C/W pressors titrate for a MAP of 65  - Monitor cardiac function via blood pressure assess for development of cardiomyopathy.          ***Pulmonary- change vent orders   - Hypoxemic Respiratroy failure 2/2 to COVID pneumonia with ARDS  - Vent day 6  - Plateau pressure 18  Mode: AC/ CMV (Assist Control/ Continuous Mandatory Ventilation)  RR (machine): 26  TV (machine): 400  FiO2: 40  PEEP: 8  ITime: 0.6  MAP: 15  PIP: 41      PE  - good air entry, ET tube in place, no leak noted , saturating 96%    Plan  - f/u repeat ABG  - f/u repeat CXR  - maintain plateau pressures <30  - consider If patient tolerates, placing in prone position 1 hour TID.           ***Gastrointestinal  - NPO/ NGT  - Tube feeds- Gl;ucerna bolus feeds  Medications-   GI Prophylaxis- Protonix    PE  - soft non distended, BS present      Plan  - continue NPO/NGT  - continue protonix  - consider Vitamin C 1500mg Q6 hours, Thiamine [id number]      ***Renal  - acute renal failure  -     I&O's Summary    31 Mar 2020 07:01  -  2020 07:00  --------------------------------------------------------  IN: 2323.3 mL / OUT: 800 mL / NET: 1523.3 mL      04    131<L>  |  97  |  90<H>  ----------------------------<  231<H>  3.9   |  19<L>  |  4.58<H>      IVF= KVO  Urine output- 400/ 12 hour 800/24   Medications-   protonix  sodium bicarbonate through ngt    PE  - + 1 bilateral LE Edema      Plan  - Fluid sparing/Judicious use, will attempt to maintain negative fluid balance  - maintain u/o at 0.5cc/kg/hour  - CMP/Mg/Phos Q12  - continue sodium bicarb   - consider diuresis at this time with lasix         ***ID  - TMax 99 TCur 97  -                       9.1    17.90 )-----------( 511      ( 2020 06:46 )             28.7     - .Sputum Sputum  03- @ 22:15   No growth at 48 hours  --    Rare polymorphonuclear leukocytes per low power field  No Squamous epithelial cells per low power field  No organisms seen per oil power field      .Urine Clean Catch (Midstream)  03-27 @ 11:03   No growth  --  --      .Blood Blood  03- @ 23:00   No Growth Final  --  --        - CRP-  - Procalcitonin level-   - Urine legionella-     PE  right fem TLC and R fem ross day 5    Medications  - Zosyn 3/        Plan  - follow up cultures  - daily CRP  - continue zosyn  - if spikes temp consider reculture        ***Hematologic  -  received 1 unit prbc on 3/29/20 and responded ,             9.1    17.90 )-----------( 511      ( 2020 06:46 )             28.7     -   Medications  - VTE Prophylaxis- Lovenox BID / SQ Heparin    Plan  - CBC with diff daily  - Every 2-3 Days- ESR. T-cell subsets, D-dimer, Ferritin, Coags  - restart dvt prophylaxis      # Endocrine  - POC Testing ( last 24)  CAPILLARY BLOOD GLUCOSE      POCT Blood Glucose.: 234 mg/dL (2020 06:22)  POCT Blood Glucose.: 192 mg/dL (31 Mar 2020 23:34)  POCT Blood Glucose.: 197 mg/dL (31 Mar 2020 16:25)  POCT Blood Glucose.: 222 mg/dL (31 Mar 2020 12:01)    - Medications    Plan  - continue RISS / point of care testing  - maintain glucose between 150-200  - methylPREDNISolone sodium succinate BID     # Advanced Directives  HCP-   Code Status- DNR Admission  -CBC w Diff, CMP/mg/Phos, caogs, D-dimer, Procal, ESR/CRP, LDH, Ferritin, ABG with lactate, G6PD, CKs, troponins, Pro-BNP, T cell subsets, (Cytokine Panel/ IL-6, IL-8- Paper requistion form)  - Please check UA, Ucx, blood Cx if not already performed.  Urine legionella,        Critical Care PA Note    Hospital Day 6  Overnight Events:  acute desaturation, ET tube lavaged and now satting 97%        PAST MEDICAL & SURGICAL HISTORY:  DJD (degenerative joint disease)  DM (diabetes mellitus)  Hyperlipidemia  Hypothyroid  HTN (hypertension)  History of vitamin D deficiency  B12 deficiency  Bronchitis  Anxiety  S/P cataract surgery  S/P eye surgery: left eye retinal surgery x2  S/P  section: x2        ***Neurologic    - intubated and sedated    PE  - RASS of -3/-4    Medications    - Fentanyl @ 1   - Propafol @ 50     Plan  - C/W sedation with propafol and fentayl  - Sedations vacations when able.       ***Cardiac    MAP @ 75      Troponin ###  Pro-BNP- ###  Lactate- ###    PE  - S1, S2 tachycardic,     Medications  - Levophed at .08  - metoprolol IV hold for now  - simvastatin        Plan  - EKG BID, monitor for prolonged QTc >550  - Trend Troponins, Pro-BNP every 2-3 days  - C/W pressors titrate for a MAP of 65  - Monitor cardiac function via blood pressure assess for development of cardiomyopathy.          ***Pulmonary- change vent orders   - Hypoxemic Respiratroy failure 2/2 to COVID pneumonia with ARDS  - Vent day 6  - Plateau pressure 18  Mode: AC/ CMV (Assist Control/ Continuous Mandatory Ventilation)  RR (machine): 26  TV (machine): 400  FiO2: 40  PEEP: 8  ITime: 0.6  MAP: 15  PIP: 41      PE  - good air entry, ET tube in place, no leak noted , saturating 96%    Plan  - f/u repeat ABG  - f/u repeat CXR  - maintain plateau pressures <30  - consider If patient tolerates, placing in prone position 1 hour TID.           ***Gastrointestinal  - NPO/ NGT  - Tube feeds- Gl;ucerna bolus feeds  Medications-   GI Prophylaxis- Protonix    PE  - soft non distended, BS present      Plan  - continue NPO/NGT  - continue protonix  - consider Vitamin C 1500mg Q6 hours, Thiamine [id number]      ***Renal  - acute renal failure  -     I&O's Summary    31 Mar 2020 07:01  -  2020 07:00  --------------------------------------------------------  IN: 2323.3 mL / OUT: 800 mL / NET: 1523.3 mL      04    131<L>  |  97  |  90<H>  ----------------------------<  231<H>  3.9   |  19<L>  |  4.58<H>      IVF= KVO  Urine output- 400/ 12 hour 800/24   Medications-   protonix  sodium bicarbonate through ngt    PE  - + 1 bilateral LE Edema      Plan  - Fluid sparing/Judicious use, will attempt to maintain negative fluid balance  - maintain u/o at 0.5cc/kg/hour  - CMP/Mg/Phos Q12  - continue sodium bicarb   - consider diuresis at this time with lasix         ***ID  - TMax 99 TCur 97  -                       9.1    17.90 )-----------( 511      ( 2020 06:46 )             28.7     - .Sputum Sputum  03- @ 22:15   No growth at 48 hours  --    Rare polymorphonuclear leukocytes per low power field  No Squamous epithelial cells per low power field  No organisms seen per oil power field      .Urine Clean Catch (Midstream)  03- @ 11:03   No growth  --  --      .Blood Blood  03- @ 23:00   No Growth Final  --  --        - CRP-  - Procalcitonin level-   - Urine legionella-     PE  right fem TLC and R fem ross day 5    Medications  - Zosyn 3/7        Plan  - follow up cultures  - daily CRP  - continue zosyn  - if spikes temp consider reculture        ***Hematologic  -  received 1 unit prbc on 3/29/20 and responded ,             9.1    17.90 )-----------( 511      ( 2020 06:46 )             28.7     -   Medications  - VTE Prophylaxis- Lovenox BID / SQ Heparin    Plan  - CBC with diff daily  - Every 2-3 Days- ESR. T-cell subsets, D-dimer, Ferritin, Coags  - restart dvt prophylaxis      # Endocrine  - POC Testing ( last 24)  CAPILLARY BLOOD GLUCOSE      POCT Blood Glucose.: 234 mg/dL (2020 06:22)  POCT Blood Glucose.: 192 mg/dL (31 Mar 2020 23:34)  POCT Blood Glucose.: 197 mg/dL (31 Mar 2020 16:25)  POCT Blood Glucose.: 222 mg/dL (31 Mar 2020 12:01)    - Medications    Plan  - continue RISS / point of care testing  - maintain glucose between 150-200  - methylPREDNISolone sodium succinate BID     # Advanced Directives  HCP-   Code Status- DNR

## 2020-04-01 NOTE — PROGRESS NOTE ADULT - ASSESSMENT
75 y/o F with hx of DM, HTN, HLD, hypothyroid, anxiety who presents with Covid-19 pneumonia. Patient is currently sedated on propofol drip. Seen by GI, nephrology, cardiology, and critical care attending. Patient DNR

## 2020-04-01 NOTE — PROGRESS NOTE ADULT - ASSESSMENT
The patient is a 74 year old female with a history of HTN, DM, HL, hypothyroidism, anxiety who presented with fevers, cough, shortness of breath in the setting of viral PNA, COVID-19, respiratory failure, septic shock.     Plan:  - CXR consistent with PNA  - COVID-19 positive  - Completed hydroxychloroquine  - Continue zosyn  - Continue norepinephrine and titrate to MAP of 65  - Continue midodrine 10 mg q8h  - Will give furosemide 20 mg IV to balance I/O  - Atrial fibrillation - brief, back in sinus rhythm. Hold metoprolol for now. If recurs, will need to start amiodarone.  - Hemoglobin improved after transfusion  - Renal function starting to improve  - Continue mechanical ventilation  - Pulm and ID follow-up  - Overall prognosis poor given multi-organ failure (respiratory failure, kidney failure, shock).

## 2020-04-01 NOTE — PROGRESS NOTE ADULT - PROBLEM SELECTOR PLAN 9
Hx of B12 anemia, normal MCV at this time  negative stool guaic, continuing PPI as per GI  CBC stable s/p 1u pRBC, will monitor

## 2020-04-02 LAB
ALBUMIN SERPL ELPH-MCNC: 1.8 G/DL — LOW (ref 3.3–5)
ALP SERPL-CCNC: 71 U/L — SIGNIFICANT CHANGE UP (ref 30–120)
ALT FLD-CCNC: 27 U/L DA — SIGNIFICANT CHANGE UP (ref 10–60)
ANION GAP SERPL CALC-SCNC: 16 MMOL/L — SIGNIFICANT CHANGE UP (ref 5–17)
AST SERPL-CCNC: 16 U/L — SIGNIFICANT CHANGE UP (ref 10–40)
BASOPHILS # BLD AUTO: 0.03 K/UL — SIGNIFICANT CHANGE UP (ref 0–0.2)
BASOPHILS NFR BLD AUTO: 0.3 % — SIGNIFICANT CHANGE UP (ref 0–2)
BILIRUB SERPL-MCNC: 0.3 MG/DL — SIGNIFICANT CHANGE UP (ref 0.2–1.2)
BUN SERPL-MCNC: 95 MG/DL — HIGH (ref 7–23)
CALCIUM SERPL-MCNC: 8.7 MG/DL — SIGNIFICANT CHANGE UP (ref 8.4–10.5)
CHLORIDE SERPL-SCNC: 97 MMOL/L — SIGNIFICANT CHANGE UP (ref 96–108)
CO2 SERPL-SCNC: 20 MMOL/L — LOW (ref 22–31)
CREAT SERPL-MCNC: 4.59 MG/DL — HIGH (ref 0.5–1.3)
EOSINOPHIL # BLD AUTO: 0.46 K/UL — SIGNIFICANT CHANGE UP (ref 0–0.5)
EOSINOPHIL NFR BLD AUTO: 4.5 % — SIGNIFICANT CHANGE UP (ref 0–6)
GLUCOSE BLDC GLUCOMTR-MCNC: 178 MG/DL — HIGH (ref 70–99)
GLUCOSE BLDC GLUCOMTR-MCNC: 199 MG/DL — HIGH (ref 70–99)
GLUCOSE BLDC GLUCOMTR-MCNC: 229 MG/DL — HIGH (ref 70–99)
GLUCOSE BLDC GLUCOMTR-MCNC: 266 MG/DL — HIGH (ref 70–99)
GLUCOSE SERPL-MCNC: 181 MG/DL — HIGH (ref 70–99)
HCT VFR BLD CALC: 25 % — LOW (ref 34.5–45)
HGB BLD-MCNC: 8 G/DL — LOW (ref 11.5–15.5)
IMM GRANULOCYTES NFR BLD AUTO: 5.8 % — HIGH (ref 0–1.5)
LYMPHOCYTES # BLD AUTO: 0.93 K/UL — LOW (ref 1–3.3)
LYMPHOCYTES # BLD AUTO: 9 % — LOW (ref 13–44)
MAGNESIUM SERPL-MCNC: 2.1 MG/DL — SIGNIFICANT CHANGE UP (ref 1.6–2.6)
MCHC RBC-ENTMCNC: 27.8 PG — SIGNIFICANT CHANGE UP (ref 27–34)
MCHC RBC-ENTMCNC: 32 GM/DL — SIGNIFICANT CHANGE UP (ref 32–36)
MCV RBC AUTO: 86.8 FL — SIGNIFICANT CHANGE UP (ref 80–100)
MONOCYTES # BLD AUTO: 0.94 K/UL — HIGH (ref 0–0.9)
MONOCYTES NFR BLD AUTO: 9.1 % — SIGNIFICANT CHANGE UP (ref 2–14)
NEUTROPHILS # BLD AUTO: 7.32 K/UL — SIGNIFICANT CHANGE UP (ref 1.8–7.4)
NEUTROPHILS NFR BLD AUTO: 71.3 % — SIGNIFICANT CHANGE UP (ref 43–77)
NRBC # BLD: 0 /100 WBCS — SIGNIFICANT CHANGE UP (ref 0–0)
PHOSPHATE SERPL-MCNC: 7.8 MG/DL — HIGH (ref 2.5–4.5)
PLATELET # BLD AUTO: 459 K/UL — HIGH (ref 150–400)
POTASSIUM SERPL-MCNC: 3.5 MMOL/L — SIGNIFICANT CHANGE UP (ref 3.5–5.3)
POTASSIUM SERPL-SCNC: 3.5 MMOL/L — SIGNIFICANT CHANGE UP (ref 3.5–5.3)
PROT SERPL-MCNC: 6.5 G/DL — SIGNIFICANT CHANGE UP (ref 6–8.3)
RBC # BLD: 2.88 M/UL — LOW (ref 3.8–5.2)
RBC # FLD: 15.1 % — HIGH (ref 10.3–14.5)
SODIUM SERPL-SCNC: 133 MMOL/L — LOW (ref 135–145)
WBC # BLD: 10.28 K/UL — SIGNIFICANT CHANGE UP (ref 3.8–10.5)
WBC # FLD AUTO: 10.28 K/UL — SIGNIFICANT CHANGE UP (ref 3.8–10.5)

## 2020-04-02 PROCEDURE — 99291 CRITICAL CARE FIRST HOUR: CPT | Mod: CS

## 2020-04-02 PROCEDURE — 99291 CRITICAL CARE FIRST HOUR: CPT

## 2020-04-02 RX ORDER — DEXMEDETOMIDINE HYDROCHLORIDE IN 0.9% SODIUM CHLORIDE 4 UG/ML
0.7 INJECTION INTRAVENOUS
Qty: 200 | Refills: 0 | Status: DISCONTINUED | OUTPATIENT
Start: 2020-04-02 | End: 2020-04-14

## 2020-04-02 RX ORDER — INSULIN GLARGINE 100 [IU]/ML
12 INJECTION, SOLUTION SUBCUTANEOUS AT BEDTIME
Refills: 0 | Status: DISCONTINUED | OUTPATIENT
Start: 2020-04-02 | End: 2020-04-07

## 2020-04-02 RX ORDER — MIDODRINE HYDROCHLORIDE 2.5 MG/1
5 TABLET ORAL EVERY 8 HOURS
Refills: 0 | Status: DISCONTINUED | OUTPATIENT
Start: 2020-04-02 | End: 2020-04-02

## 2020-04-02 RX ORDER — ALBUMIN HUMAN 25 %
50 VIAL (ML) INTRAVENOUS EVERY 8 HOURS
Refills: 0 | Status: DISCONTINUED | OUTPATIENT
Start: 2020-04-02 | End: 2020-04-03

## 2020-04-02 RX ADMIN — Medication 6: at 12:35

## 2020-04-02 RX ADMIN — Medication 50 MILLILITER(S): at 15:31

## 2020-04-02 RX ADMIN — SIMVASTATIN 20 MILLIGRAM(S): 20 TABLET, FILM COATED ORAL at 22:15

## 2020-04-02 RX ADMIN — PIPERACILLIN AND TAZOBACTAM 25 GRAM(S): 4; .5 INJECTION, POWDER, LYOPHILIZED, FOR SOLUTION INTRAVENOUS at 17:43

## 2020-04-02 RX ADMIN — DEXMEDETOMIDINE HYDROCHLORIDE IN 0.9% SODIUM CHLORIDE 15.9 MICROGRAM(S)/KG/HR: 4 INJECTION INTRAVENOUS at 12:00

## 2020-04-02 RX ADMIN — CHLORHEXIDINE GLUCONATE 15 MILLILITER(S): 213 SOLUTION TOPICAL at 17:43

## 2020-04-02 RX ADMIN — DEXMEDETOMIDINE HYDROCHLORIDE IN 0.9% SODIUM CHLORIDE 15.9 MICROGRAM(S)/KG/HR: 4 INJECTION INTRAVENOUS at 23:58

## 2020-04-02 RX ADMIN — Medication 650 MILLIGRAM(S): at 12:33

## 2020-04-02 RX ADMIN — MIDODRINE HYDROCHLORIDE 10 MILLIGRAM(S): 2.5 TABLET ORAL at 05:02

## 2020-04-02 RX ADMIN — PROPOFOL 40 MICROGRAM(S)/KG/MIN: 10 INJECTION, EMULSION INTRAVENOUS at 08:25

## 2020-04-02 RX ADMIN — Medication 50 MILLILITER(S): at 22:04

## 2020-04-02 RX ADMIN — Medication 650 MILLIGRAM(S): at 05:02

## 2020-04-02 RX ADMIN — INSULIN GLARGINE 12 UNIT(S): 100 INJECTION, SOLUTION SUBCUTANEOUS at 22:12

## 2020-04-02 RX ADMIN — Medication 12.5 MICROGRAM(S): at 22:15

## 2020-04-02 RX ADMIN — CHLORHEXIDINE GLUCONATE 15 MILLILITER(S): 213 SOLUTION TOPICAL at 05:01

## 2020-04-02 RX ADMIN — DEXMEDETOMIDINE HYDROCHLORIDE IN 0.9% SODIUM CHLORIDE 15.9 MICROGRAM(S)/KG/HR: 4 INJECTION INTRAVENOUS at 14:26

## 2020-04-02 RX ADMIN — HEPARIN SODIUM 5000 UNIT(S): 5000 INJECTION INTRAVENOUS; SUBCUTANEOUS at 05:02

## 2020-04-02 RX ADMIN — PROPOFOL 40 MICROGRAM(S)/KG/MIN: 10 INJECTION, EMULSION INTRAVENOUS at 05:25

## 2020-04-02 RX ADMIN — Medication 2: at 05:26

## 2020-04-02 RX ADMIN — PROPOFOL 40 MICROGRAM(S)/KG/MIN: 10 INJECTION, EMULSION INTRAVENOUS at 12:32

## 2020-04-02 RX ADMIN — PIPERACILLIN AND TAZOBACTAM 25 GRAM(S): 4; .5 INJECTION, POWDER, LYOPHILIZED, FOR SOLUTION INTRAVENOUS at 05:01

## 2020-04-02 RX ADMIN — HEPARIN SODIUM 5000 UNIT(S): 5000 INJECTION INTRAVENOUS; SUBCUTANEOUS at 22:06

## 2020-04-02 RX ADMIN — BRIMONIDINE TARTRATE 1 DROP(S): 2 SOLUTION/ DROPS OPHTHALMIC at 12:33

## 2020-04-02 RX ADMIN — Medication 1 DROP(S): at 12:33

## 2020-04-02 RX ADMIN — Medication 650 MILLIGRAM(S): at 22:12

## 2020-04-02 RX ADMIN — PROPOFOL 40 MICROGRAM(S)/KG/MIN: 10 INJECTION, EMULSION INTRAVENOUS at 18:38

## 2020-04-02 RX ADMIN — Medication 2: at 23:13

## 2020-04-02 RX ADMIN — Medication 4: at 17:43

## 2020-04-02 RX ADMIN — CHLORHEXIDINE GLUCONATE 1 APPLICATION(S): 213 SOLUTION TOPICAL at 05:01

## 2020-04-02 RX ADMIN — HEPARIN SODIUM 5000 UNIT(S): 5000 INJECTION INTRAVENOUS; SUBCUTANEOUS at 12:34

## 2020-04-02 RX ADMIN — LATANOPROST 1 DROP(S): 0.05 SOLUTION/ DROPS OPHTHALMIC; TOPICAL at 22:05

## 2020-04-02 RX ADMIN — PROPOFOL 40 MICROGRAM(S)/KG/MIN: 10 INJECTION, EMULSION INTRAVENOUS at 00:00

## 2020-04-02 RX ADMIN — PROPOFOL 40 MICROGRAM(S)/KG/MIN: 10 INJECTION, EMULSION INTRAVENOUS at 23:59

## 2020-04-02 RX ADMIN — PANTOPRAZOLE SODIUM 40 MILLIGRAM(S): 20 TABLET, DELAYED RELEASE ORAL at 12:34

## 2020-04-02 NOTE — PROGRESS NOTE ADULT - ASSESSMENT
75 y/o F with hx of DM, HTN, HLD, hypothyroid, anxiety who presents with Covid-19 pneumonia. Patient is currently sedated on propofol drip, with plan to add precedex and decrease propofol. Seen by GI, nephrology, cardiology, ID, and critical care attending. Patient DNR

## 2020-04-02 NOTE — PROGRESS NOTE ADULT - SUBJECTIVE AND OBJECTIVE BOX
Chief Complaint: Fevers, shortness of breath    Interval Events: No significant changes overnight.    Review of Systems:  Unable to obtain    Physical Exam:  Vital Signs Last 24 Hrs  T(C): 36.4 (02 Apr 2020 05:00), Max: 36.8 (01 Apr 2020 16:00)  T(F): 97.6 (02 Apr 2020 05:00), Max: 98.2 (01 Apr 2020 16:00)  HR: 62 (02 Apr 2020 06:00) (48 - 65)  BP: 131/46 (02 Apr 2020 06:00) (120/41 - 141/48)  BP(mean): 67 (02 Apr 2020 06:00) (60 - 71)  RR: 18 (02 Apr 2020 06:00) (15 - 18)  SpO2: 92% (02 Apr 2020 06:00) (92% - 97%)  General: Sedated  HEENT: Intubated  Neck: No JVD, no carotid bruit  Extremities: No LE edema, no cyanosis  Neuro: Non-focal  Skin: No rash    Labs:    04-02    133<L>  |  97  |  95<H>  ----------------------------<  181<H>  3.5   |  20<L>  |  4.59<H>    Ca    8.7      02 Apr 2020 06:58  Phos  7.8     04-02  Mg     2.1     04-02    TPro  6.5  /  Alb  1.8<L>  /  TBili  0.3  /  DBili  x   /  AST  16  /  ALT  27  /  AlkPhos  71  04-02                        8.0    10.28 )-----------( 459      ( 02 Apr 2020 06:58 )             25.0       Telemetry: Sinus rhythm

## 2020-04-02 NOTE — PROGRESS NOTE ADULT - SUBJECTIVE AND OBJECTIVE BOX
24 hour events:   no interval events    T(F): 98.2 (04-02-20 @ 16:00), Max: 98.2 (04-02-20 @ 16:00)  HR: 56 (04-02-20 @ 14:25) (48 - 65)  BP: 152/54 (04-02-20 @ 14:00) (120/41 - 152/54)  RR: 16 (04-02-20 @ 14:00) (15 - 18)  SpO2: 90% (04-02-20 @ 14:25) (90% - 97%)  Wt(kg): --    Mode: AC/ CMV (Assist Control/ Continuous Mandatory Ventilation), RR (machine): 15, TV (machine): 450, FiO2: 40, PEEP: 5  04-01-20 @ 07:01  -  04-02-20 @ 07:00  --------------------------------------------------------  IN: 2719.2 mL / OUT: 1375 mL / NET: 1344.2 mL    04-02-20 @ 07:01  -  04-02-20 @ 16:01  --------------------------------------------------------  IN: 931.3 mL / OUT: 0 mL / NET: 931.3 mL        CAPILLARY BLOOD GLUCOSE      POCT Blood Glucose.: 266 mg/dL (02 Apr 2020 11:52)      I&O's Summary    01 Apr 2020 07:01  -  02 Apr 2020 07:00  --------------------------------------------------------  IN: 2719.2 mL / OUT: 1375 mL / NET: 1344.2 mL    02 Apr 2020 07:01  -  02 Apr 2020 16:01  --------------------------------------------------------  IN: 931.3 mL / OUT: 0 mL / NET: 931.3 mL        Physical Exam:   Gen: critically ill, intububated and sedated  Resp: CTABL  CVS: RRR  Abd: soft, NTND  Ext: no edema   Skin: WWP    Meds:  piperacillin/tazobactam IVPB.. IV Intermittent    midodrine Oral  norepinephrine Infusion IV Continuous    dextrose 40% Gel Oral PRN  dextrose 50% Injectable IV Push  dextrose 50% Injectable IV Push  dextrose 50% Injectable IV Push  glucagon  Injectable IntraMuscular PRN  insulin lispro (HumaLOG) corrective regimen sliding scale SubCutaneous  levothyroxine Injectable IV Push  simvastatin Oral      acetaminophen   Tablet .. Oral PRN  dexMEDEtomidine Infusion IV Continuous  fentaNYL   Infusion. IV Continuous  propofol Infusion IV Continuous      heparin  Injectable SubCutaneous    pantoprazole  Injectable IV Push      albumin human 25% IVPB IV Intermittent  dextrose 5%. IV Continuous  sodium bicarbonate Oral  sodium chloride 0.9% lock flush IV Push PRN      brimonidine 0.2% Ophthalmic Solution Left EYE  chlorhexidine 0.12% Liquid Oral Mucosa  chlorhexidine 2% Cloths Topical  latanoprost 0.005% Ophthalmic Solution Both EYES  timolol 0.5% Solution Left EYE                              8.0    10.28 )-----------( 459      ( 02 Apr 2020 06:58 )             25.0       04-02    133<L>  |  97  |  95<H>  ----------------------------<  181<H>  3.5   |  20<L>  |  4.59<H>    Ca    8.7      02 Apr 2020 06:58  Phos  7.8     04-02  Mg     2.1     04-02    TPro  6.5  /  Alb  1.8<L>  /  TBili  0.3  /  DBili  x   /  AST  16  /  ALT  27  /  AlkPhos  71  04-02              .Sputum Sputum   No growth at 48 hours   Rare polymorphonuclear leukocytes per low power field  No Squamous epithelial cells per low power field  No organisms seen per oil power field 03-28 @ 22:15        CENTRAL LINE: L TLC femoral   VAZQUEZ: Y  A-LINE: R femoral

## 2020-04-02 NOTE — PROGRESS NOTE ADULT - PROBLEM SELECTOR PLAN 9
Hx of B12 anemia, normal MCV at this time  negative stool guaic, continuing PPI as per GI  CBC stable, will monitor in case of future repeat transfusion

## 2020-04-02 NOTE — PROGRESS NOTE ADULT - ASSESSMENT
1.	ALIS: ATN, COVID renal injury  2.	SARS-COVID 19, Resp failure on vent  3.	Anemia  4.	Diabetes    Stable renal indices. UO good. On pressor support. Avoid over diuresis. To continue current meds. Optimize BP.  Supportive care. PRN PRBC transfusion. Monitor h/h trend. Monitor blood sugar levels. Insulin coverage as needed. Strict I & O.   On bicarb. Avoid nephrotoxic meds as possible. Avoid ACEI, ARB, NSAIDs and IV contrast. Will follow electrolytes and renal function trend.

## 2020-04-02 NOTE — PROGRESS NOTE ADULT - ASSESSMENT
The patient is a 74 year old female with a history of HTN, DM, HL, hypothyroidism, anxiety who presented with fevers, cough, shortness of breath in the setting of viral PNA, COVID-19, respiratory failure, septic shock.     Plan:  - CXR consistent with PNA  - COVID-19 positive  - Completed hydroxychloroquine  - Continue zosyn  - Norepinephrine weaned off overnight  - Lower midodrine to 5 mg q8h  - May need additional diuretics as net up  - Atrial fibrillation - brief, back in sinus rhythm. Hold metoprolol for now due to bradycardia.  - Hemoglobin improved after transfusion  - Renal function about the same as yesterday  - Continue mechanical ventilation  - Pulm and ID follow-up  - Overall prognosis poor given multi-organ failure (respiratory failure, kidney failure, shock).

## 2020-04-02 NOTE — PROGRESS NOTE ADULT - SUBJECTIVE AND OBJECTIVE BOX
Patient is a 74y old  Female who presents with a chief complaint of SOB (31 Mar 2020 05:05)  Patient seen in follow up for ALIS, ATN.      Events noted. chart reviewed.     PAST MEDICAL HISTORY:  DJD (degenerative joint disease)  DM (diabetes mellitus)  Hyperlipidemia  Hypothyroid  HTN (hypertension)  History of vitamin D deficiency  B12 deficiency  Bronchitis  Anxiety      MEDICATIONS  (STANDING):  brimonidine 0.2% Ophthalmic Solution 1 Drop(s) Left EYE daily  chlorhexidine 0.12% Liquid 15 milliLiter(s) Oral Mucosa every 12 hours  chlorhexidine 2% Cloths 1 Application(s) Topical <User Schedule>  dexMEDEtomidine Infusion 0.7 MICROgram(s)/kG/Hr (15.9 mL/Hr) IV Continuous <Continuous>  dextrose 5%. 1000 milliLiter(s) (50 mL/Hr) IV Continuous <Continuous>  dextrose 50% Injectable 12.5 Gram(s) IV Push once  dextrose 50% Injectable 25 Gram(s) IV Push once  dextrose 50% Injectable 25 Gram(s) IV Push once  fentaNYL   Infusion. 0.998 MICROgram(s)/kG/Hr (1.81 mL/Hr) IV Continuous <Continuous>  heparin  Injectable 5000 Unit(s) SubCutaneous every 8 hours  insulin lispro (HumaLOG) corrective regimen sliding scale   SubCutaneous every 6 hours  latanoprost 0.005% Ophthalmic Solution 1 Drop(s) Both EYES at bedtime  levothyroxine Injectable 12.5 MICROGram(s) IV Push at bedtime  midodrine 5 milliGRAM(s) Oral every 8 hours  norepinephrine Infusion 0.02 MICROgram(s)/kG/Min (3.4 mL/Hr) IV Continuous <Continuous>  pantoprazole  Injectable 40 milliGRAM(s) IV Push daily  piperacillin/tazobactam IVPB.. 3.375 Gram(s) IV Intermittent every 12 hours  propofol Infusion 73.502 MICROgram(s)/kG/Min (40 mL/Hr) IV Continuous <Continuous>  simvastatin 20 milliGRAM(s) Oral at bedtime  sodium bicarbonate 650 milliGRAM(s) Oral three times a day  timolol 0.5% Solution 1 Drop(s) Left EYE daily    MEDICATIONS  (PRN):  acetaminophen   Tablet .. 650 milliGRAM(s) Oral every 6 hours PRN Temp greater or equal to 38C (100.4F), Mild Pain (1 - 3)  dextrose 40% Gel 15 Gram(s) Oral once PRN Blood Glucose LESS THAN 70 milliGRAM(s)/deciliter  glucagon  Injectable 1 milliGRAM(s) IntraMuscular once PRN Glucose LESS THAN 70 milligrams/deciliter  sodium chloride 0.9% lock flush 10 milliLiter(s) IV Push every 1 hour PRN Pre/post blood products, medications, blood draw, and to maintain line patency    T(C): 36.5 (04-02-20 @ 12:00), Max: 37.2 (03-31-20 @ 18:00)  HR: 59 (04-02-20 @ 12:00) (48 - 65)  BP: 139/48 (04-02-20 @ 12:00) (120/41 - 144/54)  RR: 15 (04-02-20 @ 12:00)  SpO2: 93% (04-02-20 @ 12:00)  Wt(kg): --  I&O's Detail    01 Apr 2020 07:01  -  02 Apr 2020 07:00  --------------------------------------------------------  IN:    fentaNYL Infusion.: 27.3 mL    fentaNYL Infusion.: 108.3 mL    Free Water: 720 mL    Glucerna 1.5: 1080 mL    norepinephrine Infusion: 47.6 mL    propofol Infusion: 561 mL    Solution: 175 mL  Total IN: 2719.2 mL    OUT:    Indwelling Catheter - Urethral: 1025 mL    Indwelling Catheter - Urethral: 350 mL  Total OUT: 1375 mL    Total NET: 1344.2 mL      02 Apr 2020 07:01  -  02 Apr 2020 13:48  --------------------------------------------------------  IN:    dexmedetomidine Infusion: 31.8 mL    fentaNYL Infusion.: 13.5 mL    Free Water: 120 mL    Glucerna 1.5: 180 mL    propofol Infusion: 119.8 mL    Solution: 25 mL  Total IN: 490.1 mL    OUT:  Total OUT: 0 mL    Total NET: 490.1 mL        PHYSICAL EXAM:  Pt on COVID precautions. Chart reviewed and previous physical examination noted.                        LABORATORY:                        8.0    10.28 )-----------( 459      ( 02 Apr 2020 06:58 )             25.0     04-02    133<L>  |  97  |  95<H>  ----------------------------<  181<H>  3.5   |  20<L>  |  4.59<H>    Ca    8.7      02 Apr 2020 06:58  Phos  7.8     04-02  Mg     2.1     04-02    TPro  6.5  /  Alb  1.8<L>  /  TBili  0.3  /  DBili  x   /  AST  16  /  ALT  27  /  AlkPhos  71  04-02    Sodium, Serum: 133 mmol/L (04-02 @ 06:58)  Sodium, Serum: 131 mmol/L (04-01 @ 06:46)    Potassium, Serum: 3.5 mmol/L (04-02 @ 06:58)  Potassium, Serum: 3.9 mmol/L (04-01 @ 06:46)    Hemoglobin: 8.0 g/dL (04-02 @ 06:58)  Hemoglobin: 9.1 g/dL (04-01 @ 06:46)  Hemoglobin: 8.4 g/dL (03-31 @ 07:29)    Creatinine, Serum 4.59 (04-02 @ 06:58)  Creatinine, Serum 4.58 (04-01 @ 06:46)  Creatinine, Serum 4.65 (03-31 @ 07:29)        LIVER FUNCTIONS - ( 02 Apr 2020 06:58 )  Alb: 1.8 g/dL / Pro: 6.5 g/dL / ALK PHOS: 71 U/L / ALT: 27 U/L DA / AST: 16 U/L / GGT: x             ABG - ( 01 Apr 2020 14:24 )  pH, Arterial: x     pH, Blood: 7.23  /  pCO2: 35    /  pO2: 73    / HCO3: 15    / Base Excess: -11.8 /  SaO2: 92

## 2020-04-02 NOTE — PROGRESS NOTE ADULT - SUBJECTIVE AND OBJECTIVE BOX
ADELIA BALDWIN is a 74yFemale , patient examined and chart reviewed.     INTERVAL HPI/ OVERNIGHT EVENTS:  Vented sedated   Afebrile. Good urine output.    Past Medical History--  PAST MEDICAL & SURGICAL HISTORY:  DJD (degenerative joint disease)  DM (diabetes mellitus)  Hyperlipidemia  Hypothyroid  HTN (hypertension)  History of vitamin D deficiency  B12 deficiency  Bronchitis  Anxiety  S/P cataract surgery  S/P eye surgery: left eye retinal surgery x2  S/P  section: x2      For details regarding the patient's social history, family history, and other miscellaneous elements, please refer the initial infectious diseases consultation and/or the admitting history and physical examination for this admission.      ROS:  Unable to obtain due to : pt's condition      Current inpatient medications :    ANTIBIOTICS/RELEVANT:  piperacillin/tazobactam IVPB.. 3.375 Gram(s) IV Intermittent every 8 hours    MEDICATIONS  (STANDING):  albumin human 25% IVPB 50 milliLiter(s) IV Intermittent every 8 hours  brimonidine 0.2% Ophthalmic Solution 1 Drop(s) Left EYE daily  chlorhexidine 0.12% Liquid 15 milliLiter(s) Oral Mucosa every 12 hours  chlorhexidine 2% Cloths 1 Application(s) Topical <User Schedule>  dexMEDEtomidine Infusion 0.7 MICROgram(s)/kG/Hr (15.9 mL/Hr) IV Continuous <Continuous>  dextrose 5%. 1000 milliLiter(s) (50 mL/Hr) IV Continuous <Continuous>  dextrose 50% Injectable 12.5 Gram(s) IV Push once  dextrose 50% Injectable 25 Gram(s) IV Push once  dextrose 50% Injectable 25 Gram(s) IV Push once  fentaNYL   Infusion. 0.998 MICROgram(s)/kG/Hr (1.81 mL/Hr) IV Continuous <Continuous>  heparin  Injectable 5000 Unit(s) SubCutaneous every 8 hours  insulin lispro (HumaLOG) corrective regimen sliding scale   SubCutaneous every 6 hours  latanoprost 0.005% Ophthalmic Solution 1 Drop(s) Both EYES at bedtime  levothyroxine Injectable 12.5 MICROGram(s) IV Push at bedtime  midodrine 5 milliGRAM(s) Oral every 8 hours  norepinephrine Infusion 0.02 MICROgram(s)/kG/Min (3.4 mL/Hr) IV Continuous <Continuous>  pantoprazole  Injectable 40 milliGRAM(s) IV Push daily  propofol Infusion 73.502 MICROgram(s)/kG/Min (40 mL/Hr) IV Continuous <Continuous>  simvastatin 20 milliGRAM(s) Oral at bedtime  sodium bicarbonate 650 milliGRAM(s) Oral three times a day  timolol 0.5% Solution 1 Drop(s) Left EYE daily    MEDICATIONS  (PRN):  acetaminophen   Tablet .. 650 milliGRAM(s) Oral every 6 hours PRN Temp greater or equal to 38C (100.4F), Mild Pain (1 - 3)  dextrose 40% Gel 15 Gram(s) Oral once PRN Blood Glucose LESS THAN 70 milliGRAM(s)/deciliter  glucagon  Injectable 1 milliGRAM(s) IntraMuscular once PRN Glucose LESS THAN 70 milligrams/deciliter  sodium chloride 0.9% lock flush 10 milliLiter(s) IV Push every 1 hour PRN Pre/post blood products, medications, blood draw, and to maintain line patency      Objective:  ICU Vital Signs Last 24 Hrs  T(C): 36.5 (2020 12:00), Max: 36.8 (2020 16:00)  T(F): 97.7 (2020 12:00), Max: 98.2 (2020 16:00)  HR: 59 (2020 12:00) (48 - 65)  BP: 139/48 (2020 12:00) (120/41 - 144/54)  BP(mean): 70 (2020 12:00) (60 - 74)  ABP: 150/52 (2020 12:00) (101/83 - 150/52)  ABP(mean): 83 (2020 12:00) (70 - 90)  RR: 15 (2020 12:00) (15 - 18)  SpO2: 93% (2020 12:00) (92% - 97%)    Mode: AC/ CMV (Assist Control/ Continuous Mandatory Ventilation)  RR (machine): 15  TV (machine): 400  FiO2: 40  PEEP: 5  ITime: 1  MAP: 14  PIP: 32      Physical Exam:  GEN: Intubated sedated  HEENT: normocephalic and atraumatic. EOMI. OLGA. Moist mucosa. Clear Posterior pharynx.  NECK: Supple. No carotid bruits.  No lymphadenopathy or thyromegaly.  LUNGS: Decreased to auscultation.  HEART: Regular rate and rhythm without murmur.  ABDOMEN: Soft, nontender, and nondistended.  Positive bowel sounds.   EXTREMITIES: +edema.  NEUROLOGIC: Sedaed   SKIN: No ulceration or induration present.      LABS:                        8.0    10.28 )-----------( 459      ( 2020 06:58 )             25.0   04-02    133<L>  |  97  |  95<H>  ----------------------------<  181<H>  3.5   |  20<L>  |  4.59<H>    Ca    8.7      2020 06:58  Phos  7.8     04-02  Mg     2.1     04-02    TPro  6.5  /  Alb  1.8<L>  /  TBili  0.3  /  DBili  x   /  AST  16  /  ALT  27  /  AlkPhos  71  04-02    Urinalysis Basic - ( 26 Mar 2020 22:14 )    Color: Yellow / Appearance: Slightly Turbid / S.020 / pH: x  Gluc: x / Ketone: Negative  / Bili: Negative / Urobili: Negative mg/dL   Blood: x / Protein: 100 mg/dL / Nitrite: Negative   Leuk Esterase: Negative / RBC: 6-10 /HPF / WBC 0-2   Sq Epi: x / Non Sq Epi: Few / Bacteria: Moderate      ABG - ( 28 Mar 2020 05:14 )  pH, Arterial: x     pH, Blood: 7.29  /  pCO2: 33    /  pO2: 67    / HCO3: 17    / Base Excess: -9.6  /  SaO2: 92        MICROBIOLOGY:    Culture - Urine (collected 27 Mar 2020 11:03)  Source: .Urine Clean Catch (Midstream)  Final Report (28 Mar 2020 10:55):    No growth    Culture - Blood (collected 26 Mar 2020 23:00)  Source: .Blood Blood  Preliminary Report (28 Mar 2020 01:03):    No growth to date.    Culture - Blood (collected 26 Mar 2020 23:00)  Source: .Blood Blood  Preliminary Report (28 Mar 2020 01:02):    No growth to date.    COVID-19 PCR . (20 @ 22:21)    COVID-19 PCR: Detected: All “detected” results on this new test are considered presumptively  positive results, are clinically actionable, and specimens will be  forwarded to [facility] for confirmation testing.  Another report (corrected report) will only be issued if discordant  results occur.  This test has been validated by [employer] to be accurate;  though it has not been FDA cleared/approved by the usual pathway.  As with all laboratory tests, results should be correlated with clinical  findings.      RADIOLOGY & ADDITIONAL STUDIES:    PROCEDURE DATE:  2020      INTERPRETATION:  AP erect chest on 2020 at 8:26 AM. Patient is desaturating and requires intubation.    Heart is magnified by technique. Calcified mitral area again seen.    There are infiltrates in the mid lower lung fields that are increasing from study earlier in the day.    Endotracheal tube remains.    IMPRESSION: Increasing basilar infiltrates.    No significant pleural effusion    Assessment :  75 y/o F with hx of DM, HTN, HLD, hypothyroid, anxiety presented admitted with severe sepsis with septic shock and acute hypoxic respiratory failure with MSOF sec COVID 19 pneumonia. Sp Cardiorespiratory arrest.  Worsening ALIS. Anemic. Procalcitonin markedly elevated- concern for superimposed bacterial process. Off pressors. Remains vented.     Plan :   Completed Hydroxychloroquine x 5 days  Supportive care  Cont  Zosyn renally dosed or possible superimposed bacterial process till 2020  Vent per pulm ICU  Trend temps  Prognosis guarded  DNR  COVID-19---high risk period for decompensation  (7-14 days post symptom onset), avoid aerosolizing procedures,, NSAIDs ---no compelling evidence to date DO NOT recommend any specific therapies outside of established protocols or controlled trials -would support focus on supportive care  avoid excessive blood draws but do recommend for hospitalized patients  -daily CBC w diff to follow eos, lymphocytes and neutrophils and daily NLR calculation (NLR <3 low vs >5 high) -other labs that may be selectively used to risk stratify and predict clinical course,   Procalcitonin (<0.2 associated with more severe disease),  Ferritin (lower risk <450 vs >850) CRP (low risk <2 and higher risk >6) D-dimer (<1,000 vs >1,000) LDH, ESR, PT/PTT, CK, lactate, troponin, IL-6  -antibiotics only if there is concern for a bacterial process  neutrophil/lymphocyte ratio    D/w ICU team      Continue with present regiment.  Appropriate use of antibiotics and adverse effects reviewed.    I have discussed the above plan of care with patient/ family in detail. They expressed understanding of the the treatment plan . Risks, benefits and alternatives discussed in detail. I have asked if they have any questions or concerns and appropriately addressed them to the best of my ability .      Critical care time greater then 35 minutes reviewing notes, labs data/ imaging , discussion with multidisciplinary team.    Thank you for allowing me to participate in care of your patient .        Eder Brown MD  Infectious Disease  [phone]

## 2020-04-02 NOTE — PROGRESS NOTE ADULT - PROBLEM SELECTOR PLAN 2
continuing ceftriaxone as per ID for possible superimposed bacterial process, stop date 4/4/20  will monitor with trending CBC w diff, N/L ratio decreasing

## 2020-04-02 NOTE — PROGRESS NOTE ADULT - SUBJECTIVE AND OBJECTIVE BOX
Patient is a 74y old  Female who presents with a chief complaint of shob (02 Apr 2020 16:00)      INTERVAL HPI/OVERNIGHT EVENTS:  Pt seen sedated in bed. No reported interval events or significant changes overnight.      MEDICATIONS  (STANDING):  albumin human 25% IVPB 50 milliLiter(s) IV Intermittent every 8 hours  brimonidine 0.2% Ophthalmic Solution 1 Drop(s) Left EYE daily  chlorhexidine 0.12% Liquid 15 milliLiter(s) Oral Mucosa every 12 hours  chlorhexidine 2% Cloths 1 Application(s) Topical <User Schedule>  dexMEDEtomidine Infusion 0.7 MICROgram(s)/kG/Hr (15.9 mL/Hr) IV Continuous <Continuous>  dextrose 5%. 1000 milliLiter(s) (50 mL/Hr) IV Continuous <Continuous>  dextrose 50% Injectable 12.5 Gram(s) IV Push once  dextrose 50% Injectable 25 Gram(s) IV Push once  dextrose 50% Injectable 25 Gram(s) IV Push once  fentaNYL   Infusion. 0.998 MICROgram(s)/kG/Hr (1.81 mL/Hr) IV Continuous <Continuous>  heparin  Injectable 5000 Unit(s) SubCutaneous every 8 hours  insulin glargine Injectable (LANTUS) 12 Unit(s) SubCutaneous at bedtime  insulin lispro (HumaLOG) corrective regimen sliding scale   SubCutaneous every 6 hours  latanoprost 0.005% Ophthalmic Solution 1 Drop(s) Both EYES at bedtime  levothyroxine Injectable 12.5 MICROGram(s) IV Push at bedtime  pantoprazole  Injectable 40 milliGRAM(s) IV Push daily  piperacillin/tazobactam IVPB.. 3.375 Gram(s) IV Intermittent every 12 hours  propofol Infusion 73.502 MICROgram(s)/kG/Min (40 mL/Hr) IV Continuous <Continuous>  simvastatin 20 milliGRAM(s) Oral at bedtime  sodium bicarbonate 650 milliGRAM(s) Oral three times a day  timolol 0.5% Solution 1 Drop(s) Left EYE daily    MEDICATIONS  (PRN):  acetaminophen   Tablet .. 650 milliGRAM(s) Oral every 6 hours PRN Temp greater or equal to 38C (100.4F), Mild Pain (1 - 3)  dextrose 40% Gel 15 Gram(s) Oral once PRN Blood Glucose LESS THAN 70 milliGRAM(s)/deciliter  glucagon  Injectable 1 milliGRAM(s) IntraMuscular once PRN Glucose LESS THAN 70 milligrams/deciliter  sodium chloride 0.9% lock flush 10 milliLiter(s) IV Push every 1 hour PRN Pre/post blood products, medications, blood draw, and to maintain line patency      Allergies    No Known Allergies    Intolerances        REVIEW OF SYSTEMS unable to perform given pt sedated and unresponsive    Vital Signs Last 24 Hrs  T(C): 36.8 (02 Apr 2020 16:00), Max: 36.8 (02 Apr 2020 16:00)  T(F): 98.2 (02 Apr 2020 16:00), Max: 98.2 (02 Apr 2020 16:00)  HR: 56 (02 Apr 2020 16:00) (48 - 65)  BP: 150/55 (02 Apr 2020 16:00) (120/41 - 152/54)  BP(mean): 79 (02 Apr 2020 16:00) (60 - 79)  RR: 16 (02 Apr 2020 16:00) (15 - 18)  SpO2: 91% (02 Apr 2020 16:00) (90% - 97%)    PHYSICAL EXAM:  GENERAL: NAD, well-groomed, well-developed, obese  HEAD:  Atraumatic, Normocephalic  ENMT: Moist mucous membranes   NECK: Supple, No JVD appreciated  NERVOUS SYSTEM: Patient unresponsive to voice or touch  CHEST/LUNG: Loud blowing breath sounds b/l. No rales, rhonchi, wheezing, or rubs appreciated  HEART: Regular rate and rhythm on monitor, unable to auscultate heart sounds due to body habitus; No murmurs, rubs, or gallops appreciated  ABDOMEN: Soft, Nontender, Nondistended; Hyperactive bowel sounds present  EXTREMITIES:  2+ Peripheral Pulses, No clubbing or cyanosis  LYMPH: No lymphadenopathy noted  SKIN: No rashes or lesions appreciated. Central line in L neck, femoral line in groin, no discharge at sites.      LABS:                        8.0    10.28 )-----------( 459      ( 02 Apr 2020 06:58 )             25.0     02 Apr 2020 06:58    133    |  97     |  95     ----------------------------<  181    3.5     |  20     |  4.59     Ca    8.7        02 Apr 2020 06:58  Phos  7.8       02 Apr 2020 06:58  Mg     2.1       02 Apr 2020 06:58    TPro  6.5    /  Alb  1.8    /  TBili  0.3    /  DBili  x      /  AST  16     /  ALT  27     /  AlkPhos  71     02 Apr 2020 06:58        CAPILLARY BLOOD GLUCOSE      POCT Blood Glucose.: 229 mg/dL (02 Apr 2020 17:32)  POCT Blood Glucose.: 266 mg/dL (02 Apr 2020 11:52)  POCT Blood Glucose.: 178 mg/dL (02 Apr 2020 05:24)  POCT Blood Glucose.: 154 mg/dL (01 Apr 2020 23:36)           [x] Consultant(s) Notes Reviewed  [x] Care Discussed with Consultants/Other Providers:  Critical care attending

## 2020-04-02 NOTE — PROGRESS NOTE ADULT - ASSESSMENT
75 y/o F w/ a PMHx of 73 y/o F w/ a PMHx of     PLAN:  Neuro: Sedation w/ neuromuscular blockade (as needed) to facilitate safe ventilation.  Cardiac: Vasopressor support as needed to maintain MAP 65. Avoid fluid challenges. QTC monitoring while on Zithromax and hydroxychloroquine.  Pulm: ARDS-NET 4-6cc/kg IBW TV as able to maintain plateau pressures < 30. Prone ventilation consideration as feasible. PaO2/FiO2 < 150 on FiO2 > 60% and PEEP at least 5. Vent bundle in place. Solumedrol 1 mg/kg q12 hrs (80mg q12 hrs).  GI: Enteric feeds as tolerated in tandem with NMB and prone ventilation  Renal: Even to negative fluid balance as tolerated by hemodynamics and renal fx. Feeds to be provided in lieu of IVF.   ID: ABX discontinuation based on discussion w/ ID in conjunction w/ clinical features, culture data, and judicious procalcitonin monitoring.    Heme: Chemical DVT prophylaxis w/ Lovenox and mechanical DVT prophylaxis w/ Lovenox.    Dispo: To remain in ICU. Prognosis guarded. I personally updated pt's daughter and son via telephone.    COVID-19 specific considerations and therapeutic options based on the available and rapidly changing literature.    Case discussed w/ eICU attending Dr. Gomez. 75 y/o F w/ a PMHx of     PLAN:  Neuro: Sedation w/ neuromuscular blockade (as needed) to facilitate safe ventilation.  Cardiac: Vasopressor support as needed to maintain MAP 65. Avoid fluid challenges. QTC monitoring while on Zithromax and hydroxychloroquine (course now complete).  Pulm: ARDS-NET 4-6cc/kg IBW TV as able to maintain plateau pressures < 30. Prone ventilation consideration as feasible. PaO2/FiO2 < 150 on FiO2 > 60% and PEEP at least 5. Vent bundle in place. Consider Solumedrol 1 mg/kg q12 hrs (80mg q12 hrs).  GI: Enteric feeds as tolerated in tandem with NMB and prone ventilation  Renal: Even to negative fluid balance as tolerated by hemodynamics and renal fx. Feeds to be provided in lieu of IVF.   ID: ABX discontinuation based on discussion w/ ID in conjunction w/ clinical features, culture data, and judicious procalcitonin monitoring.    Heme: Chemical DVT prophylaxis w/ Lovenox and mechanical DVT prophylaxis w/ Lovenox.    Dispo: To remain in ICU. Prognosis guarded. I personally updated pt's daughter and son via telephone.    COVID-19 specific considerations and therapeutic options based on the available and rapidly changing literature.    Case discussed w/ eICU attending Dr. Gomez.

## 2020-04-02 NOTE — PROGRESS NOTE ADULT - PROBLEM SELECTOR PLAN 3
likely distributive shock from sedation, resolved  discontinued levophed, midodrine  albumin started today

## 2020-04-02 NOTE — PROGRESS NOTE ADULT - SUBJECTIVE AND OBJECTIVE BOX
INTERVAL HPI/OVERNIGHT EVENTS:  chart reviewed    MEDICATIONS  (STANDING):  brimonidine 0.2% Ophthalmic Solution 1 Drop(s) Left EYE daily  chlorhexidine 0.12% Liquid 15 milliLiter(s) Oral Mucosa every 12 hours  chlorhexidine 2% Cloths 1 Application(s) Topical <User Schedule>  dexMEDEtomidine Infusion 0.7 MICROgram(s)/kG/Hr (15.9 mL/Hr) IV Continuous <Continuous>  dextrose 5%. 1000 milliLiter(s) (50 mL/Hr) IV Continuous <Continuous>  dextrose 50% Injectable 12.5 Gram(s) IV Push once  dextrose 50% Injectable 25 Gram(s) IV Push once  dextrose 50% Injectable 25 Gram(s) IV Push once  fentaNYL   Infusion. 0.998 MICROgram(s)/kG/Hr (1.81 mL/Hr) IV Continuous <Continuous>  heparin  Injectable 5000 Unit(s) SubCutaneous every 8 hours  insulin lispro (HumaLOG) corrective regimen sliding scale   SubCutaneous every 6 hours  latanoprost 0.005% Ophthalmic Solution 1 Drop(s) Both EYES at bedtime  levothyroxine Injectable 12.5 MICROGram(s) IV Push at bedtime  midodrine 5 milliGRAM(s) Oral every 8 hours  norepinephrine Infusion 0.02 MICROgram(s)/kG/Min (3.4 mL/Hr) IV Continuous <Continuous>  pantoprazole  Injectable 40 milliGRAM(s) IV Push daily  piperacillin/tazobactam IVPB.. 3.375 Gram(s) IV Intermittent every 12 hours  propofol Infusion 73.502 MICROgram(s)/kG/Min (40 mL/Hr) IV Continuous <Continuous>  simvastatin 20 milliGRAM(s) Oral at bedtime  sodium bicarbonate 650 milliGRAM(s) Oral three times a day  timolol 0.5% Solution 1 Drop(s) Left EYE daily    MEDICATIONS  (PRN):  acetaminophen   Tablet .. 650 milliGRAM(s) Oral every 6 hours PRN Temp greater or equal to 38C (100.4F), Mild Pain (1 - 3)  dextrose 40% Gel 15 Gram(s) Oral once PRN Blood Glucose LESS THAN 70 milliGRAM(s)/deciliter  glucagon  Injectable 1 milliGRAM(s) IntraMuscular once PRN Glucose LESS THAN 70 milligrams/deciliter  sodium chloride 0.9% lock flush 10 milliLiter(s) IV Push every 1 hour PRN Pre/post blood products, medications, blood draw, and to maintain line patency      Allergies    No Known Allergies    Intolerances          Vital Signs Last 24 Hrs  T(C): 36.7 (02 Apr 2020 08:00), Max: 36.8 (01 Apr 2020 16:00)  T(F): 98.1 (02 Apr 2020 08:00), Max: 98.2 (01 Apr 2020 16:00)  HR: 56 (02 Apr 2020 08:00) (48 - 65)  BP: 144/54 (02 Apr 2020 08:00) (120/41 - 144/54)  BP(mean): 74 (02 Apr 2020 08:00) (60 - 74)  RR: 16 (02 Apr 2020 08:00) (15 - 18)  SpO2: 93% (02 Apr 2020 08:00) (92% - 97%)        LABS:                        8.0    10.28 )-----------( 459      ( 02 Apr 2020 06:58 )             25.0     04-02    133<L>  |  97  |  95<H>  ----------------------------<  181<H>  3.5   |  20<L>  |  4.59<H>    Ca    8.7      02 Apr 2020 06:58  Phos  7.8     04-02  Mg     2.1     04-02    TPro  6.5  /  Alb  1.8<L>  /  TBili  0.3  /  DBili  x   /  AST  16  /  ALT  27  /  AlkPhos  71  04-02          RADIOLOGY & ADDITIONAL TESTS:

## 2020-04-02 NOTE — PROGRESS NOTE ADULT - SUBJECTIVE AND OBJECTIVE BOX
Patient is a 74 y/ female who presents with a chief complaint of SOB (2020 22:13)    BRIEF HOSPITAL COURSE: ***    Events last 24 hours: ***    PAST MEDICAL & SURGICAL HISTORY:  DJD (degenerative joint disease)  DM (diabetes mellitus)  Hyperlipidemia  Hypothyroid  HTN (hypertension)  History of vitamin D deficiency  B12 deficiency  Bronchitis  Anxiety  S/P cataract surgery  S/P eye surgery: left eye retinal surgery x2  S/P  section: x2    Review of Systems:  Unable to obtain. Pt is intubated and sedated.    Medications:  piperacillin/tazobactam IVPB.. 3.375 Gram(s) IV Intermittent every 12 hours  acetaminophen   Tablet .. 650 milliGRAM(s) Oral every 6 hours PRN  dexMEDEtomidine Infusion 0.7 MICROgram(s)/kG/Hr IV Continuous <Continuous>  fentaNYL   Infusion. 0.998 MICROgram(s)/kG/Hr IV Continuous <Continuous>  propofol Infusion 73.502 MICROgram(s)/kG/Min IV Continuous <Continuous>  heparin  Injectable 5000 Unit(s) SubCutaneous every 8 hours  pantoprazole  Injectable 40 milliGRAM(s) IV Push daily  dextrose 40% Gel 15 Gram(s) Oral once PRN  dextrose 50% Injectable 12.5 Gram(s) IV Push once  dextrose 50% Injectable 25 Gram(s) IV Push once  dextrose 50% Injectable 25 Gram(s) IV Push once  glucagon  Injectable 1 milliGRAM(s) IntraMuscular once PRN  insulin glargine Injectable (LANTUS) 12 Unit(s) SubCutaneous at bedtime  insulin lispro (HumaLOG) corrective regimen sliding scale   SubCutaneous every 6 hours  levothyroxine Injectable 12.5 MICROGram(s) IV Push at bedtime  simvastatin 20 milliGRAM(s) Oral at bedtime  albumin human 25% IVPB 50 milliLiter(s) IV Intermittent every 8 hours  dextrose 5%. 1000 milliLiter(s) IV Continuous <Continuous>  sodium bicarbonate 650 milliGRAM(s) Oral three times a day  sodium chloride 0.9% lock flush 10 milliLiter(s) IV Push every 1 hour PRN  brimonidine 0.2% Ophthalmic Solution 1 Drop(s) Left EYE daily  chlorhexidine 0.12% Liquid 15 milliLiter(s) Oral Mucosa every 12 hours  chlorhexidine 2% Cloths 1 Application(s) Topical <User Schedule>  latanoprost 0.005% Ophthalmic Solution 1 Drop(s) Both EYES at bedtime  timolol 0.5% Solution 1 Drop(s) Left EYE daily    Mode: AC/ CMV (Assist Control/ Continuous Mandatory Ventilation)  RR (machine): 15  TV (machine): 400  FiO2: 40  PEEP: 5  ITime: 1  MAP: 11  PIP: 27    ICU Vital Signs Last 24 Hrs  T(C): 36.9 (2020 20:00), Max: 36.9 (2020 20:00)  T(F): 98.5 (2020 20:00), Max: 98.5 (2020 20:00)  HR: 56 (2020 23:00) (48 - 65)  BP: 143/53 (2020 23:00) (129/47 - 152/54)  BP(mean): 75 (2020 23:00) (63 - 79)  ABP: 129/99 (2020 23:00) (118/56 - 169/72)  ABP(mean): 123 (2020 23:00) (76 - 123)  RR: 17 (2020 23:00) (15 - 19)  SpO2: 91% (2020 23:00) (90% - 96%)    ABG - ( 2020 14:24 )  pH, Arterial: x     pH, Blood: 7.23  /  pCO2: 35    /  pO2: 73    / HCO3: 15    / Base Excess: -11.8 /  SaO2: 92        I&O's Detail    2020 07:01  -  2020 07:00  --------------------------------------------------------  IN:    fentaNYL Infusion.: 27.3 mL    fentaNYL Infusion.: 108.3 mL    Free Water: 720 mL    Glucerna 1.5: 1080 mL    norepinephrine Infusion: 47.6 mL    propofol Infusion: 561 mL    Solution: 175 mL  Total IN: 2719.2 mL    OUT:    Indwelling Catheter - Urethral: 1025 mL    Indwelling Catheter - Urethral: 350 mL  Total OUT: 1375 mL    Total NET: 1344.2 mL    2020 07:01  -  2020 00:09  --------------------------------------------------------  IN:    dexmedetomidine Infusion: 206.7 mL    fentaNYL Infusion.: 13.5 mL    Free Water: 480 mL    Glucerna 1.5: 720 mL    propofol Infusion: 272.1 mL    Solution: 50 mL    Solution: 175 mL  Total IN: 1917.3 mL    OUT:    Indwelling Catheter - Urethral: 375 mL  Total OUT: 375 mL    Total NET: 1542.3 mL    LABS:                        8.0    10.28 )-----------( 459      ( 2020 06:58 )             25.0     04-02    133<L>  |  97  |  95<H>  ----------------------------<  181<H>  3.5   |  20<L>  |  4.59<H>    Ca    8.7      2020 06:58  Phos  7.8     04-02  Mg     2.1     04-02    TPro  6.5  /  Alb  1.8<L>  /  TBili  0.3  /  DBili  x   /  AST  16  /  ALT  27  /  AlkPhos  71  04-02    CAPILLARY BLOOD GLUCOSE    POCT Blood Glucose.: 199 mg/dL (2020 23:10)    CULTURES:  Culture Results:   No growth at 48 hours ( @ 22:15)  Culture Results:   No growth ( @ 11:03)    RADIOLOGY:  < from: Xray Chest 1 View- PORTABLE-Urgent (20 @ 09:06) >  EXAM:  XR CHEST PORTABLE URGENT 1V                          PROCEDURE DATE:  2020      INTERPRETATION:  AP erect chest on 2020 at 8:26 AM. Patient is desaturating and requires intubation.    Heart is magnified by technique. Calcified mitral area again seen.    There are infiltrates in the mid lower lung fields that are increasing from study earlier in the day.    Endotracheal tube remains.    IMPRESSION: Increasing basilar infiltrates.    SUMMER VIERA M.D., ATTENDING RADIOLOGIST  This document has been electronically signed. 2020  8:45AM      < end of copied text >    CRITICAL CARE TIME SPENT: 38 minutes Patient is a 74 y/ female who presents with a chief complaint of SOB (2020 22:13)    BRIEF HOSPITAL COURSE: 73 y/o F w/ a PMHx of DM, HTN, hypothyroidism, HLD, and anxiety who presented to [facility] on 3/26 c/o fever and cough x 6 days. Pt was admitted to ICU and ultimately intubated for acute hypoxic respiratory failure later that day. Pt found to be COVID-19 (+).    PAST MEDICAL & SURGICAL HISTORY:  DJD (degenerative joint disease)  DM (diabetes mellitus)  Hyperlipidemia  Hypothyroid  HTN (hypertension)  History of vitamin D deficiency  B12 deficiency  Bronchitis  Anxiety  S/P cataract surgery  S/P eye surgery: left eye retinal surgery x2  S/P  section: x2    Review of Systems:  Unable to obtain. Pt is intubated and sedated.    Medications:  piperacillin/tazobactam IVPB.. 3.375 Gram(s) IV Intermittent every 12 hours  acetaminophen   Tablet .. 650 milliGRAM(s) Oral every 6 hours PRN  dexMEDEtomidine Infusion 0.7 MICROgram(s)/kG/Hr IV Continuous <Continuous>  fentaNYL   Infusion. 0.998 MICROgram(s)/kG/Hr IV Continuous <Continuous>  propofol Infusion 73.502 MICROgram(s)/kG/Min IV Continuous <Continuous>  heparin  Injectable 5000 Unit(s) SubCutaneous every 8 hours  pantoprazole  Injectable 40 milliGRAM(s) IV Push daily  dextrose 40% Gel 15 Gram(s) Oral once PRN  dextrose 50% Injectable 12.5 Gram(s) IV Push once  dextrose 50% Injectable 25 Gram(s) IV Push once  dextrose 50% Injectable 25 Gram(s) IV Push once  glucagon  Injectable 1 milliGRAM(s) IntraMuscular once PRN  insulin glargine Injectable (LANTUS) 12 Unit(s) SubCutaneous at bedtime  insulin lispro (HumaLOG) corrective regimen sliding scale   SubCutaneous every 6 hours  levothyroxine Injectable 12.5 MICROGram(s) IV Push at bedtime  simvastatin 20 milliGRAM(s) Oral at bedtime  albumin human 25% IVPB 50 milliLiter(s) IV Intermittent every 8 hours  dextrose 5%. 1000 milliLiter(s) IV Continuous <Continuous>  sodium bicarbonate 650 milliGRAM(s) Oral three times a day  sodium chloride 0.9% lock flush 10 milliLiter(s) IV Push every 1 hour PRN  brimonidine 0.2% Ophthalmic Solution 1 Drop(s) Left EYE daily  chlorhexidine 0.12% Liquid 15 milliLiter(s) Oral Mucosa every 12 hours  chlorhexidine 2% Cloths 1 Application(s) Topical <User Schedule>  latanoprost 0.005% Ophthalmic Solution 1 Drop(s) Both EYES at bedtime  timolol 0.5% Solution 1 Drop(s) Left EYE daily    Mode: AC/ CMV (Assist Control/ Continuous Mandatory Ventilation)  RR (machine): 15  TV (machine): 400  FiO2: 40  PEEP: 5  ITime: 1  MAP: 11  PIP: 27    ICU Vital Signs Last 24 Hrs  T(C): 36.9 (2020 20:00), Max: 36.9 (2020 20:00)  T(F): 98.5 (2020 20:00), Max: 98.5 (2020 20:00)  HR: 56 (2020 23:00) (48 - 65)  BP: 143/53 (2020 23:00) (129/47 - 152/54)  BP(mean): 75 (2020 23:00) (63 - 79)  ABP: 129/99 (2020 23:00) (118/56 - 169/72)  ABP(mean): 123 (2020 23:00) (76 - 123)  RR: 17 (2020 23:00) (15 - 19)  SpO2: 91% (2020 23:00) (90% - 96%)    ABG - ( 2020 14:24 )  pH, Arterial: x     pH, Blood: 7.23  /  pCO2: 35    /  pO2: 73    / HCO3: 15    / Base Excess: -11.8 /  SaO2: 92        I&O's Detail    2020 07:01  -  2020 07:00  --------------------------------------------------------  IN:    fentaNYL Infusion.: 27.3 mL    fentaNYL Infusion.: 108.3 mL    Free Water: 720 mL    Glucerna 1.5: 1080 mL    norepinephrine Infusion: 47.6 mL    propofol Infusion: 561 mL    Solution: 175 mL  Total IN: 2719.2 mL    OUT:    Indwelling Catheter - Urethral: 1025 mL    Indwelling Catheter - Urethral: 350 mL  Total OUT: 1375 mL    Total NET: 1344.2 mL    2020 07:01  -  2020 00:09  --------------------------------------------------------  IN:    dexmedetomidine Infusion: 206.7 mL    fentaNYL Infusion.: 13.5 mL    Free Water: 480 mL    Glucerna 1.5: 720 mL    propofol Infusion: 272.1 mL    Solution: 50 mL    Solution: 175 mL  Total IN: 1917.3 mL    OUT:    Indwelling Catheter - Urethral: 375 mL  Total OUT: 375 mL    Total NET: 1542.3 mL    LABS:                        8.0    10.28 )-----------( 459      ( 2020 06:58 )             25.0     04-02    133<L>  |  97  |  95<H>  ----------------------------<  181<H>  3.5   |  20<L>  |  4.59<H>    Ca    8.7      2020 06:58  Phos  7.8     04-02  Mg     2.1     04-02    TPro  6.5  /  Alb  1.8<L>  /  TBili  0.3  /  DBili  x   /  AST  16  /  ALT  27  /  AlkPhos  71  04-02    CAPILLARY BLOOD GLUCOSE    POCT Blood Glucose.: 199 mg/dL (2020 23:10)    CULTURES:  Culture Results:   No growth at 48 hours ( @ 22:15)  Culture Results:   No growth ( @ 11:03)    RADIOLOGY:  < from: Xray Chest 1 View- PORTABLE-Urgent (20 @ 09:06) >  EXAM:  XR CHEST PORTABLE URGENT 1V                          PROCEDURE DATE:  2020      INTERPRETATION:  AP erect chest on 2020 at 8:26 AM. Patient is desaturating and requires intubation.    Heart is magnified by technique. Calcified mitral area again seen.    There are infiltrates in the mid lower lung fields that are increasing from study earlier in the day.    Endotracheal tube remains.    IMPRESSION: Increasing basilar infiltrates.    SUMMER VIERA M.D., ATTENDING RADIOLOGIST  This document has been electronically signed. 2020  8:45AM      < end of copied text >    CRITICAL CARE TIME SPENT: 38 minutes

## 2020-04-03 DIAGNOSIS — E87.6 HYPOKALEMIA: ICD-10-CM

## 2020-04-03 LAB
ALBUMIN SERPL ELPH-MCNC: 1.6 G/DL — LOW (ref 3.3–5)
ALBUMIN SERPL ELPH-MCNC: 2 G/DL — LOW (ref 3.3–5)
ALP SERPL-CCNC: 53 U/L — SIGNIFICANT CHANGE UP (ref 30–120)
ALP SERPL-CCNC: 79 U/L — SIGNIFICANT CHANGE UP (ref 30–120)
ALT FLD-CCNC: 17 U/L DA — SIGNIFICANT CHANGE UP (ref 10–60)
ALT FLD-CCNC: 25 U/L DA — SIGNIFICANT CHANGE UP (ref 10–60)
ANION GAP SERPL CALC-SCNC: 13 MMOL/L — SIGNIFICANT CHANGE UP (ref 5–17)
ANION GAP SERPL CALC-SCNC: 14 MMOL/L — SIGNIFICANT CHANGE UP (ref 5–17)
AST SERPL-CCNC: 10 U/L — SIGNIFICANT CHANGE UP (ref 10–40)
AST SERPL-CCNC: 20 U/L — SIGNIFICANT CHANGE UP (ref 10–40)
BASE EXCESS BLDA CALC-SCNC: -7.3 MMOL/L — LOW (ref -2–2)
BASOPHILS # BLD AUTO: 0.01 K/UL — SIGNIFICANT CHANGE UP (ref 0–0.2)
BASOPHILS NFR BLD AUTO: 0.1 % — SIGNIFICANT CHANGE UP (ref 0–2)
BILIRUB SERPL-MCNC: 0.3 MG/DL — SIGNIFICANT CHANGE UP (ref 0.2–1.2)
BILIRUB SERPL-MCNC: 0.4 MG/DL — SIGNIFICANT CHANGE UP (ref 0.2–1.2)
BLD GP AB SCN SERPL QL: SIGNIFICANT CHANGE UP
BLOOD GAS SOURCE: SIGNIFICANT CHANGE UP
BUN SERPL-MCNC: 78 MG/DL — HIGH (ref 7–23)
BUN SERPL-MCNC: 93 MG/DL — HIGH (ref 7–23)
CALCIUM SERPL-MCNC: 6.8 MG/DL — LOW (ref 8.4–10.5)
CALCIUM SERPL-MCNC: 8.6 MG/DL — SIGNIFICANT CHANGE UP (ref 8.4–10.5)
CHLORIDE SERPL-SCNC: 107 MMOL/L — SIGNIFICANT CHANGE UP (ref 96–108)
CHLORIDE SERPL-SCNC: 97 MMOL/L — SIGNIFICANT CHANGE UP (ref 96–108)
CO2 SERPL-SCNC: 17 MMOL/L — LOW (ref 22–31)
CO2 SERPL-SCNC: 20 MMOL/L — LOW (ref 22–31)
CREAT SERPL-MCNC: 3.36 MG/DL — HIGH (ref 0.5–1.3)
CREAT SERPL-MCNC: 4.29 MG/DL — HIGH (ref 0.5–1.3)
EOSINOPHIL # BLD AUTO: 0.38 K/UL — SIGNIFICANT CHANGE UP (ref 0–0.5)
EOSINOPHIL NFR BLD AUTO: 4.9 % — SIGNIFICANT CHANGE UP (ref 0–6)
GLUCOSE BLDC GLUCOMTR-MCNC: 227 MG/DL — HIGH (ref 70–99)
GLUCOSE BLDC GLUCOMTR-MCNC: 248 MG/DL — HIGH (ref 70–99)
GLUCOSE BLDC GLUCOMTR-MCNC: 253 MG/DL — HIGH (ref 70–99)
GLUCOSE BLDC GLUCOMTR-MCNC: 300 MG/DL — HIGH (ref 70–99)
GLUCOSE SERPL-MCNC: 215 MG/DL — HIGH (ref 70–99)
GLUCOSE SERPL-MCNC: 283 MG/DL — HIGH (ref 70–99)
HCO3 BLDA-SCNC: 18 MMOL/L — LOW (ref 21–29)
HCT VFR BLD CALC: 20.3 % — CRITICAL LOW (ref 34.5–45)
HCT VFR BLD CALC: 27.9 % — LOW (ref 34.5–45)
HGB BLD-MCNC: 6.6 G/DL — CRITICAL LOW (ref 11.5–15.5)
HGB BLD-MCNC: 9.2 G/DL — LOW (ref 11.5–15.5)
HOROWITZ INDEX BLDA+IHG-RTO: 50 — SIGNIFICANT CHANGE UP
IMM GRANULOCYTES NFR BLD AUTO: 5.1 % — HIGH (ref 0–1.5)
LYMPHOCYTES # BLD AUTO: 0.53 K/UL — LOW (ref 1–3.3)
LYMPHOCYTES # BLD AUTO: 6.9 % — LOW (ref 13–44)
MAGNESIUM SERPL-MCNC: 1.6 MG/DL — SIGNIFICANT CHANGE UP (ref 1.6–2.6)
MCHC RBC-ENTMCNC: 28.4 PG — SIGNIFICANT CHANGE UP (ref 27–34)
MCHC RBC-ENTMCNC: 29.2 PG — SIGNIFICANT CHANGE UP (ref 27–34)
MCHC RBC-ENTMCNC: 32.5 GM/DL — SIGNIFICANT CHANGE UP (ref 32–36)
MCHC RBC-ENTMCNC: 33 GM/DL — SIGNIFICANT CHANGE UP (ref 32–36)
MCV RBC AUTO: 87.5 FL — SIGNIFICANT CHANGE UP (ref 80–100)
MCV RBC AUTO: 88.6 FL — SIGNIFICANT CHANGE UP (ref 80–100)
MONOCYTES # BLD AUTO: 0.51 K/UL — SIGNIFICANT CHANGE UP (ref 0–0.9)
MONOCYTES NFR BLD AUTO: 6.6 % — SIGNIFICANT CHANGE UP (ref 2–14)
NEUTROPHILS # BLD AUTO: 5.86 K/UL — SIGNIFICANT CHANGE UP (ref 1.8–7.4)
NEUTROPHILS NFR BLD AUTO: 76.4 % — SIGNIFICANT CHANGE UP (ref 43–77)
NRBC # BLD: 0 /100 WBCS — SIGNIFICANT CHANGE UP (ref 0–0)
NRBC # BLD: 0 /100 WBCS — SIGNIFICANT CHANGE UP (ref 0–0)
PCO2 BLDA: 36 MMHG — SIGNIFICANT CHANGE UP (ref 32–46)
PH BLD: 7.31 — LOW (ref 7.35–7.45)
PHOSPHATE SERPL-MCNC: 5.1 MG/DL — HIGH (ref 2.5–4.5)
PLATELET # BLD AUTO: 354 K/UL — SIGNIFICANT CHANGE UP (ref 150–400)
PLATELET # BLD AUTO: 437 K/UL — HIGH (ref 150–400)
PO2 BLDA: 66 MMHG — LOW (ref 74–108)
POTASSIUM SERPL-MCNC: 2.5 MMOL/L — CRITICAL LOW (ref 3.5–5.3)
POTASSIUM SERPL-MCNC: 4.2 MMOL/L — SIGNIFICANT CHANGE UP (ref 3.5–5.3)
POTASSIUM SERPL-SCNC: 2.5 MMOL/L — CRITICAL LOW (ref 3.5–5.3)
POTASSIUM SERPL-SCNC: 4.2 MMOL/L — SIGNIFICANT CHANGE UP (ref 3.5–5.3)
PROT SERPL-MCNC: 5.1 G/DL — LOW (ref 6–8.3)
PROT SERPL-MCNC: 6.7 G/DL — SIGNIFICANT CHANGE UP (ref 6–8.3)
RBC # BLD: 2.32 M/UL — LOW (ref 3.8–5.2)
RBC # BLD: 3.15 M/UL — LOW (ref 3.8–5.2)
RBC # FLD: 14.5 % — SIGNIFICANT CHANGE UP (ref 10.3–14.5)
RBC # FLD: 14.8 % — HIGH (ref 10.3–14.5)
SAO2 % BLDA: 91 % — LOW (ref 92–96)
SODIUM SERPL-SCNC: 130 MMOL/L — LOW (ref 135–145)
SODIUM SERPL-SCNC: 138 MMOL/L — SIGNIFICANT CHANGE UP (ref 135–145)
WBC # BLD: 10.91 K/UL — HIGH (ref 3.8–10.5)
WBC # BLD: 7.68 K/UL — SIGNIFICANT CHANGE UP (ref 3.8–10.5)
WBC # FLD AUTO: 10.91 K/UL — HIGH (ref 3.8–10.5)
WBC # FLD AUTO: 7.68 K/UL — SIGNIFICANT CHANGE UP (ref 3.8–10.5)

## 2020-04-03 PROCEDURE — 99291 CRITICAL CARE FIRST HOUR: CPT

## 2020-04-03 RX ORDER — MAGNESIUM SULFATE 500 MG/ML
2 VIAL (ML) INJECTION ONCE
Refills: 0 | Status: COMPLETED | OUTPATIENT
Start: 2020-04-03 | End: 2020-04-03

## 2020-04-03 RX ORDER — POTASSIUM CHLORIDE 20 MEQ
20 PACKET (EA) ORAL ONCE
Refills: 0 | Status: COMPLETED | OUTPATIENT
Start: 2020-04-03 | End: 2020-04-03

## 2020-04-03 RX ORDER — MIDAZOLAM HYDROCHLORIDE 1 MG/ML
2 INJECTION, SOLUTION INTRAMUSCULAR; INTRAVENOUS
Refills: 0 | Status: DISCONTINUED | OUTPATIENT
Start: 2020-04-03 | End: 2020-04-10

## 2020-04-03 RX ORDER — HYDROMORPHONE HYDROCHLORIDE 2 MG/ML
2 INJECTION INTRAMUSCULAR; INTRAVENOUS; SUBCUTANEOUS EVERY 4 HOURS
Refills: 0 | Status: DISCONTINUED | OUTPATIENT
Start: 2020-04-03 | End: 2020-04-06

## 2020-04-03 RX ORDER — POTASSIUM CHLORIDE 20 MEQ
10 PACKET (EA) ORAL
Refills: 0 | Status: COMPLETED | OUTPATIENT
Start: 2020-04-03 | End: 2020-04-03

## 2020-04-03 RX ORDER — ALBUMIN HUMAN 25 %
50 VIAL (ML) INTRAVENOUS EVERY 8 HOURS
Refills: 0 | Status: COMPLETED | OUTPATIENT
Start: 2020-04-03 | End: 2020-04-03

## 2020-04-03 RX ADMIN — Medication 6: at 12:30

## 2020-04-03 RX ADMIN — LATANOPROST 1 DROP(S): 0.05 SOLUTION/ DROPS OPHTHALMIC; TOPICAL at 22:42

## 2020-04-03 RX ADMIN — HEPARIN SODIUM 5000 UNIT(S): 5000 INJECTION INTRAVENOUS; SUBCUTANEOUS at 22:43

## 2020-04-03 RX ADMIN — SIMVASTATIN 20 MILLIGRAM(S): 20 TABLET, FILM COATED ORAL at 22:46

## 2020-04-03 RX ADMIN — Medication 4: at 17:50

## 2020-04-03 RX ADMIN — Medication 50 MILLILITER(S): at 05:34

## 2020-04-03 RX ADMIN — Medication 20 MILLIEQUIVALENT(S): at 17:40

## 2020-04-03 RX ADMIN — BRIMONIDINE TARTRATE 1 DROP(S): 2 SOLUTION/ DROPS OPHTHALMIC at 12:38

## 2020-04-03 RX ADMIN — Medication 100 MILLIEQUIVALENT(S): at 10:50

## 2020-04-03 RX ADMIN — Medication 4: at 22:59

## 2020-04-03 RX ADMIN — DEXMEDETOMIDINE HYDROCHLORIDE IN 0.9% SODIUM CHLORIDE 15.9 MICROGRAM(S)/KG/HR: 4 INJECTION INTRAVENOUS at 18:14

## 2020-04-03 RX ADMIN — Medication 100 MILLIEQUIVALENT(S): at 09:21

## 2020-04-03 RX ADMIN — PIPERACILLIN AND TAZOBACTAM 25 GRAM(S): 4; .5 INJECTION, POWDER, LYOPHILIZED, FOR SOLUTION INTRAVENOUS at 05:36

## 2020-04-03 RX ADMIN — DEXMEDETOMIDINE HYDROCHLORIDE IN 0.9% SODIUM CHLORIDE 15.9 MICROGRAM(S)/KG/HR: 4 INJECTION INTRAVENOUS at 10:41

## 2020-04-03 RX ADMIN — CHLORHEXIDINE GLUCONATE 1 APPLICATION(S): 213 SOLUTION TOPICAL at 05:35

## 2020-04-03 RX ADMIN — HEPARIN SODIUM 5000 UNIT(S): 5000 INJECTION INTRAVENOUS; SUBCUTANEOUS at 15:16

## 2020-04-03 RX ADMIN — HEPARIN SODIUM 5000 UNIT(S): 5000 INJECTION INTRAVENOUS; SUBCUTANEOUS at 05:35

## 2020-04-03 RX ADMIN — HYDROMORPHONE HYDROCHLORIDE 2 MILLIGRAM(S): 2 INJECTION INTRAMUSCULAR; INTRAVENOUS; SUBCUTANEOUS at 21:18

## 2020-04-03 RX ADMIN — HYDROMORPHONE HYDROCHLORIDE 2 MILLIGRAM(S): 2 INJECTION INTRAMUSCULAR; INTRAVENOUS; SUBCUTANEOUS at 15:51

## 2020-04-03 RX ADMIN — PROPOFOL 40 MICROGRAM(S)/KG/MIN: 10 INJECTION, EMULSION INTRAVENOUS at 10:00

## 2020-04-03 RX ADMIN — Medication 1 DROP(S): at 12:40

## 2020-04-03 RX ADMIN — PANTOPRAZOLE SODIUM 40 MILLIGRAM(S): 20 TABLET, DELAYED RELEASE ORAL at 12:39

## 2020-04-03 RX ADMIN — MIDAZOLAM HYDROCHLORIDE 2 MILLIGRAM(S): 1 INJECTION, SOLUTION INTRAMUSCULAR; INTRAVENOUS at 10:40

## 2020-04-03 RX ADMIN — Medication 50 MILLILITER(S): at 22:43

## 2020-04-03 RX ADMIN — CHLORHEXIDINE GLUCONATE 15 MILLILITER(S): 213 SOLUTION TOPICAL at 05:35

## 2020-04-03 RX ADMIN — Medication 50 GRAM(S): at 12:37

## 2020-04-03 RX ADMIN — INSULIN GLARGINE 12 UNIT(S): 100 INJECTION, SOLUTION SUBCUTANEOUS at 22:59

## 2020-04-03 RX ADMIN — CHLORHEXIDINE GLUCONATE 15 MILLILITER(S): 213 SOLUTION TOPICAL at 17:49

## 2020-04-03 RX ADMIN — Medication 100 MILLIEQUIVALENT(S): at 10:10

## 2020-04-03 RX ADMIN — Medication 12.5 MICROGRAM(S): at 22:42

## 2020-04-03 RX ADMIN — Medication 6: at 06:03

## 2020-04-03 RX ADMIN — PROPOFOL 40 MICROGRAM(S)/KG/MIN: 10 INJECTION, EMULSION INTRAVENOUS at 15:16

## 2020-04-03 RX ADMIN — Medication 50 MILLILITER(S): at 17:00

## 2020-04-03 RX ADMIN — Medication 650 MILLIGRAM(S): at 05:35

## 2020-04-03 RX ADMIN — PIPERACILLIN AND TAZOBACTAM 25 GRAM(S): 4; .5 INJECTION, POWDER, LYOPHILIZED, FOR SOLUTION INTRAVENOUS at 18:14

## 2020-04-03 NOTE — PROGRESS NOTE ADULT - SUBJECTIVE AND OBJECTIVE BOX
INTERVAL HPI/OVERNIGHT EVENTS:  chart reviewed  drop in Hgb    MEDICATIONS  (STANDING):  brimonidine 0.2% Ophthalmic Solution 1 Drop(s) Left EYE daily  chlorhexidine 0.12% Liquid 15 milliLiter(s) Oral Mucosa every 12 hours  chlorhexidine 2% Cloths 1 Application(s) Topical <User Schedule>  dexMEDEtomidine Infusion 0.7 MICROgram(s)/kG/Hr (15.9 mL/Hr) IV Continuous <Continuous>  dextrose 5%. 1000 milliLiter(s) (50 mL/Hr) IV Continuous <Continuous>  dextrose 50% Injectable 12.5 Gram(s) IV Push once  dextrose 50% Injectable 25 Gram(s) IV Push once  dextrose 50% Injectable 25 Gram(s) IV Push once  heparin  Injectable 5000 Unit(s) SubCutaneous every 8 hours  insulin glargine Injectable (LANTUS) 12 Unit(s) SubCutaneous at bedtime  insulin lispro (HumaLOG) corrective regimen sliding scale   SubCutaneous every 6 hours  latanoprost 0.005% Ophthalmic Solution 1 Drop(s) Both EYES at bedtime  levothyroxine Injectable 12.5 MICROGram(s) IV Push at bedtime  magnesium sulfate  IVPB 2 Gram(s) IV Intermittent once  pantoprazole  Injectable 40 milliGRAM(s) IV Push daily  piperacillin/tazobactam IVPB.. 3.375 Gram(s) IV Intermittent every 12 hours  propofol Infusion 73.502 MICROgram(s)/kG/Min (40 mL/Hr) IV Continuous <Continuous>  simvastatin 20 milliGRAM(s) Oral at bedtime  timolol 0.5% Solution 1 Drop(s) Left EYE daily    MEDICATIONS  (PRN):  acetaminophen   Tablet .. 650 milliGRAM(s) Oral every 6 hours PRN Temp greater or equal to 38C (100.4F), Mild Pain (1 - 3)  dextrose 40% Gel 15 Gram(s) Oral once PRN Blood Glucose LESS THAN 70 milliGRAM(s)/deciliter  glucagon  Injectable 1 milliGRAM(s) IntraMuscular once PRN Glucose LESS THAN 70 milligrams/deciliter  midazolam Injectable 2 milliGRAM(s) IV Push every 2 hours PRN Agitation  sodium chloride 0.9% lock flush 10 milliLiter(s) IV Push every 1 hour PRN Pre/post blood products, medications, blood draw, and to maintain line patency      Allergies    No Known Allergies    Intolerances          Vital Signs Last 24 Hrs  T(C): 36.4 (03 Apr 2020 09:00), Max: 36.9 (02 Apr 2020 20:00)  T(F): 97.6 (03 Apr 2020 09:00), Max: 98.5 (02 Apr 2020 20:00)  HR: 91 (03 Apr 2020 09:00) (52 - 91)  BP: 148/54 (03 Apr 2020 06:00) (134/53 - 152/54)  BP(mean): 76 (03 Apr 2020 06:00) (70 - 79)  RR: 27 (03 Apr 2020 09:00) (15 - 27)  SpO2: 96% (03 Apr 2020 09:00) (90% - 96%)      LABS:                        6.6    7.68  )-----------( 354      ( 03 Apr 2020 07:15 )             20.3     04-03    138  |  107  |  78<H>  ----------------------------<  215<H>  2.5<LL>   |  17<L>  |  3.36<H>    Ca    6.8<L>      03 Apr 2020 06:39  Phos  5.1     04-03  Mg     1.6     04-03    TPro  5.1<L>  /  Alb  1.6<L>  /  TBili  0.3  /  DBili  x   /  AST  10  /  ALT  17  /  AlkPhos  53  04-03          RADIOLOGY & ADDITIONAL TESTS:

## 2020-04-03 NOTE — PROGRESS NOTE ADULT - PROBLEM SELECTOR PLAN 2
s/p hydroxychloroquine+azithromycin regimen  continuing ceftriaxone as per ID for possible superimposed bacterial process, stop date 4/4/20  will monitor with trending CBC w diff, N/L ratio decreasing

## 2020-04-03 NOTE — PROGRESS NOTE ADULT - SUBJECTIVE AND OBJECTIVE BOX
Chief Complaint: Fevers, shortness of breath    Interval Events: Hemoglobin trended down. Potassium low.    Review of Systems:  Unable to obtain    Physical Exam:  Vital Signs Last 24 Hrs  T(C): 36.5 (03 Apr 2020 03:15), Max: 36.9 (02 Apr 2020 20:00)  T(F): 97.7 (03 Apr 2020 03:15), Max: 98.5 (02 Apr 2020 20:00)  HR: 54 (03 Apr 2020 06:00) (52 - 59)  BP: 148/54 (03 Apr 2020 06:00) (134/53 - 152/54)  BP(mean): 76 (03 Apr 2020 06:00) (70 - 79)  RR: 18 (03 Apr 2020 06:00) (15 - 23)  SpO2: 93% (03 Apr 2020 06:00) (90% - 96%)  General: Sedated  HEENT: Intubated  Neck: No JVD, no carotid bruit  Extremities: No LE edema, no cyanosis  Neuro: Non-focal  Skin: No rash    Labs:    04-03    138  |  107  |  78<H>  ----------------------------<  215<H>  2.5<LL>   |  17<L>  |  3.36<H>    Ca    6.8<L>      03 Apr 2020 06:39  Phos  5.1     04-03  Mg     1.6     04-03    TPro  5.1<L>  /  Alb  1.6<L>  /  TBili  0.3  /  DBili  x   /  AST  10  /  ALT  17  /  AlkPhos  53  04-03                        6.6    7.68  )-----------( 354      ( 03 Apr 2020 07:15 )             20.3       Telemetry: Sinus rhythm

## 2020-04-03 NOTE — PROGRESS NOTE ADULT - SUBJECTIVE AND OBJECTIVE BOX
Patient is a 74y old  Female who presents with a chief complaint of SOB (31 Mar 2020 05:05)  Patient seen in follow up for ALIS, ATN.      Events noted. chart reviewed. Improving renal indices.     PAST MEDICAL HISTORY:  DJD (degenerative joint disease)  DM (diabetes mellitus)  Hyperlipidemia  Hypothyroid  HTN (hypertension)  History of vitamin D deficiency  B12 deficiency  Bronchitis  Anxiety      MEDICATIONS  (STANDING):  albumin human 25% IVPB 50 milliLiter(s) IV Intermittent every 8 hours  brimonidine 0.2% Ophthalmic Solution 1 Drop(s) Left EYE daily  chlorhexidine 0.12% Liquid 15 milliLiter(s) Oral Mucosa every 12 hours  chlorhexidine 2% Cloths 1 Application(s) Topical <User Schedule>  dexMEDEtomidine Infusion 0.7 MICROgram(s)/kG/Hr (15.9 mL/Hr) IV Continuous <Continuous>  dextrose 5%. 1000 milliLiter(s) (50 mL/Hr) IV Continuous <Continuous>  dextrose 50% Injectable 12.5 Gram(s) IV Push once  dextrose 50% Injectable 25 Gram(s) IV Push once  dextrose 50% Injectable 25 Gram(s) IV Push once  heparin  Injectable 5000 Unit(s) SubCutaneous every 8 hours  insulin glargine Injectable (LANTUS) 12 Unit(s) SubCutaneous at bedtime  insulin lispro (HumaLOG) corrective regimen sliding scale   SubCutaneous every 6 hours  latanoprost 0.005% Ophthalmic Solution 1 Drop(s) Both EYES at bedtime  levothyroxine Injectable 12.5 MICROGram(s) IV Push at bedtime  pantoprazole  Injectable 40 milliGRAM(s) IV Push daily  piperacillin/tazobactam IVPB.. 3.375 Gram(s) IV Intermittent every 12 hours  potassium chloride    Tablet ER 20 milliEquivalent(s) Oral once  propofol Infusion 73.502 MICROgram(s)/kG/Min (40 mL/Hr) IV Continuous <Continuous>  simvastatin 20 milliGRAM(s) Oral at bedtime  timolol 0.5% Solution 1 Drop(s) Left EYE daily    MEDICATIONS  (PRN):  acetaminophen   Tablet .. 650 milliGRAM(s) Oral every 6 hours PRN Temp greater or equal to 38C (100.4F), Mild Pain (1 - 3)  dextrose 40% Gel 15 Gram(s) Oral once PRN Blood Glucose LESS THAN 70 milliGRAM(s)/deciliter  glucagon  Injectable 1 milliGRAM(s) IntraMuscular once PRN Glucose LESS THAN 70 milligrams/deciliter  midazolam Injectable 2 milliGRAM(s) IV Push every 2 hours PRN Agitation  sodium chloride 0.9% lock flush 10 milliLiter(s) IV Push every 1 hour PRN Pre/post blood products, medications, blood draw, and to maintain line patency    T(C): 36.4 (04-03-20 @ 09:00), Max: 36.9 (04-02-20 @ 20:00)  HR: 89 (04-03-20 @ 09:30) (48 - 91)  BP: 148/54 (04-03-20 @ 06:00) (120/41 - 152/54)  RR: 27 (04-03-20 @ 09:00)  SpO2: 88% (04-03-20 @ 09:30)  Wt(kg): --  I&O's Detail    02 Apr 2020 07:01  -  03 Apr 2020 07:00  --------------------------------------------------------  IN:    dexmedetomidine Infusion: 297.5 mL    fentaNYL Infusion.: 13.5 mL    Free Water: 720 mL    Glucerna 1.5: 1080 mL    propofol Infusion: 348.3 mL    Solution: 50 mL    Solution: 275 mL  Total IN: 2784.3 mL    OUT:    Indwelling Catheter - Urethral: 800 mL    Indwelling Catheter - Urethral: 375 mL  Total OUT: 1175 mL    Total NET: 1609.3 mL      03 Apr 2020 07:01  -  03 Apr 2020 14:33  --------------------------------------------------------  IN:    Free Water: 120 mL    Glucerna 1.5: 180 mL    propofol Infusion: 19 mL    Solution: 100 mL  Total IN: 419 mL    OUT:  Total OUT: 0 mL    Total NET: 419 mL        PHYSICAL EXAM:  Pt on COVID precautions. Chart reviewed and previous physical examination noted.                          LABORATORY:                        6.6    7.68  )-----------( 354      ( 03 Apr 2020 07:15 )             20.3     04-03    138  |  107  |  78<H>  ----------------------------<  215<H>  2.5<LL>   |  17<L>  |  3.36<H>    Ca    6.8<L>      03 Apr 2020 06:39  Phos  5.1     04-03  Mg     1.6     04-03    TPro  5.1<L>  /  Alb  1.6<L>  /  TBili  0.3  /  DBili  x   /  AST  10  /  ALT  17  /  AlkPhos  53  04-03    Sodium, Serum: 138 mmol/L (04-03 @ 06:39)  Sodium, Serum: 133 mmol/L (04-02 @ 06:58)    Potassium, Serum: 2.5 mmol/L (04-03 @ 06:39)  Potassium, Serum: 3.5 mmol/L (04-02 @ 06:58)    Hemoglobin: 6.6 g/dL (04-03 @ 07:15)  Hemoglobin: 8.0 g/dL (04-02 @ 06:58)  Hemoglobin: 9.1 g/dL (04-01 @ 06:46)    Creatinine, Serum 3.36 (04-03 @ 06:39)  Creatinine, Serum 4.59 (04-02 @ 06:58)  Creatinine, Serum 4.58 (04-01 @ 06:46)        LIVER FUNCTIONS - ( 03 Apr 2020 06:39 )  Alb: 1.6 g/dL / Pro: 5.1 g/dL / ALK PHOS: 53 U/L / ALT: 17 U/L DA / AST: 10 U/L / GGT: x

## 2020-04-03 NOTE — PROGRESS NOTE ADULT - SUBJECTIVE AND OBJECTIVE BOX
Patient is a 74y old  Female who presents with a chief complaint of shob (03 Apr 2020 15:46)      INTERVAL HPI/OVERNIGHT EVENTS:  Staff reports diarrhea x2 days. No reported events overnight. Patient sedated.    MEDICATIONS  (STANDING):  albumin human 25% IVPB 50 milliLiter(s) IV Intermittent every 8 hours  brimonidine 0.2% Ophthalmic Solution 1 Drop(s) Left EYE daily  chlorhexidine 0.12% Liquid 15 milliLiter(s) Oral Mucosa every 12 hours  chlorhexidine 2% Cloths 1 Application(s) Topical <User Schedule>  dexMEDEtomidine Infusion 0.7 MICROgram(s)/kG/Hr (15.9 mL/Hr) IV Continuous <Continuous>  dextrose 5%. 1000 milliLiter(s) (50 mL/Hr) IV Continuous <Continuous>  dextrose 50% Injectable 12.5 Gram(s) IV Push once  dextrose 50% Injectable 25 Gram(s) IV Push once  dextrose 50% Injectable 25 Gram(s) IV Push once  heparin  Injectable 5000 Unit(s) SubCutaneous every 8 hours  insulin glargine Injectable (LANTUS) 12 Unit(s) SubCutaneous at bedtime  insulin lispro (HumaLOG) corrective regimen sliding scale   SubCutaneous every 6 hours  latanoprost 0.005% Ophthalmic Solution 1 Drop(s) Both EYES at bedtime  levothyroxine Injectable 12.5 MICROGram(s) IV Push at bedtime  pantoprazole  Injectable 40 milliGRAM(s) IV Push daily  piperacillin/tazobactam IVPB.. 3.375 Gram(s) IV Intermittent every 12 hours  propofol Infusion 73.502 MICROgram(s)/kG/Min (40 mL/Hr) IV Continuous <Continuous>  simvastatin 20 milliGRAM(s) Oral at bedtime  timolol 0.5% Solution 1 Drop(s) Left EYE daily    MEDICATIONS  (PRN):  acetaminophen   Tablet .. 650 milliGRAM(s) Oral every 6 hours PRN Temp greater or equal to 38C (100.4F), Mild Pain (1 - 3)  dextrose 40% Gel 15 Gram(s) Oral once PRN Blood Glucose LESS THAN 70 milliGRAM(s)/deciliter  glucagon  Injectable 1 milliGRAM(s) IntraMuscular once PRN Glucose LESS THAN 70 milligrams/deciliter  HYDROmorphone  Injectable 2 milliGRAM(s) IV Push every 4 hours PRN Mild Pain (1 - 3)  midazolam Injectable 2 milliGRAM(s) IV Push every 2 hours PRN Agitation  sodium chloride 0.9% lock flush 10 milliLiter(s) IV Push every 1 hour PRN Pre/post blood products, medications, blood draw, and to maintain line patency      Allergies    No Known Allergies    Intolerances        REVIEW OF SYSTEMS unable to perform given pt sedated and unresponsive    Vital Signs Last 24 Hrs  T(C): 35.9 (03 Apr 2020 16:00), Max: 36.5 (03 Apr 2020 03:15)  T(F): 96.7 (03 Apr 2020 16:00), Max: 97.7 (03 Apr 2020 03:15)  HR: 59 (03 Apr 2020 16:30) (52 - 107)  BP: 126/65 (03 Apr 2020 16:30) (122/60 - 156/67)  BP(mean): 72 (03 Apr 2020 16:30) (72 - 76)  RR: 15 (03 Apr 2020 16:30) (15 - 27)  SpO2: 97% (03 Apr 2020 16:30) (88% - 97%)    PHYSICAL EXAM:  GENERAL: NAD, well-groomed, well-developed, obese  HEAD:  Atraumatic, Normocephalic  ENMT: Moist mucous membranes   NECK: Supple, No JVD appreciated  NERVOUS SYSTEM: Patient unresponsive to voice or touch  CHEST/LUNG: Loud blowing breath sounds b/l. No rales, rhonchi, wheezing, or rubs appreciated  HEART: Regular rate and rhythm on monitor, unable to auscultate heart sounds due to body habitus; No murmurs, rubs, or gallops appreciated  ABDOMEN: Soft, Nontender, Nondistended; Hyperactive bowel sounds present  EXTREMITIES:  2+ Peripheral Pulses, No clubbing or cyanosis  LYMPH: No lymphadenopathy noted  SKIN: No rashes or lesions appreciated. Central line in L neck, femoral line in groin, no discharge at sites.      LABS:                        6.6    7.68  )-----------( 354      ( 03 Apr 2020 07:15 )             20.3     03 Apr 2020 16:10    130    |  97     |  93     ----------------------------<  283    4.2     |  20     |  4.29     Ca    8.6        03 Apr 2020 16:10  Phos  5.1       03 Apr 2020 06:39  Mg     1.6       03 Apr 2020 06:39    TPro  6.7    /  Alb  2.0    /  TBili  0.4    /  DBili  x      /  AST  20     /  ALT  25     /  AlkPhos  79     03 Apr 2020 16:10        CAPILLARY BLOOD GLUCOSE      POCT Blood Glucose.: 248 mg/dL (03 Apr 2020 17:45)  POCT Blood Glucose.: 300 mg/dL (03 Apr 2020 12:03)  POCT Blood Glucose.: 253 mg/dL (03 Apr 2020 05:53)  POCT Blood Glucose.: 199 mg/dL (02 Apr 2020 23:10)           [x] Consultant(s) Notes Reviewed  [x] Care Discussed with Consultants/Other Providers:  Critical care attending

## 2020-04-03 NOTE — PROGRESS NOTE ADULT - ASSESSMENT
75 y/o F with hx of DM, HTN, HLD, hypothyroid, anxiety who presents with Covid-19 pneumonia. Patient is currently sedated on propofol drip, with plan to add versed. Seen by GI, nephrology, cardiology, ID, and critical care attending. Patient DNR

## 2020-04-03 NOTE — PROGRESS NOTE ADULT - SUBJECTIVE AND OBJECTIVE BOX
24 hour events:  No acute 24 hour events.    Review of Systems:  Unable to obtain      T(F): 97.6 (04-03-20 @ 09:00), Max: 98.5 (04-02-20 @ 20:00)  HR: 89 (04-03-20 @ 09:30) (52 - 91)  BP: 148/54 (04-03-20 @ 06:00) (134/53 - 150/55)  RR: 27 (04-03-20 @ 09:00) (16 - 27)  SpO2: 88% (04-03-20 @ 09:30) (88% - 96%)  Wt(kg): --    Mode: AC/ CMV (Assist Control/ Continuous Mandatory Ventilation), RR (machine): 15, TV (machine): 400, FiO2: 50, PEEP: 5  04-02-20 @ 07:01  -  04-03-20 @ 07:00  --------------------------------------------------------  IN: 2784.3 mL / OUT: 1175 mL / NET: 1609.3 mL    04-03-20 @ 07:01  -  04-03-20 @ 15:46  --------------------------------------------------------  IN: 419 mL / OUT: 0 mL / NET: 419 mL        CAPILLARY BLOOD GLUCOSE      POCT Blood Glucose.: 300 mg/dL (03 Apr 2020 12:03)      I&O's Summary    02 Apr 2020 07:01  -  03 Apr 2020 07:00  --------------------------------------------------------  IN: 2784.3 mL / OUT: 1175 mL / NET: 1609.3 mL    03 Apr 2020 07:01  -  03 Apr 2020 15:46  --------------------------------------------------------  IN: 419 mL / OUT: 0 mL / NET: 419 mL        Physical Exam:   Gen:  Appears uncomfortable  Neuro:  Sedated  Resp:  Vented, decreased O2 sats  CVS:  Regular  Abd:  Distended, diarrhea      Meds:  piperacillin/tazobactam IVPB.. IV Intermittent      dextrose 40% Gel Oral PRN  dextrose 50% Injectable IV Push  dextrose 50% Injectable IV Push  dextrose 50% Injectable IV Push  glucagon  Injectable IntraMuscular PRN  insulin glargine Injectable (LANTUS) SubCutaneous  insulin lispro (HumaLOG) corrective regimen sliding scale SubCutaneous  levothyroxine Injectable IV Push  simvastatin Oral      acetaminophen   Tablet .. Oral PRN  dexMEDEtomidine Infusion IV Continuous  HYDROmorphone  Injectable IV Push PRN  midazolam Injectable IV Push PRN  propofol Infusion IV Continuous      heparin  Injectable SubCutaneous    pantoprazole  Injectable IV Push      albumin human 25% IVPB IV Intermittent  dextrose 5%. IV Continuous  potassium chloride    Tablet ER Oral  sodium chloride 0.9% lock flush IV Push PRN      brimonidine 0.2% Ophthalmic Solution Left EYE  chlorhexidine 0.12% Liquid Oral Mucosa  chlorhexidine 2% Cloths Topical  latanoprost 0.005% Ophthalmic Solution Both EYES  timolol 0.5% Solution Left EYE                              6.6    7.68  )-----------( 354      ( 03 Apr 2020 07:15 )             20.3       04-03    138  |  107  |  78<H>  ----------------------------<  215<H>  2.5<LL>   |  17<L>  |  3.36<H>    Ca    6.8<L>      03 Apr 2020 06:39  Phos  5.1     04-03  Mg     1.6     04-03    TPro  5.1<L>  /  Alb  1.6<L>  /  TBili  0.3  /  DBili  x   /  AST  10  /  ALT  17  /  AlkPhos  53  04-03

## 2020-04-03 NOTE — PROGRESS NOTE ADULT - PROBLEM SELECTOR PLAN 10
possibly d/t diarrhea episodes  Added KCl today  will monitor Mg  holding bicarb until repleted  continue tele monitoring for changes

## 2020-04-03 NOTE — PROGRESS NOTE ADULT - PROBLEM SELECTOR PLAN 9
H/H 6.6/20.3  1u pRBC ordered, will repeat CBC and transfuse more as necessary for maintaining Hgb>7.0   Hx of B12 anemia, normal MCV at this time  negative stool guaic, continuing PPI as per GI

## 2020-04-03 NOTE — PROGRESS NOTE ADULT - ASSESSMENT
The patient is a 74 year old female with a history of HTN, DM, HL, hypothyroidism, anxiety who presented with fevers, cough, shortness of breath in the setting of viral PNA, COVID-19, respiratory failure, septic shock.     Plan:  - CXR consistent with PNA  - COVID-19 positive  - Completed hydroxychloroquine  - Continue zosyn  - Off of midodrine  - Hold off on diuresis today  - Atrial fibrillation - brief, back in sinus rhythm. Hold metoprolol for now due to bradycardia.  - May need additional transfusion today  - Renal function improving  - Replete K  - Continue mechanical ventilation  - Pulm and ID follow-up

## 2020-04-03 NOTE — PROGRESS NOTE ADULT - PROBLEM SELECTOR PLAN 3
likely distributive shock from sedation, resolved  discontinued levophed, midodrine  continue albumin as per nephro to prevent shock

## 2020-04-03 NOTE — PROGRESS NOTE ADULT - SUBJECTIVE AND OBJECTIVE BOX
ADELIA BALDWIN is a 74yFemale , patient examined and chart reviewed.     INTERVAL HPI/ OVERNIGHT EVENTS:  Vented sedated   Afebrile. Anemic.    Past Medical History--  PAST MEDICAL & SURGICAL HISTORY:  DJD (degenerative joint disease)  DM (diabetes mellitus)  Hyperlipidemia  Hypothyroid  HTN (hypertension)  History of vitamin D deficiency  B12 deficiency  Bronchitis  Anxiety  S/P cataract surgery  S/P eye surgery: left eye retinal surgery x2  S/P  section: x2      For details regarding the patient's social history, family history, and other miscellaneous elements, please refer the initial infectious diseases consultation and/or the admitting history and physical examination for this admission.      ROS:  Unable to obtain due to : pt's condition      Current inpatient medications :    ANTIBIOTICS/RELEVANT:  piperacillin/tazobactam IVPB.. 3.375 Gram(s) IV Intermittent every 8 hours    MEDICATIONS  (STANDING):  albumin human 25% IVPB 50 milliLiter(s) IV Intermittent every 8 hours  brimonidine 0.2% Ophthalmic Solution 1 Drop(s) Left EYE daily  chlorhexidine 0.12% Liquid 15 milliLiter(s) Oral Mucosa every 12 hours  chlorhexidine 2% Cloths 1 Application(s) Topical <User Schedule>  dexMEDEtomidine Infusion 0.7 MICROgram(s)/kG/Hr (15.9 mL/Hr) IV Continuous <Continuous>  dextrose 5%. 1000 milliLiter(s) (50 mL/Hr) IV Continuous <Continuous>  dextrose 50% Injectable 12.5 Gram(s) IV Push once  dextrose 50% Injectable 25 Gram(s) IV Push once  dextrose 50% Injectable 25 Gram(s) IV Push once  heparin  Injectable 5000 Unit(s) SubCutaneous every 8 hours  insulin glargine Injectable (LANTUS) 12 Unit(s) SubCutaneous at bedtime  insulin lispro (HumaLOG) corrective regimen sliding scale   SubCutaneous every 6 hours  latanoprost 0.005% Ophthalmic Solution 1 Drop(s) Both EYES at bedtime  levothyroxine Injectable 12.5 MICROGram(s) IV Push at bedtime  pantoprazole  Injectable 40 milliGRAM(s) IV Push daily  potassium chloride    Tablet ER 20 milliEquivalent(s) Oral once  propofol Infusion 73.502 MICROgram(s)/kG/Min (40 mL/Hr) IV Continuous <Continuous>  simvastatin 20 milliGRAM(s) Oral at bedtime  timolol 0.5% Solution 1 Drop(s) Left EYE daily    MEDICATIONS  (PRN):  acetaminophen   Tablet .. 650 milliGRAM(s) Oral every 6 hours PRN Temp greater or equal to 38C (100.4F), Mild Pain (1 - 3)  dextrose 40% Gel 15 Gram(s) Oral once PRN Blood Glucose LESS THAN 70 milliGRAM(s)/deciliter  glucagon  Injectable 1 milliGRAM(s) IntraMuscular once PRN Glucose LESS THAN 70 milligrams/deciliter  HYDROmorphone  Injectable 2 milliGRAM(s) IV Push every 4 hours PRN Mild Pain (1 - 3)  midazolam Injectable 2 milliGRAM(s) IV Push every 2 hours PRN Agitation  sodium chloride 0.9% lock flush 10 milliLiter(s) IV Push every 1 hour PRN Pre/post blood products, medications, blood draw, and to maintain line patency      Objective:  ICU Vital Signs Last 24 Hrs  T(C): 35.9 (2020 16:00), Max: 36.9 (2020 20:00)  T(F): 96.7 (2020 16:00), Max: 98.5 (2020 20:00)  HR: 59 (2020 16:30) (52 - 91)  BP: 126/65 (2020 16:30) (122/60 - 156/67)  BP(mean): 72 (2020 16:30) (72 - 78)  ABP: 124/61 (2020 16:30) (114/64 - 178/74)  ABP(mean): 82 (2020 16:30) (77 - 123)  RR: 15 (2020 16:30) (15 - 27)  SpO2: 97% (2020 16:30) (88% - 97%)      Mode: AC/ CMV (Assist Control/ Continuous Mandatory Ventilation)  RR (machine): 15  TV (machine): 400  FiO2: 50  PEEP: 5  ITime: 1  MAP: 10.5  PIP: 27      Physical Exam:  GEN: Intubated sedated  HEENT: normocephalic and atraumatic. EOMI. OLGA. Moist mucosa. Clear Posterior pharynx.  NECK: Supple. No carotid bruits.  No lymphadenopathy or thyromegaly.  LUNGS: Decreased to auscultation.  HEART: Regular rate and rhythm without murmur.  ABDOMEN: Soft, nontender, and nondistended.  Positive bowel sounds.   EXTREMITIES: +edema.  NEUROLOGIC: Sedated   SKIN: No ulceration or induration present.      LABS:                        6.6    7.68  )-----------( 354      ( 2020 07:15 )             20.3   04-03    130<L>  |  97  |  93<H>  ----------------------------<  283<H>  4.2   |  20<L>  |  4.29<H>    Ca    8.6      2020 16:10  Phos  5.1     04-03  Mg     1.6     04-03    TPro  6.7  /  Alb  2.0<L>  /  TBili  0.4  /  DBili  x   /  AST  20  /  ALT  25  /  AlkPhos  79  04-03      MICROBIOLOGY:    Culture - Urine (collected 27 Mar 2020 11:03)  Source: .Urine Clean Catch (Midstream)  Final Report (28 Mar 2020 10:55):    No growth    Culture - Blood (collected 26 Mar 2020 23:00)  Source: .Blood Blood  Preliminary Report (28 Mar 2020 01:03):    No growth to date.    Culture - Blood (collected 26 Mar 2020 23:00)  Source: .Blood Blood  Preliminary Report (28 Mar 2020 01:02):    No growth to date.    COVID-19 PCR . (20 @ 22:21)    COVID-19 PCR: Detected: All “detected” results on this new test are considered presumptively  positive results, are clinically actionable, and specimens will be  forwarded to [facility] for confirmation testing.  Another report (corrected report) will only be issued if discordant  results occur.  This test has been validated by [employer] to be accurate;  though it has not been FDA cleared/approved by the usual pathway.  As with all laboratory tests, results should be correlated with clinical  findings.      RADIOLOGY & ADDITIONAL STUDIES:    PROCEDURE DATE:  2020      INTERPRETATION:  AP erect chest on 2020 at 8:26 AM. Patient is desaturating and requires intubation.    Heart is magnified by technique. Calcified mitral area again seen.    There are infiltrates in the mid lower lung fields that are increasing from study earlier in the day.    Endotracheal tube remains.    IMPRESSION: Increasing basilar infiltrates.    No significant pleural effusion    Assessment :  75 y/o F with hx of DM, HTN, HLD, hypothyroid, anxiety presented admitted with severe sepsis with septic shock and acute hypoxic respiratory failure with MSOF sec COVID 19 pneumonia. Sp Cardiorespiratory arrest.  Worsening ALIS. Anemic. Procalcitonin markedly elevated- concern for superimposed bacterial process. Off pressors. Remains vented.     Plan :   Completed Hydroxychloroquine x 5 days  Supportive care  Cont Zosyn renally dosed or possible superimposed bacterial process till 2020  Vent per pulm ICU  Trend temps  Prognosis guarded  DNR  COVID-19---high risk period for decompensation  (7-14 days post symptom onset), avoid aerosolizing procedures, NSAIDs   -avoid excessive blood draws and frequent CXRs  -daily CBC w diff to follow eos, lymphocytes and neutrophils and daily NLR calculation (NLR <3 low vs >5 high)  -Other labs that may be selectively used to risk stratify and predict clinical course, ie: Ferritin (lower risk <450 vs >850) CRP (low risk <2 and higher risk >6) and LDH, D Dimer  -antibiotics only if there is concern for a bacterial process      D/w ICU team      Continue with present regiment.  Appropriate use of antibiotics and adverse effects reviewed.    I have discussed the above plan of care with patient/ family in detail. They expressed understanding of the the treatment plan . Risks, benefits and alternatives discussed in detail. I have asked if they have any questions or concerns and appropriately addressed them to the best of my ability .      Critical care time greater then 35 minutes reviewing notes, labs data/ imaging , discussion with multidisciplinary team.    Thank you for allowing me to participate in care of your patient .        Eder Brown MD  Infectious Disease  [phone]

## 2020-04-03 NOTE — PROGRESS NOTE ADULT - ASSESSMENT
1.	ALIS: ATN, COVID renal injury  2.	SARS-COVID 19, Resp failure on vent  3.	Anemia  4.	Diabetes    Improving renal indices. Add bicarb if needed once potassium levels better. To continue current meds. Supportive care. PRBC transfusion. Strict I & O.   Monitor h/h trend. Monitor blood sugar levels. Insulin coverage as needed. Avoid nephrotoxic meds as possible. Avoid ACEI, ARB, NSAIDs and IV contrast.   Will follow electrolytes and renal function trend. ICU management.

## 2020-04-03 NOTE — CHART NOTE - NSCHARTNOTEFT_GEN_A_CORE
Assessment/nutrition follow-up:  Pt is a 73 yo female with hx DM2. HTN, Vit B, Vit D deficiency, afib, hypothyroidism admitted with + COVID19,  Pt required  intubation and central line insertion; patient is sedated with vent support; patient is also s/p cardiac arrest. Pt receiving bolus feedings via NGT of Glucerna 1.5 180 ml every 4 hrs (6x per day) with 60ml free water flush before and after.  Noted pt is tolerating current tube feeding order.  Renal labs appear to be gradually improving.  Noted drop H/H. Noted pt continues to have loose stools, likely multifactorial.  RD to continue to follow.      Factors impacting intake: [ ] none [ ] nausea  [ ] vomiting [ ] diarrhea [ ] constipation  [ ]chewing problems [ ] swallowing issues  [ x] other: intubation    Diet Prescription: Diet, NPO with Tube Feed:   Tube Feeding Modality: Nasogastric  Glucerna 1.5 Car  Total Volume for 24 Hours (mL): 1080  Bolus  Total Volume of Bolus (mL):  180  Total # of Feeds: 6  Tube Feed Frequency: Every 4 hours   Tube Feed Start Time: 05:00  Bolus Feed Rate (mL per Hour): 180   Bolus Feed Duration (in Hours): 1  Bolus Feed Instructions:  give bolus every 4 hours with 60mL water flush before and after each bolus (03-28-20 @ 15:35)    Intake: bolus feedings    Current Weight:   % Weight Change - no updated bw to assess; generalized edema     Pertinent Medications: MEDICATIONS  (STANDING):  albumin human 25% IVPB 50 milliLiter(s) IV Intermittent every 8 hours  brimonidine 0.2% Ophthalmic Solution 1 Drop(s) Left EYE daily  chlorhexidine 0.12% Liquid 15 milliLiter(s) Oral Mucosa every 12 hours  chlorhexidine 2% Cloths 1 Application(s) Topical <User Schedule>  dexMEDEtomidine Infusion 0.7 MICROgram(s)/kG/Hr (15.9 mL/Hr) IV Continuous <Continuous>  dextrose 5%. 1000 milliLiter(s) (50 mL/Hr) IV Continuous <Continuous>  dextrose 50% Injectable 12.5 Gram(s) IV Push once  dextrose 50% Injectable 25 Gram(s) IV Push once  dextrose 50% Injectable 25 Gram(s) IV Push once  heparin  Injectable 5000 Unit(s) SubCutaneous every 8 hours  insulin glargine Injectable (LANTUS) 12 Unit(s) SubCutaneous at bedtime  insulin lispro (HumaLOG) corrective regimen sliding scale   SubCutaneous every 6 hours  latanoprost 0.005% Ophthalmic Solution 1 Drop(s) Both EYES at bedtime  levothyroxine Injectable 12.5 MICROGram(s) IV Push at bedtime  pantoprazole  Injectable 40 milliGRAM(s) IV Push daily  piperacillin/tazobactam IVPB.. 3.375 Gram(s) IV Intermittent every 12 hours  potassium chloride    Tablet ER 20 milliEquivalent(s) Oral once  propofol Infusion 73.502 MICROgram(s)/kG/Min (40 mL/Hr) IV Continuous <Continuous>  simvastatin 20 milliGRAM(s) Oral at bedtime  timolol 0.5% Solution 1 Drop(s) Left EYE daily    MEDICATIONS  (PRN):  acetaminophen   Tablet .. 650 milliGRAM(s) Oral every 6 hours PRN Temp greater or equal to 38C (100.4F), Mild Pain (1 - 3)  dextrose 40% Gel 15 Gram(s) Oral once PRN Blood Glucose LESS THAN 70 milliGRAM(s)/deciliter  glucagon  Injectable 1 milliGRAM(s) IntraMuscular once PRN Glucose LESS THAN 70 milligrams/deciliter  HYDROmorphone  Injectable 2 milliGRAM(s) IV Push every 4 hours PRN Mild Pain (1 - 3)  midazolam Injectable 2 milliGRAM(s) IV Push every 2 hours PRN Agitation  sodium chloride 0.9% lock flush 10 milliLiter(s) IV Push every 1 hour PRN Pre/post blood products, medications, blood draw, and to maintain line patency    Pertinent Labs: 04-03 Na138 mmol/L Glu 215 mg/dL<H> K+ 2.5 mmol/L<LL> Cr  3.36 mg/dL<H> BUN 78 mg/dL<H> 04-03 Phos 5.1 mg/dL<H> 04-03 Alb 1.6 g/dL<L> 03-27 [id number] 8.1 %<H>     CAPILLARY BLOOD GLUCOSE      POCT Blood Glucose.: 300 mg/dL (03 Apr 2020 12:03)  POCT Blood Glucose.: 253 mg/dL (03 Apr 2020 05:53)  POCT Blood Glucose.: 199 mg/dL (02 Apr 2020 23:10)  POCT Blood Glucose.: 229 mg/dL (02 Apr 2020 17:32)    Skin: intact    Estimated Needs:   [x ] no change since previous assessment  [ ] recalculated:     Previous Nutrition Diagnosis:   [ ] Inadequate Energy Intake [x ]Inadequate Oral Intake [ ] Excessive Energy Intake   [ ] Underweight [ ] Increased Nutrient Needs [ ] Overweight/Obesity   [ ] Altered GI Function [ ] Unintended Weight Loss [ ] Food & Nutrition Related Knowledge Deficit [ ] Malnutrition     Nutrition Diagnosis is [x ] ongoing  [ ] resolved [ ] not applicable     New Nutrition Diagnosis: [ ] not applicable       Interventions: c/w current tube feeding rx  Recommend  [ ] Change Diet To:  [ ] Nutrition Supplement  [ ] Nutrition Support  [ ] Other:     Monitoring and Evaluation:   [ ] PO intake [ x ] Tolerance to diet prescription/bolus feeds [ x ] weights [ x ] labs[ x ] follow up per protocol  [ ] other:

## 2020-04-03 NOTE — PROGRESS NOTE ADULT - ASSESSMENT
covid  ftt  anemia    plan  drop in hgb -- transfuse prn  monitor for signs of gi bleeding  in and out  ppi once a day  gerd precautions

## 2020-04-04 LAB
ALBUMIN SERPL ELPH-MCNC: 2.5 G/DL — LOW (ref 3.3–5)
ALP SERPL-CCNC: 66 U/L — SIGNIFICANT CHANGE UP (ref 30–120)
ALT FLD-CCNC: 20 U/L DA — SIGNIFICANT CHANGE UP (ref 10–60)
ANION GAP SERPL CALC-SCNC: 13 MMOL/L — SIGNIFICANT CHANGE UP (ref 5–17)
AST SERPL-CCNC: 21 U/L — SIGNIFICANT CHANGE UP (ref 10–40)
BASOPHILS # BLD AUTO: 0.02 K/UL — SIGNIFICANT CHANGE UP (ref 0–0.2)
BASOPHILS NFR BLD AUTO: 0.2 % — SIGNIFICANT CHANGE UP (ref 0–2)
BILIRUB SERPL-MCNC: 0.4 MG/DL — SIGNIFICANT CHANGE UP (ref 0.2–1.2)
BUN SERPL-MCNC: 90 MG/DL — HIGH (ref 7–23)
CALCIUM SERPL-MCNC: 9 MG/DL — SIGNIFICANT CHANGE UP (ref 8.4–10.5)
CHLORIDE SERPL-SCNC: 100 MMOL/L — SIGNIFICANT CHANGE UP (ref 96–108)
CO2 SERPL-SCNC: 22 MMOL/L — SIGNIFICANT CHANGE UP (ref 22–31)
CREAT SERPL-MCNC: 4.19 MG/DL — HIGH (ref 0.5–1.3)
EOSINOPHIL # BLD AUTO: 0.25 K/UL — SIGNIFICANT CHANGE UP (ref 0–0.5)
EOSINOPHIL NFR BLD AUTO: 3 % — SIGNIFICANT CHANGE UP (ref 0–6)
GLUCOSE BLDC GLUCOMTR-MCNC: 141 MG/DL — HIGH (ref 70–99)
GLUCOSE BLDC GLUCOMTR-MCNC: 160 MG/DL — HIGH (ref 70–99)
GLUCOSE BLDC GLUCOMTR-MCNC: 166 MG/DL — HIGH (ref 70–99)
GLUCOSE BLDC GLUCOMTR-MCNC: 168 MG/DL — HIGH (ref 70–99)
GLUCOSE SERPL-MCNC: 171 MG/DL — HIGH (ref 70–99)
HCT VFR BLD CALC: 26.9 % — LOW (ref 34.5–45)
HGB BLD-MCNC: 8.8 G/DL — LOW (ref 11.5–15.5)
IMM GRANULOCYTES NFR BLD AUTO: 2.6 % — HIGH (ref 0–1.5)
LYMPHOCYTES # BLD AUTO: 0.28 K/UL — LOW (ref 1–3.3)
LYMPHOCYTES # BLD AUTO: 3.3 % — LOW (ref 13–44)
MAGNESIUM SERPL-MCNC: 2.7 MG/DL — HIGH (ref 1.6–2.6)
MCHC RBC-ENTMCNC: 28.8 PG — SIGNIFICANT CHANGE UP (ref 27–34)
MCHC RBC-ENTMCNC: 32.7 GM/DL — SIGNIFICANT CHANGE UP (ref 32–36)
MCV RBC AUTO: 87.9 FL — SIGNIFICANT CHANGE UP (ref 80–100)
MONOCYTES # BLD AUTO: 0.39 K/UL — SIGNIFICANT CHANGE UP (ref 0–0.9)
MONOCYTES NFR BLD AUTO: 4.6 % — SIGNIFICANT CHANGE UP (ref 2–14)
NEUTROPHILS # BLD AUTO: 7.26 K/UL — SIGNIFICANT CHANGE UP (ref 1.8–7.4)
NEUTROPHILS NFR BLD AUTO: 86.3 % — HIGH (ref 43–77)
NRBC # BLD: 0 /100 WBCS — SIGNIFICANT CHANGE UP (ref 0–0)
PLATELET # BLD AUTO: 417 K/UL — HIGH (ref 150–400)
POTASSIUM SERPL-MCNC: 3.7 MMOL/L — SIGNIFICANT CHANGE UP (ref 3.5–5.3)
POTASSIUM SERPL-SCNC: 3.7 MMOL/L — SIGNIFICANT CHANGE UP (ref 3.5–5.3)
PROT SERPL-MCNC: 6.8 G/DL — SIGNIFICANT CHANGE UP (ref 6–8.3)
RBC # BLD: 3.06 M/UL — LOW (ref 3.8–5.2)
RBC # FLD: 14.6 % — HIGH (ref 10.3–14.5)
SODIUM SERPL-SCNC: 135 MMOL/L — SIGNIFICANT CHANGE UP (ref 135–145)
WBC # BLD: 8.42 K/UL — SIGNIFICANT CHANGE UP (ref 3.8–10.5)
WBC # FLD AUTO: 8.42 K/UL — SIGNIFICANT CHANGE UP (ref 3.8–10.5)

## 2020-04-04 PROCEDURE — 36556 INSERT NON-TUNNEL CV CATH: CPT

## 2020-04-04 PROCEDURE — 99233 SBSQ HOSP IP/OBS HIGH 50: CPT | Mod: CS

## 2020-04-04 PROCEDURE — 71045 X-RAY EXAM CHEST 1 VIEW: CPT | Mod: 26

## 2020-04-04 RX ADMIN — PANTOPRAZOLE SODIUM 40 MILLIGRAM(S): 20 TABLET, DELAYED RELEASE ORAL at 11:57

## 2020-04-04 RX ADMIN — PIPERACILLIN AND TAZOBACTAM 25 GRAM(S): 4; .5 INJECTION, POWDER, LYOPHILIZED, FOR SOLUTION INTRAVENOUS at 18:06

## 2020-04-04 RX ADMIN — PROPOFOL 40 MICROGRAM(S)/KG/MIN: 10 INJECTION, EMULSION INTRAVENOUS at 06:33

## 2020-04-04 RX ADMIN — Medication 2: at 11:57

## 2020-04-04 RX ADMIN — SIMVASTATIN 20 MILLIGRAM(S): 20 TABLET, FILM COATED ORAL at 23:31

## 2020-04-04 RX ADMIN — PROPOFOL 40 MICROGRAM(S)/KG/MIN: 10 INJECTION, EMULSION INTRAVENOUS at 23:35

## 2020-04-04 RX ADMIN — BRIMONIDINE TARTRATE 1 DROP(S): 2 SOLUTION/ DROPS OPHTHALMIC at 11:55

## 2020-04-04 RX ADMIN — HYDROMORPHONE HYDROCHLORIDE 2 MILLIGRAM(S): 2 INJECTION INTRAMUSCULAR; INTRAVENOUS; SUBCUTANEOUS at 13:17

## 2020-04-04 RX ADMIN — DEXMEDETOMIDINE HYDROCHLORIDE IN 0.9% SODIUM CHLORIDE 15.9 MICROGRAM(S)/KG/HR: 4 INJECTION INTRAVENOUS at 23:35

## 2020-04-04 RX ADMIN — Medication 12.5 MICROGRAM(S): at 23:34

## 2020-04-04 RX ADMIN — INSULIN GLARGINE 12 UNIT(S): 100 INJECTION, SOLUTION SUBCUTANEOUS at 23:31

## 2020-04-04 RX ADMIN — PROPOFOL 40 MICROGRAM(S)/KG/MIN: 10 INJECTION, EMULSION INTRAVENOUS at 00:23

## 2020-04-04 RX ADMIN — LATANOPROST 1 DROP(S): 0.05 SOLUTION/ DROPS OPHTHALMIC; TOPICAL at 23:34

## 2020-04-04 RX ADMIN — Medication 1 DROP(S): at 11:56

## 2020-04-04 RX ADMIN — CHLORHEXIDINE GLUCONATE 15 MILLILITER(S): 213 SOLUTION TOPICAL at 06:32

## 2020-04-04 RX ADMIN — HYDROMORPHONE HYDROCHLORIDE 2 MILLIGRAM(S): 2 INJECTION INTRAMUSCULAR; INTRAVENOUS; SUBCUTANEOUS at 03:13

## 2020-04-04 RX ADMIN — HEPARIN SODIUM 5000 UNIT(S): 5000 INJECTION INTRAVENOUS; SUBCUTANEOUS at 06:32

## 2020-04-04 RX ADMIN — CHLORHEXIDINE GLUCONATE 1 APPLICATION(S): 213 SOLUTION TOPICAL at 06:32

## 2020-04-04 RX ADMIN — CHLORHEXIDINE GLUCONATE 15 MILLILITER(S): 213 SOLUTION TOPICAL at 18:05

## 2020-04-04 RX ADMIN — MIDAZOLAM HYDROCHLORIDE 2 MILLIGRAM(S): 1 INJECTION, SOLUTION INTRAMUSCULAR; INTRAVENOUS at 00:08

## 2020-04-04 RX ADMIN — HEPARIN SODIUM 5000 UNIT(S): 5000 INJECTION INTRAVENOUS; SUBCUTANEOUS at 23:31

## 2020-04-04 RX ADMIN — PIPERACILLIN AND TAZOBACTAM 25 GRAM(S): 4; .5 INJECTION, POWDER, LYOPHILIZED, FOR SOLUTION INTRAVENOUS at 06:31

## 2020-04-04 RX ADMIN — HEPARIN SODIUM 5000 UNIT(S): 5000 INJECTION INTRAVENOUS; SUBCUTANEOUS at 11:56

## 2020-04-04 RX ADMIN — Medication 2: at 06:32

## 2020-04-04 NOTE — PROGRESS NOTE ADULT - SUBJECTIVE AND OBJECTIVE BOX
Patient is a 74y old  Female who presents with a chief complaint of sob (2020 19:07)    HPI:  Patient is a 74y old  Female who presents with a chief complaint of shob    HPI:  73 y/o F with hx of DM, HTN, HLD, hypothyroid, anxiety biba with c/o fever and cough x 6 days. Pt states that Tmax was 101.8F, took one tab of tylenol XS at 9am this morning. Pt states that she has associated dry cough, body aches and mild generalized weakness. States that she has had one episode of loose stool daily. Denies recent travel, sick contacts, known covid-19 exposure, CP, SOB, runny nose, sore throat, headache, rash, urinary symptoms.  she was sating 94% ON 4l and was then put on non rebreather and was transferred to spcu.    2 hr after initial exam around 7pm  due to increased work of breathing and shob anesthesisa was called for intubation.  covid was sent.        PAST MEDICAL & SURGICAL HISTORY:  DJD (degenerative joint disease)  DM (diabetes mellitus)  Hyperlipidemia  Hypothyroid  HTN (hypertension)  History of vitamin D deficiency  B12 deficiency  Bronchitis  Anxiety  S/P cataract surgery  S/P eye surgery: left eye retinal surgery x2  S/P  section: x2      FAMILY HISTORY:      SOCIAL HISTORY:    Allergies    No Known Allergies    Intolerances          REVIEW OF SYSEMS:  General: + weakness, + fever/chills, no weight loss/gain  Skin/Breast: no rash, no jaundice  Ophthalmologic: no vision changes, no dry eyes   Respiratory and Thorax: no cough, no wheezing, no hemoptysis, no dyspnea  Cardiovascular: no chest pain, no shortness of breath, no orthopnea  Gastrointestinal: no n/v/d, no abdominal pain, no dysphagia   Genitourinary: no dysuria, no frequency, no nocturia, no hematuria  Musculoskeletal: no trauma, no sprain/strain, no myalgias, no arthralgias, no fracture  Neurological: no HA, no dizziness, no weakness, no numbness  Psychiatric: no depression, no SI/HI  Hematology/Lymphatics: no easy bruising  Endocrine: no heat or cold intolerance. no weight gain or loss  Allergic/Immunologic: no allergy or recent reaction       Vital Signs Last 24 Hrs  T(C): 36.7 (26 Mar 2020 17:53), Max: 38.4 (26 Mar 2020 13:17)  T(F): 98 (26 Mar 2020 17:53), Max: 101.2 (26 Mar 2020 13:17)  HR: 74 (26 Mar 2020 17:53) (70 - 76)  BP: 129/72 (26 Mar 2020 17:53) (117/68 - 129/72)  BP(mean): --  RR: 17 (26 Mar 2020 17:53) (16 - 18)  SpO2: 84% (26 Mar 2020 17:53) (84% - 94%)  I&O's Summary      PHYSICAL EXAM:  GENERAL: on nc  HEAD:  Atraumatic, Normocephalic  EYES: EOMI, PERRLA, conjunctiva and sclera clear  NECK: Supple, No JVD  CHEST/LUNG: + rhonchi  HEART: Regular rate and rhythm; No murmurs, rubs, or gallops  ABDOMEN: Soft, Nontender, Nondistended; Bowel sounds present  Neuro: AAOx3, no focal deficit, 5/5 b/l extremities  EXTREMITIES:  2+ Peripheral Pulses, No clubbing, cyanosis, or edema  SKIN: No rashes or lesions    LABS:                        9.2    10.38 )-----------( 271      ( 26 Mar 2020 14:21 )             28.4     03-26    134<L>  |  100  |  42<H>  ----------------------------<  175<H>  4.0   |  21<L>  |  1.91<H>    Ca    8.9      26 Mar 2020 14:21    TPro  7.8  /  Alb  2.9<L>  /  TBili  0.4  /  DBili  x   /  AST  19  /  ALT  24  /  AlkPhos  61  03-26      CAPILLARY BLOOD GLUCOSE                RADIOLOGY & ADDITIONAL TESTS:    Imaging Personally Reviewed:  [x] YES  [ ] NO    Consultant(s) Notes Reviewed:  [x] YES  [ ] NO      MEDICATIONS  (STANDING):  azithromycin  IVPB 500 milliGRAM(s) IV Intermittent every 24 hours  cefTRIAXone   IVPB 1000 milliGRAM(s) IV Intermittent every 24 hours    MEDICATIONS  (PRN):  acetaminophen   Tablet .. 650 milliGRAM(s) Oral every 6 hours PRN Temp greater or equal to 38C (100.4F), Mild Pain (1 - 3)      Care Discussed with Consultants/Other Providers [x] YES  [ ] NO    HEALTH ISSUES - PROBLEM Dx: (26 Mar 2020 18:50)    INTERVAL HPI/OVERNIGHT EVENTS:  Chart reviewed, notes reviewed.   Patient seen and examined.  Being followed by following specialists.     Consultant(s) Notes Reviewed:  [X] Yes    Care Discussed with Consultants/Other Providers: [X] Yes    REVIEW OF SYSTEMS:    Allergies: No Known Allergies    Intolerances      Home Medications:    MEDICATIONS  (STANDING):  brimonidine 0.2% Ophthalmic Solution 1 Drop(s) Left EYE daily  chlorhexidine 0.12% Liquid 15 milliLiter(s) Oral Mucosa every 12 hours  chlorhexidine 2% Cloths 1 Application(s) Topical <User Schedule>  dexMEDEtomidine Infusion 0.7 MICROgram(s)/kG/Hr (15.9 mL/Hr) IV Continuous <Continuous>  dextrose 5%. 1000 milliLiter(s) (50 mL/Hr) IV Continuous <Continuous>  dextrose 50% Injectable 12.5 Gram(s) IV Push once  dextrose 50% Injectable 25 Gram(s) IV Push once  dextrose 50% Injectable 25 Gram(s) IV Push once  heparin  Injectable 5000 Unit(s) SubCutaneous every 8 hours  insulin glargine Injectable (LANTUS) 12 Unit(s) SubCutaneous at bedtime  insulin lispro (HumaLOG) corrective regimen sliding scale   SubCutaneous every 6 hours  latanoprost 0.005% Ophthalmic Solution 1 Drop(s) Both EYES at bedtime  levothyroxine Injectable 12.5 MICROGram(s) IV Push at bedtime  pantoprazole  Injectable 40 milliGRAM(s) IV Push daily  propofol Infusion 73.502 MICROgram(s)/kG/Min (40 mL/Hr) IV Continuous <Continuous>  simvastatin 20 milliGRAM(s) Oral at bedtime  timolol 0.5% Solution 1 Drop(s) Left EYE daily    MEDICATIONS  (PRN):  acetaminophen   Tablet .. 650 milliGRAM(s) Oral every 6 hours PRN Temp greater or equal to 38C (100.4F), Mild Pain (1 - 3)  dextrose 40% Gel 15 Gram(s) Oral once PRN Blood Glucose LESS THAN 70 milliGRAM(s)/deciliter  glucagon  Injectable 1 milliGRAM(s) IntraMuscular once PRN Glucose LESS THAN 70 milligrams/deciliter  HYDROmorphone  Injectable 2 milliGRAM(s) IV Push every 4 hours PRN Mild Pain (1 - 3)  midazolam Injectable 2 milliGRAM(s) IV Push every 2 hours PRN Agitation  sodium chloride 0.9% lock flush 10 milliLiter(s) IV Push every 1 hour PRN Pre/post blood products, medications, blood draw, and to maintain line patency    Vital Signs Last 24 Hrs  T(C): 36.8 (2020 20:00), Max: 37 (2020 16:00)  T(F): 98.2 (2020 20:00), Max: 98.6 (2020 16:00)  HR: 79 (2020 23:38) (59 - 85)  BP: 146/59 (2020 23:38) (125/49 - 146/59)  BP(mean): 68 (2020 18:00) (65 - 76)  RR: 26 (2020 23:38) (14 - 28)  SpO2: 91% (2020 23:38) (91% - 100%)    PHYSICAL EXAM:  GENERAL: well-groomed, well-developed  HEAD:  Atraumatic, Normocephalic  EYES: Pallor +  ENMT:   NECK: Supple.  CHEST/LUNG:   HEART: S1, S2.   ABDOMEN: Soft, Nontender, Nondistended; Bowel sounds present  EXTREMITIES:  2+ Peripheral Pulses, No clubbing, cyanosis, or edema  MS: No joint swelling or deformity.   LYMPH: No lymphadenopathy noted  SKIN: No rashes or lesions  NERVOUS SYSTEM:     PSYCH:      LABS:                         8.8    8.42  )-----------( 417      ( 2020 07:06 )             26.9     2020 07:06    135    |  100    |  90     ----------------------------<  171    3.7     |  22     |  4.19     Ca    9.0        2020 07:06  Phos  5.1       2020 06:39  Mg     2.7       2020 07:06    TPro  6.8    /  Alb  2.5    /  TBili  0.4    /  DBili  x      /  AST  21     /  ALT  20     /  AlkPhos  66     2020 07:06    CAPILLARY BLOOD GLUCOSE      POCT Blood Glucose.: 160 mg/dL (2020 23:48)  POCT Blood Glucose.: 141 mg/dL (2020 18:03)  POCT Blood Glucose.: 166 mg/dL (2020 11:23)  POCT Blood Glucose.: 168 mg/dL (2020 06:28)       [id number] 8.1      Ferritin, Serum: 414 ng/mL ( @ 11:31)  Ferritin, Serum: 579 ng/mL ( @ 13:15)                    RADIOLOGY TEST: (IMAGES REVIEWED BY ME)    Imaging Personally Reviewed:  [X] YES      HEALTH ISSUES - PROBLEM Dx:  Hypokalemia: Hypokalemia  Anemia, unspecified type: Anemia, unspecified type  History of vitamin D deficiency: History of vitamin D deficiency  B12 deficiency: B12 deficiency  HTN (hypertension): HTN (hypertension)  Atrial fibrillation, unspecified type: Atrial fibrillation, unspecified type  Hypothyroidism, unspecified type: Hypothyroidism, unspecified type  Afib: Afib  Shock: Shock  Anxiety: Anxiety  Hypothyroid: Hypothyroid  Hyperlipidemia: Hyperlipidemia  DM (diabetes mellitus): DM (diabetes mellitus)  SARS (severe acute respiratory syndrome): SARS (severe acute respiratory syndrome)  Acute respiratory failure with hypoxia: Acute respiratory failure with hypoxia Patient is a 74y old  Female who presents with a chief complaint of sob (04 Apr 2020 19:07)    HPI: 75 y/o F with hx of DM, HTN, HLD, hypothyroid, anxiety biba with c/o fever and cough x 6 days. Pt states that Tmax was 101.8F, took one tab of tylenol XS at 9am this morning. Pt states that she has associated dry cough, body aches and mild generalized weakness. States that she has had one episode of loose stool daily. Denies recent travel, sick contacts, known covid-19 exposure, CP, SOB, runny nose, sore throat, headache, rash, urinary symptoms.  she was sating 94% ON 4l and was then put on non rebreather and was transferred to spcu.    Chart reviewed, notes reviewed.   Patient seen and examined.  Being followed by following specialists: Pulmonary critical care, cardiology, nephrology, and GI.    Consultant(s) Notes Reviewed:  [X] Yes    Care Discussed with Consultants/Other Providers: [X] Yes    04/04/2020 --> Events noted, patient remains intubated and on ventilator.  Patient is sedated.  All information is from patient's chart.    REVIEW OF SYSTEMS:  Unable to obtain.    Allergies: No Known Allergies    Intolerances      Home Medications:    MEDICATIONS  (STANDING):  brimonidine 0.2% Ophthalmic Solution 1 Drop(s) Left EYE daily  chlorhexidine 0.12% Liquid 15 milliLiter(s) Oral Mucosa every 12 hours  chlorhexidine 2% Cloths 1 Application(s) Topical <User Schedule>  dexMEDEtomidine Infusion 0.7 MICROgram(s)/kG/Hr (15.9 mL/Hr) IV Continuous <Continuous>  dextrose 5%. 1000 milliLiter(s) (50 mL/Hr) IV Continuous <Continuous>  dextrose 50% Injectable 12.5 Gram(s) IV Push once  dextrose 50% Injectable 25 Gram(s) IV Push once  dextrose 50% Injectable 25 Gram(s) IV Push once  heparin  Injectable 5000 Unit(s) SubCutaneous every 8 hours  insulin glargine Injectable (LANTUS) 12 Unit(s) SubCutaneous at bedtime  insulin lispro (HumaLOG) corrective regimen sliding scale   SubCutaneous every 6 hours  latanoprost 0.005% Ophthalmic Solution 1 Drop(s) Both EYES at bedtime  levothyroxine Injectable 12.5 MICROGram(s) IV Push at bedtime  pantoprazole  Injectable 40 milliGRAM(s) IV Push daily  propofol Infusion 73.502 MICROgram(s)/kG/Min (40 mL/Hr) IV Continuous <Continuous>  simvastatin 20 milliGRAM(s) Oral at bedtime  timolol 0.5% Solution 1 Drop(s) Left EYE daily    MEDICATIONS  (PRN):  acetaminophen   Tablet .. 650 milliGRAM(s) Oral every 6 hours PRN Temp greater or equal to 38C (100.4F), Mild Pain (1 - 3)  dextrose 40% Gel 15 Gram(s) Oral once PRN Blood Glucose LESS THAN 70 milliGRAM(s)/deciliter  glucagon  Injectable 1 milliGRAM(s) IntraMuscular once PRN Glucose LESS THAN 70 milligrams/deciliter  HYDROmorphone  Injectable 2 milliGRAM(s) IV Push every 4 hours PRN Mild Pain (1 - 3)  midazolam Injectable 2 milliGRAM(s) IV Push every 2 hours PRN Agitation  sodium chloride 0.9% lock flush 10 milliLiter(s) IV Push every 1 hour PRN Pre/post blood products, medications, blood draw, and to maintain line patency    Vital Signs Last 24 Hrs  T(C): 36.8 (04 Apr 2020 20:00), Max: 37 (04 Apr 2020 16:00)  T(F): 98.2 (04 Apr 2020 20:00), Max: 98.6 (04 Apr 2020 16:00)  HR: 79 (04 Apr 2020 23:38) (59 - 85)  BP: 146/59 (04 Apr 2020 23:38) (125/49 - 146/59)  BP(mean): 68 (04 Apr 2020 18:00) (65 - 76)  RR: 26 (04 Apr 2020 23:38) (14 - 28)  SpO2: 91% (04 Apr 2020 23:38) (91% - 100%)    PHYSICAL EXAM:  GENERAL: Currently on ventilator, well-groomed, well-developed  HEAD:  Atraumatic, Normocephalic  EYES: Pallor +  ENMT: Intubated via ET tube.  NECK: Supple.  CHEST/LUNG: Decreased breath sounds at bases.  HEART: S1, S2.   ABDOMEN: Soft, Nontender, Nondistended; Bowel sounds present  EXTREMITIES:  2+ Peripheral Pulses, No clubbing, cyanosis, or edema  MS: No joint swelling or deformity.   LYMPH: No lymphadenopathy noted  SKIN: No rashes or lesions  NERVOUS SYSTEM:   Sedated  PSYCH:  Sedated    LABS:                         8.8    8.42  )-----------( 417      ( 04 Apr 2020 07:06 )             26.9     04 Apr 2020 07:06    135    |  100    |  90     ----------------------------<  171    3.7     |  22     |  4.19     Ca    9.0        04 Apr 2020 07:06  Phos  5.1       03 Apr 2020 06:39  Mg     2.7       04 Apr 2020 07:06    TPro  6.8    /  Alb  2.5    /  TBili  0.4    /  DBili  x      /  AST  21     /  ALT  20     /  AlkPhos  66     04 Apr 2020 07:06    CAPILLARY BLOOD GLUCOSE  POCT Blood Glucose.: 160 mg/dL (04 Apr 2020 23:48)  POCT Blood Glucose.: 141 mg/dL (04 Apr 2020 18:03)  POCT Blood Glucose.: 166 mg/dL (04 Apr 2020 11:23)  POCT Blood Glucose.: 168 mg/dL (04 Apr 2020 06:28)    03-27 [id number] 8.1      Ferritin, Serum: 414 ng/mL (03-31 @ 11:31)  Ferritin, Serum: 579 ng/mL (03-29 @ 13:15)    RADIOLOGY TEST: (IMAGES REVIEWED BY ME)    Imaging Personally Reviewed:  [X] YES      HEALTH ISSUES - PROBLEM Dx:  Hypokalemia: Hypokalemia  Anemia, unspecified type: Anemia, unspecified type  History of vitamin D deficiency: History of vitamin D deficiency  B12 deficiency: B12 deficiency  HTN (hypertension): HTN (hypertension)  Atrial fibrillation, unspecified type: Atrial fibrillation, unspecified type  Hypothyroidism, unspecified type: Hypothyroidism, unspecified type  Afib: Afib  Shock: Shock  Anxiety: Anxiety  Hypothyroid: Hypothyroid  Hyperlipidemia: Hyperlipidemia  DM (diabetes mellitus): DM (diabetes mellitus)  SARS (severe acute respiratory syndrome): SARS (severe acute respiratory syndrome)  Acute respiratory failure with hypoxia: Acute respiratory failure with hypoxia

## 2020-04-04 NOTE — PROCEDURE NOTE - NSPROCDETAILS_GEN_ALL_CORE
positive blood return obtained via catheter/sutured in place/location identified, draped/prepped, sterile technique used, needle inserted/introduced/Seldinger technique/connected to a pressurized flush line/all materials/supplies accounted for at end of procedure
difficult/crash intubation
guidewire recovered/lumen(s) aspirated and flushed/sterile dressing applied/sterile technique, catheter placed/ultrasound guidance
sterile dressing applied/sterile technique, catheter placed/guidewire recovered/lumen(s) aspirated and flushed

## 2020-04-04 NOTE — PROGRESS NOTE ADULT - SUBJECTIVE AND OBJECTIVE BOX
INTERVAL HPI/OVERNIGHT EVENTS:    MEDICATIONS  (STANDING):  brimonidine 0.2% Ophthalmic Solution 1 Drop(s) Left EYE daily  chlorhexidine 0.12% Liquid 15 milliLiter(s) Oral Mucosa every 12 hours  chlorhexidine 2% Cloths 1 Application(s) Topical <User Schedule>  dexMEDEtomidine Infusion 0.7 MICROgram(s)/kG/Hr (15.9 mL/Hr) IV Continuous <Continuous>  dextrose 5%. 1000 milliLiter(s) (50 mL/Hr) IV Continuous <Continuous>  dextrose 50% Injectable 12.5 Gram(s) IV Push once  dextrose 50% Injectable 25 Gram(s) IV Push once  dextrose 50% Injectable 25 Gram(s) IV Push once  heparin  Injectable 5000 Unit(s) SubCutaneous every 8 hours  insulin glargine Injectable (LANTUS) 12 Unit(s) SubCutaneous at bedtime  insulin lispro (HumaLOG) corrective regimen sliding scale   SubCutaneous every 6 hours  latanoprost 0.005% Ophthalmic Solution 1 Drop(s) Both EYES at bedtime  levothyroxine Injectable 12.5 MICROGram(s) IV Push at bedtime  pantoprazole  Injectable 40 milliGRAM(s) IV Push daily  propofol Infusion 73.502 MICROgram(s)/kG/Min (40 mL/Hr) IV Continuous <Continuous>  simvastatin 20 milliGRAM(s) Oral at bedtime  timolol 0.5% Solution 1 Drop(s) Left EYE daily    MEDICATIONS  (PRN):  acetaminophen   Tablet .. 650 milliGRAM(s) Oral every 6 hours PRN Temp greater or equal to 38C (100.4F), Mild Pain (1 - 3)  dextrose 40% Gel 15 Gram(s) Oral once PRN Blood Glucose LESS THAN 70 milliGRAM(s)/deciliter  glucagon  Injectable 1 milliGRAM(s) IntraMuscular once PRN Glucose LESS THAN 70 milligrams/deciliter  HYDROmorphone  Injectable 2 milliGRAM(s) IV Push every 4 hours PRN Mild Pain (1 - 3)  midazolam Injectable 2 milliGRAM(s) IV Push every 2 hours PRN Agitation  sodium chloride 0.9% lock flush 10 milliLiter(s) IV Push every 1 hour PRN Pre/post blood products, medications, blood draw, and to maintain line patency      Allergies    No Known Allergies    Intolerances      Vital Signs Last 24 Hrs  T(C): 37 (04 Apr 2020 16:00), Max: 37 (04 Apr 2020 16:00)  T(F): 98.6 (04 Apr 2020 16:00), Max: 98.6 (04 Apr 2020 16:00)  HR: 59 (04 Apr 2020 18:00) (59 - 85)  BP: 135/49 (04 Apr 2020 18:00) (125/49 - 141/65)  BP(mean): 68 (04 Apr 2020 18:00) (61 - 85)  RR: 14 (04 Apr 2020 18:00) (14 - 21)  SpO2: 96% (04 Apr 2020 18:00) (91% - 98%)        LABS:                        8.8    8.42  )-----------( 417      ( 04 Apr 2020 07:06 )             26.9     04-04    135  |  100  |  90<H>  ----------------------------<  171<H>  3.7   |  22  |  4.19<H>    Ca    9.0      04 Apr 2020 07:06  Phos  5.1     04-03  Mg     2.7     04-04    TPro  6.8  /  Alb  2.5<L>  /  TBili  0.4  /  DBili  x   /  AST  21  /  ALT  20  /  AlkPhos  66  04-04          RADIOLOGY & ADDITIONAL TESTS:

## 2020-04-04 NOTE — PROGRESS NOTE ADULT - PROBLEM SELECTOR PLAN 4
Patient is status post shock  .Patient is hemodynamically stable off pressors at this time.  Continue to monitor hemodynamic status.

## 2020-04-04 NOTE — PROGRESS NOTE ADULT - ASSESSMENT
The patient is a 74 year old female with a history of HTN, DM, HL, hypothyroidism, anxiety who presented with fevers, cough, shortness of breath in the setting of viral PNA, COVID-19, respiratory failure, septic shock.     Plan:  - CXR consistent with PNA  - COVID-19 positive  - Completed hydroxychloroquine  - On zosyn  - Off of midodrine  - Atrial fibrillation - brief, back in sinus rhythm. Hold metoprolol for now due to bradycardia.  - Transfused yesterday with improvement in hemoglobin  - Renal function slowly improving  - Continue mechanical ventilation  - Pulm and ID follow-up

## 2020-04-04 NOTE — PROGRESS NOTE ADULT - ASSESSMENT
75 y/o F with hx of DM, HTN, HLD, hypothyroid, anxiety biba with c/o fever and cough. Now COVID 19 PNA.   ALIS on CKD. Continue supportive care. Will follow.

## 2020-04-04 NOTE — PROGRESS NOTE ADULT - SUBJECTIVE AND OBJECTIVE BOX
REBECACLARKENORM ADELIA  74y  Female    Patient is a 74y old  Female who presents with a chief complaint of shob (2020 14:50)      HPI:  Patient is a 74y old  Female who presents with a chief complaint of shob      sedated on the vent.       PAST MEDICAL & SURGICAL HISTORY:  DJD (degenerative joint disease)  DM (diabetes mellitus)  Hyperlipidemia  Hypothyroid  HTN (hypertension)  History of vitamin D deficiency  B12 deficiency  Bronchitis  Anxiety  S/P cataract surgery  S/P eye surgery: left eye retinal surgery x2  S/P  section: x2      FAMILY HISTORY:      SOCIAL HISTORY:    Allergies    No Known Allergies    Intolerances          REVIEW OF SYSEMS:  General: + weakness, + fever/chills, no weight loss/gain  Skin/Breast: no rash, no jaundice  Ophthalmologic: no vision changes, no dry eyes   Respiratory and Thorax: no cough, no wheezing, no hemoptysis, no dyspnea  Cardiovascular: no chest pain, no shortness of breath, no orthopnea  Gastrointestinal: no n/v/d, no abdominal pain, no dysphagia   Genitourinary: no dysuria, no frequency, no nocturia, no hematuria  Musculoskeletal: no trauma, no sprain/strain, no myalgias, no arthralgias, no fracture  Neurological: no HA, no dizziness, no weakness, no numbness  Psychiatric: no depression, no SI/HI  Hematology/Lymphatics: no easy bruising  Endocrine: no heat or cold intolerance. no weight gain or loss  Allergic/Immunologic: no allergy or recent reaction       Vital Signs Last 24 Hrs  T(C): 36.7 (26 Mar 2020 17:53), Max: 38.4 (26 Mar 2020 13:17)  T(F): 98 (26 Mar 2020 17:53), Max: 101.2 (26 Mar 2020 13:17)  HR: 74 (26 Mar 2020 17:53) (70 - 76)  BP: 129/72 (26 Mar 2020 17:53) (117/68 - 129/72)  BP(mean): --  RR: 17 (26 Mar 2020 17:53) (16 - 18)  SpO2: 84% (26 Mar 2020 17:53) (84% - 94%)  I&O's Summary      PHYSICAL EXAM:  GENERAL: on nc  HEAD:  Atraumatic, Normocephalic  EYES: EOMI, PERRLA, conjunctiva and sclera clear  NECK: Supple, No JVD  CHEST/LUNG: + rhonchi  HEART: Regular rate and rhythm; No murmurs, rubs, or gallops  ABDOMEN: Soft, Nontender, Nondistended; Bowel sounds present  Neuro: AAOx3, no focal deficit, 5/5 b/l extremities  EXTREMITIES:  2+ Peripheral Pulses, No clubbing, cyanosis, or edema  SKIN: No rashes or lesions    LABS:                        9.2    10.38 )-----------( 271      ( 26 Mar 2020 14:21 )             28.4         134<L>  |  100  |  42<H>  ----------------------------<  175<H>  4.0   |  21<L>  |  1.91<H>    Ca    8.9      26 Mar 2020 14:21    TPro  7.8  /  Alb  2.9<L>  /  TBili  0.4  /  DBili  x   /  AST  19  /  ALT  24  /  AlkPhos  61        CAPILLARY BLOOD GLUCOSE                RADIOLOGY & ADDITIONAL TESTS:    Imaging Personally Reviewed:  [x] YES  [ ] NO    Consultant(s) Notes Reviewed:  [x] YES  [ ] NO      MEDICATIONS  (STANDING):  azithromycin  IVPB 500 milliGRAM(s) IV Intermittent every 24 hours  cefTRIAXone   IVPB 1000 milliGRAM(s) IV Intermittent every 24 hours    MEDICATIONS  (PRN):  acetaminophen   Tablet .. 650 milliGRAM(s) Oral every 6 hours PRN Temp greater or equal to 38C (100.4F), Mild Pain (1 - 3)      Care Discussed with Consultants/Other Providers [x] YES  [ ] NO    HEALTH ISSUES - PROBLEM Dx: (26 Mar 2020 18:50)      PAST MEDICAL & SURGICAL HISTORY:  DJD (degenerative joint disease)  DM (diabetes mellitus)  Hyperlipidemia  Hypothyroid  HTN (hypertension)  History of vitamin D deficiency  B12 deficiency  Bronchitis  Anxiety  S/P cataract surgery  S/P eye surgery: left eye retinal surgery x2  S/P  section: x2          PHYSICAL EXAM:    T(C): 36.7 (20 @ 12:00), Max: 36.9 (20 @ 22:00)  HR: 64 (20 @ 12:00) (59 - 107)  BP: 125/49 (20 @ 12:00) (122/60 - 141/65)  RR: 21 (20 @ 12:00) (14 - 21)  SpO2: 91% (20 @ 12:00) (90% - 97%)  Wt(kg): --    I&O's Detail    2020 07:01  -  2020 07:00  --------------------------------------------------------  IN:    dexmedetomidine Infusion: 299.2 mL    Free Water: 120 mL    Glucerna 1.5: 180 mL    Packed Red Blood Cells: 273 mL    propofol Infusion: 408.6 mL    Solution: 400 mL    Solution: 100 mL  Total IN: 1780.8 mL    OUT:    Indwelling Catheter - Urethral: 1100 mL    Nasoenteral Tube: 50 mL  Total OUT: 1150 mL    Total NET: 630.8 mL      2020 07:01  -  2020 14:58  --------------------------------------------------------  IN:    dexmedetomidine Infusion: 108.6 mL    propofol Infusion: 130.8 mL  Total IN: 239.4 mL    OUT:  Total OUT: 0 mL    Total NET: 239.4 mL          Respiratory: clear anteriorly, decreased BS at bases  Cardiovascular: S1 S2  Gastrointestinal: soft NT ND +BS  Extremities: edema   Neuro: Awake and alert    MEDICATIONS  (STANDING):  brimonidine 0.2% Ophthalmic Solution 1 Drop(s) Left EYE daily  chlorhexidine 0.12% Liquid 15 milliLiter(s) Oral Mucosa every 12 hours  chlorhexidine 2% Cloths 1 Application(s) Topical <User Schedule>  dexMEDEtomidine Infusion 0.7 MICROgram(s)/kG/Hr (15.9 mL/Hr) IV Continuous <Continuous>  dextrose 5%. 1000 milliLiter(s) (50 mL/Hr) IV Continuous <Continuous>  dextrose 50% Injectable 12.5 Gram(s) IV Push once  dextrose 50% Injectable 25 Gram(s) IV Push once  dextrose 50% Injectable 25 Gram(s) IV Push once  heparin  Injectable 5000 Unit(s) SubCutaneous every 8 hours  insulin glargine Injectable (LANTUS) 12 Unit(s) SubCutaneous at bedtime  insulin lispro (HumaLOG) corrective regimen sliding scale   SubCutaneous every 6 hours  latanoprost 0.005% Ophthalmic Solution 1 Drop(s) Both EYES at bedtime  levothyroxine Injectable 12.5 MICROGram(s) IV Push at bedtime  pantoprazole  Injectable 40 milliGRAM(s) IV Push daily  piperacillin/tazobactam IVPB.. 3.375 Gram(s) IV Intermittent every 12 hours  propofol Infusion 73.502 MICROgram(s)/kG/Min (40 mL/Hr) IV Continuous <Continuous>  simvastatin 20 milliGRAM(s) Oral at bedtime  timolol 0.5% Solution 1 Drop(s) Left EYE daily    MEDICATIONS  (PRN):  acetaminophen   Tablet .. 650 milliGRAM(s) Oral every 6 hours PRN Temp greater or equal to 38C (100.4F), Mild Pain (1 - 3)  dextrose 40% Gel 15 Gram(s) Oral once PRN Blood Glucose LESS THAN 70 milliGRAM(s)/deciliter  glucagon  Injectable 1 milliGRAM(s) IntraMuscular once PRN Glucose LESS THAN 70 milligrams/deciliter  HYDROmorphone  Injectable 2 milliGRAM(s) IV Push every 4 hours PRN Mild Pain (1 - 3)  midazolam Injectable 2 milliGRAM(s) IV Push every 2 hours PRN Agitation  sodium chloride 0.9% lock flush 10 milliLiter(s) IV Push every 1 hour PRN Pre/post blood products, medications, blood draw, and to maintain line patency                            8.8    8.42  )-----------( 417      ( 2020 07:06 )             26.9       04-04    135  |  100  |  90<H>  ----------------------------<  171<H>  3.7   |  22  |  4.19<H>    Ca    9.0      2020 07:06  Phos  5.1     04-03  Mg     2.7     04-04    TPro  6.8  /  Alb  2.5<L>  /  TBili  0.4  /  DBili  x   /  AST  21  /  ALT  20  /  AlkPhos  66  04-04      Creatinine Trend: Creatinine Trend: 4.19<--, 4.29<--, 3.36<--, 4.59<--, 4.58<--, 4.65<--

## 2020-04-04 NOTE — PROGRESS NOTE ADULT - SUBJECTIVE AND OBJECTIVE BOX
Patient is a 74y old  Female who presents with a chief complaint of shob (2020 20:49)      BRIEF HOSPITAL COURSE: 74y Female pmhx DM, HTN, Hypothyroidism, HLD, and anxiety presented with fever and cough. Admitted for acute hypoxic respiratory failure, COVID 19+. Course complicated by anemia requiring PRBC transfusion    PAST MEDICAL & SURGICAL HISTORY:  DJD (degenerative joint disease)  DM (diabetes mellitus)  Hyperlipidemia  Hypothyroid  HTN (hypertension)  History of vitamin D deficiency  B12 deficiency  Bronchitis  Anxiety  S/P cataract surgery  S/P eye surgery: left eye retinal surgery x2  S/P  section: x2        Hosp day #9d    Mode: AC/ CMV (Assist Control/ Continuous Mandatory Ventilation)  RR (machine): 16  TV (machine): 400  FiO2: 50  PEEP: 10  ITime: 1  MAP: 16  PIP: 32        Vital signs / Reviewed and Physical Exam Performed where pertinent and urgently required    Lab / Radiology  studies / ABG / Meds -  reviewed and interpreted into the assessment and treatment plan.      Assessment/Plan/Therapeutic interventions      Neuro - Propofol gtt and Precedex gtt as needed to facilitate weening    CV -  Pressor support as needed to maintain MAP 65           Avoiding fluid challenges          QTC monitoring while on Azithromycin and Hydroxychloroquine.    Pulm -  ARDS-NET 4-6cc/kg IBW TV as able to maintain plateau pressures <30               Prone ventilation consideration as feasible  Pa02/Fi02 < 150 on Fi02 >60% and PEEP at least 5                 Vent bundle Reviewed     GI -  PPI  Enteric feeds as tolerated in tandem with NMB and prone ventilation    Renal - Acute renal failure secondary to ATN. . Even to negative fluid balance as tolerated by hemodynamics and renal fx.  Feeds to be provided in lieu of IVF.     Heme -  Anemia requiring transfusion 1 PRBC. Appropriate elevation in hgbPharmacologic DVT PPx  in addition to SCD's    ID - COVID 19+ s/p Hydroxychloroquine. Intermittent fevers, empiric coverage w/ Zosyn. ABX discontinuation based on discussion with ID in conjunction with clinical features, culture data, and judicious procalcitonin monitoring.      Endo -  Aggressive glycemic control to limit FS glucose to < 180mg/dl.      COVID 19 specific considerations and therapeutic  options based on the available and rapidly changing literature    Goals of care considerations:  Ongoing assessment for patient specific treatment options based on progression or decline.  I have involved the family with updates and requests in guidance for medical decision making.          38  Minutes of critical care tiem spent in the management of this critically ill COVID-19 patient/PUI patient with continuous assessments and interventions based on the interpretation of multiple databases.

## 2020-04-04 NOTE — PROCEDURE NOTE - NSICDXPROCEDURE_GEN_ALL_CORE_FT
PROCEDURES:  Insertion of central venous access device with ultrasound guidance 04-Apr-2020 14:10:29  Sarah Madison

## 2020-04-04 NOTE — PROGRESS NOTE ADULT - PROBLEM SELECTOR PLAN 2
Patient with COVID 19.  Patient has completed Plaquenil.  Continue supportive care.  Continue isolation precautions. Patient with COVID 19.  Patient has completed Plaquenil.  Continue supportive care.  Continue isolation precautions.  NLR today is 25.9

## 2020-04-04 NOTE — PROCEDURE NOTE - NSINDICATIONS_GEN_A_CORE
blood sampling/arterial puncture to obtain ABG's/critical patient/monitoring purposes
venous access/emergency venous access/critical illness
emergency venous access/venous access
respiratory failure

## 2020-04-04 NOTE — PROGRESS NOTE ADULT - SUBJECTIVE AND OBJECTIVE BOX
PA note    Chest x ray to evaluate central line placement.  Tip of the central line is noted to be in the SVC and is okay to use.      Right femoral line and A line were discontinued.  Sutures were removed line was dc'd and direct pressure applied for 5 minutes.  No bleeding noted.  Sterile dressing was applied

## 2020-04-04 NOTE — PROGRESS NOTE ADULT - ASSESSMENT
73 y/o F with hx of DM, HTN, HLD, hypothyroid, anxiety biba with c/o fever and cough x 6 days. Pt states that Tmax was 101.8F, took one tab of tylenol XS at 9am this morning. Pt states that she has associated dry cough, body aches and mild generalized weakness. States that she has had one episode of loose stool daily. Denies recent travel, sick contacts, known covid-19 exposure, CP, SOB, runny nose, sore throat, headache, rash, urinary symptoms.  she was sating 94% ON 4l and was then put on non rebreather and was transferred to spcu.

## 2020-04-04 NOTE — PROGRESS NOTE ADULT - SUBJECTIVE AND OBJECTIVE BOX
24 hour events:  No acute events.  Decreased O2 sats during day yesterday - improved with analgesia    Review of Systems:  Unable to obtain      T(F): 98.1 (04-04-20 @ 12:00), Max: 98.5 (04-03-20 @ 22:00)  HR: 64 (04-04-20 @ 12:00) (59 - 107)  BP: 125/49 (04-04-20 @ 12:00) (122/60 - 141/65)  RR: 21 (04-04-20 @ 12:00) (14 - 21)  SpO2: 91% (04-04-20 @ 12:00) (90% - 97%)  Wt(kg): --    Mode: AC/ CMV (Assist Control/ Continuous Mandatory Ventilation), RR (machine): 16, TV (machine): 400, FiO2: 50, PEEP: 10  04-03-20 @ 07:01  -  04-04-20 @ 07:00  --------------------------------------------------------  IN: 1780.8 mL / OUT: 1150 mL / NET: 630.8 mL    04-04-20 @ 07:01  -  04-04-20 @ 13:53  --------------------------------------------------------  IN: 239.4 mL / OUT: 0 mL / NET: 239.4 mL        CAPILLARY BLOOD GLUCOSE      POCT Blood Glucose.: 166 mg/dL (04 Apr 2020 11:23)      I&O's Summary    03 Apr 2020 07:01  -  04 Apr 2020 07:00  --------------------------------------------------------  IN: 1780.8 mL / OUT: 1150 mL / NET: 630.8 mL    04 Apr 2020 07:01  -  04 Apr 2020 13:53  --------------------------------------------------------  IN: 239.4 mL / OUT: 0 mL / NET: 239.4 mL        Physical Exam:   Gen:  Intubated  Neuro:  Sedated  Resp:  Vented  CVS:  Regular  Abd:  Improved distention     Meds:  piperacillin/tazobactam IVPB.. IV Intermittent      dextrose 40% Gel Oral PRN  dextrose 50% Injectable IV Push  dextrose 50% Injectable IV Push  dextrose 50% Injectable IV Push  glucagon  Injectable IntraMuscular PRN  insulin glargine Injectable (LANTUS) SubCutaneous  insulin lispro (HumaLOG) corrective regimen sliding scale SubCutaneous  levothyroxine Injectable IV Push  simvastatin Oral      acetaminophen   Tablet .. Oral PRN  dexMEDEtomidine Infusion IV Continuous  HYDROmorphone  Injectable IV Push PRN  midazolam Injectable IV Push PRN  propofol Infusion IV Continuous      heparin  Injectable SubCutaneous    pantoprazole  Injectable IV Push      dextrose 5%. IV Continuous  sodium chloride 0.9% lock flush IV Push PRN      brimonidine 0.2% Ophthalmic Solution Left EYE  chlorhexidine 0.12% Liquid Oral Mucosa  chlorhexidine 2% Cloths Topical  latanoprost 0.005% Ophthalmic Solution Both EYES  timolol 0.5% Solution Left EYE                              8.8    8.42  )-----------( 417      ( 04 Apr 2020 07:06 )             26.9       04-04    135  |  100  |  90<H>  ----------------------------<  171<H>  3.7   |  22  |  4.19<H>    Ca    9.0      04 Apr 2020 07:06  Phos  5.1     04-03  Mg     2.7     04-04    TPro  6.8  /  Alb  2.5<L>  /  TBili  0.4  /  DBili  x   /  AST  21  /  ALT  20  /  AlkPhos  66  04-04              Radiology: None today

## 2020-04-04 NOTE — PROGRESS NOTE ADULT - PROBLEM SELECTOR PLAN 5
Patient with anemia of unclear etiology.  H&H stable posttransfusion.  Continue to monitor CBC and transfuse as needed.

## 2020-04-05 DIAGNOSIS — Z29.9 ENCOUNTER FOR PROPHYLACTIC MEASURES, UNSPECIFIED: ICD-10-CM

## 2020-04-05 DIAGNOSIS — N19 UNSPECIFIED KIDNEY FAILURE: ICD-10-CM

## 2020-04-05 DIAGNOSIS — B34.2 CORONAVIRUS INFECTION, UNSPECIFIED: ICD-10-CM

## 2020-04-05 LAB
ALBUMIN SERPL ELPH-MCNC: 2.4 G/DL — LOW (ref 3.3–5)
ALP SERPL-CCNC: 71 U/L — SIGNIFICANT CHANGE UP (ref 30–120)
ALT FLD-CCNC: 22 U/L DA — SIGNIFICANT CHANGE UP (ref 10–60)
ANION GAP SERPL CALC-SCNC: 19 MMOL/L — HIGH (ref 5–17)
APPEARANCE UR: CLEAR — SIGNIFICANT CHANGE UP
AST SERPL-CCNC: 16 U/L — SIGNIFICANT CHANGE UP (ref 10–40)
BACTERIA # UR AUTO: ABNORMAL
BASE EXCESS BLDA CALC-SCNC: -11.3 MMOL/L — LOW (ref -2–2)
BASOPHILS # BLD AUTO: 0.03 K/UL — SIGNIFICANT CHANGE UP (ref 0–0.2)
BASOPHILS NFR BLD AUTO: 0.2 % — SIGNIFICANT CHANGE UP (ref 0–2)
BILIRUB SERPL-MCNC: 0.6 MG/DL — SIGNIFICANT CHANGE UP (ref 0.2–1.2)
BILIRUB UR-MCNC: NEGATIVE — SIGNIFICANT CHANGE UP
BLOOD GAS SOURCE: SIGNIFICANT CHANGE UP
BUN SERPL-MCNC: 81 MG/DL — HIGH (ref 7–23)
CALCIUM SERPL-MCNC: 9.6 MG/DL — SIGNIFICANT CHANGE UP (ref 8.4–10.5)
CHLORIDE SERPL-SCNC: 101 MMOL/L — SIGNIFICANT CHANGE UP (ref 96–108)
CO2 SERPL-SCNC: 17 MMOL/L — LOW (ref 22–31)
COLOR SPEC: YELLOW — SIGNIFICANT CHANGE UP
CREAT SERPL-MCNC: 3.67 MG/DL — HIGH (ref 0.5–1.3)
DIFF PNL FLD: ABNORMAL
EOSINOPHIL # BLD AUTO: 0.36 K/UL — SIGNIFICANT CHANGE UP (ref 0–0.5)
EOSINOPHIL NFR BLD AUTO: 2.1 % — SIGNIFICANT CHANGE UP (ref 0–6)
EPI CELLS # UR: SIGNIFICANT CHANGE UP
GLUCOSE BLDC GLUCOMTR-MCNC: 170 MG/DL — HIGH (ref 70–99)
GLUCOSE BLDC GLUCOMTR-MCNC: 176 MG/DL — HIGH (ref 70–99)
GLUCOSE BLDC GLUCOMTR-MCNC: 198 MG/DL — HIGH (ref 70–99)
GLUCOSE BLDC GLUCOMTR-MCNC: 243 MG/DL — HIGH (ref 70–99)
GLUCOSE SERPL-MCNC: 193 MG/DL — HIGH (ref 70–99)
GLUCOSE UR QL: NEGATIVE MG/DL — SIGNIFICANT CHANGE UP
HCO3 BLDA-SCNC: 15 MMOL/L — LOW (ref 21–29)
HCT VFR BLD CALC: 32.4 % — LOW (ref 34.5–45)
HGB BLD-MCNC: 10.5 G/DL — LOW (ref 11.5–15.5)
HOROWITZ INDEX BLDA+IHG-RTO: 55 — SIGNIFICANT CHANGE UP
IMM GRANULOCYTES NFR BLD AUTO: 2.4 % — HIGH (ref 0–1.5)
KETONES UR-MCNC: NEGATIVE — SIGNIFICANT CHANGE UP
LEUKOCYTE ESTERASE UR-ACNC: ABNORMAL
LYMPHOCYTES # BLD AUTO: 0.53 K/UL — LOW (ref 1–3.3)
LYMPHOCYTES # BLD AUTO: 3.1 % — LOW (ref 13–44)
MAGNESIUM SERPL-MCNC: 2.4 MG/DL — SIGNIFICANT CHANGE UP (ref 1.6–2.6)
MCHC RBC-ENTMCNC: 28.8 PG — SIGNIFICANT CHANGE UP (ref 27–34)
MCHC RBC-ENTMCNC: 32.4 GM/DL — SIGNIFICANT CHANGE UP (ref 32–36)
MCV RBC AUTO: 89 FL — SIGNIFICANT CHANGE UP (ref 80–100)
MONOCYTES # BLD AUTO: 0.67 K/UL — SIGNIFICANT CHANGE UP (ref 0–0.9)
MONOCYTES NFR BLD AUTO: 4 % — SIGNIFICANT CHANGE UP (ref 2–14)
NEUTROPHILS # BLD AUTO: 14.86 K/UL — HIGH (ref 1.8–7.4)
NEUTROPHILS NFR BLD AUTO: 88.2 % — HIGH (ref 43–77)
NITRITE UR-MCNC: NEGATIVE — SIGNIFICANT CHANGE UP
NRBC # BLD: 0 /100 WBCS — SIGNIFICANT CHANGE UP (ref 0–0)
PCO2 BLDA: 40 MMHG — SIGNIFICANT CHANGE UP (ref 32–46)
PH BLD: 7.2 — CRITICAL LOW (ref 7.35–7.45)
PH UR: 5 — SIGNIFICANT CHANGE UP (ref 5–8)
PHOSPHATE SERPL-MCNC: 7 MG/DL — HIGH (ref 2.5–4.5)
PLATELET # BLD AUTO: 528 K/UL — HIGH (ref 150–400)
PO2 BLDA: 96 MMHG — SIGNIFICANT CHANGE UP (ref 74–108)
POTASSIUM SERPL-MCNC: 4 MMOL/L — SIGNIFICANT CHANGE UP (ref 3.5–5.3)
POTASSIUM SERPL-SCNC: 4 MMOL/L — SIGNIFICANT CHANGE UP (ref 3.5–5.3)
PROT SERPL-MCNC: 7.6 G/DL — SIGNIFICANT CHANGE UP (ref 6–8.3)
PROT UR-MCNC: 30 MG/DL
RBC # BLD: 3.64 M/UL — LOW (ref 3.8–5.2)
RBC # FLD: 15.2 % — HIGH (ref 10.3–14.5)
RBC CASTS # UR COMP ASSIST: ABNORMAL /HPF (ref 0–4)
SAO2 % BLDA: 95 % — SIGNIFICANT CHANGE UP (ref 92–96)
SODIUM SERPL-SCNC: 137 MMOL/L — SIGNIFICANT CHANGE UP (ref 135–145)
SP GR SPEC: 1.01 — SIGNIFICANT CHANGE UP (ref 1.01–1.02)
UROBILINOGEN FLD QL: NEGATIVE MG/DL — SIGNIFICANT CHANGE UP
WBC # BLD: 16.85 K/UL — HIGH (ref 3.8–10.5)
WBC # FLD AUTO: 16.85 K/UL — HIGH (ref 3.8–10.5)
WBC UR QL: SIGNIFICANT CHANGE UP

## 2020-04-05 PROCEDURE — 99233 SBSQ HOSP IP/OBS HIGH 50: CPT | Mod: CS

## 2020-04-05 RX ORDER — METOPROLOL TARTRATE 50 MG
25 TABLET ORAL
Refills: 0 | Status: DISCONTINUED | OUTPATIENT
Start: 2020-04-05 | End: 2020-04-08

## 2020-04-05 RX ORDER — HYDROMORPHONE HYDROCHLORIDE 2 MG/ML
2 INJECTION INTRAMUSCULAR; INTRAVENOUS; SUBCUTANEOUS ONCE
Refills: 0 | Status: DISCONTINUED | OUTPATIENT
Start: 2020-04-05 | End: 2020-04-05

## 2020-04-05 RX ADMIN — Medication 1 DROP(S): at 11:27

## 2020-04-05 RX ADMIN — HEPARIN SODIUM 5000 UNIT(S): 5000 INJECTION INTRAVENOUS; SUBCUTANEOUS at 05:00

## 2020-04-05 RX ADMIN — Medication 12.5 MICROGRAM(S): at 21:23

## 2020-04-05 RX ADMIN — Medication 2: at 17:17

## 2020-04-05 RX ADMIN — Medication 4: at 11:21

## 2020-04-05 RX ADMIN — Medication 2: at 22:48

## 2020-04-05 RX ADMIN — LATANOPROST 1 DROP(S): 0.05 SOLUTION/ DROPS OPHTHALMIC; TOPICAL at 21:10

## 2020-04-05 RX ADMIN — HEPARIN SODIUM 5000 UNIT(S): 5000 INJECTION INTRAVENOUS; SUBCUTANEOUS at 11:28

## 2020-04-05 RX ADMIN — DEXMEDETOMIDINE HYDROCHLORIDE IN 0.9% SODIUM CHLORIDE 15.9 MICROGRAM(S)/KG/HR: 4 INJECTION INTRAVENOUS at 20:46

## 2020-04-05 RX ADMIN — HEPARIN SODIUM 5000 UNIT(S): 5000 INJECTION INTRAVENOUS; SUBCUTANEOUS at 21:10

## 2020-04-05 RX ADMIN — CHLORHEXIDINE GLUCONATE 1 APPLICATION(S): 213 SOLUTION TOPICAL at 05:00

## 2020-04-05 RX ADMIN — CHLORHEXIDINE GLUCONATE 15 MILLILITER(S): 213 SOLUTION TOPICAL at 17:17

## 2020-04-05 RX ADMIN — PROPOFOL 40 MICROGRAM(S)/KG/MIN: 10 INJECTION, EMULSION INTRAVENOUS at 21:09

## 2020-04-05 RX ADMIN — CHLORHEXIDINE GLUCONATE 15 MILLILITER(S): 213 SOLUTION TOPICAL at 05:00

## 2020-04-05 RX ADMIN — INSULIN GLARGINE 12 UNIT(S): 100 INJECTION, SOLUTION SUBCUTANEOUS at 22:48

## 2020-04-05 RX ADMIN — BRIMONIDINE TARTRATE 1 DROP(S): 2 SOLUTION/ DROPS OPHTHALMIC at 11:27

## 2020-04-05 RX ADMIN — Medication 2: at 05:03

## 2020-04-05 RX ADMIN — DEXMEDETOMIDINE HYDROCHLORIDE IN 0.9% SODIUM CHLORIDE 15.9 MICROGRAM(S)/KG/HR: 4 INJECTION INTRAVENOUS at 23:56

## 2020-04-05 RX ADMIN — PANTOPRAZOLE SODIUM 40 MILLIGRAM(S): 20 TABLET, DELAYED RELEASE ORAL at 11:29

## 2020-04-05 NOTE — PROGRESS NOTE ADULT - SUBJECTIVE AND OBJECTIVE BOX
INTERVAL HPI/OVERNIGHT EVENTS:  chart reviewed    MEDICATIONS  (STANDING):  brimonidine 0.2% Ophthalmic Solution 1 Drop(s) Left EYE daily  chlorhexidine 0.12% Liquid 15 milliLiter(s) Oral Mucosa every 12 hours  chlorhexidine 2% Cloths 1 Application(s) Topical <User Schedule>  dexMEDEtomidine Infusion 0.7 MICROgram(s)/kG/Hr (15.9 mL/Hr) IV Continuous <Continuous>  dextrose 5%. 1000 milliLiter(s) (50 mL/Hr) IV Continuous <Continuous>  dextrose 50% Injectable 12.5 Gram(s) IV Push once  dextrose 50% Injectable 25 Gram(s) IV Push once  dextrose 50% Injectable 25 Gram(s) IV Push once  heparin  Injectable 5000 Unit(s) SubCutaneous every 8 hours  insulin glargine Injectable (LANTUS) 12 Unit(s) SubCutaneous at bedtime  insulin lispro (HumaLOG) corrective regimen sliding scale   SubCutaneous every 6 hours  latanoprost 0.005% Ophthalmic Solution 1 Drop(s) Both EYES at bedtime  levothyroxine Injectable 12.5 MICROGram(s) IV Push at bedtime  metoprolol tartrate 25 milliGRAM(s) Oral two times a day  pantoprazole  Injectable 40 milliGRAM(s) IV Push daily  propofol Infusion 73.502 MICROgram(s)/kG/Min (40 mL/Hr) IV Continuous <Continuous>  simvastatin 20 milliGRAM(s) Oral at bedtime  timolol 0.5% Solution 1 Drop(s) Left EYE daily    MEDICATIONS  (PRN):  acetaminophen   Tablet .. 650 milliGRAM(s) Oral every 6 hours PRN Temp greater or equal to 38C (100.4F), Mild Pain (1 - 3)  dextrose 40% Gel 15 Gram(s) Oral once PRN Blood Glucose LESS THAN 70 milliGRAM(s)/deciliter  glucagon  Injectable 1 milliGRAM(s) IntraMuscular once PRN Glucose LESS THAN 70 milligrams/deciliter  HYDROmorphone  Injectable 2 milliGRAM(s) IV Push every 4 hours PRN Mild Pain (1 - 3)  midazolam Injectable 2 milliGRAM(s) IV Push every 2 hours PRN Agitation  sodium chloride 0.9% lock flush 10 milliLiter(s) IV Push every 1 hour PRN Pre/post blood products, medications, blood draw, and to maintain line patency      Allergies    No Known Allergies    Intolerances            Vital Signs Last 24 Hrs  T(C): 36.6 (05 Apr 2020 12:00), Max: 37 (04 Apr 2020 16:00)  T(F): 97.8 (05 Apr 2020 12:00), Max: 98.6 (04 Apr 2020 16:00)  HR: 69 (05 Apr 2020 12:00) (59 - 126)  BP: 116/50 (05 Apr 2020 12:00) (116/50 - 184/79)  BP(mean): 64 (05 Apr 2020 12:00) (64 - 81)  RR: 23 (05 Apr 2020 12:00) (14 - 28)  SpO2: 98% (05 Apr 2020 12:00) (91% - 100%)        LABS:                        10.5   16.85 )-----------( 528      ( 05 Apr 2020 04:47 )             32.4     04-05    137  |  101  |  81<H>  ----------------------------<  193<H>  4.0   |  17<L>  |  3.67<H>    Ca    9.6      05 Apr 2020 04:47  Phos  7.0     04-05  Mg     2.4     04-05    TPro  7.6  /  Alb  2.4<L>  /  TBili  0.6  /  DBili  x   /  AST  16  /  ALT  22  /  AlkPhos  71  04-05          RADIOLOGY & ADDITIONAL TESTS:

## 2020-04-05 NOTE — PROGRESS NOTE ADULT - SUBJECTIVE AND OBJECTIVE BOX
ADELIA BALDWIN  74y  Female    Patient is a 74y old  Female who presents with a chief complaint of shob (2020 13:30)    vented and sedated.    PAST MEDICAL & SURGICAL HISTORY:  DJD (degenerative joint disease)  DM (diabetes mellitus)  Hyperlipidemia  Hypothyroid  HTN (hypertension)  History of vitamin D deficiency  B12 deficiency  Bronchitis  Anxiety  S/P cataract surgery  S/P eye surgery: left eye retinal surgery x2  S/P  section: x2      FAMILY HISTORY:      SOCIAL HISTORY:    Allergies    No Known Allergies    Intolerances          REVIEW OF SYSEMS:  General: + weakness, + fever/chills, no weight loss/gain  Skin/Breast: no rash, no jaundice  Ophthalmologic: no vision changes, no dry eyes   Respiratory and Thorax: no cough, no wheezing, no hemoptysis, no dyspnea  Cardiovascular: no chest pain, no shortness of breath, no orthopnea  Gastrointestinal: no n/v/d, no abdominal pain, no dysphagia   Genitourinary: no dysuria, no frequency, no nocturia, no hematuria  Musculoskeletal: no trauma, no sprain/strain, no myalgias, no arthralgias, no fracture  Neurological: no HA, no dizziness, no weakness, no numbness  Psychiatric: no depression, no SI/HI  Hematology/Lymphatics: no easy bruising  Endocrine: no heat or cold intolerance. no weight gain or loss  Allergic/Immunologic: no allergy or recent reaction       Vital Signs Last 24 Hrs  T(C): 36.7 (26 Mar 2020 17:53), Max: 38.4 (26 Mar 2020 13:17)  T(F): 98 (26 Mar 2020 17:53), Max: 101.2 (26 Mar 2020 13:17)  HR: 74 (26 Mar 2020 17:53) (70 - 76)  BP: 129/72 (26 Mar 2020 17:53) (117/68 - 129/72)  BP(mean): --  RR: 17 (26 Mar 2020 17:53) (16 - 18)  SpO2: 84% (26 Mar 2020 17:53) (84% - 94%)  I&O's Summary      PHYSICAL EXAM:  GENERAL: on nc  HEAD:  Atraumatic, Normocephalic  EYES: EOMI, PERRLA, conjunctiva and sclera clear  NECK: Supple, No JVD  CHEST/LUNG: + rhonchi  HEART: Regular rate and rhythm; No murmurs, rubs, or gallops  ABDOMEN: Soft, Nontender, Nondistended; Bowel sounds present  Neuro: sedated  EXTREMITIES:  2+ Peripheral Pulses, No clubbing, cyanosis, or edema  SKIN: No rashes or lesions    LABS:                        9.2    10.38 )-----------( 271      ( 26 Mar 2020 14:21 )             28.4     03-26    134<L>  |  100  |  42<H>  ----------------------------<  175<H>  4.0   |  21<L>  |  1.91<H>    Ca    8.9      26 Mar 2020 14:21    TPro  7.8  /  Alb  2.9<L>  /  TBili  0.4  /  DBili  x   /  AST  19  /  ALT  24  /  AlkPhos  61  03-      CAPILLARY BLOOD GLUCOSE                RADIOLOGY & ADDITIONAL TESTS:    Imaging Personally Reviewed:  [x] YES  [ ] NO    Consultant(s) Notes Reviewed:  [x] YES  [ ] NO      MEDICATIONS  (STANDING):  azithromycin  IVPB 500 milliGRAM(s) IV Intermittent every 24 hours  cefTRIAXone   IVPB 1000 milliGRAM(s) IV Intermittent every 24 hours    MEDICATIONS  (PRN):  acetaminophen   Tablet .. 650 milliGRAM(s) Oral every 6 hours PRN Temp greater or equal to 38C (100.4F), Mild Pain (1 - 3)      Care Discussed with Consultants/Other Providers [x] YES  [ ] NO    HEALTH ISSUES - PROBLEM Dx: (26 Mar 2020 18:50)      PAST MEDICAL & SURGICAL HISTORY:  DJD (degenerative joint disease)  DM (diabetes mellitus)  Hyperlipidemia  Hypothyroid  HTN (hypertension)  History of vitamin D deficiency  B12 deficiency  Bronchitis  Anxiety  S/P cataract surgery  S/P eye surgery: left eye retinal surgery x2  S/P  section: x2          PHYSICAL EXAM:    T(C): 36.9 (20 @ 16:00), Max: 36.9 (20 @ 16:00)  HR: 60 (20 @ 16:24) (59 - 126)  BP: 104/44 (20 @ 16:00) (104/44 - 184/79)  RR: 25 (20 @ 16:00) (14 - 28)  SpO2: 96% (04-05-20 @ 16:24) (91% - 100%)  Wt(kg): --    I&O's Detail    2020 07:01  -  2020 07:00  --------------------------------------------------------  IN:    dexmedetomidine Infusion: 415.3 mL    Free Water: 360 mL    Glucerna 1.5: 540 mL    propofol Infusion: 367.4 mL    Solution: 100 mL  Total IN: 1782.7 mL    OUT:    Indwelling Catheter - Urethral: 1350 mL    Indwelling Catheter - Urethral: 750 mL    Rectal Tube: 250 mL  Total OUT: 2350 mL    Total NET: -567.3 mL      2020 07:01  -  2020 17:42  --------------------------------------------------------  IN:    dexmedetomidine Infusion: 108.6 mL    Free Water: 120 mL    Glucerna 1.5: 180 mL    propofol Infusion: 111.5 mL  Total IN: 520.1 mL    OUT:    Indwelling Catheter - Urethral: 400 mL    Nasoenteral Tube: 50 mL    Rectal Tube: 200 mL  Total OUT: 650 mL    Total NET: -129.9 mL          Respiratory: clear anteriorly, decreased BS at bases  Cardiovascular: S1 S2  Gastrointestinal: soft NT ND +BS  Extremities: edema   Neuro: Awake and alert    MEDICATIONS  (STANDING):  brimonidine 0.2% Ophthalmic Solution 1 Drop(s) Left EYE daily  chlorhexidine 0.12% Liquid 15 milliLiter(s) Oral Mucosa every 12 hours  chlorhexidine 2% Cloths 1 Application(s) Topical <User Schedule>  dexMEDEtomidine Infusion 0.7 MICROgram(s)/kG/Hr (15.9 mL/Hr) IV Continuous <Continuous>  dextrose 5%. 1000 milliLiter(s) (50 mL/Hr) IV Continuous <Continuous>  dextrose 50% Injectable 12.5 Gram(s) IV Push once  dextrose 50% Injectable 25 Gram(s) IV Push once  dextrose 50% Injectable 25 Gram(s) IV Push once  heparin  Injectable 5000 Unit(s) SubCutaneous every 8 hours  insulin glargine Injectable (LANTUS) 12 Unit(s) SubCutaneous at bedtime  insulin lispro (HumaLOG) corrective regimen sliding scale   SubCutaneous every 6 hours  latanoprost 0.005% Ophthalmic Solution 1 Drop(s) Both EYES at bedtime  levothyroxine Injectable 12.5 MICROGram(s) IV Push at bedtime  metoprolol tartrate 25 milliGRAM(s) Oral two times a day  pantoprazole  Injectable 40 milliGRAM(s) IV Push daily  propofol Infusion 73.502 MICROgram(s)/kG/Min (40 mL/Hr) IV Continuous <Continuous>  simvastatin 20 milliGRAM(s) Oral at bedtime  timolol 0.5% Solution 1 Drop(s) Left EYE daily    MEDICATIONS  (PRN):  acetaminophen   Tablet .. 650 milliGRAM(s) Oral every 6 hours PRN Temp greater or equal to 38C (100.4F), Mild Pain (1 - 3)  dextrose 40% Gel 15 Gram(s) Oral once PRN Blood Glucose LESS THAN 70 milliGRAM(s)/deciliter  glucagon  Injectable 1 milliGRAM(s) IntraMuscular once PRN Glucose LESS THAN 70 milligrams/deciliter  HYDROmorphone  Injectable 2 milliGRAM(s) IV Push every 4 hours PRN Mild Pain (1 - 3)  midazolam Injectable 2 milliGRAM(s) IV Push every 2 hours PRN Agitation  sodium chloride 0.9% lock flush 10 milliLiter(s) IV Push every 1 hour PRN Pre/post blood products, medications, blood draw, and to maintain line patency                            10.5   16.85 )-----------( 528      ( 2020 04:47 )             32.4       04-05    137  |  101  |  81<H>  ----------------------------<  193<H>  4.0   |  17<L>  |  3.67<H>    Ca    9.6      2020 04:47  Phos  7.0     04-05  Mg     2.4     04-05    TPro  7.6  /  Alb  2.4<L>  /  TBili  0.6  /  DBili  x   /  AST  16  /  ALT  22  /  AlkPhos  71  04-05      Creatinine Trend: Creatinine Trend: 3.67<--, 4.19<--, 4.29<--, 3.36<--, 4.59<--, 4.58<--

## 2020-04-05 NOTE — PROGRESS NOTE ADULT - SUBJECTIVE AND OBJECTIVE BOX
Chief Complaint: Fevers, shortness of breath    Interval Events: No events overnight.    Review of Systems:  Unable to obtain    Physical Exam:  Vital Signs Last 24 Hrs  T(C): 36.4 (05 Apr 2020 08:00), Max: 37 (04 Apr 2020 16:00)  T(F): 97.6 (05 Apr 2020 08:00), Max: 98.6 (04 Apr 2020 16:00)  HR: 106 (05 Apr 2020 08:00) (59 - 126)  BP: 139/61 (05 Apr 2020 08:00) (125/49 - 184/79)  BP(mean): 81 (05 Apr 2020 08:00) (65 - 81)  RR: 24 (05 Apr 2020 08:00) (14 - 28)  SpO2: 96% (05 Apr 2020 08:00) (91% - 100%)  General: Sedated  HEENT: Intubated  Neck: No JVD, no carotid bruit  Extremities: No LE edema, no cyanosis  Neuro: Non-focal  Skin: No rash    Labs:    04-05    137  |  101  |  81<H>  ----------------------------<  193<H>  4.0   |  17<L>  |  3.67<H>    Ca    9.6      05 Apr 2020 04:47  Phos  7.0     04-05  Mg     2.4     04-05    TPro  7.6  /  Alb  2.4<L>  /  TBili  0.6  /  DBili  x   /  AST  16  /  ALT  22  /  AlkPhos  71  04-05                        10.5   16.85 )-----------( 528      ( 05 Apr 2020 04:47 )             32.4       Telemetry: Sinus tachycardic

## 2020-04-05 NOTE — PROGRESS NOTE ADULT - ASSESSMENT
75 y/o F with hx of DM, HTN, HLD, hypothyroid, anxiety biba with c/o fever and cough. Now COVID 19 PNA.   ALIS on CKD most likely due to overwhelming sepsis. Indices are now improving. Continue supportive care. Will follow.

## 2020-04-05 NOTE — PROGRESS NOTE ADULT - PROBLEM SELECTOR PLAN 2
Patient with COVID 19.  Patient has completed Plaquenil.  Continue supportive care.  Continue isolation precautions.  NLR today is 28.03

## 2020-04-05 NOTE — PROGRESS NOTE ADULT - ASSESSMENT
73 y/o F with hx of DM, HTN, HLD, hypothyroid, anxiety admitted 3/26 with acute hypoxemic respiratory failure and ARDS from COVID19 pneumonia, complicated by ALIS.  Intubated on 3/26      NEURO:   on precedex and propofol with prn dilaudid and versed.   Paralytics: none  Daily SAT to be avoided until resp status improved     CVS:   Pressors: off  MAP Target: > 65  Serial troponins  monitor QTc.    PULM:   On Volume AC ; lung protective ventilation strategy at 4-6 cc/kg predicted IBW, actively titrating PEEP and FiO2 per ARDSnet high PEEP strategy for spo2 > 92%. aggressive chest pt and suctioning, keep HOB > 30 degrees  Vent settings/adjustments:  16/400/55/+10. PEEP 8-->10. fio2 55-->45-->55.  P:F ratio f/u abg in am.   Peak pressure/plateau pressure: 29/22  Trend ABGs at least q12 hours      GI:   Diet: tube feeds  Bowel Regimen as needed    ID:   COVID 19 PCR status: positive  RVP: negative  Cultures: no growth  completed plaquenil course.   trend crp, ldh, ferritin. monitor procalcitonin for possible superimposed bacterial infection.    RENAL:   ALIS with Cr peak 4.26. nonoliguric. nephrology following.  Possible net neg balance   Avoid nephrotoxic agents  Strict I&O with Cr monitoring   Medications adjusted for   Close Electrolyte monitoring and correction as needed     ENDO: lantus and sliding scale. accuechecks q 6 hours.      HEME: heparin for dvt prophylaxis  PROPH: 40 mg IV protonix while intubated. chlorhexidine wash.    Code status: DNR      Lines/ Tubes/ Catheters/ devices:   ETT: Placed on 3/26  OGT/NGT: 3/26  CVC: Placed on 4/5 in sterile conditions 75 y/o F with hx of DM, HTN, HLD, hypothyroid, anxiety admitted 3/26 with acute hypoxemic respiratory failure and ARDS from COVID19 pneumonia, complicated by ALIS.  Intubated on 3/26      NEURO:   on precedex and propofol with prn dilaudid and versed.   Paralytics: none  Daily SAT to be avoided until resp status improved     CVS:   Pressors: off  MAP Target: > 65  Serial troponins  monitor QTc.    PULM:   On Volume AC ; lung protective ventilation strategy at 4-6 cc/kg predicted IBW, actively titrating PEEP and FiO2 per ARDSnet high PEEP strategy for spo2 > 92%. aggressive chest pt and suctioning, keep HOB > 30 degrees  Vent settings/adjustments:  16/400/55/+10. PEEP 8-->10. fio2 55-->45-->55.  P:F ratio f/u abg in am.   Peak pressure/plateau pressure: 29/22  Trend ABGs at least q12 hours      GI:   Diet: tube feeds  Bowel Regimen as needed    ID:   COVID 19 PCR status: positive  RVP: negative  Cultures: no growth  completed plaquenil course.   trend crp, ldh, ferritin. monitor procalcitonin for possible superimposed bacterial infection.    RENAL:   ALIS with Cr peak 4.26. nonoliguric. nephrology following.  Possible net neg balance   Avoid nephrotoxic agents  Strict I&O with Cr monitoring   Medications adjusted for   Close Electrolyte monitoring and correction as needed     ENDO: lantus and sliding scale. accuechecks q 6 hours.      HEME: heparin for dvt prophylaxis  PROPH: 40 mg IV protonix while intubated. chlorhexidine wash.    Code status: DNR      Lines/ Tubes/ Catheters/ devices:   ETT: Placed on 3/26  OGT/NGT: 3/26  CVC: Placed on 4/5 in sterile conditions      addendum: patient began having agonal like breathing while on full support. stat ABG revealed metabolic acidosis with a ph of 7.20. serum bicarb on stat labs showed a CO2 of 17. in lieu of ARDS and maintaining a net negative fluid balance, held off of starting bicarb gtt as creatinine was improving. f/u bmp as needed may end up requiring if acidosis worsens. she has also had a persistent diarrhea, while it may be attributed to COVID, she has now spiked new fevers up to 101.2 and her white count has doubled. therefore will rule out c-diff. also check blood cultures and UA. lines were placed 10 days ago, pending cultures/clinical course may need to be changed.

## 2020-04-05 NOTE — PROGRESS NOTE ADULT - SUBJECTIVE AND OBJECTIVE BOX
Date/Time Patient Seen:  		  Referring MD:   Data Reviewed	       Patient is a 74y old  Female who presents with a chief complaint of sob (2020 13:17)      Subjective/HPI     PAST MEDICAL & SURGICAL HISTORY:  DJD (degenerative joint disease)  DM (diabetes mellitus)  Hyperlipidemia  Hypothyroid  HTN (hypertension)  History of vitamin D deficiency  B12 deficiency  Bronchitis  Anxiety  S/P cataract surgery  S/P eye surgery: left eye retinal surgery x2  S/P  section: x2        Medication list         MEDICATIONS  (STANDING):  brimonidine 0.2% Ophthalmic Solution 1 Drop(s) Left EYE daily  chlorhexidine 0.12% Liquid 15 milliLiter(s) Oral Mucosa every 12 hours  chlorhexidine 2% Cloths 1 Application(s) Topical <User Schedule>  dexMEDEtomidine Infusion 0.7 MICROgram(s)/kG/Hr (15.9 mL/Hr) IV Continuous <Continuous>  dextrose 5%. 1000 milliLiter(s) (50 mL/Hr) IV Continuous <Continuous>  dextrose 50% Injectable 12.5 Gram(s) IV Push once  dextrose 50% Injectable 25 Gram(s) IV Push once  dextrose 50% Injectable 25 Gram(s) IV Push once  heparin  Injectable 5000 Unit(s) SubCutaneous every 8 hours  insulin glargine Injectable (LANTUS) 12 Unit(s) SubCutaneous at bedtime  insulin lispro (HumaLOG) corrective regimen sliding scale   SubCutaneous every 6 hours  latanoprost 0.005% Ophthalmic Solution 1 Drop(s) Both EYES at bedtime  levothyroxine Injectable 12.5 MICROGram(s) IV Push at bedtime  metoprolol tartrate 25 milliGRAM(s) Oral two times a day  pantoprazole  Injectable 40 milliGRAM(s) IV Push daily  propofol Infusion 73.502 MICROgram(s)/kG/Min (40 mL/Hr) IV Continuous <Continuous>  simvastatin 20 milliGRAM(s) Oral at bedtime  timolol 0.5% Solution 1 Drop(s) Left EYE daily    MEDICATIONS  (PRN):  acetaminophen   Tablet .. 650 milliGRAM(s) Oral every 6 hours PRN Temp greater or equal to 38C (100.4F), Mild Pain (1 - 3)  dextrose 40% Gel 15 Gram(s) Oral once PRN Blood Glucose LESS THAN 70 milliGRAM(s)/deciliter  glucagon  Injectable 1 milliGRAM(s) IntraMuscular once PRN Glucose LESS THAN 70 milligrams/deciliter  HYDROmorphone  Injectable 2 milliGRAM(s) IV Push every 4 hours PRN Mild Pain (1 - 3)  midazolam Injectable 2 milliGRAM(s) IV Push every 2 hours PRN Agitation  sodium chloride 0.9% lock flush 10 milliLiter(s) IV Push every 1 hour PRN Pre/post blood products, medications, blood draw, and to maintain line patency         Vitals log        ICU Vital Signs Last 24 Hrs  T(C): 36.6 (2020 12:00), Max: 37 (2020 16:00)  T(F): 97.8 (2020 12:00), Max: 98.6 (2020 16:00)  HR: 69 (2020 12:00) (59 - 126)  BP: 116/50 (2020 12:00) (116/50 - 184/79)  BP(mean): 64 (2020 12:00) (64 - 81)  ABP: --  ABP(mean): --  RR: 23 (2020 12:00) (14 - 28)  SpO2: 98% (2020 12:00) (91% - 100%)       Mode: AC/ CMV (Assist Control/ Continuous Mandatory Ventilation)  RR (machine): 16  TV (machine): 400  FiO2: 55  PEEP: 10  ITime: 1  MAP: 18  PIP: 32      Input and Output:  I&O's Detail    2020 07:01  -  2020 07:00  --------------------------------------------------------  IN:    dexmedetomidine Infusion: 415.3 mL    Free Water: 360 mL    Glucerna 1.5: 540 mL    propofol Infusion: 367.4 mL    Solution: 100 mL  Total IN: 1782.7 mL    OUT:    Indwelling Catheter - Urethral: 1350 mL    Indwelling Catheter - Urethral: 750 mL    Rectal Tube: 250 mL  Total OUT: 2350 mL    Total NET: -567.3 mL      2020 07:01  -  2020 13:30  --------------------------------------------------------  IN:    dexmedetomidine Infusion: 108.6 mL    Free Water: 120 mL    Glucerna 1.5: 180 mL    propofol Infusion: 111.5 mL  Total IN: 520.1 mL    OUT:  Total OUT: 0 mL    Total NET: 520.1 mL          Lab Data                        10.5   16.85 )-----------( 528      ( 2020 04:47 )             32.4     04-    137  |  101  |  81<H>  ----------------------------<  193<H>  4.0   |  17<L>  |  3.67<H>    Ca    9.6      2020 04:47  Phos  7.0     04-  Mg     2.4     04    TPro  7.6  /  Alb  2.4<L>  /  TBili  0.6  /  DBili  x   /  AST  16  /  ALT  22  /  AlkPhos  71  04-05    ABG - ( 2020 03:32 )  pH, Arterial: x     pH, Blood: 7.20  /  pCO2: 40    /  pO2: 96    / HCO3: 15    / Base Excess: -11.3 /  SaO2: 95                      Review of Systems	      Objective     Physical Examination    heart s1s2  lung dec BS      Pertinent Lab findings & Imaging      Justin:  NO   Adequate UO     I&O's Detail    2020 07:01  -  2020 07:00  --------------------------------------------------------  IN:    dexmedetomidine Infusion: 415.3 mL    Free Water: 360 mL    Glucerna 1.5: 540 mL    propofol Infusion: 367.4 mL    Solution: 100 mL  Total IN: 1782.7 mL    OUT:    Indwelling Catheter - Urethral: 1350 mL    Indwelling Catheter - Urethral: 750 mL    Rectal Tube: 250 mL  Total OUT: 2350 mL    Total NET: -567.3 mL      2020 07:01  -  2020 13:30  --------------------------------------------------------  IN:    dexmedetomidine Infusion: 108.6 mL    Free Water: 120 mL    Glucerna 1.5: 180 mL    propofol Infusion: 111.5 mL  Total IN: 520.1 mL    OUT:  Total OUT: 0 mL    Total NET: 520.1 mL               Discussed with:     Cultures:	        Radiology

## 2020-04-05 NOTE — PROGRESS NOTE ADULT - PROBLEM SELECTOR PLAN 3
Patient with ALIS most likely secondary to ATN from shock.  BUN/creat slightly better.   Monitor serial BMP.

## 2020-04-05 NOTE — PROGRESS NOTE ADULT - SUBJECTIVE AND OBJECTIVE BOX
Patient is a 74y old  Female who presents with a chief complaint of sob (04 Apr 2020 19:07)    HPI: 73 y/o F with hx of DM, HTN, HLD, hypothyroid, anxiety biba with c/o fever and cough x 6 days. Pt states that Tmax was 101.8F, took one tab of tylenol XS at 9am this morning. Pt states that she has associated dry cough, body aches and mild generalized weakness. States that she has had one episode of loose stool daily. Denies recent travel, sick contacts, known covid-19 exposure, CP, SOB, runny nose, sore throat, headache, rash, urinary symptoms.  she was sating 94% ON 4l and was then put on non rebreather and was transferred to [facility].    Chart reviewed, notes reviewed.   Patient seen and examined.  Being followed by following specialists: Pulmonary critical care, cardiology, nephrology, and GI.    Consultant(s) Notes Reviewed:  [X] Yes    Care Discussed with Consultants/Other Providers: [X] Yes    04/04/2020 --> Events noted, patient remains intubated and on ventilator.  Patient is sedated.  All information is from patient's chart.    04/05/2020 --> Remains intubated and on vent. Sedated.     REVIEW OF SYSTEMS:  Unable to obtain.    Allergies: No Known Allergies    Intolerances      Home Medications:    MEDICATIONS  (STANDING):  brimonidine 0.2% Ophthalmic Solution 1 Drop(s) Left EYE daily  chlorhexidine 0.12% Liquid 15 milliLiter(s) Oral Mucosa every 12 hours  chlorhexidine 2% Cloths 1 Application(s) Topical <User Schedule>  dexMEDEtomidine Infusion 0.7 MICROgram(s)/kG/Hr (15.9 mL/Hr) IV Continuous <Continuous>  dextrose 5%. 1000 milliLiter(s) (50 mL/Hr) IV Continuous <Continuous>  dextrose 50% Injectable 12.5 Gram(s) IV Push once  dextrose 50% Injectable 25 Gram(s) IV Push once  dextrose 50% Injectable 25 Gram(s) IV Push once  heparin  Injectable 5000 Unit(s) SubCutaneous every 8 hours  insulin glargine Injectable (LANTUS) 12 Unit(s) SubCutaneous at bedtime  insulin lispro (HumaLOG) corrective regimen sliding scale   SubCutaneous every 6 hours  latanoprost 0.005% Ophthalmic Solution 1 Drop(s) Both EYES at bedtime  levothyroxine Injectable 12.5 MICROGram(s) IV Push at bedtime  metoprolol tartrate 25 milliGRAM(s) Oral two times a day  pantoprazole  Injectable 40 milliGRAM(s) IV Push daily  propofol Infusion 73.502 MICROgram(s)/kG/Min (40 mL/Hr) IV Continuous <Continuous>  simvastatin 20 milliGRAM(s) Oral at bedtime  timolol 0.5% Solution 1 Drop(s) Left EYE daily    MEDICATIONS  (PRN):  acetaminophen   Tablet .. 650 milliGRAM(s) Oral every 6 hours PRN Temp greater or equal to 38C (100.4F), Mild Pain (1 - 3)  dextrose 40% Gel 15 Gram(s) Oral once PRN Blood Glucose LESS THAN 70 milliGRAM(s)/deciliter  glucagon  Injectable 1 milliGRAM(s) IntraMuscular once PRN Glucose LESS THAN 70 milligrams/deciliter  HYDROmorphone  Injectable 2 milliGRAM(s) IV Push every 4 hours PRN Mild Pain (1 - 3)  midazolam Injectable 2 milliGRAM(s) IV Push every 2 hours PRN Agitation  sodium chloride 0.9% lock flush 10 milliLiter(s) IV Push every 1 hour PRN Pre/post blood products, medications, blood draw, and to maintain line patency    Vital Signs Last 24 Hrs  T(C): 36.1 (05 Apr 2020 20:00), Max: 36.9 (05 Apr 2020 16:00)  T(F): 97 (05 Apr 2020 20:00), Max: 98.4 (05 Apr 2020 16:00)  HR: 58 (05 Apr 2020 22:00) (58 - 126)  BP: 102/48 (05 Apr 2020 22:00) (102/48 - 184/79)  BP(mean): 61 (05 Apr 2020 22:00) (59 - 81)  RR: 17 (05 Apr 2020 22:00) (17 - 27)  SpO2: 98% (05 Apr 2020 22:00) (91% - 98%)    Mode: AC/ CMV (Assist Control/ Continuous Mandatory Ventilation)  RR (machine): 16  TV (machine): 400  FiO2: 40  PEEP: 8  ITime: 1  MAP: 16  PIP: 41    PHYSICAL EXAM:  GENERAL: Currently on ventilator, well-groomed, well-developed  HEAD:  Atraumatic, Normocephalic  EYES: Pallor +  ENMT: Intubated via ET tube.  NECK: Supple.  CHEST/LUNG: Decreased breath sounds at bases.  HEART: S1, S2.   ABDOMEN: Soft, Nontender, Nondistended; Bowel sounds present  EXTREMITIES:  2+ Peripheral Pulses, No clubbing, cyanosis, or edema  MS: No joint swelling or deformity.   LYMPH: No lymphadenopathy noted  SKIN: No rashes or lesions  NERVOUS SYSTEM:   Sedated  PSYCH:  Sedated    LABS:                           10.5   16.85 )-----------( 528      ( 05 Apr 2020 04:47 )             32.4     05 Apr 2020 04:47    137    |  101    |  81     ----------------------------<  193    4.0     |  17     |  3.67     Ca    9.6        05 Apr 2020 04:47  Phos  7.0       05 Apr 2020 04:47  Mg     2.4       05 Apr 2020 04:47    TPro  7.6    /  Alb  2.4    /  TBili  0.6    /  DBili  x      /  AST  16     /  ALT  22     /  AlkPhos  71     05 Apr 2020 04:47    CAPILLARY BLOOD GLUCOSE  POCT Blood Glucose.: 243 mg/dL (05 Apr 2020 11:02)  POCT Blood Glucose.: 170 mg/dL (05 Apr 2020 04:56)  POCT Blood Glucose.: 160 mg/dL (04 Apr 2020 23:48)      03-27 [id number] 8.1      Ferritin, Serum: 414 ng/mL (03-31 @ 11:31)  Ferritin, Serum: 579 ng/mL (03-29 @ 13:15)    RADIOLOGY TEST: (IMAGES REVIEWED BY ME)  < from: Xray Chest 1 View- PORTABLE-Urgent (04.04.20 @ 14:55) >  EXAM:  XR CHEST PORTABLE URGENT 1V                        PROCEDURE DATE:  04/04/2020    INTERPRETATION:  History: Line placement    Chest:  one view.      Comparison: 04/01/2020    AP radiograph of the chest demonstrates persistentBILATERAL perihilar infiltrates. NG tube unchanged. ET tube difficult to discern due to technique. RIGHT internal jugular venous catheter tip in atriocaval junction. The cardiac silhouette is normal in size. Osseous structures are intact.    Impression:persistent BILATERAL perihilar infiltrates. NG tube unchanged. ET tube difficult to discern due to technique. RIGHT internal jugular venous catheter tip in atriocaval junction.    < end of copied text >    Imaging Personally Reviewed:  [X] YES      HEALTH ISSUES - PROBLEM Dx:  Hypokalemia: Hypokalemia  Anemia, unspecified type: Anemia, unspecified type  History of vitamin D deficiency: History of vitamin D deficiency  B12 deficiency: B12 deficiency  HTN (hypertension): HTN (hypertension)  Atrial fibrillation, unspecified type: Atrial fibrillation, unspecified type  Hypothyroidism, unspecified type: Hypothyroidism, unspecified type  Afib: Afib  Shock: Shock  Anxiety: Anxiety  Hypothyroid: Hypothyroid  Hyperlipidemia: Hyperlipidemia  DM (diabetes mellitus): DM (diabetes mellitus)  SARS (severe acute respiratory syndrome): SARS (severe acute respiratory syndrome)  Acute respiratory failure with hypoxia: Acute respiratory failure with hypoxia

## 2020-04-05 NOTE — PROGRESS NOTE ADULT - SUBJECTIVE AND OBJECTIVE BOX
Patient is a 74y old  Female who presents with a chief complaint of sob (2020 19:07)      BRIEF HOSPITAL COURSE: 73 y/o F with hx of DM, HTN, HLD, hypothyroid, anxiety admitted 3/26 with acute hypoxemic respiratory failure and ARDS from COVID19 pneumonia, complicated by ALIS.  Intubated on 3/26      Events last 24 hours: desatted to 85% on 45/8. increased peep to 10 and fio2 to 55 with improvement.    PAST MEDICAL & SURGICAL HISTORY:  DJD (degenerative joint disease)  DM (diabetes mellitus)  Hyperlipidemia  Hypothyroid  HTN (hypertension)  History of vitamin D deficiency  B12 deficiency  Bronchitis  Anxiety  S/P cataract surgery  S/P eye surgery: left eye retinal surgery x2  S/P  section: x2      Review of Systems:  sedated unable to obtain      Medications:        acetaminophen   Tablet .. 650 milliGRAM(s) Oral every 6 hours PRN  dexMEDEtomidine Infusion 0.7 MICROgram(s)/kG/Hr IV Continuous <Continuous>  HYDROmorphone  Injectable 2 milliGRAM(s) IV Push every 4 hours PRN  midazolam Injectable 2 milliGRAM(s) IV Push every 2 hours PRN  propofol Infusion 73.502 MICROgram(s)/kG/Min IV Continuous <Continuous>      heparin  Injectable 5000 Unit(s) SubCutaneous every 8 hours    pantoprazole  Injectable 40 milliGRAM(s) IV Push daily      dextrose 40% Gel 15 Gram(s) Oral once PRN  dextrose 50% Injectable 12.5 Gram(s) IV Push once  dextrose 50% Injectable 25 Gram(s) IV Push once  dextrose 50% Injectable 25 Gram(s) IV Push once  glucagon  Injectable 1 milliGRAM(s) IntraMuscular once PRN  insulin glargine Injectable (LANTUS) 12 Unit(s) SubCutaneous at bedtime  insulin lispro (HumaLOG) corrective regimen sliding scale   SubCutaneous every 6 hours  levothyroxine Injectable 12.5 MICROGram(s) IV Push at bedtime  simvastatin 20 milliGRAM(s) Oral at bedtime    dextrose 5%. 1000 milliLiter(s) IV Continuous <Continuous>  sodium chloride 0.9% lock flush 10 milliLiter(s) IV Push every 1 hour PRN      brimonidine 0.2% Ophthalmic Solution 1 Drop(s) Left EYE daily  chlorhexidine 0.12% Liquid 15 milliLiter(s) Oral Mucosa every 12 hours  chlorhexidine 2% Cloths 1 Application(s) Topical <User Schedule>  latanoprost 0.005% Ophthalmic Solution 1 Drop(s) Both EYES at bedtime  timolol 0.5% Solution 1 Drop(s) Left EYE daily        Mode: AC/ CMV (Assist Control/ Continuous Mandatory Ventilation)  RR (machine): 16  TV (machine): 400  FiO2: 45  PEEP: 8  ITime: 1  MAP: 24  PIP: 32      ICU Vital Signs Last 24 Hrs  T(C): 36.8 (2020 20:00), Max: 37 (2020 16:00)  T(F): 98.2 (2020 20:00), Max: 98.6 (2020 16:00)  HR: 79 (2020 23:38) (59 - 85)  BP: 146/59 (2020 23:38) (125/49 - 146/59)  BP(mean): 68 (2020 18:00) (65 - 76)  ABP: 133/66 (2020 12:00) (133/66 - 152/64)  ABP(mean): 91 (2020 12:00) (84 - 92)  RR: 26 (2020 23:38) (14 - 28)  SpO2: 91% (2020 23:38) (91% - 100%)      ABG - ( 2020 22:12 )  pH, Arterial: x     pH, Blood: 7.31  /  pCO2: 36    /  pO2: 66    / HCO3: 18    / Base Excess: -7.3  /  SaO2: 91                  I&O's Detail    2020 07:01  -  2020 07:00  --------------------------------------------------------  IN:    dexmedetomidine Infusion: 299.2 mL    Free Water: 120 mL    Glucerna 1.5: 180 mL    Packed Red Blood Cells: 273 mL    propofol Infusion: 408.6 mL    Solution: 400 mL    Solution: 100 mL  Total IN: 1780.8 mL    OUT:    Indwelling Catheter - Urethral: 1100 mL    Nasoenteral Tube: 50 mL  Total OUT: 1150 mL    Total NET: 630.8 mL      2020 07:01  -  2020 01:21  --------------------------------------------------------  IN:    dexmedetomidine Infusion: 307.3 mL    Free Water: 240 mL    Glucerna 1.5: 360 mL    propofol Infusion: 367.4 mL    Solution: 100 mL  Total IN: 1374.7 mL    OUT:    Indwelling Catheter - Urethral: 850 mL    Rectal Tube: 250 mL  Total OUT: 1100 mL    Total NET: 274.7 mL            LABS:                        8.8    8.42  )-----------( 417      ( 2020 07:06 )             26.9     04-04    135  |  100  |  90<H>  ----------------------------<  171<H>  3.7   |  22  |  4.19<H>    Ca    9.0      2020 07:06  Phos  5.1     04-03  Mg     2.7     04-04    TPro  6.8  /  Alb  2.5<L>  /  TBili  0.4  /  DBili  x   /  AST  21  /  ALT  20  /  AlkPhos  66  04-04          CAPILLARY BLOOD GLUCOSE      POCT Blood Glucose.: 160 mg/dL (2020 23:48)        CULTURES:      Physical Examination:    General: No acute distress.      HEENT: Pupils equal, reactive to light.  Symmetric.    PULM: Coarse bilaterally, no significant sputum production    NECK: Supple, no lymphadenopathy, trachea midline    CVS: Regular rate and rhythm, no murmurs, rubs, or gallops    ABD: Soft, nondistended, nontender, normoactive bowel sounds, no masses    EXT: No edema, nontender    SKIN: Warm and well perfused, no rashes noted.    NEURO: sedated    RADIOLOGY: reviewed     CRITICAL CARE TIME SPENT: 38 minutes of critical care time spent providing medical care for patient's acute illness/conditions that impairs at least one vital organ system and/or poses a high risk of imminent or life threatening deterioration in the patient's condition. It includes time spent evaluating and treating the patient's acute illness as well as time spent reviewing labs, radiology, discussing goals of care with patient and/or patient's family, and discussing the case with a multidisciplinary team in an effort to prevent further life threatening deterioration or end organ damage. This time is independent of any procedures performed.

## 2020-04-05 NOTE — PROGRESS NOTE ADULT - ASSESSMENT
The patient is a 74 year old female with a history of HTN, DM, HL, hypothyroidism, anxiety who presented with fevers, cough, shortness of breath in the setting of viral PNA, COVID-19, respiratory failure, septic shock.     Plan:  - CXR consistent with PNA  - COVID-19 positive  - Completed hydroxychloroquine  - On zosyn  - Off of midodrine  - Atrial fibrillation - brief, back in sinus rhythm.  - Restart metoprolol tartrate 25 mg bid  - Hemoglobin improved  - Renal function improving  - Continue mechanical ventilation  - Pulm and ID follow-up

## 2020-04-06 LAB
ALBUMIN SERPL ELPH-MCNC: 2 G/DL — LOW (ref 3.3–5)
ALP SERPL-CCNC: 66 U/L — SIGNIFICANT CHANGE UP (ref 30–120)
ALT FLD-CCNC: 14 U/L DA — SIGNIFICANT CHANGE UP (ref 10–60)
ANION GAP SERPL CALC-SCNC: 14 MMOL/L — SIGNIFICANT CHANGE UP (ref 5–17)
AST SERPL-CCNC: 15 U/L — SIGNIFICANT CHANGE UP (ref 10–40)
BASE EXCESS BLDA CALC-SCNC: -10.2 MMOL/L — LOW (ref -2–2)
BASE EXCESS BLDA CALC-SCNC: -8.5 MMOL/L — LOW (ref -2–2)
BASOPHILS # BLD AUTO: 0.01 K/UL — SIGNIFICANT CHANGE UP (ref 0–0.2)
BASOPHILS NFR BLD AUTO: 0.1 % — SIGNIFICANT CHANGE UP (ref 0–2)
BILIRUB SERPL-MCNC: 0.5 MG/DL — SIGNIFICANT CHANGE UP (ref 0.2–1.2)
BLOOD GAS COMMENTS: SIGNIFICANT CHANGE UP
BLOOD GAS COMMENTS: SIGNIFICANT CHANGE UP
BLOOD GAS SOURCE: SIGNIFICANT CHANGE UP
BUN SERPL-MCNC: 88 MG/DL — HIGH (ref 7–23)
C DIFF BY PCR RESULT: SIGNIFICANT CHANGE UP
C DIFF TOX GENS STL QL NAA+PROBE: SIGNIFICANT CHANGE UP
CALCIUM SERPL-MCNC: 9.3 MG/DL — SIGNIFICANT CHANGE UP (ref 8.4–10.5)
CHLORIDE SERPL-SCNC: 104 MMOL/L — SIGNIFICANT CHANGE UP (ref 96–108)
CO2 SERPL-SCNC: 20 MMOL/L — LOW (ref 22–31)
CREAT SERPL-MCNC: 3.88 MG/DL — HIGH (ref 0.5–1.3)
EOSINOPHIL # BLD AUTO: 0.38 K/UL — SIGNIFICANT CHANGE UP (ref 0–0.5)
EOSINOPHIL NFR BLD AUTO: 3.6 % — SIGNIFICANT CHANGE UP (ref 0–6)
GLUCOSE BLDC GLUCOMTR-MCNC: 153 MG/DL — HIGH (ref 70–99)
GLUCOSE BLDC GLUCOMTR-MCNC: 175 MG/DL — HIGH (ref 70–99)
GLUCOSE BLDC GLUCOMTR-MCNC: 175 MG/DL — HIGH (ref 70–99)
GLUCOSE BLDC GLUCOMTR-MCNC: 190 MG/DL — HIGH (ref 70–99)
GLUCOSE SERPL-MCNC: 172 MG/DL — HIGH (ref 70–99)
HCO3 BLDA-SCNC: 16 MMOL/L — LOW (ref 21–29)
HCO3 BLDA-SCNC: 16 MMOL/L — LOW (ref 21–29)
HCT VFR BLD CALC: 28.9 % — LOW (ref 34.5–45)
HGB BLD-MCNC: 9.2 G/DL — LOW (ref 11.5–15.5)
HOROWITZ INDEX BLDA+IHG-RTO: 40 — SIGNIFICANT CHANGE UP
HOROWITZ INDEX BLDA+IHG-RTO: 40 — SIGNIFICANT CHANGE UP
IMM GRANULOCYTES NFR BLD AUTO: 1.3 % — SIGNIFICANT CHANGE UP (ref 0–1.5)
LYMPHOCYTES # BLD AUTO: 0.48 K/UL — LOW (ref 1–3.3)
LYMPHOCYTES # BLD AUTO: 4.6 % — LOW (ref 13–44)
MAGNESIUM SERPL-MCNC: 3.4 MG/DL — HIGH (ref 1.6–2.6)
MCHC RBC-ENTMCNC: 28.4 PG — SIGNIFICANT CHANGE UP (ref 27–34)
MCHC RBC-ENTMCNC: 31.8 GM/DL — LOW (ref 32–36)
MCV RBC AUTO: 89.2 FL — SIGNIFICANT CHANGE UP (ref 80–100)
MONOCYTES # BLD AUTO: 0.45 K/UL — SIGNIFICANT CHANGE UP (ref 0–0.9)
MONOCYTES NFR BLD AUTO: 4.3 % — SIGNIFICANT CHANGE UP (ref 2–14)
NEUTROPHILS # BLD AUTO: 9 K/UL — HIGH (ref 1.8–7.4)
NEUTROPHILS NFR BLD AUTO: 86.1 % — HIGH (ref 43–77)
NRBC # BLD: 0 /100 WBCS — SIGNIFICANT CHANGE UP (ref 0–0)
PCO2 BLDA: 48 MMHG — HIGH (ref 32–46)
PCO2 BLDA: 53 MMHG — HIGH (ref 32–46)
PH BLD: 7.16 — CRITICAL LOW (ref 7.35–7.45)
PH BLD: 7.17 — CRITICAL LOW (ref 7.35–7.45)
PHOSPHATE SERPL-MCNC: 7.8 MG/DL — HIGH (ref 2.5–4.5)
PLATELET # BLD AUTO: 421 K/UL — HIGH (ref 150–400)
PO2 BLDA: 118 MMHG — HIGH (ref 74–108)
PO2 BLDA: 151 MMHG — HIGH (ref 74–108)
POTASSIUM SERPL-MCNC: 3.8 MMOL/L — SIGNIFICANT CHANGE UP (ref 3.5–5.3)
POTASSIUM SERPL-SCNC: 3.8 MMOL/L — SIGNIFICANT CHANGE UP (ref 3.5–5.3)
PROT SERPL-MCNC: 6.6 G/DL — SIGNIFICANT CHANGE UP (ref 6–8.3)
RBC # BLD: 3.24 M/UL — LOW (ref 3.8–5.2)
RBC # FLD: 15.6 % — HIGH (ref 10.3–14.5)
SAO2 % BLDA: 97 % — HIGH (ref 92–96)
SAO2 % BLDA: 98 % — HIGH (ref 92–96)
SODIUM SERPL-SCNC: 138 MMOL/L — SIGNIFICANT CHANGE UP (ref 135–145)
WBC # BLD: 10.46 K/UL — SIGNIFICANT CHANGE UP (ref 3.8–10.5)
WBC # FLD AUTO: 10.46 K/UL — SIGNIFICANT CHANGE UP (ref 3.8–10.5)

## 2020-04-06 PROCEDURE — 74018 RADEX ABDOMEN 1 VIEW: CPT | Mod: 26

## 2020-04-06 PROCEDURE — 99233 SBSQ HOSP IP/OBS HIGH 50: CPT | Mod: CS

## 2020-04-06 RX ORDER — SEVELAMER CARBONATE 2400 MG/1
800 POWDER, FOR SUSPENSION ORAL
Refills: 0 | Status: DISCONTINUED | OUTPATIENT
Start: 2020-04-06 | End: 2020-04-08

## 2020-04-06 RX ORDER — SODIUM BICARBONATE 1 MEQ/ML
0.08 SYRINGE (ML) INTRAVENOUS
Qty: 150 | Refills: 0 | Status: DISCONTINUED | OUTPATIENT
Start: 2020-04-06 | End: 2020-04-06

## 2020-04-06 RX ORDER — SODIUM CHLORIDE 9 MG/ML
1000 INJECTION, SOLUTION INTRAVENOUS
Refills: 0 | Status: DISCONTINUED | OUTPATIENT
Start: 2020-04-06 | End: 2020-04-08

## 2020-04-06 RX ORDER — FENTANYL CITRATE 50 UG/ML
0.5 INJECTION INTRAVENOUS
Qty: 2500 | Refills: 0 | Status: DISCONTINUED | OUTPATIENT
Start: 2020-04-06 | End: 2020-04-08

## 2020-04-06 RX ADMIN — Medication 2: at 11:48

## 2020-04-06 RX ADMIN — Medication 50 MEQ/KG/HR: at 11:47

## 2020-04-06 RX ADMIN — DEXMEDETOMIDINE HYDROCHLORIDE IN 0.9% SODIUM CHLORIDE 15.9 MICROGRAM(S)/KG/HR: 4 INJECTION INTRAVENOUS at 05:54

## 2020-04-06 RX ADMIN — PROPOFOL 40 MICROGRAM(S)/KG/MIN: 10 INJECTION, EMULSION INTRAVENOUS at 02:45

## 2020-04-06 RX ADMIN — PANTOPRAZOLE SODIUM 40 MILLIGRAM(S): 20 TABLET, DELAYED RELEASE ORAL at 11:48

## 2020-04-06 RX ADMIN — SEVELAMER CARBONATE 800 MILLIGRAM(S): 2400 POWDER, FOR SUSPENSION ORAL at 22:09

## 2020-04-06 RX ADMIN — Medication 2: at 05:13

## 2020-04-06 RX ADMIN — DEXMEDETOMIDINE HYDROCHLORIDE IN 0.9% SODIUM CHLORIDE 15.9 MICROGRAM(S)/KG/HR: 4 INJECTION INTRAVENOUS at 22:04

## 2020-04-06 RX ADMIN — Medication 2: at 17:02

## 2020-04-06 RX ADMIN — CHLORHEXIDINE GLUCONATE 15 MILLILITER(S): 213 SOLUTION TOPICAL at 05:09

## 2020-04-06 RX ADMIN — HEPARIN SODIUM 5000 UNIT(S): 5000 INJECTION INTRAVENOUS; SUBCUTANEOUS at 12:53

## 2020-04-06 RX ADMIN — DEXMEDETOMIDINE HYDROCHLORIDE IN 0.9% SODIUM CHLORIDE 15.9 MICROGRAM(S)/KG/HR: 4 INJECTION INTRAVENOUS at 08:49

## 2020-04-06 RX ADMIN — Medication 2: at 23:41

## 2020-04-06 RX ADMIN — BRIMONIDINE TARTRATE 1 DROP(S): 2 SOLUTION/ DROPS OPHTHALMIC at 11:49

## 2020-04-06 RX ADMIN — HEPARIN SODIUM 5000 UNIT(S): 5000 INJECTION INTRAVENOUS; SUBCUTANEOUS at 05:12

## 2020-04-06 RX ADMIN — CHLORHEXIDINE GLUCONATE 15 MILLILITER(S): 213 SOLUTION TOPICAL at 17:03

## 2020-04-06 RX ADMIN — HEPARIN SODIUM 5000 UNIT(S): 5000 INJECTION INTRAVENOUS; SUBCUTANEOUS at 20:25

## 2020-04-06 RX ADMIN — Medication 1 DROP(S): at 11:49

## 2020-04-06 RX ADMIN — DEXMEDETOMIDINE HYDROCHLORIDE IN 0.9% SODIUM CHLORIDE 15.9 MICROGRAM(S)/KG/HR: 4 INJECTION INTRAVENOUS at 17:04

## 2020-04-06 RX ADMIN — DEXMEDETOMIDINE HYDROCHLORIDE IN 0.9% SODIUM CHLORIDE 15.9 MICROGRAM(S)/KG/HR: 4 INJECTION INTRAVENOUS at 11:50

## 2020-04-06 RX ADMIN — SEVELAMER CARBONATE 800 MILLIGRAM(S): 2400 POWDER, FOR SUSPENSION ORAL at 17:03

## 2020-04-06 RX ADMIN — DEXMEDETOMIDINE HYDROCHLORIDE IN 0.9% SODIUM CHLORIDE 15.9 MICROGRAM(S)/KG/HR: 4 INJECTION INTRAVENOUS at 02:45

## 2020-04-06 RX ADMIN — SODIUM CHLORIDE 60 MILLILITER(S): 9 INJECTION, SOLUTION INTRAVENOUS at 15:49

## 2020-04-06 RX ADMIN — FENTANYL CITRATE 4.54 MICROGRAM(S)/KG/HR: 50 INJECTION INTRAVENOUS at 08:48

## 2020-04-06 RX ADMIN — CHLORHEXIDINE GLUCONATE 1 APPLICATION(S): 213 SOLUTION TOPICAL at 05:11

## 2020-04-06 RX ADMIN — SIMVASTATIN 20 MILLIGRAM(S): 20 TABLET, FILM COATED ORAL at 22:10

## 2020-04-06 RX ADMIN — Medication 25 MILLIGRAM(S): at 17:03

## 2020-04-06 RX ADMIN — LATANOPROST 1 DROP(S): 0.05 SOLUTION/ DROPS OPHTHALMIC; TOPICAL at 20:25

## 2020-04-06 RX ADMIN — Medication 12.5 MICROGRAM(S): at 22:08

## 2020-04-06 RX ADMIN — INSULIN GLARGINE 12 UNIT(S): 100 INJECTION, SOLUTION SUBCUTANEOUS at 23:41

## 2020-04-06 NOTE — PROGRESS NOTE ADULT - SUBJECTIVE AND OBJECTIVE BOX
Patient is a 74y old  Female who presents with a chief complaint of sob (2020 19:07)      BRIEF HOSPITAL COURSE: 73 y/o F with hx of DM, HTN, HLD, hypothyroid, anxiety admitted 3/26 with acute hypoxemic respiratory failure and ARDS from COVID19 pneumonia, complicated by ALIS.  Intubated on 3/26      Events last 24 hours: called to bedside urgently as patient desatting. upon arrival sat is 75% and dropping. high peak pressures on vent. attempted to suction catheter could not pass. lavaged with 10 cc normal saline without complete resolution. some anecdotal use of sodium bicarb with mucomyst down ett as a lavage for this situation in covid patients as it may break up the crystalizing secretions caused from covid. no mucomyst immediately available and patient satting 59%, 5 cc sodium bicarb lavaged in to ETT with ambubag (With viral filter on). large mucous plug suctioned from ETT. lavaged again with additional plug suctioned. placed back on vent with increased peep  to 15 and fio2 to 100% transiently. sats improved to 100%. able to wean back down on oxygenation requirements back to 40/+8.    PAST MEDICAL & SURGICAL HISTORY:  DJD (degenerative joint disease)  DM (diabetes mellitus)  Hyperlipidemia  Hypothyroid  HTN (hypertension)  History of vitamin D deficiency  B12 deficiency  Bronchitis  Anxiety  S/P cataract surgery  S/P eye surgery: left eye retinal surgery x2  S/P  section: x2      Review of Systems:  sedated unable to obtain      Medications:        acetaminophen   Tablet .. 650 milliGRAM(s) Oral every 6 hours PRN  dexMEDEtomidine Infusion 0.7 MICROgram(s)/kG/Hr IV Continuous <Continuous>  HYDROmorphone  Injectable 2 milliGRAM(s) IV Push every 4 hours PRN  midazolam Injectable 2 milliGRAM(s) IV Push every 2 hours PRN  propofol Infusion 73.502 MICROgram(s)/kG/Min IV Continuous <Continuous>      heparin  Injectable 5000 Unit(s) SubCutaneous every 8 hours    pantoprazole  Injectable 40 milliGRAM(s) IV Push daily      dextrose 40% Gel 15 Gram(s) Oral once PRN  dextrose 50% Injectable 12.5 Gram(s) IV Push once  dextrose 50% Injectable 25 Gram(s) IV Push once  dextrose 50% Injectable 25 Gram(s) IV Push once  glucagon  Injectable 1 milliGRAM(s) IntraMuscular once PRN  insulin glargine Injectable (LANTUS) 12 Unit(s) SubCutaneous at bedtime  insulin lispro (HumaLOG) corrective regimen sliding scale   SubCutaneous every 6 hours  levothyroxine Injectable 12.5 MICROGram(s) IV Push at bedtime  simvastatin 20 milliGRAM(s) Oral at bedtime    dextrose 5%. 1000 milliLiter(s) IV Continuous <Continuous>  sodium chloride 0.9% lock flush 10 milliLiter(s) IV Push every 1 hour PRN      brimonidine 0.2% Ophthalmic Solution 1 Drop(s) Left EYE daily  chlorhexidine 0.12% Liquid 15 milliLiter(s) Oral Mucosa every 12 hours  chlorhexidine 2% Cloths 1 Application(s) Topical <User Schedule>  latanoprost 0.005% Ophthalmic Solution 1 Drop(s) Both EYES at bedtime  timolol 0.5% Solution 1 Drop(s) Left EYE daily        Mode: AC/ CMV (Assist Control/ Continuous Mandatory Ventilation)  RR (machine): 16  TV (machine): 400  FiO2: 45  PEEP: 8  ITime: 1  MAP: 24  PIP: 32      ICU Vital Signs Last 24 Hrs  T(C): 36.8 (2020 20:00), Max: 37 (2020 16:00)  T(F): 98.2 (2020 20:00), Max: 98.6 (2020 16:00)  HR: 79 (2020 23:38) (59 - 85)  BP: 146/59 (2020 23:38) (125/49 - 146/59)  BP(mean): 68 (2020 18:00) (65 - 76)  ABP: 133/66 (2020 12:00) (133/66 - 152/64)  ABP(mean): 91 (2020 12:00) (84 - 92)  RR: 26 (2020 23:38) (14 - 28)  SpO2: 91% (2020 23:38) (91% - 100%)      ABG - ( 2020 22:12 )  pH, Arterial: x     pH, Blood: 7.31  /  pCO2: 36    /  pO2: 66    / HCO3: 18    / Base Excess: -7.3  /  SaO2: 91                  I&O's Detail    2020 07:01  -  2020 07:00  --------------------------------------------------------  IN:    dexmedetomidine Infusion: 299.2 mL    Free Water: 120 mL    Glucerna 1.5: 180 mL    Packed Red Blood Cells: 273 mL    propofol Infusion: 408.6 mL    Solution: 400 mL    Solution: 100 mL  Total IN: 1780.8 mL    OUT:    Indwelling Catheter - Urethral: 1100 mL    Nasoenteral Tube: 50 mL  Total OUT: 1150 mL    Total NET: 630.8 mL      2020 07:01  -  2020 01:21  --------------------------------------------------------  IN:    dexmedetomidine Infusion: 307.3 mL    Free Water: 240 mL    Glucerna 1.5: 360 mL    propofol Infusion: 367.4 mL    Solution: 100 mL  Total IN: 1374.7 mL    OUT:    Indwelling Catheter - Urethral: 850 mL    Rectal Tube: 250 mL  Total OUT: 1100 mL    Total NET: 274.7 mL            LABS:                        8.8    8.42  )-----------( 417      ( 2020 07:06 )             26.9     04-04    135  |  100  |  90<H>  ----------------------------<  171<H>  3.7   |  22  |  4.19<H>    Ca    9.0      2020 07:06  Phos  5.1     04-03  Mg     2.7     04-04    TPro  6.8  /  Alb  2.5<L>  /  TBili  0.4  /  DBili  x   /  AST  21  /  ALT  20  /  AlkPhos  66  04-04          CAPILLARY BLOOD GLUCOSE      POCT Blood Glucose.: 160 mg/dL (2020 23:48)        CULTURES:      Physical Examination:    General: No acute distress.      HEENT: Pupils equal, reactive to light.  Symmetric.    PULM: Coarse bilaterally, no significant sputum production    NECK: Supple, no lymphadenopathy, trachea midline    CVS: Regular rate and rhythm, no murmurs, rubs, or gallops    ABD: Soft, nondistended, nontender, normoactive bowel sounds, no masses    EXT: No edema, nontender    SKIN: Warm and well perfused, no rashes noted.    NEURO: sedated    RADIOLOGY: reviewed     CRITICAL CARE TIME SPENT: 42 minutes of critical care time spent providing medical care for patient's acute illness/conditions that impairs at least one vital organ system and/or poses a high risk of imminent or life threatening deterioration in the patient's condition. It includes time spent evaluating and treating the patient's acute illness as well as time spent reviewing labs, radiology, discussing goals of care with patient and/or patient's family, and discussing the case with a multidisciplinary team in an effort to prevent further life threatening deterioration or end organ damage. This time is independent of any procedures performed.

## 2020-04-06 NOTE — PROGRESS NOTE ADULT - ASSESSMENT
covid  ftt  anemia  resp failure  COVID +    plan  diarrhea f/u stool for c diff  ngt   residuals  if c diff is neg consider imodium to slow down the bowels  drop in hgb -- transfuse prn  monitor for signs of gi bleeding  in and out  ppi once a day  gerd precautions

## 2020-04-06 NOTE — CHART NOTE - NSCHARTNOTEFT_GEN_A_CORE
Assessment/nutrition follow-up: Pt is a 73 yo female with hx DM2, HTN, Vit B, Vit D deficiency, afib, hypothyroidism admitted with + COVID19,  Pt required  intubation and central line insertion; patient is sedated with vent support. Pt receiving bolus feedings via NGT of Glucerna 1.5 180 ml every 4 hrs (6x per day) with 60ml free water flush before and after.  Noted pt is tolerating current tube feeding order.  However, tube feeds held ~ 2 days and NGT to suction; restarted tube feedings today. Renal labs appear to be generally stable, Renal following- recommends adding sevelamer powder for hyperphosphatemia.  Noted pt continues to have loose stools, likely multifactorial; negative for c.diff; +rectal tube.  RD to continue to follow.      Factors impacting intake: [ ] none [ ] nausea  [ ] vomiting [ ] diarrhea [ ] constipation  [ ]chewing problems [ ] swallowing issues  [x ] other: intubation     Diet Prescription: Diet, NPO with Tube Feed:   Tube Feeding Modality: Nasogastric  Glucerna 1.5 Car  Total Volume for 24 Hours (mL): 1080  Bolus  Total Volume of Bolus (mL):  180  Total # of Feeds: 6  Tube Feed Frequency: Every 4 hours   Tube Feed Start Time: 05:00  Bolus Feed Rate (mL per Hour): 180   Bolus Feed Duration (in Hours): 1  Bolus Feed Instructions:  give bolus every 4 hours with 60mL water flush before and after each bolus (04-06-20 @ 12:40)    Intake: bolus feedings restarted    Current Weight: no updated bw to assess   % Weight Change    Pertinent Medications: MEDICATIONS  (STANDING):  brimonidine 0.2% Ophthalmic Solution 1 Drop(s) Left EYE daily  chlorhexidine 0.12% Liquid 15 milliLiter(s) Oral Mucosa every 12 hours  chlorhexidine 2% Cloths 1 Application(s) Topical <User Schedule>  dexMEDEtomidine Infusion 0.7 MICROgram(s)/kG/Hr (15.9 mL/Hr) IV Continuous <Continuous>  dextrose 5%. 1000 milliLiter(s) (50 mL/Hr) IV Continuous <Continuous>  dextrose 50% Injectable 12.5 Gram(s) IV Push once  dextrose 50% Injectable 25 Gram(s) IV Push once  dextrose 50% Injectable 25 Gram(s) IV Push once  fentaNYL   Infusion. 0.5 MICROgram(s)/kG/Hr (4.54 mL/Hr) IV Continuous <Continuous>  heparin  Injectable 5000 Unit(s) SubCutaneous every 8 hours  insulin glargine Injectable (LANTUS) 12 Unit(s) SubCutaneous at bedtime  insulin lispro (HumaLOG) corrective regimen sliding scale   SubCutaneous every 6 hours  latanoprost 0.005% Ophthalmic Solution 1 Drop(s) Both EYES at bedtime  levothyroxine Injectable 12.5 MICROGram(s) IV Push at bedtime  metoprolol tartrate 25 milliGRAM(s) Oral two times a day  pantoprazole  Injectable 40 milliGRAM(s) IV Push daily  sevelamer carbonate Oral Powder - Peds 800 milliGRAM(s) Enteral Tube four times a day with meals  simvastatin 20 milliGRAM(s) Oral at bedtime  sodium chloride 0.45% 1000 milliLiter(s) (60 mL/Hr) IV Continuous <Continuous>  timolol 0.5% Solution 1 Drop(s) Left EYE daily    MEDICATIONS  (PRN):  acetaminophen   Tablet .. 650 milliGRAM(s) Oral every 6 hours PRN Temp greater or equal to 38C (100.4F), Mild Pain (1 - 3)  dextrose 40% Gel 15 Gram(s) Oral once PRN Blood Glucose LESS THAN 70 milliGRAM(s)/deciliter  glucagon  Injectable 1 milliGRAM(s) IntraMuscular once PRN Glucose LESS THAN 70 milligrams/deciliter  midazolam Injectable 2 milliGRAM(s) IV Push every 2 hours PRN Agitation  sodium chloride 0.9% lock flush 10 milliLiter(s) IV Push every 1 hour PRN Pre/post blood products, medications, blood draw, and to maintain line patency    Pertinent Labs: 04-06 Na138 mmol/L Glu 172 mg/dL<H> K+ 3.8 mmol/L Cr  3.88 mg/dL<H> BUN 88 mg/dL<H> 04-06 Phos 7.8 mg/dL<H> 04-06 Alb 2.0 g/dL<L> 03-27 [id number] 8.1 %<H>     CAPILLARY BLOOD GLUCOSE      POCT Blood Glucose.: 153 mg/dL (06 Apr 2020 11:19)  POCT Blood Glucose.: 175 mg/dL (06 Apr 2020 05:11)  POCT Blood Glucose.: 176 mg/dL (05 Apr 2020 22:44)  POCT Blood Glucose.: 198 mg/dL (05 Apr 2020 17:13)    Skin: intact; 1+ generalized edema, BL hand 2+    Estimated Needs:   [x ] no change since previous assessment  [ ] recalculated:     Previous Nutrition Diagnosis:   [ ] Inadequate Energy Intake [ x]Inadequate Oral Intake [ ] Excessive Energy Intake   [ ] Underweight [ ] Increased Nutrient Needs [ ] Overweight/Obesity   [ ] Altered GI Function [ ] Unintended Weight Loss [ ] Food & Nutrition Related Knowledge Deficit [ ] Malnutrition     Nutrition Diagnosis is [ x] ongoing  [ ] resolved [ ] not applicable     New Nutrition Diagnosis: [ ] not applicable       Interventions: c/w current tube feeding rx  Recommend  [ ] Change Diet To:  [ ] Nutrition Supplement  [ ] Nutrition Support  [ ] Other:     Monitoring and Evaluation:   [ ] PO intake [ x ] Tolerance to diet prescription/bolus feeds [ x ] weights [ x ] labs[ x ] follow up per protocol  [ ] other:

## 2020-04-06 NOTE — PROGRESS NOTE ADULT - ASSESSMENT
75 y/o F with hx of DM, HTN, HLD, hypothyroid, anxiety admitted 3/26 with acute hypoxemic respiratory failure and ARDS from COVID19 pneumonia, complicated by ALIS.  Intubated on 3/26      NEURO:   on precedex and propofol with prn dilaudid and versed.   Paralytics: none  Daily SAT to be avoided until resp status improved     CVS:   Pressors: off  MAP Target: > 65  Serial troponins  monitor QTc.    PULM:   On Volume AC ; lung protective ventilation strategy at 4-6 cc/kg predicted IBW, actively titrating PEEP and FiO2 per ARDSnet high PEEP strategy for spo2 > 92%. aggressive chest pt and suctioning, keep HOB > 30 degrees  Vent settings/adjustments:  16/400/55/+10. PEEP 8-->15-->8. fio2 40-->100-->40  P:F ratio 175 yesterday. f/u abg in am.   Peak pressure/plateau pressure: high peaks resolved. now 31/25. goal <30  Trend ABGs at least q12 hours    tube exchange if plugging continues to happen.    GI:   Diet: tube feeds  Bowel Regimen as needed    ID:   COVID 19 PCR status: positive  RVP: negative  Cultures: pending  cdiff pending.  completed plaquenil course.   trend crp, ldh, ferritin. monitor procalcitonin for possible superimposed bacterial infection.    RENAL:   ALIS with Cr peak 4.26. nonoliguric. nephrology following.  Possible net neg balance   Avoid nephrotoxic agents  Strict I&O with Cr monitoring   Medications adjusted for   Close Electrolyte monitoring and correction as needed     ENDO: lantus and sliding scale. accuechecks q 6 hours.      HEME: heparin for dvt prophylaxis  PROPH: 40 mg IV protonix while intubated. chlorhexidine wash.    Code status: DNR      Lines/ Tubes/ Catheters/ devices:   ETT: Placed on 3/26  OGT/NGT: 3/26  CVC: Placed on 4/5 in sterile conditions

## 2020-04-06 NOTE — PROGRESS NOTE ADULT - ASSESSMENT
1.	ALIS: ATN, COVID renal injury  2.	SARS-COVID 19, Resp failure on vent  3.	Anemia  4.	Diabetes  5.	Hyperphosphatemia    Pt remains non oliguric. Will start on gentle IVF with bicarb if ok with intensivist. Treatment for COVID pneumonia. COVID precautions.   Strict I & O. Monitor h/h trend. Monitor blood sugar levels. Insulin coverage as needed. Avoid nephrotoxic meds as possible.   Avoid ACEI, ARB, NSAIDs and IV contrast. Will follow electrolytes and renal function trend. Add sevelamer powder for hyperphosphatemia.   Supportive care. ICU management.

## 2020-04-06 NOTE — PROGRESS NOTE ADULT - SUBJECTIVE AND OBJECTIVE BOX
Chief Complaint: Fevers, shortness of breath    Interval Events: No events overnight.    Review of Systems:  Unable to obtain    Physical Exam:  Vital Signs Last 24 Hrs  T(C): 36.1 (06 Apr 2020 08:00), Max: 36.9 (05 Apr 2020 16:00)  T(F): 96.9 (06 Apr 2020 08:00), Max: 98.4 (05 Apr 2020 16:00)  HR: 51 (06 Apr 2020 09:40) (51 - 69)  BP: 96/39 (06 Apr 2020 09:00) (96/39 - 116/50)  BP(mean): 59 (06 Apr 2020 09:00) (54 - 64)  RR: 23 (06 Apr 2020 09:00) (16 - 25)  SpO2: 99% (06 Apr 2020 09:40) (96% - 100%)  General: Sedated  HEENT: Intubated  Neck: No JVD, no carotid bruit  Extremities: No LE edema, no cyanosis  Neuro: Non-focal  Skin: No rash    Labs:    04-06    138  |  104  |  88<H>  ----------------------------<  172<H>  3.8   |  20<L>  |  3.88<H>    Ca    9.3      06 Apr 2020 07:08  Phos  7.8     04-06  Mg     3.4     04-06    TPro  6.6  /  Alb  2.0<L>  /  TBili  0.5  /  DBili  x   /  AST  15  /  ALT  14  /  AlkPhos  66  04-06                        9.2    10.46 )-----------( 421      ( 06 Apr 2020 07:08 )             28.9     Telemetry: Sinus tachycardic

## 2020-04-06 NOTE — PROGRESS NOTE ADULT - ASSESSMENT
The patient is a 74 year old female with a history of HTN, DM, HL, hypothyroidism, anxiety who presented with fevers, cough, shortness of breath in the setting of viral PNA, COVID-19, respiratory failure, septic shock.     Plan:  - CXR consistent with PNA  - COVID-19 positive  - Completed hydroxychloroquine  - On zosyn  - Off of midodrine - restart?  - Atrial fibrillation - brief, back in sinus rhythm.  - Continue metoprolol tartrate 25 mg bid although held today as BPs on low side  - Hemoglobin improved  - Continue mechanical ventilation  - ICU care

## 2020-04-06 NOTE — PROGRESS NOTE ADULT - SUBJECTIVE AND OBJECTIVE BOX
INTERVAL HPI/OVERNIGHT EVENTS:  No new overnight event.  No N/V.  Tolerating diet.via ngt vent dependent some loose bm      PAST MEDICAL & SURGICAL HISTORY:  DJD (degenerative joint disease)  DM (diabetes mellitus)  Hyperlipidemia  Hypothyroid  HTN (hypertension)  History of vitamin D deficiency  B12 deficiency  Bronchitis  Anxiety  S/P cataract surgery  S/P eye surgery: left eye retinal surgery x2  S/P  section: x2      FAMILY HISTORY:      SOCIAL HISTORY:    Allergies    No Known Allergies    Intolerances          REVIEW OF SYSEMS:  General: + weakness, + fever/chills, no weight loss/gain  Skin/Breast: no rash, no jaundice  Ophthalmologic: no vision changes, no dry eyes   Respiratory and Thorax: no cough, no wheezing, no hemoptysis, no dyspnea  Cardiovascular: no chest pain, no shortness of breath, no orthopnea  Gastrointestinal: no n/v/d, no abdominal pain, no dysphagia   Genitourinary: no dysuria, no frequency, no nocturia, no hematuria  Musculoskeletal: no trauma, no sprain/strain, no myalgias, no arthralgias, no fracture  Neurological: no HA, no dizziness, no weakness, no numbness  Psychiatric: no depression, no SI/HI  Hematology/Lymphatics: no easy bruising  Endocrine: no heat or cold intolerance. no weight gain or loss  Allergic/Immunologic: no allergy or recent reaction       Vital Signs Last 24 Hrs  T(C): 36.7 (26 Mar 2020 17:53), Max: 38.4 (26 Mar 2020 13:17)  T(F): 98 (26 Mar 2020 17:53), Max: 101.2 (26 Mar 2020 13:17)  HR: 74 (26 Mar 2020 17:53) (70 - 76)  BP: 129/72 (26 Mar 2020 17:53) (117/68 - 129/72)  BP(mean): --  RR: 17 (26 Mar 2020 17:53) (16 - 18)  SpO2: 84% (26 Mar 2020 17:53) (84% - 94%)  I&O's Summary      PHYSICAL EXAM:  GENERAL: on nc  HEAD:  Atraumatic, Normocephalic  EYES: EOMI, PERRLA, conjunctiva and sclera clear  NECK: Supple, No JVD  CHEST/LUNG: + rhonchi  HEART: Regular rate and rhythm; No murmurs, rubs, or gallops  ABDOMEN: Soft, Nontender, Nondistended; Bowel sounds present ngt  Neuro: AAOx3, no focal deficit, 5/5 b/l extremities  EXTREMITIES:  2+ Peripheral Pulses, No clubbing, cyanosis, or edema  SKIN: No rashes or lesions    LABS:                        9.2    10.38 )-----------( 271      ( 26 Mar 2020 14:21 )             28.4     03-26    134<L>  |  100  |  42<H>  ----------------------------<  175<H>  4.0   |  21<L>  |  1.91<H>    Ca    8.9      26 Mar 2020 14:21    TPro  7.8  /  Alb  2.9<L>  /  TBili  0.4  /  DBili  x   /  AST  19  /  ALT  24  /  AlkPhos  61  03-26      CAPILLARY BLOOD GLUCOSE                RADIOLOGY & ADDITIONAL TESTS:    Imaging Personally Reviewed:  [x] YES  [ ] NO    Consultant(s) Notes Reviewed:  [x] YES  [ ] NO      MEDICATIONS  (STANDING):  azithromycin  IVPB 500 milliGRAM(s) IV Intermittent every 24 hours  cefTRIAXone   IVPB 1000 milliGRAM(s) IV Intermittent every 24 hours    MEDICATIONS  (PRN):  acetaminophen   Tablet .. 650 milliGRAM(s) Oral every 6 hours PRN Temp greater or equal to 38C (100.4F), Mild Pain (1 - 3)      Care Discussed with Consultants/Other Providers [x] YES  [ ] NO    HEALTH ISSUES - PROBLEM Dx: (26 Mar 2020 18:50)    MEDICATIONS  (STANDING):  brimonidine 0.2% Ophthalmic Solution 1 Drop(s) Left EYE daily  chlorhexidine 0.12% Liquid 15 milliLiter(s) Oral Mucosa every 12 hours  chlorhexidine 2% Cloths 1 Application(s) Topical <User Schedule>  dexMEDEtomidine Infusion 0.7 MICROgram(s)/kG/Hr (15.9 mL/Hr) IV Continuous <Continuous>  dextrose 5%. 1000 milliLiter(s) (50 mL/Hr) IV Continuous <Continuous>  dextrose 50% Injectable 12.5 Gram(s) IV Push once  dextrose 50% Injectable 25 Gram(s) IV Push once  dextrose 50% Injectable 25 Gram(s) IV Push once  fentaNYL   Infusion. 0.5 MICROgram(s)/kG/Hr (4.54 mL/Hr) IV Continuous <Continuous>  heparin  Injectable 5000 Unit(s) SubCutaneous every 8 hours  insulin glargine Injectable (LANTUS) 12 Unit(s) SubCutaneous at bedtime  insulin lispro (HumaLOG) corrective regimen sliding scale   SubCutaneous every 6 hours  latanoprost 0.005% Ophthalmic Solution 1 Drop(s) Both EYES at bedtime  levothyroxine Injectable 12.5 MICROGram(s) IV Push at bedtime  metoprolol tartrate 25 milliGRAM(s) Oral two times a day  pantoprazole  Injectable 40 milliGRAM(s) IV Push daily  simvastatin 20 milliGRAM(s) Oral at bedtime  sodium bicarbonate  Infusion 0.083 mEq/kG/Hr (50 mL/Hr) IV Continuous <Continuous>  sodium chloride 0.45% 1000 milliLiter(s) (60 mL/Hr) IV Continuous <Continuous>  timolol 0.5% Solution 1 Drop(s) Left EYE daily    MEDICATIONS  (PRN):  acetaminophen   Tablet .. 650 milliGRAM(s) Oral every 6 hours PRN Temp greater or equal to 38C (100.4F), Mild Pain (1 - 3)  dextrose 40% Gel 15 Gram(s) Oral once PRN Blood Glucose LESS THAN 70 milliGRAM(s)/deciliter  glucagon  Injectable 1 milliGRAM(s) IntraMuscular once PRN Glucose LESS THAN 70 milligrams/deciliter  midazolam Injectable 2 milliGRAM(s) IV Push every 2 hours PRN Agitation  sodium chloride 0.9% lock flush 10 milliLiter(s) IV Push every 1 hour PRN Pre/post blood products, medications, blood draw, and to maintain line patency      Allergies    No Known Allergies    Intolerances          General:  No wt loss, fevers, chills, night sweats, fatigue,   Eyes:  Good vision, no reported pain  ENT:  No sore throat, pain, runny nose, dysphagia  CV:  No pain, palpitations, hypo/hypertension  Resp:  No dyspnea, cough, tachypnea, wheezing  GI:  No pain, No nausea, No vomiting, No diarrhea, No constipation, No weight loss, No fever, No pruritis, No rectal bleeding, No tarry stools, No dysphagia,  :  No pain, bleeding, incontinence, nocturia  Muscle:  No pain, weakness  Neuro:  No weakness, tingling, memory problems  Psych:  No fatigue, insomnia, mood problems, depression  Endocrine:  No polyuria, polydipsia, cold/heat intolerance  Heme:  No petechiae, ecchymosis, easy bruisability  Skin:  No rash, tattoos, scars, edema      PHYSICAL EXAM:   Vital Signs:  Vital Signs Last 24 Hrs  T(C): 36.1 (2020 08:00), Max: 36.9 (2020 16:00)  T(F): 96.9 (2020 08:00), Max: 98.4 (2020 16:00)  HR: 53 (2020 12:00) (51 - 60)  BP: 155/67 (2020 12:00) (96/39 - 155/67)  BP(mean): 83 (2020 12:00) (54 - 92)  RR: 25 (2020 12:00) (16 - 30)  SpO2: 99% (2020 12:00) (96% - 100%)  Daily     Daily I&O's Summary    2020 07:01  -  2020 07:00  --------------------------------------------------------  IN: 1174.9 mL / OUT: 1000 mL / NET: 174.9 mL    2020 07:01  -  2020 14:56  --------------------------------------------------------  IN: 314.9 mL / OUT: 0 mL / NET: 314.9 mL        GENERAL:  Appears stated age, well-groomed, well-nourished, no distress  HEENT:  NC/AT,  conjunctivae clear and pink, no thyromegaly, nodules, adenopathy, no JVD, sclera -anicteric  CHEST:  Full & symmetric excursion, no increased effort, breath sounds clear  HEART:  Regular rhythm, S1, S2, no murmur/rub/S3/S4, no abdominal bruit, no edema  ABDOMEN:  Soft, non-tender, non-distended, normoactive bowel sounds,  no masses ,no hepato-splenomegaly, no signs of chronic liver disease  EXTEREMITIES:  no cyanosis,clubbing or edema  SKIN:  No rash/erythema/ecchymoses/petechiae/wounds/abscess/warm/dry  NEURO:  Alert, oriented, no asterixis, no tremor, no encephalopathy      LABS:                        9.2    10.46 )-----------( 421      ( 2020 07:08 )             28.9     04-06    138  |  104  |  88<H>  ----------------------------<  172<H>  3.8   |  20<L>  |  3.88<H>    Ca    9.3      2020 07:08  Phos  7.8     04-06  Mg     3.4     04-06    TPro  6.6  /  Alb  2.0<L>  /  TBili  0.5  /  DBili  x   /  AST  15  /  ALT  14  /  AlkPhos  66  04-06      Urinalysis Basic - ( 2020 21:35 )    Color: Yellow / Appearance: Clear / S.015 / pH: x  Gluc: x / Ketone: Negative  / Bili: Negative / Urobili: Negative mg/dL   Blood: x / Protein: 30 mg/dL / Nitrite: Negative   Leuk Esterase: Trace / RBC: 3-5 /HPF / WBC 3-5   Sq Epi: x / Non Sq Epi: Few / Bacteria: Few      amylase   lipase  RADIOLOGY & ADDITIONAL TESTS:

## 2020-04-06 NOTE — PROGRESS NOTE ADULT - SUBJECTIVE AND OBJECTIVE BOX
Patient is a 74y old  Female who presents with a chief complaint of sob (04 Apr 2020 19:07)    HPI: 75 y/o F with hx of DM, HTN, HLD, hypothyroid, anxiety biba with c/o fever and cough x 6 days. Pt states that Tmax was 101.8F, took one tab of tylenol XS at 9am this morning. Pt states that she has associated dry cough, body aches and mild generalized weakness. States that she has had one episode of loose stool daily. Denies recent travel, sick contacts, known covid-19 exposure, CP, SOB, runny nose, sore throat, headache, rash, urinary symptoms.  she was sating 94% ON 4l and was then put on non rebreather and was transferred to spcu.    Chart reviewed, notes reviewed.   Patient seen and examined.  Being followed by following specialists: Pulmonary critical care, cardiology, nephrology, and GI.    Consultant(s) Notes Reviewed:  [X] Yes    Care Discussed with Consultants/Other Providers: [X] Yes    04/04/2020 --> Events noted, patient remains intubated and on ventilator.  Patient is sedated.  All information is from patient's chart.  04/05/2020 --> Remains intubated and on vent. Sedated.     04/06/2020 --> Still on vent. Sedated.     REVIEW OF SYSTEMS:  Unable to obtain.    Allergies: No Known Allergies    Intolerances    Home Medications:    MEDICATIONS  (STANDING):  brimonidine 0.2% Ophthalmic Solution 1 Drop(s) Left EYE daily  chlorhexidine 0.12% Liquid 15 milliLiter(s) Oral Mucosa every 12 hours  chlorhexidine 2% Cloths 1 Application(s) Topical <User Schedule>  dexMEDEtomidine Infusion 0.7 MICROgram(s)/kG/Hr (15.9 mL/Hr) IV Continuous <Continuous>  dextrose 5%. 1000 milliLiter(s) (50 mL/Hr) IV Continuous <Continuous>  dextrose 50% Injectable 12.5 Gram(s) IV Push once  dextrose 50% Injectable 25 Gram(s) IV Push once  dextrose 50% Injectable 25 Gram(s) IV Push once  fentaNYL   Infusion. 0.5 MICROgram(s)/kG/Hr (4.54 mL/Hr) IV Continuous <Continuous>  heparin  Injectable 5000 Unit(s) SubCutaneous every 8 hours  insulin glargine Injectable (LANTUS) 12 Unit(s) SubCutaneous at bedtime  insulin lispro (HumaLOG) corrective regimen sliding scale   SubCutaneous every 6 hours  latanoprost 0.005% Ophthalmic Solution 1 Drop(s) Both EYES at bedtime  levothyroxine Injectable 12.5 MICROGram(s) IV Push at bedtime  metoprolol tartrate 25 milliGRAM(s) Oral two times a day  pantoprazole  Injectable 40 milliGRAM(s) IV Push daily  sevelamer carbonate Oral Powder - Peds 800 milliGRAM(s) Enteral Tube four times a day with meals  simvastatin 20 milliGRAM(s) Oral at bedtime  sodium chloride 0.45% 1000 milliLiter(s) (60 mL/Hr) IV Continuous <Continuous>  timolol 0.5% Solution 1 Drop(s) Left EYE daily    MEDICATIONS  (PRN):  acetaminophen   Tablet .. 650 milliGRAM(s) Oral every 6 hours PRN Temp greater or equal to 38C (100.4F), Mild Pain (1 - 3)  dextrose 40% Gel 15 Gram(s) Oral once PRN Blood Glucose LESS THAN 70 milliGRAM(s)/deciliter  glucagon  Injectable 1 milliGRAM(s) IntraMuscular once PRN Glucose LESS THAN 70 milligrams/deciliter  midazolam Injectable 2 milliGRAM(s) IV Push every 2 hours PRN Agitation  sodium chloride 0.9% lock flush 10 milliLiter(s) IV Push every 1 hour PRN Pre/post blood products, medications, blood draw, and to maintain line patency    ICU Vital Signs Last 24 Hrs  T(C): 36.7 (06 Apr 2020 20:00), Max: 36.7 (06 Apr 2020 20:00)  T(F): 98.1 (06 Apr 2020 20:00), Max: 98.1 (06 Apr 2020 20:00)  HR: 65 (06 Apr 2020 20:00) (51 - 112)  BP: 121/52 (06 Apr 2020 20:00) (96/39 - 155/67)  BP(mean): 68 (06 Apr 2020 20:00) (54 - 92)  ABP: --  ABP(mean): --  RR: 22 (06 Apr 2020 20:00) (16 - 30)  SpO2: 98% (06 Apr 2020 20:00) (96% - 100%)    Mode: AC/ CMV (Assist Control/ Continuous Mandatory Ventilation)  RR (machine): 22  TV (machine): 400  FiO2: 30  PEEP: 5  ITime: 1  MAP: 17  PIP: 33    PHYSICAL EXAM:  GENERAL: Currently on ventilator, well-groomed, well-developed  HEAD:  Atraumatic, Normocephalic  EYES: Pallor +  ENMT: Intubated via ET tube.  NECK: Supple.  CHEST/LUNG: Decreased breath sounds at bases.  HEART: S1, S2.   ABDOMEN: Soft, Nontender, Nondistended; Bowel sounds present  EXTREMITIES:  2+ Peripheral Pulses, No clubbing, cyanosis, or edema  MS: No joint swelling or deformity.   LYMPH: No lymphadenopathy noted  SKIN: No rashes or lesions  NERVOUS SYSTEM:   Sedated  PSYCH:  Sedated    LABS:              9.2    10.46 )-----------( 421      ( 06 Apr 2020 07:08 )             28.9     06 Apr 2020 07:08    138    |  104    |  88     ----------------------------<  172    3.8     |  20     |  3.88     Ca    9.3        06 Apr 2020 07:08  Phos  7.8       06 Apr 2020 07:08  Mg     3.4       06 Apr 2020 07:08    TPro  6.6    /  Alb  2.0    /  TBili  0.5    /  DBili  x      /  AST  15     /  ALT  14     /  AlkPhos  66     06 Apr 2020 07:08    CAPILLARY BLOOD GLUCOSE  POCT Blood Glucose.: 175 mg/dL (06 Apr 2020 16:46)  POCT Blood Glucose.: 153 mg/dL (06 Apr 2020 11:19)  POCT Blood Glucose.: 175 mg/dL (06 Apr 2020 05:11)  POCT Blood Glucose.: 176 mg/dL (05 Apr 2020 22:44)    03-27 [id number] 8.1    Ferritin, Serum: 414 ng/mL (03-31 @ 11:31)  Ferritin, Serum: 579 ng/mL (03-29 @ 13:15)    Culture Results:   No growth to date. (04-05 @ 12:56)  Culture Results:   No growth to date. (04-05 @ 12:56)      RADIOLOGY TEST: (IMAGES REVIEWED BY ME)  < from: Xray Chest 1 View- PORTABLE-Urgent (04.04.20 @ 14:55) >  EXAM:  XR CHEST PORTABLE URGENT 1V                        PROCEDURE DATE:  04/04/2020    INTERPRETATION:  History: Line placement    Chest:  one view.      Comparison: 04/01/2020    AP radiograph of the chest demonstrates persistentBILATERAL perihilar infiltrates. NG tube unchanged. ET tube difficult to discern due to technique. RIGHT internal jugular venous catheter tip in atriocaval junction. The cardiac silhouette is normal in size. Osseous structures are intact.    Impression:persistent BILATERAL perihilar infiltrates. NG tube unchanged. ET tube difficult to discern due to technique. RIGHT internal jugular venous catheter tip in atriocaval junction.    < end of copied text >    Imaging Personally Reviewed:  [X] YES      HEALTH ISSUES - PROBLEM Dx:  Hypokalemia: Hypokalemia  Anemia, unspecified type: Anemia, unspecified type  History of vitamin D deficiency: History of vitamin D deficiency  B12 deficiency: B12 deficiency  HTN (hypertension): HTN (hypertension)  Atrial fibrillation, unspecified type: Atrial fibrillation, unspecified type  Hypothyroidism, unspecified type: Hypothyroidism, unspecified type  Afib: Afib  Shock: Shock  Anxiety: Anxiety  Hypothyroid: Hypothyroid  Hyperlipidemia: Hyperlipidemia  DM (diabetes mellitus): DM (diabetes mellitus)  SARS (severe acute respiratory syndrome): SARS (severe acute respiratory syndrome)  Acute respiratory failure with hypoxia: Acute respiratory failure with hypoxia

## 2020-04-06 NOTE — PROGRESS NOTE ADULT - SUBJECTIVE AND OBJECTIVE BOX
Patient is a 74y old  Female who presents with a chief complaint of SOB (31 Mar 2020 05:05)  Patient seen in follow up for ALIS, ATN.      Events noted. chart reviewed. Improving renal indices.     PAST MEDICAL HISTORY:  DJD (degenerative joint disease)  DM (diabetes mellitus)  Hyperlipidemia  Hypothyroid  HTN (hypertension)  History of vitamin D deficiency  B12 deficiency  Bronchitis  Anxiety      MEDICATIONS  (STANDING):  brimonidine 0.2% Ophthalmic Solution 1 Drop(s) Left EYE daily  chlorhexidine 0.12% Liquid 15 milliLiter(s) Oral Mucosa every 12 hours  chlorhexidine 2% Cloths 1 Application(s) Topical <User Schedule>  dexMEDEtomidine Infusion 0.7 MICROgram(s)/kG/Hr (15.9 mL/Hr) IV Continuous <Continuous>  dextrose 5%. 1000 milliLiter(s) (50 mL/Hr) IV Continuous <Continuous>  dextrose 50% Injectable 12.5 Gram(s) IV Push once  dextrose 50% Injectable 25 Gram(s) IV Push once  dextrose 50% Injectable 25 Gram(s) IV Push once  fentaNYL   Infusion. 0.5 MICROgram(s)/kG/Hr (4.54 mL/Hr) IV Continuous <Continuous>  heparin  Injectable 5000 Unit(s) SubCutaneous every 8 hours  insulin glargine Injectable (LANTUS) 12 Unit(s) SubCutaneous at bedtime  insulin lispro (HumaLOG) corrective regimen sliding scale   SubCutaneous every 6 hours  latanoprost 0.005% Ophthalmic Solution 1 Drop(s) Both EYES at bedtime  levothyroxine Injectable 12.5 MICROGram(s) IV Push at bedtime  metoprolol tartrate 25 milliGRAM(s) Oral two times a day  pantoprazole  Injectable 40 milliGRAM(s) IV Push daily  simvastatin 20 milliGRAM(s) Oral at bedtime  sodium bicarbonate  Infusion 0.083 mEq/kG/Hr (50 mL/Hr) IV Continuous <Continuous>  timolol 0.5% Solution 1 Drop(s) Left EYE daily    MEDICATIONS  (PRN):  acetaminophen   Tablet .. 650 milliGRAM(s) Oral every 6 hours PRN Temp greater or equal to 38C (100.4F), Mild Pain (1 - 3)  dextrose 40% Gel 15 Gram(s) Oral once PRN Blood Glucose LESS THAN 70 milliGRAM(s)/deciliter  glucagon  Injectable 1 milliGRAM(s) IntraMuscular once PRN Glucose LESS THAN 70 milligrams/deciliter  midazolam Injectable 2 milliGRAM(s) IV Push every 2 hours PRN Agitation  sodium chloride 0.9% lock flush 10 milliLiter(s) IV Push every 1 hour PRN Pre/post blood products, medications, blood draw, and to maintain line patency    T(C): 36.1 (20 @ 08:00), Max: 37 (20 @ 16:00)  HR: 53 (20 @ 12:00) (51 - 126)  BP: 155/67 (20 @ 12:00) (96/39 - 184/79)  RR: 25 (20 @ 12:00)  SpO2: 99% (20 @ 12:00)  Wt(kg): --  I&O's Detail    2020 07:01  -  2020 07:00  --------------------------------------------------------  IN:    dexmedetomidine Infusion: 434.4 mL    Free Water: 120 mL    Glucerna 1.5: 180 mL    propofol Infusion: 440.5 mL  Total IN: 1174.9 mL    OUT:    Indwelling Catheter - Urethral: 750 mL    Nasoenteral Tube: 50 mL    Rectal Tube: 200 mL  Total OUT: 1000 mL    Total NET: 174.9 mL      2020 07:01  -  2020 14:24  --------------------------------------------------------  IN:    dexmedetomidine Infusion: 119.9 mL    fentaNYL Infusion.: 45 mL    sodium bicarbonate  Infusion: 150 mL  Total IN: 314.9 mL    OUT:  Total OUT: 0 mL    Total NET: 314.9 mL        PHYSICAL EXAM:  Pt on COVID precautions. Chart reviewed and previous physical examination noted.                          LABORATORY:                        9.2    10.46 )-----------( 421      ( 2020 07:08 )             28.9     04-06    138  |  104  |  88<H>  ----------------------------<  172<H>  3.8   |  20<L>  |  3.88<H>    Ca    9.3      2020 07:08  Phos  7.8       Mg     3.4         TPro  6.6  /  Alb  2.0<L>  /  TBili  0.5  /  DBili  x   /  AST  15  /  ALT  14  /  AlkPhos  66      Sodium, Serum: 138 mmol/L ( @ 07:08)  Sodium, Serum: 137 mmol/L ( @ 04:47)    Potassium, Serum: 3.8 mmol/L ( @ 07:08)  Potassium, Serum: 4.0 mmol/L ( @ 04:47)    Hemoglobin: 9.2 g/dL ( @ 07:08)  Hemoglobin: 10.5 g/dL ( @ 04:47)  Hemoglobin: 8.8 g/dL ( @ 07:06)  Hemoglobin: 9.2 g/dL ( @ 22:32)    Creatinine, Serum 3.88 ( @ 07:08)  Creatinine, Serum 3.67 ( @ 04:47)  Creatinine, Serum 4.19 ( @ 07:06)  Creatinine, Serum 4.29 ( @ 16:10)        LIVER FUNCTIONS - ( 2020 07:08 )  Alb: 2.0 g/dL / Pro: 6.6 g/dL / ALK PHOS: 66 U/L / ALT: 14 U/L DA / AST: 15 U/L / GGT: x           Urinalysis Basic - ( 2020 21:35 )    Color: Yellow / Appearance: Clear / S.015 / pH: x  Gluc: x / Ketone: Negative  / Bili: Negative / Urobili: Negative mg/dL   Blood: x / Protein: 30 mg/dL / Nitrite: Negative   Leuk Esterase: Trace / RBC: 3-5 /HPF / WBC 3-5   Sq Epi: x / Non Sq Epi: Few / Bacteria: Few      ABG - ( 2020 10:28 )  pH, Arterial: x     pH, Blood: 7.16  /  pCO2: 48    /  pO2: 151   / HCO3: 16    / Base Excess: -10.2 /  SaO2: 98

## 2020-04-06 NOTE — PROGRESS NOTE ADULT - SUBJECTIVE AND OBJECTIVE BOX
Date/Time Patient Seen:  		  Referring MD:   Data Reviewed	       Patient is a 74y old  Female who presents with a chief complaint of shob (2020 01:29)      Subjective/HPI     PAST MEDICAL & SURGICAL HISTORY:  DJD (degenerative joint disease)  DM (diabetes mellitus)  Hyperlipidemia  Hypothyroid  HTN (hypertension)  History of vitamin D deficiency  B12 deficiency  Bronchitis  Anxiety  S/P cataract surgery  S/P eye surgery: left eye retinal surgery x2  S/P  section: x2        Medication list         MEDICATIONS  (STANDING):  brimonidine 0.2% Ophthalmic Solution 1 Drop(s) Left EYE daily  chlorhexidine 0.12% Liquid 15 milliLiter(s) Oral Mucosa every 12 hours  chlorhexidine 2% Cloths 1 Application(s) Topical <User Schedule>  dexMEDEtomidine Infusion 0.7 MICROgram(s)/kG/Hr (15.9 mL/Hr) IV Continuous <Continuous>  dextrose 5%. 1000 milliLiter(s) (50 mL/Hr) IV Continuous <Continuous>  dextrose 50% Injectable 12.5 Gram(s) IV Push once  dextrose 50% Injectable 25 Gram(s) IV Push once  dextrose 50% Injectable 25 Gram(s) IV Push once  heparin  Injectable 5000 Unit(s) SubCutaneous every 8 hours  insulin glargine Injectable (LANTUS) 12 Unit(s) SubCutaneous at bedtime  insulin lispro (HumaLOG) corrective regimen sliding scale   SubCutaneous every 6 hours  latanoprost 0.005% Ophthalmic Solution 1 Drop(s) Both EYES at bedtime  levothyroxine Injectable 12.5 MICROGram(s) IV Push at bedtime  metoprolol tartrate 25 milliGRAM(s) Oral two times a day  pantoprazole  Injectable 40 milliGRAM(s) IV Push daily  propofol Infusion 73.502 MICROgram(s)/kG/Min (40 mL/Hr) IV Continuous <Continuous>  simvastatin 20 milliGRAM(s) Oral at bedtime  timolol 0.5% Solution 1 Drop(s) Left EYE daily    MEDICATIONS  (PRN):  acetaminophen   Tablet .. 650 milliGRAM(s) Oral every 6 hours PRN Temp greater or equal to 38C (100.4F), Mild Pain (1 - 3)  dextrose 40% Gel 15 Gram(s) Oral once PRN Blood Glucose LESS THAN 70 milliGRAM(s)/deciliter  glucagon  Injectable 1 milliGRAM(s) IntraMuscular once PRN Glucose LESS THAN 70 milligrams/deciliter  HYDROmorphone  Injectable 2 milliGRAM(s) IV Push every 4 hours PRN Mild Pain (1 - 3)  midazolam Injectable 2 milliGRAM(s) IV Push every 2 hours PRN Agitation  sodium chloride 0.9% lock flush 10 milliLiter(s) IV Push every 1 hour PRN Pre/post blood products, medications, blood draw, and to maintain line patency         Vitals log        ICU Vital Signs Last 24 Hrs  T(C): 35.9 (2020 04:00), Max: 36.9 (2020 16:00)  T(F): 96.7 (2020 04:00), Max: 98.4 (2020 16:00)  HR: 55 (2020 06:00) (54 - 106)  BP: 99/41 (2020 06:00) (99/41 - 139/61)  BP(mean): 54 (2020 06:00) (54 - 81)  ABP: --  ABP(mean): --  RR: 17 (2020 06:00) (16 - 25)  SpO2: 100% (2020 06:00) (96% - 100%)       Mode: AC/ CMV (Assist Control/ Continuous Mandatory Ventilation)  RR (machine): 16  TV (machine): 400  FiO2: 40  PEEP: 8  ITime: 1  MAP: 16  PIP: 35      Input and Output:  I&O's Detail    2020 07:01  -  2020 07:00  --------------------------------------------------------  IN:    dexmedetomidine Infusion: 415.3 mL    Free Water: 360 mL    Glucerna 1.5: 540 mL    propofol Infusion: 367.4 mL    Solution: 100 mL  Total IN: 1782.7 mL    OUT:    Indwelling Catheter - Urethral: 1350 mL    Indwelling Catheter - Urethral: 750 mL    Rectal Tube: 250 mL  Total OUT: 2350 mL    Total NET: -567.3 mL      2020 07:01  -  2020 06:59  --------------------------------------------------------  IN:    dexmedetomidine Infusion: 416.3 mL    Free Water: 120 mL    Glucerna 1.5: 180 mL    propofol Infusion: 440.5 mL  Total IN: 1156.8 mL    OUT:    Indwelling Catheter - Urethral: 750 mL    Nasoenteral Tube: 50 mL    Rectal Tube: 200 mL  Total OUT: 1000 mL    Total NET: 156.8 mL          Lab Data                        10.5   16.85 )-----------( 528      ( 2020 04:47 )             32.4     04-05    137  |  101  |  81<H>  ----------------------------<  193<H>  4.0   |  17<L>  |  3.67<H>    Ca    9.6      2020 04:47  Phos  7.0     -  Mg     2.4     -    TPro  7.6  /  Alb  2.4<L>  /  TBili  0.6  /  DBili  x   /  AST  16  /  ALT  22  /  AlkPhos  71  04-05    ABG - ( 2020 05:59 )  pH, Arterial: x     pH, Blood: 7.17  /  pCO2: 53    /  pO2: 118   / HCO3: 16    / Base Excess: -8.5  /  SaO2: 97                      Review of Systems	      Objective     Physical Examination    heart s1s2  lung dc BS  abd soft  et tube      Pertinent Lab findings & Imaging      Justin:  NO   Adequate UO     I&O's Detail    2020 07:01  -  2020 07:00  --------------------------------------------------------  IN:    dexmedetomidine Infusion: 415.3 mL    Free Water: 360 mL    Glucerna 1.5: 540 mL    propofol Infusion: 367.4 mL    Solution: 100 mL  Total IN: 1782.7 mL    OUT:    Indwelling Catheter - Urethral: 1350 mL    Indwelling Catheter - Urethral: 750 mL    Rectal Tube: 250 mL  Total OUT: 2350 mL    Total NET: -567.3 mL      2020 07:01  -  2020 06:59  --------------------------------------------------------  IN:    dexmedetomidine Infusion: 416.3 mL    Free Water: 120 mL    Glucerna 1.5: 180 mL    propofol Infusion: 440.5 mL  Total IN: 1156.8 mL    OUT:    Indwelling Catheter - Urethral: 750 mL    Nasoenteral Tube: 50 mL    Rectal Tube: 200 mL  Total OUT: 1000 mL    Total NET: 156.8 mL               Discussed with:     Cultures:	        Radiology

## 2020-04-06 NOTE — PROGRESS NOTE ADULT - PROBLEM SELECTOR PLAN 4
Patient is status post shock.  Patient is hemodynamically stable off pressors at this time.  Continue to monitor hemodynamic status.

## 2020-04-06 NOTE — PROGRESS NOTE ADULT - SUBJECTIVE AND OBJECTIVE BOX
ADELIA BALDWIN is a 74yFemale , patient examined and chart reviewed.     INTERVAL HPI/ OVERNIGHT EVENTS:  Vented sedated   Afebrile.     Past Medical History--  PAST MEDICAL & SURGICAL HISTORY:  DJD (degenerative joint disease)  DM (diabetes mellitus)  Hyperlipidemia  Hypothyroid  HTN (hypertension)  History of vitamin D deficiency  B12 deficiency  Bronchitis  Anxiety  S/P cataract surgery  S/P eye surgery: left eye retinal surgery x2  S/P  section: x2      For details regarding the patient's social history, family history, and other miscellaneous elements, please refer the initial infectious diseases consultation and/or the admitting history and physical examination for this admission.      ROS:  Unable to obtain due to : pt's condition      Current inpatient medications :  MEDICATIONS  (STANDING):  brimonidine 0.2% Ophthalmic Solution 1 Drop(s) Left EYE daily  chlorhexidine 0.12% Liquid 15 milliLiter(s) Oral Mucosa every 12 hours  chlorhexidine 2% Cloths 1 Application(s) Topical <User Schedule>  dexMEDEtomidine Infusion 0.7 MICROgram(s)/kG/Hr (15.9 mL/Hr) IV Continuous <Continuous>  dextrose 5%. 1000 milliLiter(s) (50 mL/Hr) IV Continuous <Continuous>  dextrose 50% Injectable 12.5 Gram(s) IV Push once  dextrose 50% Injectable 25 Gram(s) IV Push once  dextrose 50% Injectable 25 Gram(s) IV Push once  fentaNYL   Infusion. 0.5 MICROgram(s)/kG/Hr (4.54 mL/Hr) IV Continuous <Continuous>  heparin  Injectable 5000 Unit(s) SubCutaneous every 8 hours  insulin glargine Injectable (LANTUS) 12 Unit(s) SubCutaneous at bedtime  insulin lispro (HumaLOG) corrective regimen sliding scale   SubCutaneous every 6 hours  latanoprost 0.005% Ophthalmic Solution 1 Drop(s) Both EYES at bedtime  levothyroxine Injectable 12.5 MICROGram(s) IV Push at bedtime  metoprolol tartrate 25 milliGRAM(s) Oral two times a day  pantoprazole  Injectable 40 milliGRAM(s) IV Push daily  sevelamer carbonate Oral Powder - Peds 800 milliGRAM(s) Enteral Tube four times a day with meals  simvastatin 20 milliGRAM(s) Oral at bedtime  sodium chloride 0.45% 1000 milliLiter(s) (60 mL/Hr) IV Continuous <Continuous>  timolol 0.5% Solution 1 Drop(s) Left EYE daily    MEDICATIONS  (PRN):  acetaminophen   Tablet .. 650 milliGRAM(s) Oral every 6 hours PRN Temp greater or equal to 38C (100.4F), Mild Pain (1 - 3)  dextrose 40% Gel 15 Gram(s) Oral once PRN Blood Glucose LESS THAN 70 milliGRAM(s)/deciliter  glucagon  Injectable 1 milliGRAM(s) IntraMuscular once PRN Glucose LESS THAN 70 milligrams/deciliter  midazolam Injectable 2 milliGRAM(s) IV Push every 2 hours PRN Agitation  sodium chloride 0.9% lock flush 10 milliLiter(s) IV Push every 1 hour PRN Pre/post blood products, medications, blood draw, and to maintain line patency    Objective:  ICU Vital Signs Last 24 Hrs  T(C): 36.1 (2020 08:00), Max: 36.1 (2020 20:00)  T(F): 96.9 (2020 08:00), Max: 97 (2020 20:00)  HR: 112 (2020 15:57) (51 - 112)  BP: 117/45 (2020 15:00) (96/39 - 155/67)  BP(mean): 67 (2020 14:00) (54 - 92)  RR: 22 (2020 15:00) (16 - 30)  SpO2: 99% (2020 15:57) (96% - 100%)    Mode: AC/ CMV (Assist Control/ Continuous Mandatory Ventilation)  RR (machine): 22  TV (machine): 400  FiO2: 40  PEEP: 5  ITime: 1  MAP: 17  PIP: 33    Physical Exam:  GEN: Intubated sedated  HEENT: normocephalic and atraumatic. EOMI. OLGA. Moist mucosa. Clear Posterior pharynx.  NECK: Supple. No carotid bruits.  No lymphadenopathy or thyromegaly.  LUNGS: Decreased to auscultation.  HEART: Regular rate and rhythm without murmur.  ABDOMEN: Soft, nontender, and nondistended.  Positive bowel sounds.   EXTREMITIES: +edema.  NEUROLOGIC: Sedated   SKIN: No ulceration or induration present.      LABS:                        9.2    10.46 )-----------( 421      ( 2020 07:08 )             28.9   04-06    138  |  104  |  88<H>  ----------------------------<  172<H>  3.8   |  20<L>  |  3.88<H>    Ca    9.3      2020 07:08  Phos  7.8     04-  Mg     3.4     -    TPro  6.6  /  Alb  2.0<L>  /  TBili  0.5  /  DBili  x   /  AST  15  /  ALT  14  /  AlkPhos  66      MICROBIOLOGY:    Culture - Blood (collected 2020 12:56)  Source: .Blood Blood-Peripheral  Preliminary Report (2020 13:01):    No growth to date.    Culture - Blood (collected 2020 12:56)  Source: .Blood Blood-Peripheral  Preliminary Report (2020 13:01):    No growth to date.    Clostridium difficile Toxin by PCR (20 @ 11:10)    C Diff by PCR Result: NotDete      COVID-19 PCR . (20 @ 22:21)    COVID-19 PCR: Detected: All “detected” results on this new test are considered presumptively  positive results, are clinically actionable, and specimens will be  forwarded to [facility] [place] [place] confirmation testing.  Another report (corrected report) will only be issued if discordant  results occur.  This test has been validated by [employer] to be accurate;  though it has not been FDA cleared/approved by the usual pathway.  As with all laboratory tests, results should be correlated with clinical  findings.      RADIOLOGY & ADDITIONAL STUDIES:    EXAM:  XR CHEST PORTABLE URGENT 1V                                  PROCEDURE DATE:  2020          INTERPRETATION:  History: Line placement    Chest:  one view.      Comparison: 2020    AP radiograph of the chest demonstrates persistentBILATERAL perihilar infiltrates. NG tube unchanged. ET tube difficult to discern due to technique. RIGHT internal jugular venous catheter tip in atriocaval junction. The cardiac silhouette is normal in size. Osseous structures are intact.    Impression:persistent BILATERAL perihilar infiltrates. NG tube unchanged. ET tube difficult to discern due to technique. RIGHT internal jugular venous catheter tip in atriocaval junction.        Assessment :  75 y/o F with hx of DM, HTN, HLD, hypothyroid, anxiety presented admitted with severe sepsis with septic shock and acute hypoxic respiratory failure with MSOF sec COVID 19 pneumonia. Sp Cardiorespiratory arrest.  Worsening ALIS. Anemic. Procalcitonin markedly elevated- concern for superimposed bacterial process sp course of Zosyn. Off pressors. Remains vented.     Plan :   Completed Hydroxychloroquine x 5 days  Supportive care  Sp Zosyn stopped 2020  Vent per pulm ICU  Trend temps  Prognosis guarded  DNR  COVID-19---high risk period for decompensation  (7-14 days post symptom onset), avoid aerosolizing procedures, NSAIDs   -avoid excessive blood draws and frequent CXRs  -daily CBC w diff to follow eos, lymphocytes and neutrophils and daily NLR calculation (NLR <3 low vs >5 high)  -Other labs that may be selectively used to risk stratify and predict clinical course, ie: Ferritin (lower risk <450 vs >850) CRP (low risk <2 and higher risk >6) and LDH, D Dimer  -antibiotics only if there is concern for a bacterial process      D/w ICU team      Continue with present regiment.  Appropriate use of antibiotics and adverse effects reviewed.    I have discussed the above plan of care with patient/ family in detail. They expressed understanding of the the treatment plan . Risks, benefits and alternatives discussed in detail. I have asked if they have any questions or concerns and appropriately addressed them to the best of my ability .      Critical care time greater then 35 minutes reviewing notes, labs data/ imaging , discussion with multidisciplinary team.    Thank you for allowing me to participate in care of your patient .        Eder Brown MD  Infectious Disease  [phone]

## 2020-04-07 LAB
ALBUMIN SERPL ELPH-MCNC: 2.2 G/DL — LOW (ref 3.3–5)
ALP SERPL-CCNC: 82 U/L — SIGNIFICANT CHANGE UP (ref 30–120)
ALT FLD-CCNC: 27 U/L DA — SIGNIFICANT CHANGE UP (ref 10–60)
ANION GAP SERPL CALC-SCNC: 16 MMOL/L — SIGNIFICANT CHANGE UP (ref 5–17)
AST SERPL-CCNC: 23 U/L — SIGNIFICANT CHANGE UP (ref 10–40)
BASE EXCESS BLDA CALC-SCNC: -6.6 MMOL/L — LOW (ref -2–2)
BASOPHILS # BLD AUTO: 0.02 K/UL — SIGNIFICANT CHANGE UP (ref 0–0.2)
BASOPHILS NFR BLD AUTO: 0.2 % — SIGNIFICANT CHANGE UP (ref 0–2)
BILIRUB SERPL-MCNC: 0.4 MG/DL — SIGNIFICANT CHANGE UP (ref 0.2–1.2)
BLOOD GAS COMMENTS: SIGNIFICANT CHANGE UP
BLOOD GAS SOURCE: SIGNIFICANT CHANGE UP
BUN SERPL-MCNC: 101 MG/DL — HIGH (ref 7–23)
CALCIUM SERPL-MCNC: 8.9 MG/DL — SIGNIFICANT CHANGE UP (ref 8.4–10.5)
CHLORIDE SERPL-SCNC: 103 MMOL/L — SIGNIFICANT CHANGE UP (ref 96–108)
CO2 SERPL-SCNC: 19 MMOL/L — LOW (ref 22–31)
CREAT SERPL-MCNC: 3.82 MG/DL — HIGH (ref 0.5–1.3)
CRP SERPL-MCNC: 6.91 MG/DL — HIGH (ref 0–0.4)
D DIMER BLD IA.RAPID-MCNC: 2168 NG/ML DDU — HIGH
EOSINOPHIL # BLD AUTO: 0.22 K/UL — SIGNIFICANT CHANGE UP (ref 0–0.5)
EOSINOPHIL NFR BLD AUTO: 1.7 % — SIGNIFICANT CHANGE UP (ref 0–6)
ERYTHROCYTE [SEDIMENTATION RATE] IN BLOOD: 119 MM/HR — HIGH (ref 0–20)
FERRITIN SERPL-MCNC: 551 NG/ML — HIGH (ref 15–150)
GLUCOSE BLDC GLUCOMTR-MCNC: 262 MG/DL — HIGH (ref 70–99)
GLUCOSE BLDC GLUCOMTR-MCNC: 321 MG/DL — HIGH (ref 70–99)
GLUCOSE BLDC GLUCOMTR-MCNC: 341 MG/DL — HIGH (ref 70–99)
GLUCOSE BLDC GLUCOMTR-MCNC: 366 MG/DL — HIGH (ref 70–99)
GLUCOSE SERPL-MCNC: 283 MG/DL — HIGH (ref 70–99)
HCO3 BLDA-SCNC: 18 MMOL/L — LOW (ref 21–29)
HCT VFR BLD CALC: 29.7 % — LOW (ref 34.5–45)
HGB BLD-MCNC: 9.6 G/DL — LOW (ref 11.5–15.5)
HOROWITZ INDEX BLDA+IHG-RTO: 30 — SIGNIFICANT CHANGE UP
IMM GRANULOCYTES NFR BLD AUTO: 0.6 % — SIGNIFICANT CHANGE UP (ref 0–1.5)
INR BLD: 1.02 RATIO — SIGNIFICANT CHANGE UP (ref 0.88–1.16)
LACTATE SERPL-SCNC: 0.9 MMOL/L — SIGNIFICANT CHANGE UP (ref 0.7–2)
LYMPHOCYTES # BLD AUTO: 0.64 K/UL — LOW (ref 1–3.3)
LYMPHOCYTES # BLD AUTO: 5.1 % — LOW (ref 13–44)
MAGNESIUM SERPL-MCNC: 2.4 MG/DL — SIGNIFICANT CHANGE UP (ref 1.6–2.6)
MCHC RBC-ENTMCNC: 28.4 PG — SIGNIFICANT CHANGE UP (ref 27–34)
MCHC RBC-ENTMCNC: 32.3 GM/DL — SIGNIFICANT CHANGE UP (ref 32–36)
MCV RBC AUTO: 87.9 FL — SIGNIFICANT CHANGE UP (ref 80–100)
MONOCYTES # BLD AUTO: 0.57 K/UL — SIGNIFICANT CHANGE UP (ref 0–0.9)
MONOCYTES NFR BLD AUTO: 4.5 % — SIGNIFICANT CHANGE UP (ref 2–14)
NEUTROPHILS # BLD AUTO: 11.08 K/UL — HIGH (ref 1.8–7.4)
NEUTROPHILS NFR BLD AUTO: 87.9 % — HIGH (ref 43–77)
NRBC # BLD: 0 /100 WBCS — SIGNIFICANT CHANGE UP (ref 0–0)
PCO2 BLDA: 34 MMHG — SIGNIFICANT CHANGE UP (ref 32–46)
PH BLD: 7.33 — LOW (ref 7.35–7.45)
PHOSPHATE SERPL-MCNC: 6.1 MG/DL — HIGH (ref 2.5–4.5)
PLATELET # BLD AUTO: 472 K/UL — HIGH (ref 150–400)
PO2 BLDA: 58 MMHG — LOW (ref 74–108)
POTASSIUM SERPL-MCNC: 4.2 MMOL/L — SIGNIFICANT CHANGE UP (ref 3.5–5.3)
POTASSIUM SERPL-SCNC: 4.2 MMOL/L — SIGNIFICANT CHANGE UP (ref 3.5–5.3)
PROCALCITONIN SERPL-MCNC: 2.88 NG/ML — HIGH (ref 0.02–0.1)
PROT SERPL-MCNC: 6.2 G/DL — SIGNIFICANT CHANGE UP (ref 6–8.3)
PROTHROM AB SERPL-ACNC: 11.3 SEC — SIGNIFICANT CHANGE UP (ref 10–12.9)
RBC # BLD: 3.38 M/UL — LOW (ref 3.8–5.2)
RBC # FLD: 15.7 % — HIGH (ref 10.3–14.5)
SAO2 % BLDA: 88 % — LOW (ref 92–96)
SODIUM SERPL-SCNC: 138 MMOL/L — SIGNIFICANT CHANGE UP (ref 135–145)
WBC # BLD: 12.61 K/UL — HIGH (ref 3.8–10.5)
WBC # FLD AUTO: 12.61 K/UL — HIGH (ref 3.8–10.5)

## 2020-04-07 PROCEDURE — 99233 SBSQ HOSP IP/OBS HIGH 50: CPT | Mod: CS

## 2020-04-07 RX ORDER — INSULIN GLARGINE 100 [IU]/ML
18 INJECTION, SOLUTION SUBCUTANEOUS AT BEDTIME
Refills: 0 | Status: DISCONTINUED | OUTPATIENT
Start: 2020-04-07 | End: 2020-04-08

## 2020-04-07 RX ADMIN — SEVELAMER CARBONATE 800 MILLIGRAM(S): 2400 POWDER, FOR SUSPENSION ORAL at 08:17

## 2020-04-07 RX ADMIN — DEXMEDETOMIDINE HYDROCHLORIDE IN 0.9% SODIUM CHLORIDE 15.9 MICROGRAM(S)/KG/HR: 4 INJECTION INTRAVENOUS at 11:19

## 2020-04-07 RX ADMIN — SEVELAMER CARBONATE 800 MILLIGRAM(S): 2400 POWDER, FOR SUSPENSION ORAL at 17:02

## 2020-04-07 RX ADMIN — BRIMONIDINE TARTRATE 1 DROP(S): 2 SOLUTION/ DROPS OPHTHALMIC at 11:20

## 2020-04-07 RX ADMIN — SIMVASTATIN 20 MILLIGRAM(S): 20 TABLET, FILM COATED ORAL at 21:58

## 2020-04-07 RX ADMIN — DEXMEDETOMIDINE HYDROCHLORIDE IN 0.9% SODIUM CHLORIDE 15.9 MICROGRAM(S)/KG/HR: 4 INJECTION INTRAVENOUS at 08:17

## 2020-04-07 RX ADMIN — DEXMEDETOMIDINE HYDROCHLORIDE IN 0.9% SODIUM CHLORIDE 15.9 MICROGRAM(S)/KG/HR: 4 INJECTION INTRAVENOUS at 02:38

## 2020-04-07 RX ADMIN — Medication 8: at 11:29

## 2020-04-07 RX ADMIN — Medication 25 MILLIGRAM(S): at 17:02

## 2020-04-07 RX ADMIN — Medication 1 DROP(S): at 11:19

## 2020-04-07 RX ADMIN — DEXMEDETOMIDINE HYDROCHLORIDE IN 0.9% SODIUM CHLORIDE 15.9 MICROGRAM(S)/KG/HR: 4 INJECTION INTRAVENOUS at 20:40

## 2020-04-07 RX ADMIN — SODIUM CHLORIDE 60 MILLILITER(S): 9 INJECTION, SOLUTION INTRAVENOUS at 13:02

## 2020-04-07 RX ADMIN — Medication 12.5 MICROGRAM(S): at 21:57

## 2020-04-07 RX ADMIN — HEPARIN SODIUM 5000 UNIT(S): 5000 INJECTION INTRAVENOUS; SUBCUTANEOUS at 21:58

## 2020-04-07 RX ADMIN — SEVELAMER CARBONATE 800 MILLIGRAM(S): 2400 POWDER, FOR SUSPENSION ORAL at 21:58

## 2020-04-07 RX ADMIN — Medication 8: at 22:25

## 2020-04-07 RX ADMIN — HEPARIN SODIUM 5000 UNIT(S): 5000 INJECTION INTRAVENOUS; SUBCUTANEOUS at 04:23

## 2020-04-07 RX ADMIN — CHLORHEXIDINE GLUCONATE 15 MILLILITER(S): 213 SOLUTION TOPICAL at 17:15

## 2020-04-07 RX ADMIN — FENTANYL CITRATE 4.54 MICROGRAM(S)/KG/HR: 50 INJECTION INTRAVENOUS at 06:20

## 2020-04-07 RX ADMIN — Medication 10: at 17:15

## 2020-04-07 RX ADMIN — INSULIN GLARGINE 18 UNIT(S): 100 INJECTION, SOLUTION SUBCUTANEOUS at 22:25

## 2020-04-07 RX ADMIN — CHLORHEXIDINE GLUCONATE 15 MILLILITER(S): 213 SOLUTION TOPICAL at 04:22

## 2020-04-07 RX ADMIN — SEVELAMER CARBONATE 800 MILLIGRAM(S): 2400 POWDER, FOR SUSPENSION ORAL at 12:55

## 2020-04-07 RX ADMIN — PANTOPRAZOLE SODIUM 40 MILLIGRAM(S): 20 TABLET, DELAYED RELEASE ORAL at 11:19

## 2020-04-07 RX ADMIN — Medication 6: at 04:30

## 2020-04-07 RX ADMIN — HEPARIN SODIUM 5000 UNIT(S): 5000 INJECTION INTRAVENOUS; SUBCUTANEOUS at 13:00

## 2020-04-07 RX ADMIN — CHLORHEXIDINE GLUCONATE 1 APPLICATION(S): 213 SOLUTION TOPICAL at 04:23

## 2020-04-07 RX ADMIN — Medication 25 MILLIGRAM(S): at 04:23

## 2020-04-07 RX ADMIN — LATANOPROST 1 DROP(S): 0.05 SOLUTION/ DROPS OPHTHALMIC; TOPICAL at 21:57

## 2020-04-07 RX ADMIN — DEXMEDETOMIDINE HYDROCHLORIDE IN 0.9% SODIUM CHLORIDE 15.9 MICROGRAM(S)/KG/HR: 4 INJECTION INTRAVENOUS at 16:40

## 2020-04-07 NOTE — PROGRESS NOTE ADULT - ASSESSMENT
The patient is a 74 year old female with a history of HTN, DM, HL, hypothyroidism, anxiety who presented with fevers, cough, shortness of breath in the setting of viral PNA, COVID-19, respiratory failure, septic shock.     Plan:  - CXR consistent with PNA  - COVID-19 positive  - Completed hydroxychloroquine  - On zosyn  - Atrial fibrillation - brief, back in sinus rhythm.  - Continue metoprolol tartrate 25 mg bid  - Hemoglobin improved  - Continue mechanical ventilation  - ICU care

## 2020-04-07 NOTE — PROGRESS NOTE ADULT - SUBJECTIVE AND OBJECTIVE BOX
Date/Time Patient Seen:  		  Referring MD:   Data Reviewed	       Patient is a 74y old  Female who presents with a chief complaint of shob (2020 17:05)      Subjective/HPI     PAST MEDICAL & SURGICAL HISTORY:  DJD (degenerative joint disease)  DM (diabetes mellitus)  Hyperlipidemia  Hypothyroid  HTN (hypertension)  History of vitamin D deficiency  B12 deficiency  Bronchitis  Anxiety  S/P cataract surgery  S/P eye surgery: left eye retinal surgery x2  S/P  section: x2        Medication list         MEDICATIONS  (STANDING):  brimonidine 0.2% Ophthalmic Solution 1 Drop(s) Left EYE daily  chlorhexidine 0.12% Liquid 15 milliLiter(s) Oral Mucosa every 12 hours  chlorhexidine 2% Cloths 1 Application(s) Topical <User Schedule>  dexMEDEtomidine Infusion 0.7 MICROgram(s)/kG/Hr (15.9 mL/Hr) IV Continuous <Continuous>  dextrose 5%. 1000 milliLiter(s) (50 mL/Hr) IV Continuous <Continuous>  dextrose 50% Injectable 12.5 Gram(s) IV Push once  dextrose 50% Injectable 25 Gram(s) IV Push once  dextrose 50% Injectable 25 Gram(s) IV Push once  fentaNYL   Infusion. 0.5 MICROgram(s)/kG/Hr (4.54 mL/Hr) IV Continuous <Continuous>  heparin  Injectable 5000 Unit(s) SubCutaneous every 8 hours  insulin glargine Injectable (LANTUS) 12 Unit(s) SubCutaneous at bedtime  insulin lispro (HumaLOG) corrective regimen sliding scale   SubCutaneous every 6 hours  latanoprost 0.005% Ophthalmic Solution 1 Drop(s) Both EYES at bedtime  levothyroxine Injectable 12.5 MICROGram(s) IV Push at bedtime  metoprolol tartrate 25 milliGRAM(s) Oral two times a day  pantoprazole  Injectable 40 milliGRAM(s) IV Push daily  sevelamer carbonate Oral Powder - Peds 800 milliGRAM(s) Enteral Tube four times a day with meals  simvastatin 20 milliGRAM(s) Oral at bedtime  sodium chloride 0.45% 1000 milliLiter(s) (60 mL/Hr) IV Continuous <Continuous>  timolol 0.5% Solution 1 Drop(s) Left EYE daily    MEDICATIONS  (PRN):  acetaminophen   Tablet .. 650 milliGRAM(s) Oral every 6 hours PRN Temp greater or equal to 38C (100.4F), Mild Pain (1 - 3)  dextrose 40% Gel 15 Gram(s) Oral once PRN Blood Glucose LESS THAN 70 milliGRAM(s)/deciliter  glucagon  Injectable 1 milliGRAM(s) IntraMuscular once PRN Glucose LESS THAN 70 milligrams/deciliter  midazolam Injectable 2 milliGRAM(s) IV Push every 2 hours PRN Agitation  sodium chloride 0.9% lock flush 10 milliLiter(s) IV Push every 1 hour PRN Pre/post blood products, medications, blood draw, and to maintain line patency         Vitals log        ICU Vital Signs Last 24 Hrs  T(C): 37.1 (2020 04:00), Max: 37.1 (2020 04:00)  T(F): 98.7 (2020 04:00), Max: 98.7 (2020 04:00)  HR: 68 (2020 06:00) (51 - 112)  BP: 154/56 (2020 06:00) (96/39 - 166/72)  BP(mean): 81 (2020 06:00) (59 - 93)  ABP: --  ABP(mean): --  RR: 22 (2020 06:00) (22 - 30)  SpO2: 89% (2020 06:00) (89% - 100%)       Mode: AC/ CMV (Assist Control/ Continuous Mandatory Ventilation)  RR (machine): 22  TV (machine): 400  FiO2: 30  PEEP: 5  ITime: 1  MAP: 13  PIP: 28      Input and Output:  I&O's Detail    2020 07:01  -  2020 07:00  --------------------------------------------------------  IN:    dexmedetomidine Infusion: 303 mL    fentaNYL Infusion.: 221.6 mL    Free Water: 600 mL    Glucerna 1.5: 900 mL    sodium bicarbonate  Infusion: 250 mL    sodium chloride 0.45%: 960 mL  Total IN: 3234.6 mL    OUT:    Indwelling Catheter - Urethral: 400 mL  Total OUT: 400 mL    Total NET: 2834.6 mL          Lab Data                        9.2    10.46 )-----------( 421      ( 2020 07:08 )             28.9     04-06    138  |  104  |  88<H>  ----------------------------<  172<H>  3.8   |  20<L>  |  3.88<H>    Ca    9.3      2020 07:08  Phos  7.8     04-06  Mg     3.4     04-06    TPro  6.6  /  Alb  2.0<L>  /  TBili  0.5  /  DBili  x   /  AST  15  /  ALT  14  /  AlkPhos  66  04-06    ABG - ( 2020 10:28 )  pH, Arterial: x     pH, Blood: 7.16  /  pCO2: 48    /  pO2: 151   / HCO3: 16    / Base Excess: -10.2 /  SaO2: 98                      Review of Systems	      Objective     Physical Examination    heart s1s2  lung dec BS      Pertinent Lab findings & Imaging      Justin:  NO   Adequate UO     I&O's Detail    2020 07:01  -  2020 07:00  --------------------------------------------------------  IN:    dexmedetomidine Infusion: 303 mL    fentaNYL Infusion.: 221.6 mL    Free Water: 600 mL    Glucerna 1.5: 900 mL    sodium bicarbonate  Infusion: 250 mL    sodium chloride 0.45%: 960 mL  Total IN: 3234.6 mL    OUT:    Indwelling Catheter - Urethral: 400 mL  Total OUT: 400 mL    Total NET: 2834.6 mL               Discussed with:     Cultures:	        Radiology

## 2020-04-07 NOTE — PROGRESS NOTE ADULT - SUBJECTIVE AND OBJECTIVE BOX
ADELIA BALDWIN is a 74yFemale , patient examined and chart reviewed.     INTERVAL HPI/ OVERNIGHT EVENTS:  Vented sedated   Afebrile. Failed weaning trial    Past Medical History--  PAST MEDICAL & SURGICAL HISTORY:  DJD (degenerative joint disease)  DM (diabetes mellitus)  Hyperlipidemia  Hypothyroid  HTN (hypertension)  History of vitamin D deficiency  B12 deficiency  Bronchitis  Anxiety  S/P cataract surgery  S/P eye surgery: left eye retinal surgery x2  S/P  section: x2      For details regarding the patient's social history, family history, and other miscellaneous elements, please refer the initial infectious diseases consultation and/or the admitting history and physical examination for this admission.      ROS:  Unable to obtain due to : pt's condition      Current inpatient medications :  MEDICATIONS  (STANDING):  brimonidine 0.2% Ophthalmic Solution 1 Drop(s) Left EYE daily  chlorhexidine 0.12% Liquid 15 milliLiter(s) Oral Mucosa every 12 hours  chlorhexidine 2% Cloths 1 Application(s) Topical <User Schedule>  dexMEDEtomidine Infusion 0.7 MICROgram(s)/kG/Hr (15.9 mL/Hr) IV Continuous <Continuous>  dextrose 5%. 1000 milliLiter(s) (50 mL/Hr) IV Continuous <Continuous>  dextrose 50% Injectable 12.5 Gram(s) IV Push once  dextrose 50% Injectable 25 Gram(s) IV Push once  dextrose 50% Injectable 25 Gram(s) IV Push once  fentaNYL   Infusion. 0.5 MICROgram(s)/kG/Hr (4.54 mL/Hr) IV Continuous <Continuous>  heparin  Injectable 5000 Unit(s) SubCutaneous every 8 hours  insulin glargine Injectable (LANTUS) 18 Unit(s) SubCutaneous at bedtime  insulin lispro (HumaLOG) corrective regimen sliding scale   SubCutaneous every 6 hours  latanoprost 0.005% Ophthalmic Solution 1 Drop(s) Both EYES at bedtime  levothyroxine Injectable 12.5 MICROGram(s) IV Push at bedtime  metoprolol tartrate 25 milliGRAM(s) Oral two times a day  pantoprazole  Injectable 40 milliGRAM(s) IV Push daily  sevelamer carbonate Oral Powder - Peds 800 milliGRAM(s) Enteral Tube four times a day with meals  simvastatin 20 milliGRAM(s) Oral at bedtime  sodium chloride 0.45% 1000 milliLiter(s) (60 mL/Hr) IV Continuous <Continuous>  timolol 0.5% Solution 1 Drop(s) Left EYE daily    MEDICATIONS  (PRN):  acetaminophen   Tablet .. 650 milliGRAM(s) Oral every 6 hours PRN Temp greater or equal to 38C (100.4F), Mild Pain (1 - 3)  dextrose 40% Gel 15 Gram(s) Oral once PRN Blood Glucose LESS THAN 70 milliGRAM(s)/deciliter  glucagon  Injectable 1 milliGRAM(s) IntraMuscular once PRN Glucose LESS THAN 70 milligrams/deciliter  midazolam Injectable 2 milliGRAM(s) IV Push every 2 hours PRN Agitation  sodium chloride 0.9% lock flush 10 milliLiter(s) IV Push every 1 hour PRN Pre/post blood products, medications, blood draw, and to maintain line patency    Objective:  ICU Vital Signs Last 24 Hrs  T(C): 36.1 (2020 07:53), Max: 37.1 (2020 04:00)  T(F): 97 (2020 07:53), Max: 98.7 (2020 04:00)  HR: 84 (2020 17:48) (60 - 96)  BP: 165/60 (2020 17:48) (121/52 - 166/72)  BP(mean): 99 (2020 15:36) (68 - 99)  RR: 18 (2020 17:48) (14 - 27)  SpO2: 90% (2020 17:48) (86% - 98%)      Mode: AC/ CMV (Assist Control/ Continuous Mandatory Ventilation)  RR (machine): 22  TV (machine): 400  FiO2: 30  PEEP: 5  ITime: 1  MAP: 14  PIP: 32      Physical Exam:  GEN: Intubated sedated  HEENT: normocephalic and atraumatic. EOMI. OLGA. Moist mucosa. Clear Posterior pharynx.  NECK: Supple. No carotid bruits.  No lymphadenopathy or thyromegaly.  LUNGS: Decreased to auscultation.  HEART: Regular rate and rhythm without murmur.  ABDOMEN: Soft, nontender, and nondistended.  Positive bowel sounds.   EXTREMITIES: +edema.  NEUROLOGIC: Sedated   SKIN: No ulceration or induration present.      LABS:                        9.6    12.61 )-----------( 472      ( 2020 07:43 )             29.7   04-07    138  |  103  |  101<H>  ----------------------------<  283<H>  4.2   |  19<L>  |  3.82<H>    Ca    8.9      2020 07:43  Phos  6.1       Mg     2.4         TPro  6.2  /  Alb  2.2<L>  /  TBili  0.4  /  DBili  x   /  AST  23  /  ALT  27  /  AlkPhos  82      MICROBIOLOGY:    Culture - Blood (collected 2020 12:56)  Source: .Blood Blood-Peripheral  Preliminary Report (2020 13:01):    No growth to date.    Culture - Blood (collected 2020 12:56)  Source: .Blood Blood-Peripheral  Preliminary Report (2020 13:01):    No growth to date.    Clostridium difficile Toxin by PCR (20 @ 11:10)    C Diff by PCR Result: NotDete      COVID-19 PCR . (20 @ 22:21)    COVID-19 PCR: Detected: All “detected” results on this new test are considered presumptively  positive results, are clinically actionable, and specimens will be  forwarded to [facility] for confirmation testing.  Another report (corrected report) will only be issued if discordant  results occur.  This test has been validated by [employer] to be accurate;  though it has not been FDA cleared/approved by the usual pathway.  As with all laboratory tests, results should be correlated with clinical  findings.      RADIOLOGY & ADDITIONAL STUDIES:    EXAM:  XR CHEST PORTABLE URGENT 1V                                  PROCEDURE DATE:  2020          INTERPRETATION:  History: Line placement    Chest:  one view.      Comparison: 2020    AP radiograph of the chest demonstrates persistentBILATERAL perihilar infiltrates. NG tube unchanged. ET tube difficult to discern due to technique. RIGHT internal jugular venous catheter tip in atriocaval junction. The cardiac silhouette is normal in size. Osseous structures are intact.    Impression:persistent BILATERAL perihilar infiltrates. NG tube unchanged. ET tube difficult to discern due to technique. RIGHT internal jugular venous catheter tip in atriocaval junction.        Assessment :  73 y/o F with hx of DM, HTN, HLD, hypothyroid, anxiety presented admitted with severe sepsis with septic shock and acute hypoxic respiratory failure with MSOF sec COVID 19 pneumonia. Sp Cardiorespiratory arrest.  ALIS stable. Anemic. Procalcitonin markedly elevated- concern for superimposed bacterial process sp course of Zosyn. Off pressors. Remains vented.     Plan :   Completed Hydroxychloroquine x 5 days  Supportive care  Sp Zosyn stopped 2020  Vent per pulm ICU  Trend temps  Prognosis guarded  DNR  COVID-19---high risk period for decompensation  (7-14 days post symptom onset), avoid aerosolizing procedures, NSAIDs   -avoid excessive blood draws and frequent CXRs  -daily CBC w diff to follow eos, lymphocytes and neutrophils and daily NLR calculation (NLR <3 low vs >5 high)  -Other labs that may be selectively used to risk stratify and predict clinical course, ie: Ferritin (lower risk <450 vs >850) CRP (low risk <2 and higher risk >6) and LDH, D Dimer  -antibiotics only if there is concern for a bacterial process      D/w ICU team      Continue with present regiment.  Appropriate use of antibiotics and adverse effects reviewed.    I have discussed the above plan of care with patient/ family in detail. They expressed understanding of the the treatment plan . Risks, benefits and alternatives discussed in detail. I have asked if they have any questions or concerns and appropriately addressed them to the best of my ability .      Critical care time greater then 35 minutes reviewing notes, labs data/ imaging , discussion with multidisciplinary team.    Thank you for allowing me to participate in care of your patient .        Eder Brown MD  Infectious Disease  [phone]

## 2020-04-07 NOTE — PROGRESS NOTE ADULT - SUBJECTIVE AND OBJECTIVE BOX
INTERVAL HPI/OVERNIGHT EVENTS:  chart reviewed    MEDICATIONS  (STANDING):  brimonidine 0.2% Ophthalmic Solution 1 Drop(s) Left EYE daily  chlorhexidine 0.12% Liquid 15 milliLiter(s) Oral Mucosa every 12 hours  chlorhexidine 2% Cloths 1 Application(s) Topical <User Schedule>  dexMEDEtomidine Infusion 0.7 MICROgram(s)/kG/Hr (15.9 mL/Hr) IV Continuous <Continuous>  dextrose 5%. 1000 milliLiter(s) (50 mL/Hr) IV Continuous <Continuous>  dextrose 50% Injectable 12.5 Gram(s) IV Push once  dextrose 50% Injectable 25 Gram(s) IV Push once  dextrose 50% Injectable 25 Gram(s) IV Push once  fentaNYL   Infusion. 0.5 MICROgram(s)/kG/Hr (4.54 mL/Hr) IV Continuous <Continuous>  heparin  Injectable 5000 Unit(s) SubCutaneous every 8 hours  insulin glargine Injectable (LANTUS) 12 Unit(s) SubCutaneous at bedtime  insulin lispro (HumaLOG) corrective regimen sliding scale   SubCutaneous every 6 hours  latanoprost 0.005% Ophthalmic Solution 1 Drop(s) Both EYES at bedtime  levothyroxine Injectable 12.5 MICROGram(s) IV Push at bedtime  metoprolol tartrate 25 milliGRAM(s) Oral two times a day  pantoprazole  Injectable 40 milliGRAM(s) IV Push daily  sevelamer carbonate Oral Powder - Peds 800 milliGRAM(s) Enteral Tube four times a day with meals  simvastatin 20 milliGRAM(s) Oral at bedtime  sodium chloride 0.45% 1000 milliLiter(s) (60 mL/Hr) IV Continuous <Continuous>  timolol 0.5% Solution 1 Drop(s) Left EYE daily    MEDICATIONS  (PRN):  acetaminophen   Tablet .. 650 milliGRAM(s) Oral every 6 hours PRN Temp greater or equal to 38C (100.4F), Mild Pain (1 - 3)  dextrose 40% Gel 15 Gram(s) Oral once PRN Blood Glucose LESS THAN 70 milliGRAM(s)/deciliter  glucagon  Injectable 1 milliGRAM(s) IntraMuscular once PRN Glucose LESS THAN 70 milligrams/deciliter  midazolam Injectable 2 milliGRAM(s) IV Push every 2 hours PRN Agitation  sodium chloride 0.9% lock flush 10 milliLiter(s) IV Push every 1 hour PRN Pre/post blood products, medications, blood draw, and to maintain line patency      Allergies    No Known Allergies    Intolerances        Vital Signs Last 24 Hrs  T(C): 36.1 (2020 07:53), Max: 37.1 (2020 04:00)  T(F): 97 (2020 07:53), Max: 98.7 (2020 04:00)  HR: 68 (2020 07:53) (53 - 112)  BP: 146/51 (2020 07:53) (114/51 - 166/72)  BP(mean): 74 (2020 07:53) (66 - 93)  RR: 22 (2020 07:53) (22 - 27)  SpO2: 91% (2020 07:53) (89% - 99%)        LABS:                        9.6    12.61 )-----------( 472      ( 2020 07:43 )             29.7     04-06    138  |  104  |  88<H>  ----------------------------<  172<H>  3.8   |  20<L>  |  3.88<H>    Ca    9.3      2020 07:08  Phos  7.8     04-06  Mg     3.4     04-06    TPro  6.6  /  Alb  2.0<L>  /  TBili  0.5  /  DBili  x   /  AST  15  /  ALT  14  /  AlkPhos  66  04-06      Urinalysis Basic - ( 2020 21:35 )    Color: Yellow / Appearance: Clear / S.015 / pH: x  Gluc: x / Ketone: Negative  / Bili: Negative / Urobili: Negative mg/dL   Blood: x / Protein: 30 mg/dL / Nitrite: Negative   Leuk Esterase: Trace / RBC: 3-5 /HPF / WBC 3-5   Sq Epi: x / Non Sq Epi: Few / Bacteria: Few        RADIOLOGY & ADDITIONAL TESTS:

## 2020-04-07 NOTE — PROGRESS NOTE ADULT - PROBLEM SELECTOR PLAN 2
Patient with COVID 19.  Patient has completed Plaquenil.  Continue supportive care.  Continue isolation precautions.  NLR today is 18.75 Patient with COVID 19.  Patient has completed Plaquenil.  Continue supportive care.  Continue isolation precautions.  NLR today is 17.31 (slightly decreased from yesterday)

## 2020-04-07 NOTE — PROGRESS NOTE ADULT - SUBJECTIVE AND OBJECTIVE BOX
Patient is a 74y old  Female who presents with a chief complaint of SOB (31 Mar 2020 05:05)  Patient seen in follow up for KALE SNELL.      Chart reviewed.    PAST MEDICAL HISTORY:  DJD (degenerative joint disease)  DM (diabetes mellitus)  Hyperlipidemia  Hypothyroid  HTN (hypertension)  History of vitamin D deficiency  B12 deficiency  Bronchitis  Anxiety      MEDICATIONS  (STANDING):  brimonidine 0.2% Ophthalmic Solution 1 Drop(s) Left EYE daily  chlorhexidine 0.12% Liquid 15 milliLiter(s) Oral Mucosa every 12 hours  chlorhexidine 2% Cloths 1 Application(s) Topical <User Schedule>  dexMEDEtomidine Infusion 0.7 MICROgram(s)/kG/Hr (15.9 mL/Hr) IV Continuous <Continuous>  dextrose 5%. 1000 milliLiter(s) (50 mL/Hr) IV Continuous <Continuous>  dextrose 50% Injectable 12.5 Gram(s) IV Push once  dextrose 50% Injectable 25 Gram(s) IV Push once  dextrose 50% Injectable 25 Gram(s) IV Push once  fentaNYL   Infusion. 0.5 MICROgram(s)/kG/Hr (4.54 mL/Hr) IV Continuous <Continuous>  heparin  Injectable 5000 Unit(s) SubCutaneous every 8 hours  insulin glargine Injectable (LANTUS) 18 Unit(s) SubCutaneous at bedtime  insulin lispro (HumaLOG) corrective regimen sliding scale   SubCutaneous every 6 hours  latanoprost 0.005% Ophthalmic Solution 1 Drop(s) Both EYES at bedtime  levothyroxine Injectable 12.5 MICROGram(s) IV Push at bedtime  metoprolol tartrate 25 milliGRAM(s) Oral two times a day  pantoprazole  Injectable 40 milliGRAM(s) IV Push daily  sevelamer carbonate Oral Powder - Peds 800 milliGRAM(s) Enteral Tube four times a day with meals  simvastatin 20 milliGRAM(s) Oral at bedtime  sodium chloride 0.45% 1000 milliLiter(s) (60 mL/Hr) IV Continuous <Continuous>  timolol 0.5% Solution 1 Drop(s) Left EYE daily    MEDICATIONS  (PRN):  acetaminophen   Tablet .. 650 milliGRAM(s) Oral every 6 hours PRN Temp greater or equal to 38C (100.4F), Mild Pain (1 - 3)  dextrose 40% Gel 15 Gram(s) Oral once PRN Blood Glucose LESS THAN 70 milliGRAM(s)/deciliter  glucagon  Injectable 1 milliGRAM(s) IntraMuscular once PRN Glucose LESS THAN 70 milligrams/deciliter  midazolam Injectable 2 milliGRAM(s) IV Push every 2 hours PRN Agitation  sodium chloride 0.9% lock flush 10 milliLiter(s) IV Push every 1 hour PRN Pre/post blood products, medications, blood draw, and to maintain line patency    T(C): 36.1 (20 @ 07:53), Max: 37.1 (20 @ 04:00)  HR: 71 (20 @ 09:05) (51 - 112)  BP: 155/515 (20 @ 11:35) (96/39 - 166/72)  RR: 22 (20 @ 11:35)  SpO2: 92% (20 @ 11:35)  Wt(kg): --  I&O's Detail    2020 07:01  -  2020 07:00  --------------------------------------------------------  IN:    dexmedetomidine Infusion: 303 mL    fentaNYL Infusion.: 221.6 mL    Free Water: 600 mL    Glucerna 1.5: 900 mL    sodium bicarbonate  Infusion: 250 mL    sodium chloride 0.45%: 960 mL  Total IN: 3234.6 mL    OUT:    Indwelling Catheter - Urethral: 400 mL  Total OUT: 400 mL    Total NET: 2834.6 mL      2020 07:01  -  2020 12:04  --------------------------------------------------------  IN:    dexmedetomidine Infusion: 55 mL    fentaNYL Infusion.: 76 mL    Free Water: 120 mL    Glucerna 1.5: 180 mL    sodium chloride 0.45%: 300 mL  Total IN: 731 mL    OUT:    Indwelling Catheter - Urethral: 160 mL  Total OUT: 160 mL    Total NET: 571 mL      PHYSICAL EXAM:  Pt on COVID precautions. Chart reviewed and previous physical examination noted.                          LABORATORY:                        9.6    12.61 )-----------( 472      ( 2020 07:43 )             29.7         138  |  103  |  101<H>  ----------------------------<  283<H>  4.2   |  19<L>  |  3.82<H>    Ca    8.9      2020 07:43  Phos  6.1     -  Mg     2.4         TPro  6.2  /  Alb  2.2<L>  /  TBili  0.4  /  DBili  x   /  AST  23  /  ALT  27  /  AlkPhos  82      Sodium, Serum: 138 mmol/L ( 07:43)  Sodium, Serum: 138 mmol/L ( @ 07:08)    Potassium, Serum: 4.2 mmol/L ( 07:43)  Potassium, Serum: 3.8 mmol/L ( 07:08)    Hemoglobin: 9.6 g/dL ( @ 07:43)  Hemoglobin: 9.2 g/dL ( @ 07:08)  Hemoglobin: 10.5 g/dL ( @ 04:47)    Creatinine, Serum 3.82 ( @ 07:43)  Creatinine, Serum 3.88 ( @ 07:08)  Creatinine, Serum 3.67 ( @ 04:47)        LIVER FUNCTIONS - ( 2020 07:43 )  Alb: 2.2 g/dL / Pro: 6.2 g/dL / ALK PHOS: 82 U/L / ALT: 27 U/L DA / AST: 23 U/L / GGT: x           Urinalysis Basic - ( 2020 21:35 )    Color: Yellow / Appearance: Clear / S.015 / pH: x  Gluc: x / Ketone: Negative  / Bili: Negative / Urobili: Negative mg/dL   Blood: x / Protein: 30 mg/dL / Nitrite: Negative   Leuk Esterase: Trace / RBC: 3-5 /HPF / WBC 3-5   Sq Epi: x / Non Sq Epi: Few / Bacteria: Few      ABG - ( 2020 07:16 )  pH, Arterial: x     pH, Blood: 7.33  /  pCO2: 34    /  pO2: 58    / HCO3: 18    / Base Excess: -6.6  /  SaO2: 88

## 2020-04-07 NOTE — PROGRESS NOTE ADULT - SUBJECTIVE AND OBJECTIVE BOX
Patient is a 74y old  Female who presents with a chief complaint of sob (04 Apr 2020 19:07)    HPI: 73 y/o F with hx of DM, HTN, HLD, hypothyroid, anxiety biba with c/o fever and cough x 6 days. Pt states that Tmax was 101.8F, took one tab of tylenol XS at 9am this morning. Pt states that she has associated dry cough, body aches and mild generalized weakness. States that she has had one episode of loose stool daily. Denies recent travel, sick contacts, known covid-19 exposure, CP, SOB, runny nose, sore throat, headache, rash, urinary symptoms.  she was sating 94% ON 4l and was then put on non rebreather and was transferred to spcu.    Chart reviewed, notes reviewed.   Patient seen and examined.  Being followed by following specialists: Pulmonary critical care, cardiology, nephrology, and GI.    Consultant(s) Notes Reviewed:  [X] Yes    Care Discussed with Consultants/Other Providers: [X] Yes    04/04/2020 --> Events noted, patient remains intubated and on ventilator.  Patient is sedated.  All information is from patient's chart.  04/05/2020 --> Remains intubated and on vent. Sedated.   04/06/2020 --> Still on vent. Sedated.     REVIEW OF SYSTEMS:  Unable to obtain.    Allergies: No Known Allergies    Intolerances    Home Medications:    MEDICATIONS  (STANDING):  brimonidine 0.2% Ophthalmic Solution 1 Drop(s) Left EYE daily  chlorhexidine 0.12% Liquid 15 milliLiter(s) Oral Mucosa every 12 hours  chlorhexidine 2% Cloths 1 Application(s) Topical <User Schedule>  dexMEDEtomidine Infusion 0.7 MICROgram(s)/kG/Hr (15.9 mL/Hr) IV Continuous <Continuous>  dextrose 5%. 1000 milliLiter(s) (50 mL/Hr) IV Continuous <Continuous>  dextrose 50% Injectable 12.5 Gram(s) IV Push once  dextrose 50% Injectable 25 Gram(s) IV Push once  dextrose 50% Injectable 25 Gram(s) IV Push once  fentaNYL   Infusion. 0.5 MICROgram(s)/kG/Hr (4.54 mL/Hr) IV Continuous <Continuous>  heparin  Injectable 5000 Unit(s) SubCutaneous every 8 hours  insulin glargine Injectable (LANTUS) 18 Unit(s) SubCutaneous at bedtime  insulin lispro (HumaLOG) corrective regimen sliding scale   SubCutaneous every 6 hours  latanoprost 0.005% Ophthalmic Solution 1 Drop(s) Both EYES at bedtime  levothyroxine Injectable 12.5 MICROGram(s) IV Push at bedtime  metoprolol tartrate 25 milliGRAM(s) Oral two times a day  pantoprazole  Injectable 40 milliGRAM(s) IV Push daily  sevelamer carbonate Oral Powder - Peds 800 milliGRAM(s) Enteral Tube four times a day with meals  simvastatin 20 milliGRAM(s) Oral at bedtime  sodium chloride 0.45% 1000 milliLiter(s) (60 mL/Hr) IV Continuous <Continuous>  timolol 0.5% Solution 1 Drop(s) Left EYE daily    MEDICATIONS  (PRN):  acetaminophen   Tablet .. 650 milliGRAM(s) Oral every 6 hours PRN Temp greater or equal to 38C (100.4F), Mild Pain (1 - 3)  dextrose 40% Gel 15 Gram(s) Oral once PRN Blood Glucose LESS THAN 70 milliGRAM(s)/deciliter  glucagon  Injectable 1 milliGRAM(s) IntraMuscular once PRN Glucose LESS THAN 70 milligrams/deciliter  midazolam Injectable 2 milliGRAM(s) IV Push every 2 hours PRN Agitation  sodium chloride 0.9% lock flush 10 milliLiter(s) IV Push every 1 hour PRN Pre/post blood products, medications, blood draw, and to maintain line patency    Vital Signs Last 24 Hrs  T(C): 36.1 (07 Apr 2020 07:53), Max: 37.1 (07 Apr 2020 04:00)  T(F): 97 (07 Apr 2020 07:53), Max: 98.7 (07 Apr 2020 04:00)  HR: 76 (07 Apr 2020 20:00) (60 - 96)  BP: 140/47 (07 Apr 2020 20:00) (125/51 - 166/72)  BP(mean): 71 (07 Apr 2020 20:00) (69 - 99)  RR: 20 (07 Apr 2020 20:00) (14 - 27)  SpO2: 97% (07 Apr 2020 20:00) (86% - 98%)    Mode: AC/ CMV (Assist Control/ Continuous Mandatory Ventilation)  RR (machine): 22  TV (machine): 400  FiO2: 30  PEEP: 5  ITime: 1  MAP: 14  PIP: 32    PHYSICAL EXAM:  GENERAL: Currently on ventilator, well-groomed, well-developed  HEAD:  Atraumatic, Normocephalic  EYES: Pallor +  ENMT: Intubated via ET tube.  NECK: Supple.  CHEST/LUNG: Decreased breath sounds at bases.  HEART: S1, S2.   ABDOMEN: Soft, Nontender, Nondistended; Bowel sounds present  EXTREMITIES:  2+ Peripheral Pulses, No clubbing, cyanosis, or edema  MS: No joint swelling or deformity.   LYMPH: No lymphadenopathy noted  SKIN: No rashes or lesions  NERVOUS SYSTEM:   Sedated  PSYCH:  Sedated    LABS:                         9.6    12.61 )-----------( 472      ( 07 Apr 2020 07:43 )             29.7     07 Apr 2020 07:43    138    |  103    |  101    ----------------------------<  283    4.2     |  19     |  3.82     Ca    8.9        07 Apr 2020 07:43  Phos  6.1       07 Apr 2020 07:43  Mg     2.4       07 Apr 2020 07:43    TPro  6.2    /  Alb  2.2    /  TBili  0.4    /  DBili  x      /  AST  23     /  ALT  27     /  AlkPhos  82     07 Apr 2020 07:43    CAPILLARY BLOOD GLUCOSE  POCT Blood Glucose.: 366 mg/dL (07 Apr 2020 17:07)  POCT Blood Glucose.: 341 mg/dL (07 Apr 2020 11:23)  POCT Blood Glucose.: 262 mg/dL (07 Apr 2020 04:23)  POCT Blood Glucose.: 190 mg/dL (06 Apr 2020 23:27)    PT/INR - ( 07 Apr 2020 14:26 )   PT: 11.3 sec;   INR: 1.02 ratio      03-27 [id number] 8.1    Ferritin, Serum: 551 ng/mL (04-07 @ 15:01)  Ferritin, Serum: 414 ng/mL (03-31 @ 11:31)  Ferritin, Serum: 579 ng/mL (03-29 @ 13:15)    Culture Results:   No growth to date. (04-05 @ 12:56)  Culture Results:   No growth to date. (04-05 @ 12:56)    Procalcitonin, Serum: 2.88 ng/mL (04-07-20 @ 12:37)    RADIOLOGY TEST: (IMAGES REVIEWED BY ME)  < from: Xray Chest 1 View- PORTABLE-Urgent (04.04.20 @ 14:55) >  EXAM:  XR CHEST PORTABLE URGENT 1V                        PROCEDURE DATE:  04/04/2020    INTERPRETATION:  History: Line placement    Chest:  one view.      Comparison: 04/01/2020    AP radiograph of the chest demonstrates persistentBILATERAL perihilar infiltrates. NG tube unchanged. ET tube difficult to discern due to technique. RIGHT internal jugular venous catheter tip in atriocaval junction. The cardiac silhouette is normal in size. Osseous structures are intact.    Impression:persistent BILATERAL perihilar infiltrates. NG tube unchanged. ET tube difficult to discern due to technique. RIGHT internal jugular venous catheter tip in atriocaval junction.    < end of copied text >    Imaging Personally Reviewed:  [X] YES      HEALTH ISSUES - PROBLEM Dx:  Hypokalemia: Hypokalemia  Anemia, unspecified type: Anemia, unspecified type  History of vitamin D deficiency: History of vitamin D deficiency  B12 deficiency: B12 deficiency  HTN (hypertension): HTN (hypertension)  Atrial fibrillation, unspecified type: Atrial fibrillation, unspecified type  Hypothyroidism, unspecified type: Hypothyroidism, unspecified type  Afib: Afib  Shock: Shock  Anxiety: Anxiety  Hypothyroid: Hypothyroid  Hyperlipidemia: Hyperlipidemia  DM (diabetes mellitus): DM (diabetes mellitus)  SARS (severe acute respiratory syndrome): SARS (severe acute respiratory syndrome)  Acute respiratory failure with hypoxia: Acute respiratory failure with hypoxia Patient is a 74y old  Female who presents with a chief complaint of sob (04 Apr 2020 19:07)    HPI: 75 y/o F with hx of DM, HTN, HLD, hypothyroid, anxiety biba with c/o fever and cough x 6 days. Pt states that Tmax was 101.8F, took one tab of tylenol XS at 9am this morning. Pt states that she has associated dry cough, body aches and mild generalized weakness. States that she has had one episode of loose stool daily. Denies recent travel, sick contacts, known covid-19 exposure, CP, SOB, runny nose, sore throat, headache, rash, urinary symptoms.  she was sating 94% ON 4l and was then put on non rebreather and was transferred to spcu.    Chart reviewed, notes reviewed.   Patient seen and examined.  Being followed by following specialists: Pulmonary critical care, cardiology, nephrology, and GI.    Consultant(s) Notes Reviewed:  [X] Yes    Care Discussed with Consultants/Other Providers: [X] Yes    04/04/2020 --> Events noted, patient remains intubated and on ventilator.  Patient is sedated.  All information is from patient's chart.  04/05/2020 --> Remains intubated and on vent. Sedated.   04/06/2020 --> Still on vent. Sedated.     04/07/2020 --> remains intubated and sedated. Failed weaning trial earlier today.     REVIEW OF SYSTEMS:  Unable to obtain.    Allergies: No Known Allergies    Intolerances    Home Medications:    MEDICATIONS  (STANDING):  brimonidine 0.2% Ophthalmic Solution 1 Drop(s) Left EYE daily  chlorhexidine 0.12% Liquid 15 milliLiter(s) Oral Mucosa every 12 hours  chlorhexidine 2% Cloths 1 Application(s) Topical <User Schedule>  dexMEDEtomidine Infusion 0.7 MICROgram(s)/kG/Hr (15.9 mL/Hr) IV Continuous <Continuous>  dextrose 5%. 1000 milliLiter(s) (50 mL/Hr) IV Continuous <Continuous>  dextrose 50% Injectable 12.5 Gram(s) IV Push once  dextrose 50% Injectable 25 Gram(s) IV Push once  dextrose 50% Injectable 25 Gram(s) IV Push once  fentaNYL   Infusion. 0.5 MICROgram(s)/kG/Hr (4.54 mL/Hr) IV Continuous <Continuous>  heparin  Injectable 5000 Unit(s) SubCutaneous every 8 hours  insulin glargine Injectable (LANTUS) 18 Unit(s) SubCutaneous at bedtime  insulin lispro (HumaLOG) corrective regimen sliding scale   SubCutaneous every 6 hours  latanoprost 0.005% Ophthalmic Solution 1 Drop(s) Both EYES at bedtime  levothyroxine Injectable 12.5 MICROGram(s) IV Push at bedtime  metoprolol tartrate 25 milliGRAM(s) Oral two times a day  pantoprazole  Injectable 40 milliGRAM(s) IV Push daily  sevelamer carbonate Oral Powder - Peds 800 milliGRAM(s) Enteral Tube four times a day with meals  simvastatin 20 milliGRAM(s) Oral at bedtime  sodium chloride 0.45% 1000 milliLiter(s) (60 mL/Hr) IV Continuous <Continuous>  timolol 0.5% Solution 1 Drop(s) Left EYE daily    MEDICATIONS  (PRN):  acetaminophen   Tablet .. 650 milliGRAM(s) Oral every 6 hours PRN Temp greater or equal to 38C (100.4F), Mild Pain (1 - 3)  dextrose 40% Gel 15 Gram(s) Oral once PRN Blood Glucose LESS THAN 70 milliGRAM(s)/deciliter  glucagon  Injectable 1 milliGRAM(s) IntraMuscular once PRN Glucose LESS THAN 70 milligrams/deciliter  midazolam Injectable 2 milliGRAM(s) IV Push every 2 hours PRN Agitation  sodium chloride 0.9% lock flush 10 milliLiter(s) IV Push every 1 hour PRN Pre/post blood products, medications, blood draw, and to maintain line patency    Vital Signs Last 24 Hrs  T(C): 36.1 (07 Apr 2020 07:53), Max: 37.1 (07 Apr 2020 04:00)  T(F): 97 (07 Apr 2020 07:53), Max: 98.7 (07 Apr 2020 04:00)  HR: 76 (07 Apr 2020 20:00) (60 - 96)  BP: 140/47 (07 Apr 2020 20:00) (125/51 - 166/72)  BP(mean): 71 (07 Apr 2020 20:00) (69 - 99)  RR: 20 (07 Apr 2020 20:00) (14 - 27)  SpO2: 97% (07 Apr 2020 20:00) (86% - 98%)    Mode: AC/ CMV (Assist Control/ Continuous Mandatory Ventilation)  RR (machine): 22  TV (machine): 400  FiO2: 30  PEEP: 5  ITime: 1  MAP: 14  PIP: 32    PHYSICAL EXAM:  GENERAL: Currently on ventilator, well-groomed, well-developed  HEAD:  Atraumatic, Normocephalic  EYES: Pallor +  ENMT: Intubated via ET tube.  NECK: Supple.  CHEST/LUNG: Decreased breath sounds at bases.  HEART: S1, S2.   ABDOMEN: Soft, Nontender, Nondistended; Bowel sounds present  EXTREMITIES:  2+ Peripheral Pulses, No clubbing, cyanosis, or edema  MS: No joint swelling or deformity.   LYMPH: No lymphadenopathy noted  SKIN: No rashes or lesions  NERVOUS SYSTEM:   Sedated  PSYCH:  Sedated    LABS:                         9.6    12.61 )-----------( 472      ( 07 Apr 2020 07:43 )             29.7     07 Apr 2020 07:43    138    |  103    |  101    ----------------------------<  283    4.2     |  19     |  3.82     Ca    8.9        07 Apr 2020 07:43  Phos  6.1       07 Apr 2020 07:43  Mg     2.4       07 Apr 2020 07:43    TPro  6.2    /  Alb  2.2    /  TBili  0.4    /  DBili  x      /  AST  23     /  ALT  27     /  AlkPhos  82     07 Apr 2020 07:43    CAPILLARY BLOOD GLUCOSE  POCT Blood Glucose.: 366 mg/dL (07 Apr 2020 17:07)  POCT Blood Glucose.: 341 mg/dL (07 Apr 2020 11:23)  POCT Blood Glucose.: 262 mg/dL (07 Apr 2020 04:23)  POCT Blood Glucose.: 190 mg/dL (06 Apr 2020 23:27)    PT/INR - ( 07 Apr 2020 14:26 )   PT: 11.3 sec;   INR: 1.02 ratio      03-27 [id number] 8.1    Ferritin, Serum: 551 ng/mL (04-07 @ 15:01)  Ferritin, Serum: 414 ng/mL (03-31 @ 11:31)  Ferritin, Serum: 579 ng/mL (03-29 @ 13:15)    Culture Results:   No growth to date. (04-05 @ 12:56)  Culture Results:   No growth to date. (04-05 @ 12:56)    Procalcitonin, Serum: 2.88 ng/mL (04-07-20 @ 12:37)    RADIOLOGY TEST: (IMAGES REVIEWED BY ME)  < from: Xray Chest 1 View- PORTABLE-Urgent (04.04.20 @ 14:55) >  EXAM:  XR CHEST PORTABLE URGENT 1V                        PROCEDURE DATE:  04/04/2020    INTERPRETATION:  History: Line placement    Chest:  one view.      Comparison: 04/01/2020    AP radiograph of the chest demonstrates persistentBILATERAL perihilar infiltrates. NG tube unchanged. ET tube difficult to discern due to technique. RIGHT internal jugular venous catheter tip in atriocaval junction. The cardiac silhouette is normal in size. Osseous structures are intact.    Impression:persistent BILATERAL perihilar infiltrates. NG tube unchanged. ET tube difficult to discern due to technique. RIGHT internal jugular venous catheter tip in atriocaval junction.    < end of copied text >    Imaging Personally Reviewed:  [X] YES      HEALTH ISSUES - PROBLEM Dx:  Hypokalemia: Hypokalemia  Anemia, unspecified type: Anemia, unspecified type  History of vitamin D deficiency: History of vitamin D deficiency  B12 deficiency: B12 deficiency  HTN (hypertension): HTN (hypertension)  Atrial fibrillation, unspecified type: Atrial fibrillation, unspecified type  Hypothyroidism, unspecified type: Hypothyroidism, unspecified type  Afib: Afib  Shock: Shock  Anxiety: Anxiety  Hypothyroid: Hypothyroid  Hyperlipidemia: Hyperlipidemia  DM (diabetes mellitus): DM (diabetes mellitus)  SARS (severe acute respiratory syndrome): SARS (severe acute respiratory syndrome)  Acute respiratory failure with hypoxia: Acute respiratory failure with hypoxia

## 2020-04-07 NOTE — PROGRESS NOTE ADULT - ASSESSMENT
1.	ALIS: ATN, COVID renal injury  2.	SARS-COVID 19, Resp failure on vent  3.	Anemia  4.	Diabetes  5.	Hyperphosphatemia    Stable renal indices. UO poor.  Continue IVF if ok with ICU team. PRN IV lasix if UO remains poor. Treatment for COVID pneumonia. COVID precautions.   Strict I & O. Monitor h/h trend. Monitor blood sugar levels. Insulin coverage as needed. Avoid nephrotoxic meds as possible.   Avoid ACEI, ARB, NSAIDs and IV contrast. Will follow electrolytes and renal function trend. On sevelamer powder for hyperphosphatemia.   Supportive care. ICU management.

## 2020-04-08 LAB
ALBUMIN SERPL ELPH-MCNC: 2 G/DL — LOW (ref 3.3–5)
ALP SERPL-CCNC: 76 U/L — SIGNIFICANT CHANGE UP (ref 30–120)
ALT FLD-CCNC: 25 U/L DA — SIGNIFICANT CHANGE UP (ref 10–60)
ANION GAP SERPL CALC-SCNC: 11 MMOL/L — SIGNIFICANT CHANGE UP (ref 5–17)
APTT BLD: 24.4 SEC — LOW (ref 28.5–37)
AST SERPL-CCNC: 14 U/L — SIGNIFICANT CHANGE UP (ref 10–40)
BASE EXCESS BLDA CALC-SCNC: -1.7 MMOL/L — SIGNIFICANT CHANGE UP (ref -2–2)
BASOPHILS # BLD AUTO: 0.01 K/UL — SIGNIFICANT CHANGE UP (ref 0–0.2)
BASOPHILS NFR BLD AUTO: 0.1 % — SIGNIFICANT CHANGE UP (ref 0–2)
BILIRUB SERPL-MCNC: 0.4 MG/DL — SIGNIFICANT CHANGE UP (ref 0.2–1.2)
BLOOD GAS SOURCE: SIGNIFICANT CHANGE UP
BUN SERPL-MCNC: 94 MG/DL — HIGH (ref 7–23)
CALCIUM SERPL-MCNC: 9.1 MG/DL — SIGNIFICANT CHANGE UP (ref 8.4–10.5)
CHLORIDE SERPL-SCNC: 104 MMOL/L — SIGNIFICANT CHANGE UP (ref 96–108)
CO2 SERPL-SCNC: 25 MMOL/L — SIGNIFICANT CHANGE UP (ref 22–31)
CREAT SERPL-MCNC: 3.31 MG/DL — HIGH (ref 0.5–1.3)
EOSINOPHIL # BLD AUTO: 0.33 K/UL — SIGNIFICANT CHANGE UP (ref 0–0.5)
EOSINOPHIL NFR BLD AUTO: 3 % — SIGNIFICANT CHANGE UP (ref 0–6)
GLUCOSE BLDC GLUCOMTR-MCNC: 269 MG/DL — HIGH (ref 70–99)
GLUCOSE BLDC GLUCOMTR-MCNC: 317 MG/DL — HIGH (ref 70–99)
GLUCOSE BLDC GLUCOMTR-MCNC: 343 MG/DL — HIGH (ref 70–99)
GLUCOSE BLDC GLUCOMTR-MCNC: 376 MG/DL — HIGH (ref 70–99)
GLUCOSE SERPL-MCNC: 390 MG/DL — HIGH (ref 70–99)
HCO3 BLDA-SCNC: 23 MMOL/L — SIGNIFICANT CHANGE UP (ref 21–29)
HCT VFR BLD CALC: 30.1 % — LOW (ref 34.5–45)
HGB BLD-MCNC: 9.7 G/DL — LOW (ref 11.5–15.5)
HOROWITZ INDEX BLDA+IHG-RTO: 70 — SIGNIFICANT CHANGE UP
IMM GRANULOCYTES NFR BLD AUTO: 0.5 % — SIGNIFICANT CHANGE UP (ref 0–1.5)
INR BLD: 1.06 RATIO — SIGNIFICANT CHANGE UP (ref 0.88–1.16)
LYMPHOCYTES # BLD AUTO: 0.65 K/UL — LOW (ref 1–3.3)
LYMPHOCYTES # BLD AUTO: 5.9 % — LOW (ref 13–44)
MAGNESIUM SERPL-MCNC: 2.4 MG/DL — SIGNIFICANT CHANGE UP (ref 1.6–2.6)
MCHC RBC-ENTMCNC: 28.4 PG — SIGNIFICANT CHANGE UP (ref 27–34)
MCHC RBC-ENTMCNC: 32.2 GM/DL — SIGNIFICANT CHANGE UP (ref 32–36)
MCV RBC AUTO: 88 FL — SIGNIFICANT CHANGE UP (ref 80–100)
MONOCYTES # BLD AUTO: 0.68 K/UL — SIGNIFICANT CHANGE UP (ref 0–0.9)
MONOCYTES NFR BLD AUTO: 6.1 % — SIGNIFICANT CHANGE UP (ref 2–14)
NEUTROPHILS # BLD AUTO: 9.38 K/UL — HIGH (ref 1.8–7.4)
NEUTROPHILS NFR BLD AUTO: 84.4 % — HIGH (ref 43–77)
NRBC # BLD: 0 /100 WBCS — SIGNIFICANT CHANGE UP (ref 0–0)
PCO2 BLDA: 37 MMHG — SIGNIFICANT CHANGE UP (ref 32–46)
PH BLD: 7.4 — SIGNIFICANT CHANGE UP (ref 7.35–7.45)
PHOSPHATE SERPL-MCNC: 3.5 MG/DL — SIGNIFICANT CHANGE UP (ref 2.5–4.5)
PLATELET # BLD AUTO: 414 K/UL — HIGH (ref 150–400)
PO2 BLDA: 72 MMHG — LOW (ref 74–108)
POTASSIUM SERPL-MCNC: 3.5 MMOL/L — SIGNIFICANT CHANGE UP (ref 3.5–5.3)
POTASSIUM SERPL-SCNC: 3.5 MMOL/L — SIGNIFICANT CHANGE UP (ref 3.5–5.3)
PROT SERPL-MCNC: 6.5 G/DL — SIGNIFICANT CHANGE UP (ref 6–8.3)
PROTHROM AB SERPL-ACNC: 11.8 SEC — SIGNIFICANT CHANGE UP (ref 10–12.9)
RBC # BLD: 3.42 M/UL — LOW (ref 3.8–5.2)
RBC # FLD: 15.4 % — HIGH (ref 10.3–14.5)
SAO2 % BLDA: 94 % — SIGNIFICANT CHANGE UP (ref 92–96)
SODIUM SERPL-SCNC: 140 MMOL/L — SIGNIFICANT CHANGE UP (ref 135–145)
TROPONIN I SERPL-MCNC: 0.79 NG/ML — HIGH (ref 0.02–0.06)
WBC # BLD: 11.1 K/UL — HIGH (ref 3.8–10.5)
WBC # FLD AUTO: 11.1 K/UL — HIGH (ref 3.8–10.5)

## 2020-04-08 PROCEDURE — 93010 ELECTROCARDIOGRAM REPORT: CPT

## 2020-04-08 PROCEDURE — 99233 SBSQ HOSP IP/OBS HIGH 50: CPT

## 2020-04-08 RX ORDER — METOPROLOL TARTRATE 50 MG
5 TABLET ORAL ONCE
Refills: 0 | Status: COMPLETED | OUTPATIENT
Start: 2020-04-08 | End: 2020-04-08

## 2020-04-08 RX ORDER — INSULIN GLARGINE 100 [IU]/ML
28 INJECTION, SOLUTION SUBCUTANEOUS AT BEDTIME
Refills: 0 | Status: DISCONTINUED | OUTPATIENT
Start: 2020-04-08 | End: 2020-04-11

## 2020-04-08 RX ORDER — SODIUM CHLORIDE 9 MG/ML
1000 INJECTION, SOLUTION INTRAVENOUS
Refills: 0 | Status: DISCONTINUED | OUTPATIENT
Start: 2020-04-08 | End: 2020-04-10

## 2020-04-08 RX ORDER — METOPROLOL TARTRATE 50 MG
25 TABLET ORAL EVERY 8 HOURS
Refills: 0 | Status: DISCONTINUED | OUTPATIENT
Start: 2020-04-08 | End: 2020-04-14

## 2020-04-08 RX ORDER — DILTIAZEM HCL 120 MG
10 CAPSULE, EXT RELEASE 24 HR ORAL ONCE
Refills: 0 | Status: COMPLETED | OUTPATIENT
Start: 2020-04-08 | End: 2020-04-08

## 2020-04-08 RX ADMIN — Medication 10 MILLIGRAM(S): at 06:24

## 2020-04-08 RX ADMIN — SIMVASTATIN 20 MILLIGRAM(S): 20 TABLET, FILM COATED ORAL at 22:05

## 2020-04-08 RX ADMIN — Medication 1 DROP(S): at 11:39

## 2020-04-08 RX ADMIN — PANTOPRAZOLE SODIUM 40 MILLIGRAM(S): 20 TABLET, DELAYED RELEASE ORAL at 11:39

## 2020-04-08 RX ADMIN — HEPARIN SODIUM 5000 UNIT(S): 5000 INJECTION INTRAVENOUS; SUBCUTANEOUS at 05:48

## 2020-04-08 RX ADMIN — DEXMEDETOMIDINE HYDROCHLORIDE IN 0.9% SODIUM CHLORIDE 15.9 MICROGRAM(S)/KG/HR: 4 INJECTION INTRAVENOUS at 08:23

## 2020-04-08 RX ADMIN — Medication 10: at 06:05

## 2020-04-08 RX ADMIN — FENTANYL CITRATE 4.54 MICROGRAM(S)/KG/HR: 50 INJECTION INTRAVENOUS at 01:02

## 2020-04-08 RX ADMIN — DEXMEDETOMIDINE HYDROCHLORIDE IN 0.9% SODIUM CHLORIDE 15.9 MICROGRAM(S)/KG/HR: 4 INJECTION INTRAVENOUS at 00:21

## 2020-04-08 RX ADMIN — Medication 25 MILLIGRAM(S): at 22:23

## 2020-04-08 RX ADMIN — CHLORHEXIDINE GLUCONATE 1 APPLICATION(S): 213 SOLUTION TOPICAL at 05:48

## 2020-04-08 RX ADMIN — DEXMEDETOMIDINE HYDROCHLORIDE IN 0.9% SODIUM CHLORIDE 15.9 MICROGRAM(S)/KG/HR: 4 INJECTION INTRAVENOUS at 17:57

## 2020-04-08 RX ADMIN — CHLORHEXIDINE GLUCONATE 15 MILLILITER(S): 213 SOLUTION TOPICAL at 17:57

## 2020-04-08 RX ADMIN — DEXMEDETOMIDINE HYDROCHLORIDE IN 0.9% SODIUM CHLORIDE 15.9 MICROGRAM(S)/KG/HR: 4 INJECTION INTRAVENOUS at 11:40

## 2020-04-08 RX ADMIN — BRIMONIDINE TARTRATE 1 DROP(S): 2 SOLUTION/ DROPS OPHTHALMIC at 11:39

## 2020-04-08 RX ADMIN — CHLORHEXIDINE GLUCONATE 15 MILLILITER(S): 213 SOLUTION TOPICAL at 05:47

## 2020-04-08 RX ADMIN — HEPARIN SODIUM 5000 UNIT(S): 5000 INJECTION INTRAVENOUS; SUBCUTANEOUS at 22:06

## 2020-04-08 RX ADMIN — SEVELAMER CARBONATE 800 MILLIGRAM(S): 2400 POWDER, FOR SUSPENSION ORAL at 11:39

## 2020-04-08 RX ADMIN — FENTANYL CITRATE 4.54 MICROGRAM(S)/KG/HR: 50 INJECTION INTRAVENOUS at 17:57

## 2020-04-08 RX ADMIN — Medication 25 MILLIGRAM(S): at 14:03

## 2020-04-08 RX ADMIN — Medication 25 MILLIGRAM(S): at 05:47

## 2020-04-08 RX ADMIN — HEPARIN SODIUM 5000 UNIT(S): 5000 INJECTION INTRAVENOUS; SUBCUTANEOUS at 14:04

## 2020-04-08 RX ADMIN — INSULIN GLARGINE 28 UNIT(S): 100 INJECTION, SOLUTION SUBCUTANEOUS at 22:07

## 2020-04-08 RX ADMIN — Medication 8: at 11:40

## 2020-04-08 RX ADMIN — Medication 5 MILLIGRAM(S): at 06:13

## 2020-04-08 RX ADMIN — LATANOPROST 1 DROP(S): 0.05 SOLUTION/ DROPS OPHTHALMIC; TOPICAL at 22:05

## 2020-04-08 RX ADMIN — Medication 12.5 MICROGRAM(S): at 22:08

## 2020-04-08 RX ADMIN — Medication 6: at 22:09

## 2020-04-08 RX ADMIN — SEVELAMER CARBONATE 800 MILLIGRAM(S): 2400 POWDER, FOR SUSPENSION ORAL at 08:23

## 2020-04-08 RX ADMIN — MIDAZOLAM HYDROCHLORIDE 2 MILLIGRAM(S): 1 INJECTION, SOLUTION INTRAMUSCULAR; INTRAVENOUS at 06:33

## 2020-04-08 NOTE — PROGRESS NOTE ADULT - SUBJECTIVE AND OBJECTIVE BOX
Date/Time Patient Seen:  		  Referring MD:   Data Reviewed	       Patient is a 74y old  Female who presents with a chief complaint of shob (2020 18:16)      Subjective/HPI     PAST MEDICAL & SURGICAL HISTORY:  DJD (degenerative joint disease)  DM (diabetes mellitus)  Hyperlipidemia  Hypothyroid  HTN (hypertension)  History of vitamin D deficiency  B12 deficiency  Bronchitis  Anxiety  S/P cataract surgery  S/P eye surgery: left eye retinal surgery x2  S/P  section: x2        Medication list         MEDICATIONS  (STANDING):  brimonidine 0.2% Ophthalmic Solution 1 Drop(s) Left EYE daily  chlorhexidine 0.12% Liquid 15 milliLiter(s) Oral Mucosa every 12 hours  chlorhexidine 2% Cloths 1 Application(s) Topical <User Schedule>  dexMEDEtomidine Infusion 0.7 MICROgram(s)/kG/Hr (15.9 mL/Hr) IV Continuous <Continuous>  dextrose 5%. 1000 milliLiter(s) (50 mL/Hr) IV Continuous <Continuous>  dextrose 50% Injectable 12.5 Gram(s) IV Push once  dextrose 50% Injectable 25 Gram(s) IV Push once  dextrose 50% Injectable 25 Gram(s) IV Push once  fentaNYL   Infusion. 0.5 MICROgram(s)/kG/Hr (4.54 mL/Hr) IV Continuous <Continuous>  heparin  Injectable 5000 Unit(s) SubCutaneous every 8 hours  insulin glargine Injectable (LANTUS) 18 Unit(s) SubCutaneous at bedtime  insulin lispro (HumaLOG) corrective regimen sliding scale   SubCutaneous every 6 hours  latanoprost 0.005% Ophthalmic Solution 1 Drop(s) Both EYES at bedtime  levothyroxine Injectable 12.5 MICROGram(s) IV Push at bedtime  metoprolol tartrate 25 milliGRAM(s) Oral two times a day  pantoprazole  Injectable 40 milliGRAM(s) IV Push daily  sevelamer carbonate Oral Powder - Peds 800 milliGRAM(s) Enteral Tube four times a day with meals  simvastatin 20 milliGRAM(s) Oral at bedtime  sodium chloride 0.45% 1000 milliLiter(s) (60 mL/Hr) IV Continuous <Continuous>  timolol 0.5% Solution 1 Drop(s) Left EYE daily    MEDICATIONS  (PRN):  acetaminophen   Tablet .. 650 milliGRAM(s) Oral every 6 hours PRN Temp greater or equal to 38C (100.4F), Mild Pain (1 - 3)  dextrose 40% Gel 15 Gram(s) Oral once PRN Blood Glucose LESS THAN 70 milliGRAM(s)/deciliter  glucagon  Injectable 1 milliGRAM(s) IntraMuscular once PRN Glucose LESS THAN 70 milligrams/deciliter  midazolam Injectable 2 milliGRAM(s) IV Push every 2 hours PRN Agitation  sodium chloride 0.9% lock flush 10 milliLiter(s) IV Push every 1 hour PRN Pre/post blood products, medications, blood draw, and to maintain line patency         Vitals log        ICU Vital Signs Last 24 Hrs  T(C): 37.4 (2020 04:00), Max: 37.4 (2020 04:00)  T(F): 99.4 (2020 04:00), Max: 99.4 (2020 04:00)  HR: 109 (2020 04:00) (66 - 109)  BP: 164/59 (2020 04:00) (140/47 - 169/52)  BP(mean): 85 (2020 04:00) (71 - 99)  ABP: --  ABP(mean): --  RR: 20 (2020 04:00) (14 - 22)  SpO2: 97% (2020 04:00) (86% - 97%)       Mode: AC/ CMV (Assist Control/ Continuous Mandatory Ventilation)  RR (machine): 22  TV (machine): 400  FiO2: 30  PEEP: 5  ITime: 1  MAP: 10  PIP: 32      Input and Output:  I&O's Detail    2020 07:01  -  2020 07:00  --------------------------------------------------------  IN:    dexmedetomidine Infusion: 254 mL    fentaNYL Infusion.: 280 mL    Free Water: 480 mL    Glucerna 1.5: 900 mL    sodium chloride 0.45%: 1200 mL  Total IN: 3114 mL    OUT:    Indwelling Catheter - Urethral: 1740 mL    Rectal Tube: 900 mL  Total OUT: 2640 mL    Total NET: 474 mL          Lab Data                        9.6    12.61 )-----------( 472      ( 2020 07:43 )             29.7     04-07    138  |  103  |  101<H>  ----------------------------<  283<H>  4.2   |  19<L>  |  3.82<H>    Ca    8.9      2020 07:43  Phos  6.1       Mg     2.4         TPro  6.2  /  Alb  2.2<L>  /  TBili  0.4  /  DBili  x   /  AST  23  /  ALT  27  /  AlkPhos  82      ABG - ( 2020 06:46 )  pH, Arterial: x     pH, Blood: 7.40  /  pCO2: 37    /  pO2: 72    / HCO3: 23    / Base Excess: -1.7  /  SaO2: 94                      Review of Systems	      Objective     Physical Examination    heart s1s2  lung dec BS  abd soft      Pertinent Lab findings & Imaging      Justin:  NO   Adequate UO     I&O's Detail    2020 07:01  -  2020 07:00  --------------------------------------------------------  IN:    dexmedetomidine Infusion: 254 mL    fentaNYL Infusion.: 280 mL    Free Water: 480 mL    Glucerna 1.5: 900 mL    sodium chloride 0.45%: 1200 mL  Total IN: 3114 mL    OUT:    Indwelling Catheter - Urethral: 1740 mL    Rectal Tube: 900 mL  Total OUT: 2640 mL    Total NET: 474 mL               Discussed with:     Cultures:	        Radiology

## 2020-04-08 NOTE — PROGRESS NOTE ADULT - SUBJECTIVE AND OBJECTIVE BOX
Chief Complaint: Fevers, shortness of breath    Interval Events: SVT this morning.    Review of Systems:  Unable to obtain    Physical Exam:  Vital Signs Last 24 Hrs  T(C): 37.1 (08 Apr 2020 08:28), Max: 37.4 (08 Apr 2020 04:00)  T(F): 98.8 (08 Apr 2020 08:28), Max: 99.4 (08 Apr 2020 04:00)  HR: 67 (08 Apr 2020 08:28) (66 - 109)  BP: 130/57 (08 Apr 2020 08:28) (130/57 - 169/52)  BP(mean): 74 (08 Apr 2020 08:28) (71 - 99)  RR: 20 (08 Apr 2020 08:28) (14 - 22)  SpO2: 97% (08 Apr 2020 08:28) (86% - 97%)  General: Sedated  HEENT: Intubated  Neck: No JVD, no carotid bruit  Extremities: No LE edema, no cyanosis  Neuro: Non-focal  Skin: No rash    Labs:    04-07    138  |  103  |  101<H>  ----------------------------<  283<H>  4.2   |  19<L>  |  3.82<H>    Ca    8.9      07 Apr 2020 07:43  Phos  6.1     04-07  Mg     2.4     04-07    TPro  6.2  /  Alb  2.2<L>  /  TBili  0.4  /  DBili  x   /  AST  23  /  ALT  27  /  AlkPhos  82  04-07                        9.7    11.10 )-----------( 414      ( 08 Apr 2020 07:24 )             30.1       Telemetry: Sinus rhythm, SVT, atrial runs

## 2020-04-08 NOTE — PROGRESS NOTE ADULT - PROBLEM SELECTOR PLAN 7
As per orders
Continue patient on Synthroid.
deferred management given patient sedated
pt in resp distress likely 2/2 viral pna and will get eval for intuabtion now    scd and gi ppx  guarded prognosis
pt in resp distress likely 2/2 viral pna and will get eval for intuabtion now    scd and gi ppx  guarded prognosis
Continue patient on Synthroid.
pt in resp distress likely 2/2 viral pna and will get eval for intuabtion now    scd and gi ppx  guarded prognosis

## 2020-04-08 NOTE — PROGRESS NOTE ADULT - PROBLEM SELECTOR PLAN 8
As per orders
Heparin for DVT prophylaxis.
continue metoprolol 25mg BID  reviewed cardiology reccs  guarded prognosis
continue metoprolol 25mg BID  reviewed cardiology reccs  guarded prognosis
metoprolol 25mg bid for now  reviewed cardio recs    eusebia prognosis
metoprolol 25mg bid for now  reviewed cardio recs    eusebia prognosis
[facility] now  [facility]
metoprolol held as per cardiology due to bradycardia  guarded prognosis
metoprolol held as per cardiology due to bradycardia  guarded prognosis
Heparin for DVT prophylaxis.

## 2020-04-08 NOTE — PROVIDER CONTACT NOTE (CRITICAL VALUE NOTIFICATION) - ACTION/TREATMENT ORDERED:
no new modalties
3 runs of K IV
JUSTIN Nieves notified of critical lab value on ABG. Pt ordered for 1 amp sodium bicarb. Will continue to monitor.

## 2020-04-08 NOTE — PROGRESS NOTE ADULT - ASSESSMENT
covid  ftt  anemia  resp failure  COVID +    plan    ngt feeds  check residuals  drop in hgb -- transfuse prn  monitor for signs of gi bleeding  in and out  ppi once a day  gerd precautions

## 2020-04-08 NOTE — PROGRESS NOTE ADULT - SUBJECTIVE AND OBJECTIVE BOX
HPI: 74F COVID 19 +, developed worsening hypoxemic resp. failure, ARDS, and required intubation and mechanical vent support.     -DNR      Current Vent Settings:    FIO2 55  PEEP 5  RR 20        Vitalsigns/reviewed and physical exam performed where pertinent and urgently required.    Lab/radiology studies/ABG/Meds reviewed and interpreted into the assesment and treatment plan.    Assessment/Plan/Therapeutic interventions      Neuro: Sedation neuromuscular blockade as needed to facilitate safe ventilation    Cor:  Pressor support as needed to maintain MAP 65.  Avoiding fluid challenges.  QTC monitoring while on azithro and      hydroxychloroquine.    Pulm:  ARDS-NET 4-6cc/kg IBW TV as able to maintain plateau pressures <30. Prone ventilation consideration as feasable.  Pa02/Fi02 < 150 on Fi02 >60% and PEEP at least 5.    GI:  PPI  Enteric feeds as tolerated in tandem with NMB and prone ventilation    Renal: Even to negative fluid balance as tolerated by hemodynamics and renal fx.  Feeds to be provided in lieu of IVF.     Heme:  Pharmacologic DVT P in addition to SCD's    ID: ABX discontinuation based on discussion with ID in conjunction with clinical features, culture data, and judicious procalcitonin monitoring.      Endo Aggressive glycemic control to limit FS glucose to < 180mg/dl.        COVID 19 specific considerations and therapeuric options based on the available and rapidly changing literature    Goals of care considerations:  Ongoing assessment for patient specific treatment options based on progression or decline.  I have involved the family with updates and requests in guidance for medical decision making.      35 minutes of critical care time spent in the management of this critically ill COVID-19 patient/PUI patient with continuous assessments and interventions based on the interpretation of multiple databases.

## 2020-04-08 NOTE — PROGRESS NOTE ADULT - PROBLEM SELECTOR PROBLEM 8
Afib
History of vitamin D deficiency
Prophylactic measure

## 2020-04-08 NOTE — PROGRESS NOTE ADULT - SUBJECTIVE AND OBJECTIVE BOX
Patient seen and examined at bedside   Intubated ,  Rectal tube in place  Bouts of Atrial fib vs NSVT on tele  Cardio following    Review of Systems:  unable to obtain, patient intubated  Objective:  Vitals  T(C): 37.7 (04-08-20 @ 11:26), Max: 37.7 (04-08-20 @ 11:26)  HR: 61 (04-08-20 @ 18:43) (61 - 109)  BP: 131/50 (04-08-20 @ 18:43) (130/57 - 172/64)  RR: 21 (04-08-20 @ 18:43) (19 - 21)  SpO2: 92% (04-08-20 @ 18:43) (92% - 98%)    Physical Exam:  General: comfortable, no acute distress, well nourished, sedated  HEENT: Atraumatic, no LAD, trachea midline, PERRLA  Cardiovascular: tachycardic, no murmurs, gallops or fricition rubs  Pulmonary: rhonchi,, no wheezing , rhonchi  Gastrointestinal: soft non tender non distended, no masses felt, no organomegally  Muscloskeletal: no lower extremity edema, intact bilateral lower extremity pulses  Neurological: CN II-12 intact. No focal weakness  Psychiatrical: sedated  SKIN: no rash, lesions or ulcers      Labs:                          9.7    11.10 )-----------( 414      ( 08 Apr 2020 07:24 )             30.1     04-08    140  |  104  |  94<H>  ----------------------------<  390<H>  3.5   |  25  |  3.31<H>    Ca    9.1      08 Apr 2020 08:44  Phos  3.5     04-08  Mg     2.4     04-08    TPro  6.5  /  Alb  2.0<L>  /  TBili  0.4  /  DBili  x   /  AST  14  /  ALT  25  /  AlkPhos  76  04-08    LIVER FUNCTIONS - ( 08 Apr 2020 08:44 )  Alb: 2.0 g/dL / Pro: 6.5 g/dL / ALK PHOS: 76 U/L / ALT: 25 U/L DA / AST: 14 U/L / GGT: x           PT/INR - ( 08 Apr 2020 07:24 )   PT: 11.8 sec;   INR: 1.06 ratio         PTT - ( 08 Apr 2020 07:24 )  PTT:24.4 sec      Active Medications  MEDICATIONS  (STANDING):  brimonidine 0.2% Ophthalmic Solution 1 Drop(s) Left EYE daily  chlorhexidine 0.12% Liquid 15 milliLiter(s) Oral Mucosa every 12 hours  chlorhexidine 2% Cloths 1 Application(s) Topical <User Schedule>  dexMEDEtomidine Infusion 0.7 MICROgram(s)/kG/Hr (15.9 mL/Hr) IV Continuous <Continuous>  dextrose 5%. 1000 milliLiter(s) (50 mL/Hr) IV Continuous <Continuous>  dextrose 50% Injectable 12.5 Gram(s) IV Push once  dextrose 50% Injectable 25 Gram(s) IV Push once  dextrose 50% Injectable 25 Gram(s) IV Push once  fentaNYL   Infusion. 0.5 MICROgram(s)/kG/Hr (4.54 mL/Hr) IV Continuous <Continuous>  heparin  Injectable 5000 Unit(s) SubCutaneous every 8 hours  insulin glargine Injectable (LANTUS) 28 Unit(s) SubCutaneous at bedtime  insulin lispro (HumaLOG) corrective regimen sliding scale   SubCutaneous every 6 hours  latanoprost 0.005% Ophthalmic Solution 1 Drop(s) Both EYES at bedtime  levothyroxine Injectable 12.5 MICROGram(s) IV Push at bedtime  metoprolol tartrate 25 milliGRAM(s) Oral every 8 hours  pantoprazole  Injectable 40 milliGRAM(s) IV Push daily  simvastatin 20 milliGRAM(s) Oral at bedtime  sodium chloride 0.45%. 1000 milliLiter(s) (50 mL/Hr) IV Continuous <Continuous>  timolol 0.5% Solution 1 Drop(s) Left EYE daily    MEDICATIONS  (PRN):  acetaminophen   Tablet .. 650 milliGRAM(s) Oral every 6 hours PRN Temp greater or equal to 38C (100.4F), Mild Pain (1 - 3)  dextrose 40% Gel 15 Gram(s) Oral once PRN Blood Glucose LESS THAN 70 milliGRAM(s)/deciliter  glucagon  Injectable 1 milliGRAM(s) IntraMuscular once PRN Glucose LESS THAN 70 milligrams/deciliter  midazolam Injectable 2 milliGRAM(s) IV Push every 2 hours PRN Agitation  sodium chloride 0.9% lock flush 10 milliLiter(s) IV Push every 1 hour PRN Pre/post blood products, medications, blood draw, and to maintain line patency

## 2020-04-08 NOTE — PROGRESS NOTE ADULT - SUBJECTIVE AND OBJECTIVE BOX
Patient is a 74y old  Female who presents with a chief complaint of SOB (31 Mar 2020 05:05)  Patient seen in follow up for KALE SNELL.      Chart reviewed.    PAST MEDICAL HISTORY:  DJD (degenerative joint disease)  DM (diabetes mellitus)  Hyperlipidemia  Hypothyroid  HTN (hypertension)  History of vitamin D deficiency  B12 deficiency  Bronchitis  Anxiety      MEDICATIONS  (STANDING):  brimonidine 0.2% Ophthalmic Solution 1 Drop(s) Left EYE daily  chlorhexidine 0.12% Liquid 15 milliLiter(s) Oral Mucosa every 12 hours  chlorhexidine 2% Cloths 1 Application(s) Topical <User Schedule>  dexMEDEtomidine Infusion 0.7 MICROgram(s)/kG/Hr (15.9 mL/Hr) IV Continuous <Continuous>  dextrose 5%. 1000 milliLiter(s) (50 mL/Hr) IV Continuous <Continuous>  dextrose 50% Injectable 12.5 Gram(s) IV Push once  dextrose 50% Injectable 25 Gram(s) IV Push once  dextrose 50% Injectable 25 Gram(s) IV Push once  fentaNYL   Infusion. 0.5 MICROgram(s)/kG/Hr (4.54 mL/Hr) IV Continuous <Continuous>  heparin  Injectable 5000 Unit(s) SubCutaneous every 8 hours  insulin glargine Injectable (LANTUS) 28 Unit(s) SubCutaneous at bedtime  insulin lispro (HumaLOG) corrective regimen sliding scale   SubCutaneous every 6 hours  latanoprost 0.005% Ophthalmic Solution 1 Drop(s) Both EYES at bedtime  levothyroxine Injectable 12.5 MICROGram(s) IV Push at bedtime  metoprolol tartrate 25 milliGRAM(s) Oral every 8 hours  pantoprazole  Injectable 40 milliGRAM(s) IV Push daily  simvastatin 20 milliGRAM(s) Oral at bedtime  sodium chloride 0.45% 1000 milliLiter(s) (60 mL/Hr) IV Continuous <Continuous>  timolol 0.5% Solution 1 Drop(s) Left EYE daily    MEDICATIONS  (PRN):  acetaminophen   Tablet .. 650 milliGRAM(s) Oral every 6 hours PRN Temp greater or equal to 38C (100.4F), Mild Pain (1 - 3)  dextrose 40% Gel 15 Gram(s) Oral once PRN Blood Glucose LESS THAN 70 milliGRAM(s)/deciliter  glucagon  Injectable 1 milliGRAM(s) IntraMuscular once PRN Glucose LESS THAN 70 milligrams/deciliter  midazolam Injectable 2 milliGRAM(s) IV Push every 2 hours PRN Agitation  sodium chloride 0.9% lock flush 10 milliLiter(s) IV Push every 1 hour PRN Pre/post blood products, medications, blood draw, and to maintain line patency    T(C): 37.7 (04-08-20 @ 11:26), Max: 37.7 (04-08-20 @ 11:26)  HR: 70 (04-08-20 @ 14:59) (60 - 112)  BP: 172/64 (04-08-20 @ 14:59) (114/51 - 172/64)  RR: 19 (04-08-20 @ 14:59)  SpO2: 92% (04-08-20 @ 14:59)  Wt(kg): --  I&O's Detail    07 Apr 2020 07:01  -  08 Apr 2020 07:00  --------------------------------------------------------  IN:    dexmedetomidine Infusion: 254 mL    fentaNYL Infusion.: 280 mL    Free Water: 480 mL    Glucerna 1.5: 900 mL    sodium chloride 0.45%: 1200 mL  Total IN: 3114 mL    OUT:    Indwelling Catheter - Urethral: 1740 mL    Rectal Tube: 900 mL  Total OUT: 2640 mL    Total NET: 474 mL      08 Apr 2020 07:01  -  08 Apr 2020 15:30  --------------------------------------------------------  IN:    dexmedetomidine Infusion: 70 mL    fentaNYL Infusion.: 70 mL    Free Water: 120 mL    Glucerna 1.5: 270 mL  Total IN: 530 mL    OUT:    Indwelling Catheter - Urethral: 400 mL  Total OUT: 400 mL    Total NET: 130 mL          PHYSICAL EXAM:  Pt on COVID precautions. Chart reviewed and previous physical examination noted.                          LABORATORY:                        9.7    11.10 )-----------( 414      ( 08 Apr 2020 07:24 )             30.1     04-08    140  |  104  |  94<H>  ----------------------------<  390<H>  3.5   |  25  |  3.31<H>    Ca    9.1      08 Apr 2020 08:44  Phos  3.5     04-08  Mg     2.4     04-08    TPro  6.5  /  Alb  2.0<L>  /  TBili  0.4  /  DBili  x   /  AST  14  /  ALT  25  /  AlkPhos  76  04-08    Sodium, Serum: 140 mmol/L (04-08 @ 08:44)  Sodium, Serum: 138 mmol/L (04-07 @ 07:43)    Potassium, Serum: 3.5 mmol/L (04-08 @ 08:44)  Potassium, Serum: 4.2 mmol/L (04-07 @ 07:43)    Hemoglobin: 9.7 g/dL (04-08 @ 07:24)  Hemoglobin: 9.6 g/dL (04-07 @ 07:43)  Hemoglobin: 9.2 g/dL (04-06 @ 07:08)    Creatinine, Serum 3.31 (04-08 @ 08:44)  Creatinine, Serum 3.82 (04-07 @ 07:43)  Creatinine, Serum 3.88 (04-06 @ 07:08)    CARDIAC MARKERS ( 08 Apr 2020 12:16 )  .787 ng/mL / x     / x     / x     / x          LIVER FUNCTIONS - ( 08 Apr 2020 08:44 )  Alb: 2.0 g/dL / Pro: 6.5 g/dL / ALK PHOS: 76 U/L / ALT: 25 U/L DA / AST: 14 U/L / GGT: x             ABG - ( 08 Apr 2020 06:46 )  pH, Arterial: x     pH, Blood: 7.40  /  pCO2: 37    /  pO2: 72    / HCO3: 23    / Base Excess: -1.7  /  SaO2: 94

## 2020-04-08 NOTE — PROGRESS NOTE ADULT - ASSESSMENT
The patient is a 74 year old female with a history of HTN, DM, HL, hypothyroidism, anxiety who presented with fevers, cough, shortness of breath in the setting of viral PNA, COVID-19, respiratory failure, septic shock.     Plan:  - CXR consistent with PNA  - COVID-19 positive  - Completed hydroxychloroquine  - On zosyn  - Off of IV pressors  - Atrial fibrillation - brief, back in sinus rhythm.  - This morning, intermittent brief atrial runs either AF or AT. 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  - Increase metoprolol tartrate to 25 mg q8h  - If there is a recurrence of sustained SVT, give metoprolol 5 mg IV or attempt adenosine.  - Continue mechanical ventilation  - ICU care

## 2020-04-08 NOTE — PROGRESS NOTE ADULT - PROBLEM SELECTOR PROBLEM 2
SARS (severe acute respiratory syndrome)
Hypothyroidism, unspecified type
Infection due to 2019 novel coronavirus
SARS (severe acute respiratory syndrome)
Infection due to 2019 novel coronavirus

## 2020-04-08 NOTE — PROGRESS NOTE ADULT - PROBLEM SELECTOR PROBLEM 7
Anxiety
B12 deficiency
Hypothyroidism, unspecified type
Anxiety

## 2020-04-08 NOTE — PROGRESS NOTE ADULT - ASSESSMENT
1.	ALIS: ATN, COVID renal injury  2.	SARS-COVID 19, Resp failure on vent  3.	Anemia  4.	Diabetes  5.	Hyperphosphatemia    Improving renal indices. UO better. Will d/c bicarb in IVF. Treatment for COVID pneumonia. COVID precautions. Strict I & O.  Monitor blood sugar levels. Insulin coverage as needed. Avoid nephrotoxic meds as possible.   Avoid ACEI, ARB, NSAIDs and IV contrast. Will follow electrolytes and renal function trend. Off sevelamer.  Supportive care. ICU management.

## 2020-04-08 NOTE — PROGRESS NOTE ADULT - SUBJECTIVE AND OBJECTIVE BOX
INTERVAL HPI/OVERNIGHT EVENTS:  chart reviewed    MEDICATIONS  (STANDING):  brimonidine 0.2% Ophthalmic Solution 1 Drop(s) Left EYE daily  chlorhexidine 0.12% Liquid 15 milliLiter(s) Oral Mucosa every 12 hours  chlorhexidine 2% Cloths 1 Application(s) Topical <User Schedule>  dexMEDEtomidine Infusion 0.7 MICROgram(s)/kG/Hr (15.9 mL/Hr) IV Continuous <Continuous>  dextrose 5%. 1000 milliLiter(s) (50 mL/Hr) IV Continuous <Continuous>  dextrose 50% Injectable 12.5 Gram(s) IV Push once  dextrose 50% Injectable 25 Gram(s) IV Push once  dextrose 50% Injectable 25 Gram(s) IV Push once  fentaNYL   Infusion. 0.5 MICROgram(s)/kG/Hr (4.54 mL/Hr) IV Continuous <Continuous>  heparin  Injectable 5000 Unit(s) SubCutaneous every 8 hours  insulin glargine Injectable (LANTUS) 18 Unit(s) SubCutaneous at bedtime  insulin lispro (HumaLOG) corrective regimen sliding scale   SubCutaneous every 6 hours  latanoprost 0.005% Ophthalmic Solution 1 Drop(s) Both EYES at bedtime  levothyroxine Injectable 12.5 MICROGram(s) IV Push at bedtime  metoprolol tartrate 25 milliGRAM(s) Oral every 8 hours  pantoprazole  Injectable 40 milliGRAM(s) IV Push daily  sevelamer carbonate Oral Powder - Peds 800 milliGRAM(s) Enteral Tube four times a day with meals  simvastatin 20 milliGRAM(s) Oral at bedtime  sodium chloride 0.45% 1000 milliLiter(s) (60 mL/Hr) IV Continuous <Continuous>  timolol 0.5% Solution 1 Drop(s) Left EYE daily    MEDICATIONS  (PRN):  acetaminophen   Tablet .. 650 milliGRAM(s) Oral every 6 hours PRN Temp greater or equal to 38C (100.4F), Mild Pain (1 - 3)  dextrose 40% Gel 15 Gram(s) Oral once PRN Blood Glucose LESS THAN 70 milliGRAM(s)/deciliter  glucagon  Injectable 1 milliGRAM(s) IntraMuscular once PRN Glucose LESS THAN 70 milligrams/deciliter  midazolam Injectable 2 milliGRAM(s) IV Push every 2 hours PRN Agitation  sodium chloride 0.9% lock flush 10 milliLiter(s) IV Push every 1 hour PRN Pre/post blood products, medications, blood draw, and to maintain line patency      Allergies    No Known Allergies    Intolerances        Review of Systems:    General:  No wt loss, fevers, chills, night sweats,fatigue,   Eyes:  Good vision, no reported pain  ENT:  No sore throat, pain, runny nose, dysphagia  CV:  No pain, palpitatioins, hypo/hypertension  Resp:  No dyspnea, cough, tachypnea, wheezing  GI:  No pain, No nausea, No vomiting, No diarrhea, No constipatiion, No weight loss, No fever, No pruritis, No rectal bleeding, No tarry stools, No dysphagia,  :  No pain, bleeding, incontinence, nocturia  Muscle:  No pain, weakness  Neuro:  No weakness, tingling, memory problems  Psych:  No fatigue, insomnia, mood problems, depression  Endocrine:  No polyuria, polydypsia, cold/heat intolerance  Heme:  No petechiae, ecchymosis, easy bruisability  Skin:  No rash, tattoos, scars, edema      Vital Signs Last 24 Hrs  T(C): 37.1 (08 Apr 2020 08:28), Max: 37.4 (08 Apr 2020 04:00)  T(F): 98.8 (08 Apr 2020 08:28), Max: 99.4 (08 Apr 2020 04:00)  HR: 68 (08 Apr 2020 10:11) (66 - 109)  BP: 130/57 (08 Apr 2020 08:28) (130/57 - 169/52)  BP(mean): 74 (08 Apr 2020 08:28) (71 - 99)  RR: 20 (08 Apr 2020 08:28) (14 - 22)  SpO2: 98% (08 Apr 2020 10:11) (86% - 98%)    PHYSICAL EXAM:    Constitutional: NAD, well-developed  HEENT: EOMI, throat clear  Neck: No LAD, supple  Respiratory: CTA and P  Cardiovascular: S1 and S2, RRR, no M  Gastrointestinal: BS+, soft, NT/ND, neg HSM,  Extremities: No peripheral edema, neg clubing, cyanosis  Vascular: 2+ peripheral pulses  Neurological: A/O x 3, no focal deficits  Psychiatric: Normal mood, normal affect  Skin: No rashes      LABS:                        9.7    11.10 )-----------( 414      ( 08 Apr 2020 07:24 )             30.1     04-08    140  |  104  |  94<H>  ----------------------------<  390<H>  3.5   |  25  |  3.31<H>    Ca    9.1      08 Apr 2020 08:44  Phos  3.5     04-08  Mg     2.4     04-08    TPro  6.5  /  Alb  2.0<L>  /  TBili  0.4  /  DBili  x   /  AST  14  /  ALT  25  /  AlkPhos  76  04-08    PT/INR - ( 08 Apr 2020 07:24 )   PT: 11.8 sec;   INR: 1.06 ratio         PTT - ( 08 Apr 2020 07:24 )  PTT:24.4 sec      RADIOLOGY & ADDITIONAL TESTS:

## 2020-04-09 LAB
ALBUMIN SERPL ELPH-MCNC: 2.2 G/DL — LOW (ref 3.3–5)
ALP SERPL-CCNC: 68 U/L — SIGNIFICANT CHANGE UP (ref 30–120)
ALT FLD-CCNC: 29 U/L DA — SIGNIFICANT CHANGE UP (ref 10–60)
ANION GAP SERPL CALC-SCNC: 14 MMOL/L — SIGNIFICANT CHANGE UP (ref 5–17)
APTT BLD: 30.8 SEC — SIGNIFICANT CHANGE UP (ref 28.5–37)
AST SERPL-CCNC: 16 U/L — SIGNIFICANT CHANGE UP (ref 10–40)
BASE EXCESS BLDA CALC-SCNC: 1 MMOL/L — SIGNIFICANT CHANGE UP (ref -2–2)
BASOPHILS # BLD AUTO: 0.01 K/UL — SIGNIFICANT CHANGE UP (ref 0–0.2)
BASOPHILS NFR BLD AUTO: 0.1 % — SIGNIFICANT CHANGE UP (ref 0–2)
BILIRUB SERPL-MCNC: 0.2 MG/DL — SIGNIFICANT CHANGE UP (ref 0.2–1.2)
BLOOD GAS COMMENTS: SIGNIFICANT CHANGE UP
BLOOD GAS COMMENTS: SIGNIFICANT CHANGE UP
BLOOD GAS SOURCE: SIGNIFICANT CHANGE UP
BUN SERPL-MCNC: 87 MG/DL — HIGH (ref 7–23)
CALCIUM SERPL-MCNC: 9.3 MG/DL — SIGNIFICANT CHANGE UP (ref 8.4–10.5)
CHLORIDE SERPL-SCNC: 110 MMOL/L — HIGH (ref 96–108)
CO2 SERPL-SCNC: 22 MMOL/L — SIGNIFICANT CHANGE UP (ref 22–31)
CREAT SERPL-MCNC: 2.68 MG/DL — HIGH (ref 0.5–1.3)
CRP SERPL-MCNC: 3.14 MG/DL — HIGH (ref 0–0.4)
CULTURE RESULTS: SIGNIFICANT CHANGE UP
CULTURE RESULTS: SIGNIFICANT CHANGE UP
D DIMER BLD IA.RAPID-MCNC: 1181 NG/ML DDU — HIGH
EOSINOPHIL # BLD AUTO: 0.44 K/UL — SIGNIFICANT CHANGE UP (ref 0–0.5)
EOSINOPHIL NFR BLD AUTO: 4.2 % — SIGNIFICANT CHANGE UP (ref 0–6)
ERYTHROCYTE [SEDIMENTATION RATE] IN BLOOD: 110 MM/HR — HIGH (ref 0–20)
FERRITIN SERPL-MCNC: 513 NG/ML — HIGH (ref 15–150)
GLUCOSE BLDC GLUCOMTR-MCNC: 146 MG/DL — HIGH (ref 70–99)
GLUCOSE BLDC GLUCOMTR-MCNC: 157 MG/DL — HIGH (ref 70–99)
GLUCOSE BLDC GLUCOMTR-MCNC: 187 MG/DL — HIGH (ref 70–99)
GLUCOSE BLDC GLUCOMTR-MCNC: 234 MG/DL — HIGH (ref 70–99)
GLUCOSE SERPL-MCNC: 249 MG/DL — HIGH (ref 70–99)
HCO3 BLDA-SCNC: 25 MMOL/L — SIGNIFICANT CHANGE UP (ref 21–29)
HCT VFR BLD CALC: 31.6 % — LOW (ref 34.5–45)
HGB BLD-MCNC: 10.1 G/DL — LOW (ref 11.5–15.5)
HOROWITZ INDEX BLDA+IHG-RTO: 55 — SIGNIFICANT CHANGE UP
IMM GRANULOCYTES NFR BLD AUTO: 0.4 % — SIGNIFICANT CHANGE UP (ref 0–1.5)
INR BLD: 1.15 RATIO — SIGNIFICANT CHANGE UP (ref 0.88–1.16)
LYMPHOCYTES # BLD AUTO: 1.03 K/UL — SIGNIFICANT CHANGE UP (ref 1–3.3)
LYMPHOCYTES # BLD AUTO: 9.8 % — LOW (ref 13–44)
MAGNESIUM SERPL-MCNC: 2.1 MG/DL — SIGNIFICANT CHANGE UP (ref 1.6–2.6)
MCHC RBC-ENTMCNC: 28.5 PG — SIGNIFICANT CHANGE UP (ref 27–34)
MCHC RBC-ENTMCNC: 32 GM/DL — SIGNIFICANT CHANGE UP (ref 32–36)
MCV RBC AUTO: 89.3 FL — SIGNIFICANT CHANGE UP (ref 80–100)
MONOCYTES # BLD AUTO: 0.72 K/UL — SIGNIFICANT CHANGE UP (ref 0–0.9)
MONOCYTES NFR BLD AUTO: 6.9 % — SIGNIFICANT CHANGE UP (ref 2–14)
NEUTROPHILS # BLD AUTO: 8.26 K/UL — HIGH (ref 1.8–7.4)
NEUTROPHILS NFR BLD AUTO: 78.6 % — HIGH (ref 43–77)
NRBC # BLD: 0 /100 WBCS — SIGNIFICANT CHANGE UP (ref 0–0)
PCO2 BLDA: 41 MMHG — SIGNIFICANT CHANGE UP (ref 32–46)
PH BLD: 7.4 — SIGNIFICANT CHANGE UP (ref 7.35–7.45)
PHOSPHATE SERPL-MCNC: 2.8 MG/DL — SIGNIFICANT CHANGE UP (ref 2.5–4.5)
PLATELET # BLD AUTO: 375 K/UL — SIGNIFICANT CHANGE UP (ref 150–400)
PO2 BLDA: 64 MMHG — LOW (ref 74–108)
POTASSIUM SERPL-MCNC: 3.1 MMOL/L — LOW (ref 3.5–5.3)
POTASSIUM SERPL-SCNC: 3.1 MMOL/L — LOW (ref 3.5–5.3)
PROCALCITONIN SERPL-MCNC: 1.31 NG/ML — HIGH (ref 0.02–0.1)
PROT SERPL-MCNC: 7 G/DL — SIGNIFICANT CHANGE UP (ref 6–8.3)
PROTHROM AB SERPL-ACNC: 12.8 SEC — SIGNIFICANT CHANGE UP (ref 10–12.9)
RBC # BLD: 3.54 M/UL — LOW (ref 3.8–5.2)
RBC # FLD: 15.3 % — HIGH (ref 10.3–14.5)
SAO2 % BLDA: 92 % — SIGNIFICANT CHANGE UP (ref 92–96)
SODIUM SERPL-SCNC: 146 MMOL/L — HIGH (ref 135–145)
SPECIMEN SOURCE: SIGNIFICANT CHANGE UP
SPECIMEN SOURCE: SIGNIFICANT CHANGE UP
TROPONIN I SERPL-MCNC: 0.63 NG/ML — HIGH (ref 0.02–0.06)
WBC # BLD: 10.5 K/UL — SIGNIFICANT CHANGE UP (ref 3.8–10.5)
WBC # FLD AUTO: 10.5 K/UL — SIGNIFICANT CHANGE UP (ref 3.8–10.5)

## 2020-04-09 PROCEDURE — 99233 SBSQ HOSP IP/OBS HIGH 50: CPT

## 2020-04-09 RX ORDER — POTASSIUM CHLORIDE 20 MEQ
10 PACKET (EA) ORAL
Refills: 0 | Status: COMPLETED | OUTPATIENT
Start: 2020-04-09 | End: 2020-04-09

## 2020-04-09 RX ORDER — POTASSIUM CHLORIDE 20 MEQ
40 PACKET (EA) ORAL EVERY 4 HOURS
Refills: 0 | Status: DISCONTINUED | OUTPATIENT
Start: 2020-04-09 | End: 2020-04-09

## 2020-04-09 RX ORDER — INSULIN GLARGINE 100 [IU]/ML
5 INJECTION, SOLUTION SUBCUTANEOUS EVERY MORNING
Refills: 0 | Status: DISCONTINUED | OUTPATIENT
Start: 2020-04-09 | End: 2020-04-09

## 2020-04-09 RX ORDER — AMLODIPINE BESYLATE 2.5 MG/1
2.5 TABLET ORAL DAILY
Refills: 0 | Status: DISCONTINUED | OUTPATIENT
Start: 2020-04-09 | End: 2020-04-11

## 2020-04-09 RX ORDER — ACETAZOLAMIDE 250 MG/1
250 TABLET ORAL ONCE
Refills: 0 | Status: COMPLETED | OUTPATIENT
Start: 2020-04-09 | End: 2020-04-09

## 2020-04-09 RX ORDER — POTASSIUM CHLORIDE 20 MEQ
40 PACKET (EA) ORAL ONCE
Refills: 0 | Status: COMPLETED | OUTPATIENT
Start: 2020-04-09 | End: 2020-04-09

## 2020-04-09 RX ORDER — METOPROLOL TARTRATE 50 MG
5 TABLET ORAL ONCE
Refills: 0 | Status: COMPLETED | OUTPATIENT
Start: 2020-04-09 | End: 2020-04-09

## 2020-04-09 RX ADMIN — SIMVASTATIN 20 MILLIGRAM(S): 20 TABLET, FILM COATED ORAL at 23:22

## 2020-04-09 RX ADMIN — Medication 25 MILLIGRAM(S): at 21:21

## 2020-04-09 RX ADMIN — HEPARIN SODIUM 5000 UNIT(S): 5000 INJECTION INTRAVENOUS; SUBCUTANEOUS at 21:22

## 2020-04-09 RX ADMIN — CHLORHEXIDINE GLUCONATE 1 APPLICATION(S): 213 SOLUTION TOPICAL at 05:25

## 2020-04-09 RX ADMIN — INSULIN GLARGINE 28 UNIT(S): 100 INJECTION, SOLUTION SUBCUTANEOUS at 23:21

## 2020-04-09 RX ADMIN — Medication 2: at 23:21

## 2020-04-09 RX ADMIN — Medication 12.5 MICROGRAM(S): at 21:37

## 2020-04-09 RX ADMIN — Medication 1 DROP(S): at 11:20

## 2020-04-09 RX ADMIN — Medication 25 MILLIGRAM(S): at 11:21

## 2020-04-09 RX ADMIN — ACETAZOLAMIDE 105 MILLIGRAM(S): 250 TABLET ORAL at 09:02

## 2020-04-09 RX ADMIN — Medication 5 MILLIGRAM(S): at 15:39

## 2020-04-09 RX ADMIN — DEXMEDETOMIDINE HYDROCHLORIDE IN 0.9% SODIUM CHLORIDE 15.9 MICROGRAM(S)/KG/HR: 4 INJECTION INTRAVENOUS at 20:38

## 2020-04-09 RX ADMIN — HEPARIN SODIUM 5000 UNIT(S): 5000 INJECTION INTRAVENOUS; SUBCUTANEOUS at 05:26

## 2020-04-09 RX ADMIN — Medication 2: at 11:18

## 2020-04-09 RX ADMIN — HEPARIN SODIUM 5000 UNIT(S): 5000 INJECTION INTRAVENOUS; SUBCUTANEOUS at 11:19

## 2020-04-09 RX ADMIN — LATANOPROST 1 DROP(S): 0.05 SOLUTION/ DROPS OPHTHALMIC; TOPICAL at 21:21

## 2020-04-09 RX ADMIN — CHLORHEXIDINE GLUCONATE 15 MILLILITER(S): 213 SOLUTION TOPICAL at 05:26

## 2020-04-09 RX ADMIN — PANTOPRAZOLE SODIUM 40 MILLIGRAM(S): 20 TABLET, DELAYED RELEASE ORAL at 11:19

## 2020-04-09 RX ADMIN — CHLORHEXIDINE GLUCONATE 15 MILLILITER(S): 213 SOLUTION TOPICAL at 17:39

## 2020-04-09 RX ADMIN — AMLODIPINE BESYLATE 2.5 MILLIGRAM(S): 2.5 TABLET ORAL at 11:30

## 2020-04-09 RX ADMIN — Medication 100 MILLIEQUIVALENT(S): at 19:37

## 2020-04-09 RX ADMIN — Medication 25 MILLIGRAM(S): at 05:26

## 2020-04-09 RX ADMIN — BRIMONIDINE TARTRATE 1 DROP(S): 2 SOLUTION/ DROPS OPHTHALMIC at 11:19

## 2020-04-09 RX ADMIN — Medication 4: at 05:26

## 2020-04-09 RX ADMIN — Medication 40 MILLIEQUIVALENT(S): at 10:53

## 2020-04-09 RX ADMIN — Medication 100 MILLIEQUIVALENT(S): at 18:29

## 2020-04-09 RX ADMIN — Medication 100 MILLIEQUIVALENT(S): at 20:38

## 2020-04-09 RX ADMIN — DEXMEDETOMIDINE HYDROCHLORIDE IN 0.9% SODIUM CHLORIDE 15.9 MICROGRAM(S)/KG/HR: 4 INJECTION INTRAVENOUS at 23:40

## 2020-04-09 NOTE — PROGRESS NOTE ADULT - SUBJECTIVE AND OBJECTIVE BOX
Patient is a 74y old  Female who presents with a chief complaint of shob (2020 18:25)      BRIEF HOSPITAL COURSE: 74y Female  ***    Events last 24 hours: ***    PAST MEDICAL & SURGICAL HISTORY:  DJD (degenerative joint disease)  DM (diabetes mellitus)  Hyperlipidemia  Hypothyroid  HTN (hypertension)  History of vitamin D deficiency  B12 deficiency  Bronchitis  Anxiety  S/P cataract surgery  S/P eye surgery: left eye retinal surgery x2  S/P  section: x2        Hosp day #14d    Vent day #  Mode: AC/ CMV (Assist Control/ Continuous Mandatory Ventilation)  RR (machine): 20  TV (machine): 400  FiO2: 50  PEEP: 5  ITime: 1  MAP: 12  PIP: 27        Vital signs / Reviewed and Physical Exam Performed where pertinent and urgently required    Lab / Radiology  studies / ABG / Meds -  reviewed and interpreted into the assessment and treatment plan.      Assessment/Plan/Therapeutic interventions      Neuro - Sedation neuromuscular blockade to facilitate safe ventilation    CV -  Pressor support as needed to maintain MAP 65           Avoiding fluid challenges          QTC monitoring while on Azithromycin and Hydroxychloroquine.    Pulm -  ARDS-NET 4-6cc/kg IBW TV as able to maintain plateau pressures <30               Prone ventilation consideration as feasible  Pa02/Fi02 < 150 on Fi02 >60% and PEEP at least 5                 Vent bundle Reviewed     GI -  PPI  Enteric feeds as tolerated in tandem with NMB and prone ventilation    Renal - Even to negative fluid balance as tolerated by hemodynamics and renal fx.  Feeds to be provided in lieu of IVF.     Heme -  Pharmacologic DVT PPx  in addition to SCD's    ID - ABX discontinuation based on discussion with ID in conjunction with clinical features, culture data, and judicious procalcitonin monitoring.      Endo -  Aggressive glycemic control to limit FS glucose to < 180mg/dl.      COVID 19 specific considerations and therapeutic  options based on the available and rapidly changing literature    Goals of care considerations:  Ongoing assessment for patient specific treatment options based on progression or decline.  I have involved the family with updates and requests in guidance for medical decision making.          38  Minutes of critical care tiem spent in the management of this critically ill COVID-19 patient/PUI patient with continuous assessments and interventions based on the interpretation of multiple databases. Patient is a 74y old  Female who presents with a chief complaint of shob (2020 18:25)      BRIEF HOSPITAL COURSE: 74y Female pmhx DM, HTN, HLD, Hypothyroid admitted with acute hypoxic respiratory failure and ARDS from COVID 19+ pneumonia. Course complicated by acute renal failure, pressor dependent shock, and Hypernatremia      PAST MEDICAL & SURGICAL HISTORY:  DJD (degenerative joint disease)  DM (diabetes mellitus)  Hyperlipidemia  Hypothyroid  HTN (hypertension)  History of vitamin D deficiency  B12 deficiency  Bronchitis  Anxiety  S/P cataract surgery  S/P eye surgery: left eye retinal surgery x2  S/P  section: x2        Hosp day #14d    Vent day #  Mode: AC/ CMV (Assist Control/ Continuous Mandatory Ventilation)  RR (machine): 20  TV (machine): 400  FiO2: 50  PEEP: 5  ITime: 1  MAP: 12  PIP: 27        Vital signs / Reviewed and Physical Exam Performed where pertinent and urgently required    Lab / Radiology  studies / ABG / Meds -  reviewed and interpreted into the assessment and treatment plan.      Assessment/Plan/Therapeutic interventions      Neuro - Precedex and Fentanyl gtt for Sedation neuromuscular to facilitate safe ventilation.     CV -  Levophed gtt needed to maintain MAP 65           Avoiding fluid challenges          QTC monitoring while on Azithromycin and Hydroxychloroquine.    Pulm -  FiO2 at 50% and PEEP +5. Consider weaning trial in AM. ARDS-NET 4-6cc/kg IBW TV as able to maintain plateau pressures <30               Prone ventilation consideration as feasible  Pa02/Fi02 < 150 on Fi02 >60% and PEEP at least 5                 Vent bundle Reviewed     GI -  PPI  Enteric feeds as tolerated in tandem with NMB and prone ventilation    Renal - Acute renal failure. +1.3L, will d/c IVF. Even to negative fluid balance as tolerated by hemodynamics and renal fx.  Feeds to be provided in lieu of IVF. Hypernatremia; free water via NGT     Heme -  Pharmacologic DVT PPx  in addition to SCD's    ID - COVID 19+ s/p Hydroxychloroquine.  ABX discontinuation based on discussion with ID in conjunction with clinical features, culture data, and judicious procalcitonin monitoring.      Endo -  Aggressive glycemic control to limit FS glucose to < 180mg/dl.      COVID 19 specific considerations and therapeutic  options based on the available and rapidly changing literature    Goals of care considerations:  Ongoing assessment for patient specific treatment options based on progression or decline.  I have involved the family with updates and requests in guidance for medical decision making.          38  Minutes of critical care tiem spent in the management of this critically ill COVID-19 patient/PUI patient with continuous assessments and interventions based on the interpretation of multiple databases.

## 2020-04-09 NOTE — PROGRESS NOTE ADULT - SUBJECTIVE AND OBJECTIVE BOX
Chief Complaint: Fevers, shortness of breath    Interval Events: No events overnight.    Review of Systems:  Unable to obtain    Physical Exam:  Vital Signs Last 24 Hrs  T(C): 37.1 (08 Apr 2020 08:28), Max: 37.4 (08 Apr 2020 04:00)  T(F): 98.8 (08 Apr 2020 08:28), Max: 99.4 (08 Apr 2020 04:00)  HR: 67 (08 Apr 2020 08:28) (66 - 109)  BP: 130/57 (08 Apr 2020 08:28) (130/57 - 169/52)  BP(mean): 74 (08 Apr 2020 08:28) (71 - 99)  RR: 20 (08 Apr 2020 08:28) (14 - 22)  SpO2: 97% (08 Apr 2020 08:28) (86% - 97%)  General: Sedated  HEENT: Intubated  Neck: No JVD, no carotid bruit  Extremities: No LE edema, no cyanosis  Neuro: Non-focal  Skin: No rash    Labs:    04-09    146<H>  |  110<H>  |  x   ----------------------------<  x   3.1<L>   |  x   |  x     Ca    9.1      08 Apr 2020 08:44  Phos  2.8     04-09  Mg     2.1     04-09    TPro  7.0  /  Alb  2.2<L>  /  TBili  0.2  /  DBili  x   /  AST  16  /  ALT  29  /  AlkPhos  68  04-09                        10.1   10.50 )-----------( 375      ( 09 Apr 2020 07:09 )             31.6       Telemetry: Sinus rhythm, atrial runs

## 2020-04-09 NOTE — PROGRESS NOTE ADULT - SUBJECTIVE AND OBJECTIVE BOX
ADELIA BALDWIN is a 74yFemale , patient examined and chart reviewed.     INTERVAL HPI/ OVERNIGHT EVENTS:  Vented sedated   Afebrile.     Past Medical History--  PAST MEDICAL & SURGICAL HISTORY:  DJD (degenerative joint disease)  DM (diabetes mellitus)  Hyperlipidemia  Hypothyroid  HTN (hypertension)  History of vitamin D deficiency  B12 deficiency  Bronchitis  Anxiety  S/P cataract surgery  S/P eye surgery: left eye retinal surgery x2  S/P  section: x2      For details regarding the patient's social history, family history, and other miscellaneous elements, please refer the initial infectious diseases consultation and/or the admitting history and physical examination for this admission.      ROS:  Unable to obtain due to : pt's condition      Current inpatient medications :  MEDICATIONS  (STANDING):  amLODIPine   Tablet 2.5 milliGRAM(s) Oral daily  brimonidine 0.2% Ophthalmic Solution 1 Drop(s) Left EYE daily  chlorhexidine 0.12% Liquid 15 milliLiter(s) Oral Mucosa every 12 hours  chlorhexidine 2% Cloths 1 Application(s) Topical <User Schedule>  dexMEDEtomidine Infusion 0.7 MICROgram(s)/kG/Hr (15.9 mL/Hr) IV Continuous <Continuous>  dextrose 5%. 1000 milliLiter(s) (50 mL/Hr) IV Continuous <Continuous>  dextrose 50% Injectable 12.5 Gram(s) IV Push once  dextrose 50% Injectable 25 Gram(s) IV Push once  dextrose 50% Injectable 25 Gram(s) IV Push once  fentaNYL   Infusion. 0.5 MICROgram(s)/kG/Hr (4.54 mL/Hr) IV Continuous <Continuous>  heparin  Injectable 5000 Unit(s) SubCutaneous every 8 hours  insulin glargine Injectable (LANTUS) 28 Unit(s) SubCutaneous at bedtime  insulin lispro (HumaLOG) corrective regimen sliding scale   SubCutaneous every 6 hours  latanoprost 0.005% Ophthalmic Solution 1 Drop(s) Both EYES at bedtime  levothyroxine Injectable 12.5 MICROGram(s) IV Push at bedtime  metoprolol tartrate 25 milliGRAM(s) Oral every 8 hours  pantoprazole  Injectable 40 milliGRAM(s) IV Push daily  simvastatin 20 milliGRAM(s) Oral at bedtime  sodium chloride 0.45%. 1000 milliLiter(s) (50 mL/Hr) IV Continuous <Continuous>  timolol 0.5% Solution 1 Drop(s) Left EYE daily    MEDICATIONS  (PRN):  acetaminophen   Tablet .. 650 milliGRAM(s) Oral every 6 hours PRN Temp greater or equal to 38C (100.4F), Mild Pain (1 - 3)  dextrose 40% Gel 15 Gram(s) Oral once PRN Blood Glucose LESS THAN 70 milliGRAM(s)/deciliter  glucagon  Injectable 1 milliGRAM(s) IntraMuscular once PRN Glucose LESS THAN 70 milligrams/deciliter  midazolam Injectable 2 milliGRAM(s) IV Push every 2 hours PRN Agitation  sodium chloride 0.9% lock flush 10 milliLiter(s) IV Push every 1 hour PRN Pre/post blood products, medications, blood draw, and to maintain line patency    Objective:  ICU Vital Signs Last 24 Hrs  T(C): 36.8 (2020 12:00), Max: 37.2 (2020 08:00)  T(F): 98.2 (2020 12:00), Max: 99 (2020 08:00)  HR: 74 (2020 12:00) (56 - 94)  BP: 168/58 (2020 12:00) (125/43 - 173/52)  BP(mean): 87 (2020 12:00) (63 - 91)  RR: 20 (2020 12:00) (18 - 22)  SpO2: 95% (2020 12:00) (91% - 97%)    Mode: AC/ CMV (Assist Control/ Continuous Mandatory Ventilation)  RR (machine): 120  TV (machine): 400  FiO2: 50  PEEP: 5  ITime: 1  MAP: 10  PIP: 20    Physical Exam:  GEN: Intubated sedated  HEENT: normocephalic and atraumatic. EOMI. OLGA. Moist mucosa. Clear Posterior pharynx.  NECK: Supple. No carotid bruits.  No lymphadenopathy or thyromegaly.  LUNGS: Decreased to auscultation.  HEART: Regular rate and rhythm without murmur.  ABDOMEN: Soft, nontender, and nondistended.  Positive bowel sounds.   EXTREMITIES: +edema.  NEUROLOGIC: Sedated   SKIN: No ulceration or induration present.      LABS:                        10.1   10.50 )-----------( 375      ( 2020 07:09 )             31.6   04-09    146<H>  |  110<H>  |  87<H>  ----------------------------<  249<H>  3.1<L>   |  22  |  2.68<H>    Ca    9.3      2020 07:09  Phos  2.8       Mg     2.1         TPro  7.0  /  Alb  2.2<L>  /  TBili  0.2  /  DBili  x   /  AST  16  /  ALT  29  /  AlkPhos  68      MICROBIOLOGY:    Culture - Blood (collected 2020 12:56)  Source: .Blood Blood-Peripheral  Preliminary Report (2020 13:01):    No growth to date.    Culture - Blood (collected 2020 12:56)  Source: .Blood Blood-Peripheral  Preliminary Report (2020 13:01):    No growth to date.    Clostridium difficile Toxin by PCR (20 @ 11:10)    C Diff by PCR Result: NotDete      COVID-19 PCR . (20 @ 22:21)    COVID-19 PCR: Detected: All “detected” results on this new test are considered presumptively  positive results, are clinically actionable, and specimens will be  forwarded to [facility] for confirmation testing.  Another report (corrected report) will only be issued if discordant  results occur.  This test has been validated by [employer] to be accurate;  though it has not been FDA cleared/approved by the usual pathway.  As with all laboratory tests, results should be correlated with clinical  findings.      RADIOLOGY & ADDITIONAL STUDIES:    EXAM:  XR KUB 1 VIEW                                  PROCEDURE DATE:  2020          INTERPRETATION:  EXAM:XR ABDOMEN KUB.     CLINICAL INDICATION: No Predefined Suggestions .     TECHNIQUE: Portable supine AP views of the chest and abdomen.     PRIOR EXAM: Chest x-ray dated 2020.     FINDINGS:      No evidence of bowel obstruction or perforation. 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    Improved pulmonary opacities bilaterally. NG tube extends into the proximal stomach. An endotracheal tube and a central line remain in stable position.      IMPRESSION:     No abnormal bowel distention.    Improving pulmonary opacities.    NG tube extending into the proximal stomach.      Assessment :  73 y/o F with hx of DM, HTN, HLD, hypothyroid, anxiety presented admitted with severe sepsis with septic shock and acute hypoxic respiratory failure with MSOF sec COVID 19 pneumonia. Sp Cardiorespiratory arrest.  ALIS stable. Anemic. Procalcitonin markedly elevated- concern for superimposed bacterial process sp course of Zosyn. Off pressors. Remains vented.     Plan :   Completed Hydroxychloroquine x 5 days  Supportive care  Sp Zosyn stopped 2020  Vent per pulm ICU  Trend temps  Prognosis guarded  DNR  COVID-19---high risk period for decompensation  (7-14 days post symptom onset), avoid aerosolizing procedures, NSAIDs   -avoid excessive blood draws and frequent CXRs  -daily CBC w diff to follow eos, lymphocytes and neutrophils and daily NLR calculation (NLR <3 low vs >5 high)  -Other labs that may be selectively used to risk stratify and predict clinical course, ie: Ferritin (lower risk <450 vs >850) CRP (low risk <2 and higher risk >6) and LDH, D Dimer  -antibiotics only if there is concern for a bacterial process      D/w ICU team      Continue with present regiment.  Appropriate use of antibiotics and adverse effects reviewed.    I have discussed the above plan of care with patient/ family in detail. They expressed understanding of the the treatment plan . Risks, benefits and alternatives discussed in detail. I have asked if they have any questions or concerns and appropriately addressed them to the best of my ability .      Critical care time greater then 35 minutes reviewing notes, labs data/ imaging , discussion with multidisciplinary team.    Thank you for allowing me to participate in care of your patient .        Eder Brown MD  Infectious Disease  [phone]

## 2020-04-09 NOTE — PROGRESS NOTE ADULT - SUBJECTIVE AND OBJECTIVE BOX
Patient is a 74y old  Female who presents with a chief complaint of SOB (31 Mar 2020 05:05)  Patient seen in follow up for KALE SNELL.      Chart reviewed.    PAST MEDICAL HISTORY:  DJD (degenerative joint disease)  DM (diabetes mellitus)  Hyperlipidemia  Hypothyroid  HTN (hypertension)  History of vitamin D deficiency  B12 deficiency  Bronchitis  Anxiety       MEDICATIONS  (STANDING):  amLODIPine   Tablet 2.5 milliGRAM(s) Oral daily  brimonidine 0.2% Ophthalmic Solution 1 Drop(s) Left EYE daily  chlorhexidine 0.12% Liquid 15 milliLiter(s) Oral Mucosa every 12 hours  chlorhexidine 2% Cloths 1 Application(s) Topical <User Schedule>  dexMEDEtomidine Infusion 0.7 MICROgram(s)/kG/Hr (15.9 mL/Hr) IV Continuous <Continuous>  dextrose 5%. 1000 milliLiter(s) (50 mL/Hr) IV Continuous <Continuous>  dextrose 50% Injectable 12.5 Gram(s) IV Push once  dextrose 50% Injectable 25 Gram(s) IV Push once  dextrose 50% Injectable 25 Gram(s) IV Push once  fentaNYL   Infusion. 0.5 MICROgram(s)/kG/Hr (4.54 mL/Hr) IV Continuous <Continuous>  heparin  Injectable 5000 Unit(s) SubCutaneous every 8 hours  insulin glargine Injectable (LANTUS) 28 Unit(s) SubCutaneous at bedtime  insulin lispro (HumaLOG) corrective regimen sliding scale   SubCutaneous every 6 hours  latanoprost 0.005% Ophthalmic Solution 1 Drop(s) Both EYES at bedtime  levothyroxine Injectable 12.5 MICROGram(s) IV Push at bedtime  metoprolol tartrate 25 milliGRAM(s) Oral every 8 hours  pantoprazole  Injectable 40 milliGRAM(s) IV Push daily  simvastatin 20 milliGRAM(s) Oral at bedtime  sodium chloride 0.45%. 1000 milliLiter(s) (50 mL/Hr) IV Continuous <Continuous>  timolol 0.5% Solution 1 Drop(s) Left EYE daily    MEDICATIONS  (PRN):  acetaminophen   Tablet .. 650 milliGRAM(s) Oral every 6 hours PRN Temp greater or equal to 38C (100.4F), Mild Pain (1 - 3)  dextrose 40% Gel 15 Gram(s) Oral once PRN Blood Glucose LESS THAN 70 milliGRAM(s)/deciliter  glucagon  Injectable 1 milliGRAM(s) IntraMuscular once PRN Glucose LESS THAN 70 milligrams/deciliter  midazolam Injectable 2 milliGRAM(s) IV Push every 2 hours PRN Agitation  sodium chloride 0.9% lock flush 10 milliLiter(s) IV Push every 1 hour PRN Pre/post blood products, medications, blood draw, and to maintain line patency    T(C): 36.8 (04-09-20 @ 12:00), Max: 37.7 (04-08-20 @ 11:26)  HR: 74 (04-09-20 @ 12:00) (56 - 109)  BP: 168/58 (04-09-20 @ 12:00) (125/43 - 173/52)  RR: 20 (04-09-20 @ 12:00)  SpO2: 95% (04-09-20 @ 12:00)  Wt(kg): --  I&O's Detail    08 Apr 2020 07:01  -  09 Apr 2020 07:00  --------------------------------------------------------  IN:    dexmedetomidine Infusion: 284 mL    fentaNYL Infusion.: 280 mL    Free Water: 390 mL    Glucerna 1.5: 1080 mL    sodium chloride 0.45%.: 550 mL  Total IN: 2584 mL    OUT:    Indwelling Catheter - Urethral: 2015 mL  Total OUT: 2015 mL    Total NET: 569 mL      09 Apr 2020 07:01  -  09 Apr 2020 14:17  --------------------------------------------------------  IN:    dexmedetomidine Infusion: 86.2 mL    fentaNYL Infusion.: 72.6 mL    Free Water: 90 mL    Glucerna 1.5: 270 mL    sodium chloride 0.45%.: 300 mL    Solution: 50 mL  Total IN: 868.8 mL    OUT:  Total OUT: 0 mL    Total NET: 868.8 mL            PHYSICAL EXAM:  Pt on COVID precautions. Chart reviewed and previous physical examination noted.                          LABORATORY:                        10.1   10.50 )-----------( 375      ( 09 Apr 2020 07:09 )             31.6     04-09    146<H>  |  110<H>  |  87<H>  ----------------------------<  249<H>  3.1<L>   |  22  |  2.68<H>    Ca    9.3      09 Apr 2020 07:09  Phos  2.8     04-09  Mg     2.1     04-09    TPro  7.0  /  Alb  2.2<L>  /  TBili  0.2  /  DBili  x   /  AST  16  /  ALT  29  /  AlkPhos  68  04-09    Sodium, Serum: 146 mmol/L (04-09 @ 07:09)  Sodium, Serum: 140 mmol/L (04-08 @ 08:44)    Potassium, Serum: 3.1 mmol/L (04-09 @ 07:09)  Potassium, Serum: 3.5 mmol/L (04-08 @ 08:44)    Hemoglobin: 10.1 g/dL (04-09 @ 07:09)  Hemoglobin: 9.7 g/dL (04-08 @ 07:24)  Hemoglobin: 9.6 g/dL (04-07 @ 07:43)    Creatinine, Serum 2.68 (04-09 @ 07:09)  Creatinine, Serum 3.31 (04-08 @ 08:44)  Creatinine, Serum 3.82 (04-07 @ 07:43)    CARDIAC MARKERS ( 09 Apr 2020 11:27 )  .629 ng/mL / x     / x     / x     / x      CARDIAC MARKERS ( 08 Apr 2020 12:16 )  .787 ng/mL / x     / x     / x     / x          LIVER FUNCTIONS - ( 09 Apr 2020 07:09 )  Alb: 2.2 g/dL / Pro: 7.0 g/dL / ALK PHOS: 68 U/L / ALT: 29 U/L DA / AST: 16 U/L / GGT: x             ABG - ( 09 Apr 2020 05:41 )  pH, Arterial: x     pH, Blood: 7.40  /  pCO2: 41    /  pO2: 64    / HCO3: 25    / Base Excess: 1.0   /  SaO2: 92

## 2020-04-09 NOTE — PROGRESS NOTE ADULT - SUBJECTIVE AND OBJECTIVE BOX
Patient seen and examined at bedside. chart notes reviewed    Review of Systems:  unattainable    Objective:  Vitals  T(C): 36.8 (04-09-20 @ 12:00), Max: 37.2 (04-09-20 @ 08:00)  HR: 62 (04-09-20 @ 14:00) (56 - 94)  BP: 115/46 (04-09-20 @ 14:00) (115/46 - 173/52)  RR: 19 (04-09-20 @ 14:00) (18 - 22)  SpO2: 97% (04-09-20 @ 14:00) (91% - 97%)    Physical Exam:  General: comfortable, no acute distress, sedated, RASS 0  HEENT: Atraumatic, no LAD, trachea midline, PERRLA  Cardiovascular: normal s1s2,   Pulmonary: very diminished , no wheezing , rhonchi  Gastrointestinal: soft non tender non distended, no masses felt, no organomegally  Muscloskeletal: no lower extremity edema,   Neurological: no focal defects  Psychiatrical: sedated  SKIN: no rash, lesions or ulcers    ~  Labs:                          10.1   10.50 )-----------( 375      ( 09 Apr 2020 07:09 )             31.6     04-09    146<H>  |  110<H>  |  87<H>  ----------------------------<  249<H>  3.1<L>   |  22  |  2.68<H>    Ca    9.3      09 Apr 2020 07:09  Phos  2.8     04-09  Mg     2.1     04-09    TPro  7.0  /  Alb  2.2<L>  /  TBili  0.2  /  DBili  x   /  AST  16  /  ALT  29  /  AlkPhos  68  04-09    LIVER FUNCTIONS - ( 09 Apr 2020 07:09 )  Alb: 2.2 g/dL / Pro: 7.0 g/dL / ALK PHOS: 68 U/L / ALT: 29 U/L DA / AST: 16 U/L / GGT: x           PT/INR - ( 09 Apr 2020 07:09 )   PT: 12.8 sec;   INR: 1.15 ratio         PTT - ( 09 Apr 2020 07:09 )  PTT:30.8 sec      Active Medications  MEDICATIONS  (STANDING):  amLODIPine   Tablet 2.5 milliGRAM(s) Oral daily  brimonidine 0.2% Ophthalmic Solution 1 Drop(s) Left EYE daily  chlorhexidine 0.12% Liquid 15 milliLiter(s) Oral Mucosa every 12 hours  chlorhexidine 2% Cloths 1 Application(s) Topical <User Schedule>  dexMEDEtomidine Infusion 0.7 MICROgram(s)/kG/Hr (15.9 mL/Hr) IV Continuous <Continuous>  dextrose 5%. 1000 milliLiter(s) (50 mL/Hr) IV Continuous <Continuous>  dextrose 50% Injectable 12.5 Gram(s) IV Push once  dextrose 50% Injectable 25 Gram(s) IV Push once  dextrose 50% Injectable 25 Gram(s) IV Push once  fentaNYL   Infusion. 0.5 MICROgram(s)/kG/Hr (4.54 mL/Hr) IV Continuous <Continuous>  heparin  Injectable 5000 Unit(s) SubCutaneous every 8 hours  insulin glargine Injectable (LANTUS) 28 Unit(s) SubCutaneous at bedtime  insulin lispro (HumaLOG) corrective regimen sliding scale   SubCutaneous every 6 hours  latanoprost 0.005% Ophthalmic Solution 1 Drop(s) Both EYES at bedtime  levothyroxine Injectable 12.5 MICROGram(s) IV Push at bedtime  metoprolol tartrate 25 milliGRAM(s) Oral every 8 hours  pantoprazole  Injectable 40 milliGRAM(s) IV Push daily  potassium chloride    Tablet ER 40 milliEquivalent(s) Oral every 4 hours  simvastatin 20 milliGRAM(s) Oral at bedtime  sodium chloride 0.45%. 1000 milliLiter(s) (50 mL/Hr) IV Continuous <Continuous>  timolol 0.5% Solution 1 Drop(s) Left EYE daily    MEDICATIONS  (PRN):  acetaminophen   Tablet .. 650 milliGRAM(s) Oral every 6 hours PRN Temp greater or equal to 38C (100.4F), Mild Pain (1 - 3)  dextrose 40% Gel 15 Gram(s) Oral once PRN Blood Glucose LESS THAN 70 milliGRAM(s)/deciliter  glucagon  Injectable 1 milliGRAM(s) IntraMuscular once PRN Glucose LESS THAN 70 milligrams/deciliter  midazolam Injectable 2 milliGRAM(s) IV Push every 2 hours PRN Agitation  sodium chloride 0.9% lock flush 10 milliLiter(s) IV Push every 1 hour PRN Pre/post blood products, medications, blood draw, and to maintain line patency

## 2020-04-09 NOTE — PROGRESS NOTE ADULT - SUBJECTIVE AND OBJECTIVE BOX
INTERVAL HPI/OVERNIGHT EVENTS:  chart reviewed    MEDICATIONS  (STANDING):  amLODIPine   Tablet 2.5 milliGRAM(s) Oral daily  brimonidine 0.2% Ophthalmic Solution 1 Drop(s) Left EYE daily  chlorhexidine 0.12% Liquid 15 milliLiter(s) Oral Mucosa every 12 hours  chlorhexidine 2% Cloths 1 Application(s) Topical <User Schedule>  dexMEDEtomidine Infusion 0.7 MICROgram(s)/kG/Hr (15.9 mL/Hr) IV Continuous <Continuous>  dextrose 5%. 1000 milliLiter(s) (50 mL/Hr) IV Continuous <Continuous>  dextrose 50% Injectable 12.5 Gram(s) IV Push once  dextrose 50% Injectable 25 Gram(s) IV Push once  dextrose 50% Injectable 25 Gram(s) IV Push once  fentaNYL   Infusion. 0.5 MICROgram(s)/kG/Hr (4.54 mL/Hr) IV Continuous <Continuous>  heparin  Injectable 5000 Unit(s) SubCutaneous every 8 hours  insulin glargine Injectable (LANTUS) 28 Unit(s) SubCutaneous at bedtime  insulin lispro (HumaLOG) corrective regimen sliding scale   SubCutaneous every 6 hours  latanoprost 0.005% Ophthalmic Solution 1 Drop(s) Both EYES at bedtime  levothyroxine Injectable 12.5 MICROGram(s) IV Push at bedtime  metoprolol tartrate 25 milliGRAM(s) Oral every 8 hours  pantoprazole  Injectable 40 milliGRAM(s) IV Push daily  potassium chloride  10 mEq/100 mL IVPB 10 milliEquivalent(s) IV Intermittent every 1 hour  simvastatin 20 milliGRAM(s) Oral at bedtime  sodium chloride 0.45%. 1000 milliLiter(s) (50 mL/Hr) IV Continuous <Continuous>  timolol 0.5% Solution 1 Drop(s) Left EYE daily    MEDICATIONS  (PRN):  acetaminophen   Tablet .. 650 milliGRAM(s) Oral every 6 hours PRN Temp greater or equal to 38C (100.4F), Mild Pain (1 - 3)  dextrose 40% Gel 15 Gram(s) Oral once PRN Blood Glucose LESS THAN 70 milliGRAM(s)/deciliter  glucagon  Injectable 1 milliGRAM(s) IntraMuscular once PRN Glucose LESS THAN 70 milligrams/deciliter  midazolam Injectable 2 milliGRAM(s) IV Push every 2 hours PRN Agitation  sodium chloride 0.9% lock flush 10 milliLiter(s) IV Push every 1 hour PRN Pre/post blood products, medications, blood draw, and to maintain line patency      Allergies    No Known Allergies    Intolerances        RVital Signs Last 24 Hrs  T(C): 36.7 (09 Apr 2020 16:00), Max: 37.2 (09 Apr 2020 08:00)  T(F): 98 (09 Apr 2020 16:00), Max: 99 (09 Apr 2020 08:00)  HR: 61 (09 Apr 2020 16:00) (56 - 141)  BP: 140/70 (09 Apr 2020 16:00) (115/46 - 173/52)  BP(mean): 87 (09 Apr 2020 16:00) (63 - 87)  RR: 18 (09 Apr 2020 16:00) (18 - 22)  SpO2: 95% (09 Apr 2020 16:00) (91% - 97%)        LABS:                        10.1   10.50 )-----------( 375      ( 09 Apr 2020 07:09 )             31.6     04-09    146<H>  |  110<H>  |  87<H>  ----------------------------<  249<H>  3.1<L>   |  22  |  2.68<H>    Ca    9.3      09 Apr 2020 07:09  Phos  2.8     04-09  Mg     2.1     04-09    TPro  7.0  /  Alb  2.2<L>  /  TBili  0.2  /  DBili  x   /  AST  16  /  ALT  29  /  AlkPhos  68  04-09    PT/INR - ( 09 Apr 2020 07:09 )   PT: 12.8 sec;   INR: 1.15 ratio         PTT - ( 09 Apr 2020 07:09 )  PTT:30.8 sec      RADIOLOGY & ADDITIONAL TESTS:

## 2020-04-09 NOTE — PROGRESS NOTE ADULT - ASSESSMENT
1.	ALIS: ATN, COVID renal injury  2.	SARS-COVID 19, Resp failure on vent  3.	Anemia  4.	Diabetes  5.	Hypokalemia    Improving renal indices. To continue current meds. Treatment for COVID pneumonia. COVID precautions. Strict I & O.  Monitor blood sugar levels. Insulin coverage as needed. Avoid nephrotoxic meds as possible. Potassium supps. Free water with tube feeds.   Avoid ACEI, ARB, NSAIDs and IV contrast. Will follow electrolytes and renal function trend. Supportive care. ICU management.

## 2020-04-09 NOTE — PROGRESS NOTE ADULT - ASSESSMENT
75 y/o F with hx of DM, HTN, HLD, hypothyroid, anxiety biba with c/o fever and cough x 6 days. Admitted to ICU for acute respiratory failure secondary to  COVID 19 pNA requiring intubation.      Acute respiratory failure with hypoxia.  secondary to ARDS from COVID 19.  Continue ventilatory support as per pulmonary critical care.  Wean ventilator as tolerated.  Continue supportive care.   Patient has completed Plaquenil.  Continue isolation precautions.    ALIS most likely secondary to ATN from shock.  Nephrology on Board  BUN/creat slightly better.   Monitor serial BMP.   on 1/2 NS as per renal  also now on free water flushes      DM (diabetes mellitus).   Plan: Continue patient on Lantus and Humalog sliding scale coverage.    Hypothyroidism, unspecified type.  Plan: Continue patient on Synthroid.       DVT ppx: with ppx dosing of heparin due to ALIS    Attending Attestation:   45 minutes spent on total encounter; more than 50% of the visit was spent counseling and/or coordinating care by the attending physician.

## 2020-04-09 NOTE — PROGRESS NOTE ADULT - SUBJECTIVE AND OBJECTIVE BOX
Date/Time Patient Seen:  		  Referring MD:   Data Reviewed	       Patient is a 74y old  Female who presents with a chief complaint of shob (2020 23:59)      Subjective/HPI     PAST MEDICAL & SURGICAL HISTORY:  DJD (degenerative joint disease)  DM (diabetes mellitus)  Hyperlipidemia  Hypothyroid  HTN (hypertension)  History of vitamin D deficiency  B12 deficiency  Bronchitis  Anxiety  S/P cataract surgery  S/P eye surgery: left eye retinal surgery x2  S/P  section: x2        Medication list         MEDICATIONS  (STANDING):  brimonidine 0.2% Ophthalmic Solution 1 Drop(s) Left EYE daily  chlorhexidine 0.12% Liquid 15 milliLiter(s) Oral Mucosa every 12 hours  chlorhexidine 2% Cloths 1 Application(s) Topical <User Schedule>  dexMEDEtomidine Infusion 0.7 MICROgram(s)/kG/Hr (15.9 mL/Hr) IV Continuous <Continuous>  dextrose 5%. 1000 milliLiter(s) (50 mL/Hr) IV Continuous <Continuous>  dextrose 50% Injectable 12.5 Gram(s) IV Push once  dextrose 50% Injectable 25 Gram(s) IV Push once  dextrose 50% Injectable 25 Gram(s) IV Push once  fentaNYL   Infusion. 0.5 MICROgram(s)/kG/Hr (4.54 mL/Hr) IV Continuous <Continuous>  heparin  Injectable 5000 Unit(s) SubCutaneous every 8 hours  insulin glargine Injectable (LANTUS) 28 Unit(s) SubCutaneous at bedtime  insulin lispro (HumaLOG) corrective regimen sliding scale   SubCutaneous every 6 hours  latanoprost 0.005% Ophthalmic Solution 1 Drop(s) Both EYES at bedtime  levothyroxine Injectable 12.5 MICROGram(s) IV Push at bedtime  metoprolol tartrate 25 milliGRAM(s) Oral every 8 hours  pantoprazole  Injectable 40 milliGRAM(s) IV Push daily  simvastatin 20 milliGRAM(s) Oral at bedtime  sodium chloride 0.45%. 1000 milliLiter(s) (50 mL/Hr) IV Continuous <Continuous>  timolol 0.5% Solution 1 Drop(s) Left EYE daily    MEDICATIONS  (PRN):  acetaminophen   Tablet .. 650 milliGRAM(s) Oral every 6 hours PRN Temp greater or equal to 38C (100.4F), Mild Pain (1 - 3)  dextrose 40% Gel 15 Gram(s) Oral once PRN Blood Glucose LESS THAN 70 milliGRAM(s)/deciliter  glucagon  Injectable 1 milliGRAM(s) IntraMuscular once PRN Glucose LESS THAN 70 milligrams/deciliter  midazolam Injectable 2 milliGRAM(s) IV Push every 2 hours PRN Agitation  sodium chloride 0.9% lock flush 10 milliLiter(s) IV Push every 1 hour PRN Pre/post blood products, medications, blood draw, and to maintain line patency         Vitals log        ICU Vital Signs Last 24 Hrs  T(C): 36.8 (2020 04:00), Max: 37.7 (2020 11:26)  T(F): 98.2 (2020 04:00), Max: 99.8 (2020 11:26)  HR: 94 (2020 06:00) (56 - 94)  BP: 173/52 (2020 06:00) (125/43 - 173/52)  BP(mean): 84 (2020 06:00) (63 - 91)  ABP: --  ABP(mean): --  RR: 20 (2020 06:00) (18 - 22)  SpO2: 91% (2020 06:00) (91% - 98%)       Mode: AC/ CMV (Assist Control/ Continuous Mandatory Ventilation)  RR (machine): 20  TV (machine): 400  FiO2: 55  PEEP: 5  ITime: 1  MAP: 10  PIP: 24      Input and Output:  I&O's Detail    2020 07:01  -  2020 07:00  --------------------------------------------------------  IN:    dexmedetomidine Infusion: 284 mL    fentaNYL Infusion.: 280 mL    Free Water: 390 mL    Glucerna 1.5: 1080 mL    sodium chloride 0.45%.: 550 mL  Total IN: 2584 mL    OUT:    Indwelling Catheter - Urethral: 2015 mL  Total OUT: 2015 mL    Total NET: 569 mL          Lab Data                        9.7    11.10 )-----------( 414      ( 2020 07:24 )             30.1     04-08    140  |  104  |  94<H>  ----------------------------<  390<H>  3.5   |  25  |  3.31<H>    Ca    9.1      2020 08:44  Phos  3.5     04-  Mg     2.4     -    TPro  6.5  /  Alb  2.0<L>  /  TBili  0.4  /  DBili  x   /  AST  14  /  ALT  25  /  AlkPhos  76  04-08    ABG - ( 2020 05:41 )  pH, Arterial: x     pH, Blood: 7.40  /  pCO2: 41    /  pO2: 64    / HCO3: 25    / Base Excess: 1.0   /  SaO2: 92                CARDIAC MARKERS ( 2020 12:16 )  .787 ng/mL / x     / x     / x     / x            Review of Systems	      Objective     Physical Examination    heart 1s2  lung dc bs  ventilated      Pertinent Lab findings & Imaging      Justin:  NO   Adequate UO     I&O's Detail    2020 07:01  -  2020 07:00  --------------------------------------------------------  IN:    dexmedetomidine Infusion: 284 mL    fentaNYL Infusion.: 280 mL    Free Water: 390 mL    Glucerna 1.5: 1080 mL    sodium chloride 0.45%.: 550 mL  Total IN: 2584 mL    OUT:    Indwelling Catheter - Urethral: 2015 mL  Total OUT:  mL    Total NET: 569 mL               Discussed with:     Cultures:	        Radiology

## 2020-04-09 NOTE — CHART NOTE - NSCHARTNOTEFT_GEN_A_CORE
Assessment: Pt seen for nutrition f/u.  Pt is a 75 yo female with hx DM2, HTN, Vit B, Vit D deficiency, afib, hypothyroidism admitted with + COVID19,  Pt required  intubation and central line insertion; patient is sedated with vent support. Pt was receiving bolus feedings via NGT of Glucerna 1.5 180 ml every 4 hrs (6x per day) with 60ml free water flush before and after, however, noted tube feeds needed to be held for intolerance.  Bolus tube feed order changed (on 4/8) to smaller volume, more frequently with noted good tolerance thus far (Glucerna 1.5 90ml every 2hrs, +flush).  Renal labs appear to be generally stable, hyperphosphatemia resolved.  Noted pt continues to have loose stools, likely multifactorial; negative for c.diff; +rectal tube.  RD to continue to follow.     Factors impacting intake: [ ] none [ ] nausea  [ ] vomiting [ ] diarrhea [ ] constipation  [ ]chewing problems [ ] swallowing issues  [ x] other: intubation    Diet Prescription: Diet, NPO with Tube Feed:   Tube Feeding Modality: Nasogastric  Glucerna 1.5 Car  Total Volume for 24 Hours (mL): 1080  Bolus  Total Volume of Bolus (mL):  90  Total # of Feeds: 6  Tube Feed Frequency: Every 2 hours   Tube Feed Start Time: 05:00  Bolus Feed Rate (mL per Hour): 180   Bolus Feed Duration (in Hours): 1  Bolus Feed Instructions:  give bolus every 4 hours with 60mL water flush before and after each bolus (04-08-20 @ 09:43)    Intake: bolus feedings    Current Weight: - no updated bw to assess  % Weight Change    Pertinent Medications: MEDICATIONS  (STANDING):  amLODIPine   Tablet 2.5 milliGRAM(s) Oral daily  brimonidine 0.2% Ophthalmic Solution 1 Drop(s) Left EYE daily  chlorhexidine 0.12% Liquid 15 milliLiter(s) Oral Mucosa every 12 hours  chlorhexidine 2% Cloths 1 Application(s) Topical <User Schedule>  dexMEDEtomidine Infusion 0.7 MICROgram(s)/kG/Hr (15.9 mL/Hr) IV Continuous <Continuous>  dextrose 5%. 1000 milliLiter(s) (50 mL/Hr) IV Continuous <Continuous>  dextrose 50% Injectable 12.5 Gram(s) IV Push once  dextrose 50% Injectable 25 Gram(s) IV Push once  dextrose 50% Injectable 25 Gram(s) IV Push once  fentaNYL   Infusion. 0.5 MICROgram(s)/kG/Hr (4.54 mL/Hr) IV Continuous <Continuous>  heparin  Injectable 5000 Unit(s) SubCutaneous every 8 hours  insulin glargine Injectable (LANTUS) 28 Unit(s) SubCutaneous at bedtime  insulin lispro (HumaLOG) corrective regimen sliding scale   SubCutaneous every 6 hours  latanoprost 0.005% Ophthalmic Solution 1 Drop(s) Both EYES at bedtime  levothyroxine Injectable 12.5 MICROGram(s) IV Push at bedtime  metoprolol tartrate 25 milliGRAM(s) Oral every 8 hours  pantoprazole  Injectable 40 milliGRAM(s) IV Push daily  simvastatin 20 milliGRAM(s) Oral at bedtime  sodium chloride 0.45%. 1000 milliLiter(s) (50 mL/Hr) IV Continuous <Continuous>  timolol 0.5% Solution 1 Drop(s) Left EYE daily    MEDICATIONS  (PRN):  acetaminophen   Tablet .. 650 milliGRAM(s) Oral every 6 hours PRN Temp greater or equal to 38C (100.4F), Mild Pain (1 - 3)  dextrose 40% Gel 15 Gram(s) Oral once PRN Blood Glucose LESS THAN 70 milliGRAM(s)/deciliter  glucagon  Injectable 1 milliGRAM(s) IntraMuscular once PRN Glucose LESS THAN 70 milligrams/deciliter  midazolam Injectable 2 milliGRAM(s) IV Push every 2 hours PRN Agitation  sodium chloride 0.9% lock flush 10 milliLiter(s) IV Push every 1 hour PRN Pre/post blood products, medications, blood draw, and to maintain line patency    Pertinent Labs: 04-09 Na146 mmol/L<H> Glu --    K+ 3.1 mmol/L<L> Cr  --    BUN --    04-09 Phos 2.8 mg/dL 04-09 Alb 2.2 g/dL<L> 03-27 [id number] 8.1 %<H>     CAPILLARY BLOOD GLUCOSE      POCT Blood Glucose.: 187 mg/dL (09 Apr 2020 11:16)  POCT Blood Glucose.: 234 mg/dL (09 Apr 2020 05:13)  POCT Blood Glucose.: 269 mg/dL (08 Apr 2020 21:35)  POCT Blood Glucose.: 343 mg/dL (08 Apr 2020 16:55)  POCT Blood Glucose.: 317 mg/dL (08 Apr 2020 11:34)    Skin: intact, +edema    Estimated Needs:   [ x] no change since previous assessment  [ ] recalculated:     Previous Nutrition Diagnosis:   [ ] Inadequate Energy Intake [x ]Inadequate Oral Intake [ ] Excessive Energy Intake   [ ] Underweight [ ] Increased Nutrient Needs [ ] Overweight/Obesity   [ ] Altered GI Function [ ] Unintended Weight Loss [ ] Food & Nutrition Related Knowledge Deficit [ ] Malnutrition     Nutrition Diagnosis is [ ] ongoing  [ ] resolved [ ] not applicable     New Nutrition Diagnosis: [ ] not applicable       Interventions: bolus feedings- low volume  Recommend  [ ] Change Diet To:  [ ] Nutrition Supplement  [ ] Nutrition Support  [ ] Other:     Monitoring and Evaluation:   [ ] PO intake [ x ] Tolerance to diet prescription/TF  [ x ] weights [ x ] labs[ x ] follow up per protocol  [ ] other:

## 2020-04-10 LAB
ALBUMIN SERPL ELPH-MCNC: 2.1 G/DL — LOW (ref 3.3–5)
ALP SERPL-CCNC: 65 U/L — SIGNIFICANT CHANGE UP (ref 30–120)
ALT FLD-CCNC: 28 U/L DA — SIGNIFICANT CHANGE UP (ref 10–60)
ANION GAP SERPL CALC-SCNC: 8 MMOL/L — SIGNIFICANT CHANGE UP (ref 5–17)
APTT BLD: 33.3 SEC — SIGNIFICANT CHANGE UP (ref 28.5–37)
AST SERPL-CCNC: 16 U/L — SIGNIFICANT CHANGE UP (ref 10–40)
BASE EXCESS BLDA CALC-SCNC: -0.7 MMOL/L — SIGNIFICANT CHANGE UP (ref -2–2)
BASOPHILS # BLD AUTO: 0.02 K/UL — SIGNIFICANT CHANGE UP (ref 0–0.2)
BASOPHILS NFR BLD AUTO: 0.2 % — SIGNIFICANT CHANGE UP (ref 0–2)
BILIRUB SERPL-MCNC: 0.2 MG/DL — SIGNIFICANT CHANGE UP (ref 0.2–1.2)
BLOOD GAS SOURCE: SIGNIFICANT CHANGE UP
BUN SERPL-MCNC: 72 MG/DL — HIGH (ref 7–23)
CALCIUM SERPL-MCNC: 9.1 MG/DL — SIGNIFICANT CHANGE UP (ref 8.4–10.5)
CHLORIDE SERPL-SCNC: 112 MMOL/L — HIGH (ref 96–108)
CO2 SERPL-SCNC: 27 MMOL/L — SIGNIFICANT CHANGE UP (ref 22–31)
CREAT SERPL-MCNC: 2.12 MG/DL — HIGH (ref 0.5–1.3)
EOSINOPHIL # BLD AUTO: 0.44 K/UL — SIGNIFICANT CHANGE UP (ref 0–0.5)
EOSINOPHIL NFR BLD AUTO: 4.7 % — SIGNIFICANT CHANGE UP (ref 0–6)
GLUCOSE BLDC GLUCOMTR-MCNC: 127 MG/DL — HIGH (ref 70–99)
GLUCOSE BLDC GLUCOMTR-MCNC: 156 MG/DL — HIGH (ref 70–99)
GLUCOSE BLDC GLUCOMTR-MCNC: 156 MG/DL — HIGH (ref 70–99)
GLUCOSE BLDC GLUCOMTR-MCNC: 165 MG/DL — HIGH (ref 70–99)
GLUCOSE BLDC GLUCOMTR-MCNC: 181 MG/DL — HIGH (ref 70–99)
GLUCOSE SERPL-MCNC: 157 MG/DL — HIGH (ref 70–99)
HCO3 BLDA-SCNC: 24 MMOL/L — SIGNIFICANT CHANGE UP (ref 21–29)
HCT VFR BLD CALC: 30.9 % — LOW (ref 34.5–45)
HGB BLD-MCNC: 9.5 G/DL — LOW (ref 11.5–15.5)
HOROWITZ INDEX BLDA+IHG-RTO: 50 — SIGNIFICANT CHANGE UP
IMM GRANULOCYTES NFR BLD AUTO: 0.4 % — SIGNIFICANT CHANGE UP (ref 0–1.5)
INR BLD: 1.22 RATIO — HIGH (ref 0.88–1.16)
LYMPHOCYTES # BLD AUTO: 1.02 K/UL — SIGNIFICANT CHANGE UP (ref 1–3.3)
LYMPHOCYTES # BLD AUTO: 10.8 % — LOW (ref 13–44)
MAGNESIUM SERPL-MCNC: 2 MG/DL — SIGNIFICANT CHANGE UP (ref 1.6–2.6)
MCHC RBC-ENTMCNC: 28.1 PG — SIGNIFICANT CHANGE UP (ref 27–34)
MCHC RBC-ENTMCNC: 30.7 GM/DL — LOW (ref 32–36)
MCV RBC AUTO: 91.4 FL — SIGNIFICANT CHANGE UP (ref 80–100)
MONOCYTES # BLD AUTO: 0.73 K/UL — SIGNIFICANT CHANGE UP (ref 0–0.9)
MONOCYTES NFR BLD AUTO: 7.7 % — SIGNIFICANT CHANGE UP (ref 2–14)
NEUTROPHILS # BLD AUTO: 7.19 K/UL — SIGNIFICANT CHANGE UP (ref 1.8–7.4)
NEUTROPHILS NFR BLD AUTO: 76.2 % — SIGNIFICANT CHANGE UP (ref 43–77)
NRBC # BLD: 0 /100 WBCS — SIGNIFICANT CHANGE UP (ref 0–0)
PCO2 BLDA: 40 MMHG — SIGNIFICANT CHANGE UP (ref 32–46)
PH BLD: 7.39 — SIGNIFICANT CHANGE UP (ref 7.35–7.45)
PHOSPHATE SERPL-MCNC: 2.8 MG/DL — SIGNIFICANT CHANGE UP (ref 2.5–4.5)
PLATELET # BLD AUTO: 229 K/UL — SIGNIFICANT CHANGE UP (ref 150–400)
PO2 BLDA: 79 MMHG — SIGNIFICANT CHANGE UP (ref 74–108)
POTASSIUM SERPL-MCNC: 4.1 MMOL/L — SIGNIFICANT CHANGE UP (ref 3.5–5.3)
POTASSIUM SERPL-SCNC: 4.1 MMOL/L — SIGNIFICANT CHANGE UP (ref 3.5–5.3)
PROT SERPL-MCNC: 6.7 G/DL — SIGNIFICANT CHANGE UP (ref 6–8.3)
PROTHROM AB SERPL-ACNC: 13.6 SEC — HIGH (ref 10–12.9)
RBC # BLD: 3.38 M/UL — LOW (ref 3.8–5.2)
RBC # FLD: 15.3 % — HIGH (ref 10.3–14.5)
SAO2 % BLDA: 95 % — SIGNIFICANT CHANGE UP (ref 92–96)
SODIUM SERPL-SCNC: 147 MMOL/L — HIGH (ref 135–145)
WBC # BLD: 9.44 K/UL — SIGNIFICANT CHANGE UP (ref 3.8–10.5)
WBC # FLD AUTO: 9.44 K/UL — SIGNIFICANT CHANGE UP (ref 3.8–10.5)

## 2020-04-10 PROCEDURE — 99233 SBSQ HOSP IP/OBS HIGH 50: CPT

## 2020-04-10 RX ORDER — QUETIAPINE FUMARATE 200 MG/1
50 TABLET, FILM COATED ORAL
Refills: 0 | Status: DISCONTINUED | OUTPATIENT
Start: 2020-04-10 | End: 2020-04-15

## 2020-04-10 RX ORDER — MIDAZOLAM HYDROCHLORIDE 1 MG/ML
2 INJECTION, SOLUTION INTRAMUSCULAR; INTRAVENOUS ONCE
Refills: 0 | Status: DISCONTINUED | OUTPATIENT
Start: 2020-04-10 | End: 2020-04-10

## 2020-04-10 RX ORDER — HYDRALAZINE HCL 50 MG
10 TABLET ORAL EVERY 6 HOURS
Refills: 0 | Status: DISCONTINUED | OUTPATIENT
Start: 2020-04-10 | End: 2020-04-15

## 2020-04-10 RX ORDER — DEXAMETHASONE 0.5 MG/5ML
5 ELIXIR ORAL EVERY 6 HOURS
Refills: 0 | Status: DISCONTINUED | OUTPATIENT
Start: 2020-04-10 | End: 2020-04-12

## 2020-04-10 RX ORDER — FUROSEMIDE 40 MG
20 TABLET ORAL ONCE
Refills: 0 | Status: COMPLETED | OUTPATIENT
Start: 2020-04-10 | End: 2020-04-10

## 2020-04-10 RX ORDER — METOPROLOL TARTRATE 50 MG
10 TABLET ORAL ONCE
Refills: 0 | Status: COMPLETED | OUTPATIENT
Start: 2020-04-10 | End: 2020-04-10

## 2020-04-10 RX ORDER — MIDAZOLAM HYDROCHLORIDE 1 MG/ML
2 INJECTION, SOLUTION INTRAMUSCULAR; INTRAVENOUS
Refills: 0 | Status: DISCONTINUED | OUTPATIENT
Start: 2020-04-10 | End: 2020-04-14

## 2020-04-10 RX ADMIN — Medication 5 MILLIGRAM(S): at 23:26

## 2020-04-10 RX ADMIN — CHLORHEXIDINE GLUCONATE 15 MILLILITER(S): 213 SOLUTION TOPICAL at 17:14

## 2020-04-10 RX ADMIN — AMLODIPINE BESYLATE 2.5 MILLIGRAM(S): 2.5 TABLET ORAL at 04:31

## 2020-04-10 RX ADMIN — QUETIAPINE FUMARATE 50 MILLIGRAM(S): 200 TABLET, FILM COATED ORAL at 21:42

## 2020-04-10 RX ADMIN — Medication 5 MILLIGRAM(S): at 12:45

## 2020-04-10 RX ADMIN — Medication 25 MILLIGRAM(S): at 21:42

## 2020-04-10 RX ADMIN — Medication 10 MILLIGRAM(S): at 14:56

## 2020-04-10 RX ADMIN — Medication 2: at 04:39

## 2020-04-10 RX ADMIN — Medication 1 DROP(S): at 11:23

## 2020-04-10 RX ADMIN — BRIMONIDINE TARTRATE 1 DROP(S): 2 SOLUTION/ DROPS OPHTHALMIC at 11:23

## 2020-04-10 RX ADMIN — LATANOPROST 1 DROP(S): 0.05 SOLUTION/ DROPS OPHTHALMIC; TOPICAL at 21:42

## 2020-04-10 RX ADMIN — Medication 25 MILLIGRAM(S): at 04:31

## 2020-04-10 RX ADMIN — HEPARIN SODIUM 5000 UNIT(S): 5000 INJECTION INTRAVENOUS; SUBCUTANEOUS at 11:23

## 2020-04-10 RX ADMIN — HEPARIN SODIUM 5000 UNIT(S): 5000 INJECTION INTRAVENOUS; SUBCUTANEOUS at 21:42

## 2020-04-10 RX ADMIN — Medication 12.5 MICROGRAM(S): at 21:42

## 2020-04-10 RX ADMIN — Medication 5 MILLIGRAM(S): at 17:16

## 2020-04-10 RX ADMIN — CHLORHEXIDINE GLUCONATE 15 MILLILITER(S): 213 SOLUTION TOPICAL at 04:32

## 2020-04-10 RX ADMIN — MIDAZOLAM HYDROCHLORIDE 2 MILLIGRAM(S): 1 INJECTION, SOLUTION INTRAMUSCULAR; INTRAVENOUS at 19:37

## 2020-04-10 RX ADMIN — PANTOPRAZOLE SODIUM 40 MILLIGRAM(S): 20 TABLET, DELAYED RELEASE ORAL at 11:23

## 2020-04-10 RX ADMIN — INSULIN GLARGINE 28 UNIT(S): 100 INJECTION, SOLUTION SUBCUTANEOUS at 23:26

## 2020-04-10 RX ADMIN — DEXMEDETOMIDINE HYDROCHLORIDE IN 0.9% SODIUM CHLORIDE 15.9 MICROGRAM(S)/KG/HR: 4 INJECTION INTRAVENOUS at 02:32

## 2020-04-10 RX ADMIN — CHLORHEXIDINE GLUCONATE 1 APPLICATION(S): 213 SOLUTION TOPICAL at 04:32

## 2020-04-10 RX ADMIN — Medication 10 MILLIGRAM(S): at 19:37

## 2020-04-10 RX ADMIN — MIDAZOLAM HYDROCHLORIDE 2 MILLIGRAM(S): 1 INJECTION, SOLUTION INTRAMUSCULAR; INTRAVENOUS at 21:13

## 2020-04-10 RX ADMIN — HEPARIN SODIUM 5000 UNIT(S): 5000 INJECTION INTRAVENOUS; SUBCUTANEOUS at 04:37

## 2020-04-10 RX ADMIN — Medication 2: at 23:26

## 2020-04-10 RX ADMIN — Medication 25 MILLIGRAM(S): at 11:24

## 2020-04-10 RX ADMIN — DEXMEDETOMIDINE HYDROCHLORIDE IN 0.9% SODIUM CHLORIDE 15.9 MICROGRAM(S)/KG/HR: 4 INJECTION INTRAVENOUS at 04:39

## 2020-04-10 RX ADMIN — DEXMEDETOMIDINE HYDROCHLORIDE IN 0.9% SODIUM CHLORIDE 15.9 MICROGRAM(S)/KG/HR: 4 INJECTION INTRAVENOUS at 23:57

## 2020-04-10 RX ADMIN — MIDAZOLAM HYDROCHLORIDE 2 MILLIGRAM(S): 1 INJECTION, SOLUTION INTRAMUSCULAR; INTRAVENOUS at 12:17

## 2020-04-10 RX ADMIN — SIMVASTATIN 20 MILLIGRAM(S): 20 TABLET, FILM COATED ORAL at 21:42

## 2020-04-10 RX ADMIN — Medication 20 MILLIGRAM(S): at 12:44

## 2020-04-10 RX ADMIN — Medication 2: at 17:14

## 2020-04-10 NOTE — PROGRESS NOTE ADULT - SUBJECTIVE AND OBJECTIVE BOX
Date/Time Patient Seen:  		  Referring MD:   Data Reviewed	       Patient is a 74y old  Female who presents with a chief complaint of shob (2020 23:48)      Subjective/HPI     PAST MEDICAL & SURGICAL HISTORY:  DJD (degenerative joint disease)  DM (diabetes mellitus)  Hyperlipidemia  Hypothyroid  HTN (hypertension)  History of vitamin D deficiency  B12 deficiency  Bronchitis  Anxiety  S/P cataract surgery  S/P eye surgery: left eye retinal surgery x2  S/P  section: x2        Medication list         MEDICATIONS  (STANDING):  amLODIPine   Tablet 2.5 milliGRAM(s) Oral daily  brimonidine 0.2% Ophthalmic Solution 1 Drop(s) Left EYE daily  chlorhexidine 0.12% Liquid 15 milliLiter(s) Oral Mucosa every 12 hours  chlorhexidine 2% Cloths 1 Application(s) Topical <User Schedule>  dexMEDEtomidine Infusion 0.7 MICROgram(s)/kG/Hr (15.9 mL/Hr) IV Continuous <Continuous>  dextrose 5%. 1000 milliLiter(s) (50 mL/Hr) IV Continuous <Continuous>  dextrose 50% Injectable 12.5 Gram(s) IV Push once  dextrose 50% Injectable 25 Gram(s) IV Push once  dextrose 50% Injectable 25 Gram(s) IV Push once  fentaNYL   Infusion. 0.5 MICROgram(s)/kG/Hr (4.54 mL/Hr) IV Continuous <Continuous>  heparin  Injectable 5000 Unit(s) SubCutaneous every 8 hours  insulin glargine Injectable (LANTUS) 28 Unit(s) SubCutaneous at bedtime  insulin lispro (HumaLOG) corrective regimen sliding scale   SubCutaneous every 6 hours  latanoprost 0.005% Ophthalmic Solution 1 Drop(s) Both EYES at bedtime  levothyroxine Injectable 12.5 MICROGram(s) IV Push at bedtime  metoprolol tartrate 25 milliGRAM(s) Oral every 8 hours  pantoprazole  Injectable 40 milliGRAM(s) IV Push daily  simvastatin 20 milliGRAM(s) Oral at bedtime  timolol 0.5% Solution 1 Drop(s) Left EYE daily    MEDICATIONS  (PRN):  acetaminophen   Tablet .. 650 milliGRAM(s) Oral every 6 hours PRN Temp greater or equal to 38C (100.4F), Mild Pain (1 - 3)  dextrose 40% Gel 15 Gram(s) Oral once PRN Blood Glucose LESS THAN 70 milliGRAM(s)/deciliter  glucagon  Injectable 1 milliGRAM(s) IntraMuscular once PRN Glucose LESS THAN 70 milligrams/deciliter  midazolam Injectable 2 milliGRAM(s) IV Push every 2 hours PRN Agitation  sodium chloride 0.9% lock flush 10 milliLiter(s) IV Push every 1 hour PRN Pre/post blood products, medications, blood draw, and to maintain line patency         Vitals log        ICU Vital Signs Last 24 Hrs  T(C): 37.2 (10 Apr 2020 08:00), Max: 37.2 (10 Apr 2020 08:00)  T(F): 98.9 (10 Apr 2020 08:00), Max: 98.9 (10 Apr 2020 08:00)  HR: 82 (10 Apr 2020 08:) (57 - 141)  BP: 168/62 (10 Apr 2020 08:) (96/42 - 173/70)  BP(mean): 90 (10 Apr 2020 08:) (56 - 98)  ABP: --  ABP(mean): --  RR: 19 (10 Apr 2020 08:) (18 - 33)  SpO2: 92% (10 Apr 2020 08:) (92% - 97%)       Mode: AC/ CMV (Assist Control/ Continuous Mandatory Ventilation)  RR (machine): 20  TV (machine): 400  FiO2: 50  PEEP: 5  ITime: 1  MAP: 12  PIP: 24      Input and Output:  I&O's Detail    2020 07:01  -  10 Apr 2020 07:00  --------------------------------------------------------  IN:    dexmedetomidine Infusion: 391.3 mL    fentaNYL Infusion.: 285.8 mL    Free Water: 530 mL    Glucerna 1.5: 1080 mL    sodium chloride 0.45%: 750 mL    Solution: 50 mL    Solution: 300 mL  Total IN: 3387.1 mL    OUT:    Indwelling Catheter - Urethral: 1550 mL    Rectal Tube: 150 mL  Total OUT: 1700 mL    Total NET: 1687.1 mL      10 Apr 2020 07:01  -  10 Apr 2020 09:01  --------------------------------------------------------  IN:    dexmedetomidine Infusion: 20.4 mL    fentaNYL Infusion.: 9.1 mL    Free Water: 30 mL    Glucerna 1.5: 90 mL  Total IN: 149.5 mL    OUT:  Total OUT: 0 mL    Total NET: 149.5 mL          Lab Data                        9.5    9.44  )-----------( 229      ( 10 Apr 2020 07:28 )             30.9     04-10    147<H>  |  112<H>  |  72<H>  ----------------------------<  157<H>  4.1   |  27  |  2.12<H>    Ca    9.1      10 Apr 2020 07:28  Phos  2.8     04-10  Mg     2.0     04-10    TPro  6.7  /  Alb  2.1<L>  /  TBili  0.2  /  DBili  x   /  AST  16  /  ALT  28  /  AlkPhos  65  04-10    ABG - ( 10 Apr 2020 06:15 )  pH, Arterial: x     pH, Blood: 7.39  /  pCO2: 40    /  pO2: 79    / HCO3: 24    / Base Excess: -0.7  /  SaO2: 95                CARDIAC MARKERS ( 2020 11:27 )  .629 ng/mL / x     / x     / x     / x      CARDIAC MARKERS ( 2020 12:16 )  .787 ng/mL / x     / x     / x     / x            Review of Systems	      Objective     Physical Examination    heart s1s2  lung dec BS  abd soft  obese  head nc  head at      Pertinent Lab findings & Imaging      Justin:  NO   Adequate UO     I&O's Detail    2020 07:01  -  10 Apr 2020 07:00  --------------------------------------------------------  IN:    dexmedetomidine Infusion: 391.3 mL    fentaNYL Infusion.: 285.8 mL    Free Water: 530 mL    Glucerna 1.5: 1080 mL    sodium chloride 0.45%: 750 mL    Solution: 50 mL    Solution: 300 mL  Total IN: 3387.1 mL    OUT:    Indwelling Catheter - Urethral: 1550 mL    Rectal Tube: 150 mL  Total OUT: 1700 mL    Total NET: 1687.1 mL      10 Apr 2020 07:01  -  10 Apr 2020 09:01  --------------------------------------------------------  IN:    dexmedetomidine Infusion: 20.4 mL    fentaNYL Infusion.: 9.1 mL    Free Water: 30 mL    Glucerna 1.5: 90 mL  Total IN: 149.5 mL    OUT:  Total OUT: 0 mL    Total NET: 149.5 mL               Discussed with:     Cultures:	        Radiology

## 2020-04-10 NOTE — PROGRESS NOTE ADULT - SUBJECTIVE AND OBJECTIVE BOX
Patient is a 74y old  Female who presents with a chief complaint of SOB (31 Mar 2020 05:05)  Patient seen in follow up for KALE SNELL.      Chart reviewed.    PAST MEDICAL HISTORY:  DJD (degenerative joint disease)  DM (diabetes mellitus)  Hyperlipidemia  Hypothyroid  HTN (hypertension)  History of vitamin D deficiency  B12 deficiency  Bronchitis  Anxiety        MEDICATIONS  (STANDING):  amLODIPine   Tablet 2.5 milliGRAM(s) Oral daily  brimonidine 0.2% Ophthalmic Solution 1 Drop(s) Left EYE daily  chlorhexidine 0.12% Liquid 15 milliLiter(s) Oral Mucosa every 12 hours  chlorhexidine 2% Cloths 1 Application(s) Topical <User Schedule>  dexAMETHasone  Injectable 5 milliGRAM(s) IV Push every 6 hours  dexMEDEtomidine Infusion 0.7 MICROgram(s)/kG/Hr (15.9 mL/Hr) IV Continuous <Continuous>  dextrose 5%. 1000 milliLiter(s) (50 mL/Hr) IV Continuous <Continuous>  dextrose 50% Injectable 12.5 Gram(s) IV Push once  dextrose 50% Injectable 25 Gram(s) IV Push once  dextrose 50% Injectable 25 Gram(s) IV Push once  fentaNYL   Infusion. 0.5 MICROgram(s)/kG/Hr (4.54 mL/Hr) IV Continuous <Continuous>  furosemide   Injectable 20 milliGRAM(s) IV Push once  heparin  Injectable 5000 Unit(s) SubCutaneous every 8 hours  insulin glargine Injectable (LANTUS) 28 Unit(s) SubCutaneous at bedtime  insulin lispro (HumaLOG) corrective regimen sliding scale   SubCutaneous every 6 hours  latanoprost 0.005% Ophthalmic Solution 1 Drop(s) Both EYES at bedtime  levothyroxine Injectable 12.5 MICROGram(s) IV Push at bedtime  metoprolol tartrate 25 milliGRAM(s) Oral every 8 hours  pantoprazole  Injectable 40 milliGRAM(s) IV Push daily  simvastatin 20 milliGRAM(s) Oral at bedtime  timolol 0.5% Solution 1 Drop(s) Left EYE daily    MEDICATIONS  (PRN):  acetaminophen   Tablet .. 650 milliGRAM(s) Oral every 6 hours PRN Temp greater or equal to 38C (100.4F), Mild Pain (1 - 3)  dextrose 40% Gel 15 Gram(s) Oral once PRN Blood Glucose LESS THAN 70 milliGRAM(s)/deciliter  glucagon  Injectable 1 milliGRAM(s) IntraMuscular once PRN Glucose LESS THAN 70 milligrams/deciliter  hydrALAZINE Injectable 10 milliGRAM(s) IV Push every 6 hours PRN Hypertension  midazolam Injectable 2 milliGRAM(s) IV Push every 2 hours PRN Agitation  sodium chloride 0.9% lock flush 10 milliLiter(s) IV Push every 1 hour PRN Pre/post blood products, medications, blood draw, and to maintain line patency    T(C): 36.9 (04-10-20 @ 12:00), Max: 37.2 (04-09-20 @ 08:00)  HR: 85 (04-10-20 @ 10:00) (56 - 141)  BP: 182/75 (04-10-20 @ 10:00) (96/42 - 182/75)  RR: 24 (04-10-20 @ 10:00)  SpO2: 86% (04-10-20 @ 10:00)  Wt(kg): --  I&O's Detail    09 Apr 2020 07:01  -  10 Apr 2020 07:00  --------------------------------------------------------  IN:    dexmedetomidine Infusion: 391.3 mL    fentaNYL Infusion.: 285.8 mL    Free Water: 530 mL    Glucerna 1.5: 1080 mL    sodium chloride 0.45%: 750 mL    Solution: 50 mL    Solution: 300 mL  Total IN: 3387.1 mL    OUT:    Indwelling Catheter - Urethral: 1550 mL    Rectal Tube: 150 mL  Total OUT: 1700 mL    Total NET: 1687.1 mL      10 Apr 2020 07:01  -  10 Apr 2020 12:21  --------------------------------------------------------  IN:    dexmedetomidine Infusion: 102 mL    fentaNYL Infusion.: 18.2 mL    Free Water: 90 mL    Glucerna 1.5: 270 mL  Total IN: 480.2 mL    OUT:  Total OUT: 0 mL    Total NET: 480.2 mL        PHYSICAL EXAM:  Pt on COVID precautions. Chart reviewed and previous physical examination noted.                          LABORATORY:                        9.5    9.44  )-----------( 229      ( 10 Apr 2020 07:28 )             30.9     04-10    147<H>  |  112<H>  |  72<H>  ----------------------------<  157<H>  4.1   |  27  |  2.12<H>    Ca    9.1      10 Apr 2020 07:28  Phos  2.8     04-10  Mg     2.0     04-10    TPro  6.7  /  Alb  2.1<L>  /  TBili  0.2  /  DBili  x   /  AST  16  /  ALT  28  /  AlkPhos  65  04-10    Sodium, Serum: 147 mmol/L (04-10 @ 07:28)  Sodium, Serum: 146 mmol/L (04-09 @ 07:09)    Potassium, Serum: 4.1 mmol/L (04-10 @ 07:28)  Potassium, Serum: 3.1 mmol/L (04-09 @ 07:09)    Hemoglobin: 9.5 g/dL (04-10 @ 07:28)  Hemoglobin: 10.1 g/dL (04-09 @ 07:09)  Hemoglobin: 9.7 g/dL (04-08 @ 07:24)    Creatinine, Serum 2.12 (04-10 @ 07:28)  Creatinine, Serum 2.68 (04-09 @ 07:09)  Creatinine, Serum 3.31 (04-08 @ 08:44)    CARDIAC MARKERS ( 09 Apr 2020 11:27 )  .629 ng/mL / x     / x     / x     / x          LIVER FUNCTIONS - ( 10 Apr 2020 07:28 )  Alb: 2.1 g/dL / Pro: 6.7 g/dL / ALK PHOS: 65 U/L / ALT: 28 U/L DA / AST: 16 U/L / GGT: x             ABG - ( 10 Apr 2020 06:15 )  pH, Arterial: x     pH, Blood: 7.39  /  pCO2: 40    /  pO2: 79    / HCO3: 24    / Base Excess: -0.7  /  SaO2: 95

## 2020-04-10 NOTE — PROGRESS NOTE ADULT - ASSESSMENT
The patient is a 74 year old female with a history of HTN, DM, HL, hypothyroidism, anxiety who presented with fevers, cough, shortness of breath in the setting of viral PNA, COVID-19, respiratory failure, septic shock.     Plan:  - CXR consistent with PNA  - COVID-19 positive  - Completed hydroxychloroquine  - On zosyn  - Off of IV pressors  - On amlodipine 2.5 mg daily  - AF/SVT - continue metoprolol tartrate 25 mg q8h  - If there is a recurrence of sustained SVT, give metoprolol 5 mg IV or attempt adenosine.  - Continue mechanical ventilation and wean  - ICU care

## 2020-04-10 NOTE — PROGRESS NOTE ADULT - SUBJECTIVE AND OBJECTIVE BOX
INTERVAL HPI/OVERNIGHT EVENTS:  chart reviewed  MEDICATIONS  (STANDING):  amLODIPine   Tablet 2.5 milliGRAM(s) Oral daily  brimonidine 0.2% Ophthalmic Solution 1 Drop(s) Left EYE daily  chlorhexidine 0.12% Liquid 15 milliLiter(s) Oral Mucosa every 12 hours  chlorhexidine 2% Cloths 1 Application(s) Topical <User Schedule>  dexAMETHasone  Injectable 5 milliGRAM(s) IV Push every 6 hours  dexMEDEtomidine Infusion 0.7 MICROgram(s)/kG/Hr (15.9 mL/Hr) IV Continuous <Continuous>  dextrose 5%. 1000 milliLiter(s) (50 mL/Hr) IV Continuous <Continuous>  dextrose 50% Injectable 12.5 Gram(s) IV Push once  dextrose 50% Injectable 25 Gram(s) IV Push once  dextrose 50% Injectable 25 Gram(s) IV Push once  fentaNYL   Infusion. 0.5 MICROgram(s)/kG/Hr (4.54 mL/Hr) IV Continuous <Continuous>  heparin  Injectable 5000 Unit(s) SubCutaneous every 8 hours  insulin glargine Injectable (LANTUS) 28 Unit(s) SubCutaneous at bedtime  insulin lispro (HumaLOG) corrective regimen sliding scale   SubCutaneous every 6 hours  latanoprost 0.005% Ophthalmic Solution 1 Drop(s) Both EYES at bedtime  levothyroxine Injectable 12.5 MICROGram(s) IV Push at bedtime  metoprolol tartrate 25 milliGRAM(s) Oral every 8 hours  pantoprazole  Injectable 40 milliGRAM(s) IV Push daily  simvastatin 20 milliGRAM(s) Oral at bedtime  timolol 0.5% Solution 1 Drop(s) Left EYE daily    MEDICATIONS  (PRN):  acetaminophen   Tablet .. 650 milliGRAM(s) Oral every 6 hours PRN Temp greater or equal to 38C (100.4F), Mild Pain (1 - 3)  dextrose 40% Gel 15 Gram(s) Oral once PRN Blood Glucose LESS THAN 70 milliGRAM(s)/deciliter  glucagon  Injectable 1 milliGRAM(s) IntraMuscular once PRN Glucose LESS THAN 70 milligrams/deciliter  hydrALAZINE Injectable 10 milliGRAM(s) IV Push every 6 hours PRN Hypertension  midazolam Injectable 2 milliGRAM(s) IV Push every 2 hours PRN Agitation  sodium chloride 0.9% lock flush 10 milliLiter(s) IV Push every 1 hour PRN Pre/post blood products, medications, blood draw, and to maintain line patency      Allergies    No Known Allergies    Intolerances        Review of Systems:    General:  No wt loss, fevers, chills, night sweats,fatigue,   Eyes:  Good vision, no reported pain  ENT:  No sore throat, pain, runny nose, dysphagia  CV:  No pain, palpitatioins, hypo/hypertension  Resp:  No dyspnea, cough, tachypnea, wheezing  GI:  No pain, No nausea, No vomiting, No diarrhea, No constipatiion, No weight loss, No fever, No pruritis, No rectal bleeding, No tarry stools, No dysphagia,  :  No pain, bleeding, incontinence, nocturia  Muscle:  No pain, weakness  Neuro:  No weakness, tingling, memory problems  Psych:  No fatigue, insomnia, mood problems, depression  Endocrine:  No polyuria, polydypsia, cold/heat intolerance  Heme:  No petechiae, ecchymosis, easy bruisability  Skin:  No rash, tattoos, scars, edema      Vital Signs Last 24 Hrs  T(C): 37.3 (10 Apr 2020 16:00), Max: 37.3 (10 Apr 2020 16:00)  T(F): 99.2 (10 Apr 2020 16:00), Max: 99.2 (10 Apr 2020 16:00)  HR: 89 (10 Apr 2020 16:00) (57 - 154)  BP: 179/75 (10 Apr 2020 16:00) (96/42 - 182/75)  BP(mean): 103 (10 Apr 2020 16:00) (56 - 128)  RR: 26 (10 Apr 2020 16:00) (19 - 33)  SpO2: 92% (10 Apr 2020 16:00) (86% - 96%)    PHYSICAL EXAM:    Constitutional: NAD, well-developed  HEENT: EOMI, throat clear  Neck: No LAD, supple  Respiratory: CTA and P  Cardiovascular: S1 and S2, RRR, no M  Gastrointestinal: BS+, soft, NT/ND, neg HSM,  Extremities: No peripheral edema, neg clubing, cyanosis  Vascular: 2+ peripheral pulses  Neurological: A/O x 3, no focal deficits  Psychiatric: Normal mood, normal affect  Skin: No rashes      LABS:                        9.5    9.44  )-----------( 229      ( 10 Apr 2020 07:28 )             30.9     04-10    147<H>  |  112<H>  |  72<H>  ----------------------------<  157<H>  4.1   |  27  |  2.12<H>    Ca    9.1      10 Apr 2020 07:28  Phos  2.8     04-10  Mg     2.0     04-10    TPro  6.7  /  Alb  2.1<L>  /  TBili  0.2  /  DBili  x   /  AST  16  /  ALT  28  /  AlkPhos  65  04-10    PT/INR - ( 10 Apr 2020 07:28 )   PT: 13.6 sec;   INR: 1.22 ratio         PTT - ( 10 Apr 2020 07:28 )  PTT:33.3 sec      RADIOLOGY & ADDITIONAL TESTS:

## 2020-04-10 NOTE — PROGRESS NOTE ADULT - SUBJECTIVE AND OBJECTIVE BOX
Chief Complaint: Fevers, shortness of breath    Interval Events: No events overnight.    Review of Systems:  Unable to obtain    Physical Exam:  Vital Signs Last 24 Hrs  T(C): 37.2 (10 Apr 2020 08:00), Max: 37.2 (10 Apr 2020 08:00)  T(F): 98.9 (10 Apr 2020 08:00), Max: 98.9 (10 Apr 2020 08:00)  HR: 66 (10 Apr 2020 09:42) (57 - 141)  BP: 168/62 (10 Apr 2020 08:00) (96/42 - 173/70)  BP(mean): 90 (10 Apr 2020 08:00) (56 - 98)  RR: 19 (10 Apr 2020 08:00) (18 - 33)  SpO2: 96% (10 Apr 2020 09:42) (92% - 97%)  General: Sedated  HEENT: Intubated  Neck: No JVD, no carotid bruit  Extremities: No LE edema, no cyanosis  Neuro: Non-focal  Skin: No rash    Labs:    04-10    147<H>  |  112<H>  |  72<H>  ----------------------------<  157<H>  4.1   |  27  |  2.12<H>    Ca    9.1      10 Apr 2020 07:28  Phos  2.8     04-10  Mg     2.0     04-10    TPro  6.7  /  Alb  2.1<L>  /  TBili  0.2  /  DBili  x   /  AST  16  /  ALT  28  /  AlkPhos  65  04-10                        9.5    9.44  )-----------( 229      ( 10 Apr 2020 07:28 )             30.9       Telemetry: Sinus rhythm, atrial runs

## 2020-04-10 NOTE — PROGRESS NOTE ADULT - SUBJECTIVE AND OBJECTIVE BOX
Patient is a 74y old  Female who presents with a chief complaint of shob (10 Apr 2020 19:01)      BRIEF HOSPITAL COURSE: 74y Female pmhx DM, HTN, HLD, Hypothyroid admitted with acute hypoxic respiratory failure and ARDS from COVID 19+ pneumonia. Course complicated by acute renal failure, pressor dependent shock, and Hypernatremia    Tolerated weaning trial for few hours then developed CHLOE    PAST MEDICAL & SURGICAL HISTORY:  DJD (degenerative joint disease)  DM (diabetes mellitus)  Hyperlipidemia  Hypothyroid  HTN (hypertension)  History of vitamin D deficiency  B12 deficiency  Bronchitis  Anxiety  S/P cataract surgery  S/P eye surgery: left eye retinal surgery x2  S/P  section: x2        Hosp day #15d    Vent day #  Mode: AC/ CMV (Assist Control/ Continuous Mandatory Ventilation)  RR (machine): 18  TV (machine): 400  FiO2: 70  PEEP: 5  ITime: 1  MAP: 19  PIP: 45        Vital signs / Reviewed and Physical Exam Performed where pertinent and urgently required    Lab / Radiology  studies / ABG / Meds -  reviewed and interpreted into the assessment and treatment plan.      Assessment/Plan/Therapeutic interventions      Neuro - Precedex and Fentanyl gtt to facilitate safe ventilation. Start Seroquel BID to help wean off continous sedation    CV -  Pressor support as needed to maintain MAP 65          Episodes of CHLOE, rate control w/ Lopressor Q8.           Avoiding fluid challenges          QTC monitoring while on Azithromycin and Hydroxychloroquine.    Pulm -  FiO2 decreased to 60%. Tolerated Weanin gtrial for few hours earlier. Will attempt in AM. ARDS-NET 4-6cc/kg IBW TV as able to maintain plateau pressures <30               Prone ventilation consideration as feasible  Pa02/Fi02 < 150 on Fi02 >60% and PEEP at least 5                 Vent bundle Reviewed     GI -  PPI  Enteric feeds as tolerated in tandem with NMB and prone ventilation    Renal - Acute renal failure in setting of viral illness. Even to negative fluid balance as tolerated by hemodynamics and renal fx.  Feeds to be provided in lieu of IVF.     Heme -  Pharmacologic DVT PPx  in addition to SCD's    ID - COVID 19+ s/p course Plaquenil. Empiric coverage w/ Zosyn. ABX discontinuation based on discussion with ID in conjunction with clinical features, culture data, and judicious procalcitonin monitoring.      Endo -  Aggressive glycemic control to limit FS glucose to < 180mg/dl.      COVID 19 specific considerations and therapeutic  options based on the available and rapidly changing literature    Goals of care considerations:  Ongoing assessment for patient specific treatment options based on progression or decline.  I have involved the family with updates and requests in guidance for medical decision making.          38  Minutes of critical care tiem spent in the management of this critically ill COVID-19 patient/PUI patient with continuous assessments and interventions based on the interpretation of multiple databases.

## 2020-04-10 NOTE — PROGRESS NOTE ADULT - ASSESSMENT
1.	ALIS: ATN, COVID renal injury  2.	SARS-COVID 19, Resp failure on vent  3.	Anemia  4.	Diabetes  5.	Hypokalemia    Improving renal indices. To continue current meds. Treatment for COVID pneumonia. COVID precautions. Strict I & O.  Monitor blood sugar levels. Insulin coverage as needed. Avoid nephrotoxic meds as possible. Potassium supps. Free water with tube feeds.   Avoid ACEI, ARB, NSAIDs and IV contrast. Will follow electrolytes and renal function trend. Supportive care. ICU management. D/w Intensivist.

## 2020-04-10 NOTE — PROGRESS NOTE ADULT - SUBJECTIVE AND OBJECTIVE BOX
patient seen and examined at bedside  chart notes reviwed  doing well on low vent settings  SBT trials today  patient lasted one hour    Review of Systems:  unable to obtain    ~Objective:  Vitals  T(C): 37.3 (04-10-20 @ 16:00), Max: 37.3 (04-10-20 @ 16:00)  HR: 99 (04-10-20 @ 18:00) (60 - 154)  BP: 189/79 (04-10-20 @ 18:00) (122/55 - 189/79)  RR: 29 (04-10-20 @ 18:00) (19 - 33)  SpO2: 91% (04-10-20 @ 18:00) (86% - 96%)    Physical Exam:  General: comfortable, no acute distress,sedated, RASS 0    HEENT: Atraumatic, no LAD, trachea midline, PERRLA  Cardiovascular: normal s1s2,   Pulmonary: decreased, no wheezing , rhonchi  Gastrointestinal: soft non tender non distended, no masses felt, no organomegally  Muscloskeletal: no lower extremity edema,   Neurological: intact. No focal weakness  Psychiatrical: sedated  SKIN: no rash, lesions or ulcers    ~  Labs:                          9.5    9.44  )-----------( 229      ( 10 Apr 2020 07:28 )             30.9     04-10    147<H>  |  112<H>  |  72<H>  ----------------------------<  157<H>  4.1   |  27  |  2.12<H>    Ca    9.1      10 Apr 2020 07:28  Phos  2.8     04-10  Mg     2.0     04-10    TPro  6.7  /  Alb  2.1<L>  /  TBili  0.2  /  DBili  x   /  AST  16  /  ALT  28  /  AlkPhos  65  04-10    LIVER FUNCTIONS - ( 10 Apr 2020 07:28 )  Alb: 2.1 g/dL / Pro: 6.7 g/dL / ALK PHOS: 65 U/L / ALT: 28 U/L DA / AST: 16 U/L / GGT: x           PT/INR - ( 10 Apr 2020 07:28 )   PT: 13.6 sec;   INR: 1.22 ratio         PTT - ( 10 Apr 2020 07:28 )  PTT:33.3 sec      Active Medications  MEDICATIONS  (STANDING):  amLODIPine   Tablet 2.5 milliGRAM(s) Oral daily  brimonidine 0.2% Ophthalmic Solution 1 Drop(s) Left EYE daily  chlorhexidine 0.12% Liquid 15 milliLiter(s) Oral Mucosa every 12 hours  chlorhexidine 2% Cloths 1 Application(s) Topical <User Schedule>  dexAMETHasone  Injectable 5 milliGRAM(s) IV Push every 6 hours  dexMEDEtomidine Infusion 0.7 MICROgram(s)/kG/Hr (15.9 mL/Hr) IV Continuous <Continuous>  dextrose 5%. 1000 milliLiter(s) (50 mL/Hr) IV Continuous <Continuous>  dextrose 50% Injectable 12.5 Gram(s) IV Push once  dextrose 50% Injectable 25 Gram(s) IV Push once  dextrose 50% Injectable 25 Gram(s) IV Push once  fentaNYL   Infusion. 0.5 MICROgram(s)/kG/Hr (4.54 mL/Hr) IV Continuous <Continuous>  heparin  Injectable 5000 Unit(s) SubCutaneous every 8 hours  insulin glargine Injectable (LANTUS) 28 Unit(s) SubCutaneous at bedtime  insulin lispro (HumaLOG) corrective regimen sliding scale   SubCutaneous every 6 hours  latanoprost 0.005% Ophthalmic Solution 1 Drop(s) Both EYES at bedtime  levothyroxine Injectable 12.5 MICROGram(s) IV Push at bedtime  metoprolol tartrate 25 milliGRAM(s) Oral every 8 hours  pantoprazole  Injectable 40 milliGRAM(s) IV Push daily  simvastatin 20 milliGRAM(s) Oral at bedtime  timolol 0.5% Solution 1 Drop(s) Left EYE daily    MEDICATIONS  (PRN):  acetaminophen   Tablet .. 650 milliGRAM(s) Oral every 6 hours PRN Temp greater or equal to 38C (100.4F), Mild Pain (1 - 3)  dextrose 40% Gel 15 Gram(s) Oral once PRN Blood Glucose LESS THAN 70 milliGRAM(s)/deciliter  glucagon  Injectable 1 milliGRAM(s) IntraMuscular once PRN Glucose LESS THAN 70 milligrams/deciliter  hydrALAZINE Injectable 10 milliGRAM(s) IV Push every 6 hours PRN Hypertension  midazolam Injectable 2 milliGRAM(s) IV Push every 2 hours PRN Agitation  sodium chloride 0.9% lock flush 10 milliLiter(s) IV Push every 1 hour PRN Pre/post blood products, medications, blood draw, and to maintain line patency

## 2020-04-10 NOTE — PROGRESS NOTE ADULT - SUBJECTIVE AND OBJECTIVE BOX
ADELIA BALDWIN is a 74yFemale , patient examined and chart reviewed.     INTERVAL HPI/ OVERNIGHT EVENTS:  Vented sedated   Afebrile.     Past Medical History--  PAST MEDICAL & SURGICAL HISTORY:  DJD (degenerative joint disease)  DM (diabetes mellitus)  Hyperlipidemia  Hypothyroid  HTN (hypertension)  History of vitamin D deficiency  B12 deficiency  Bronchitis  Anxiety  S/P cataract surgery  S/P eye surgery: left eye retinal surgery x2  S/P  section: x2      For details regarding the patient's social history, family history, and other miscellaneous elements, please refer the initial infectious diseases consultation and/or the admitting history and physical examination for this admission.      ROS:  Unable to obtain due to : pt's condition      Current inpatient medications :  MEDICATIONS  (STANDING):  amLODIPine   Tablet 2.5 milliGRAM(s) Oral daily  brimonidine 0.2% Ophthalmic Solution 1 Drop(s) Left EYE daily  chlorhexidine 0.12% Liquid 15 milliLiter(s) Oral Mucosa every 12 hours  chlorhexidine 2% Cloths 1 Application(s) Topical <User Schedule>  dexAMETHasone  Injectable 5 milliGRAM(s) IV Push every 6 hours  dexMEDEtomidine Infusion 0.7 MICROgram(s)/kG/Hr (15.9 mL/Hr) IV Continuous <Continuous>  dextrose 5%. 1000 milliLiter(s) (50 mL/Hr) IV Continuous <Continuous>  dextrose 50% Injectable 12.5 Gram(s) IV Push once  dextrose 50% Injectable 25 Gram(s) IV Push once  dextrose 50% Injectable 25 Gram(s) IV Push once  fentaNYL   Infusion. 0.5 MICROgram(s)/kG/Hr (4.54 mL/Hr) IV Continuous <Continuous>  heparin  Injectable 5000 Unit(s) SubCutaneous every 8 hours  insulin glargine Injectable (LANTUS) 28 Unit(s) SubCutaneous at bedtime  insulin lispro (HumaLOG) corrective regimen sliding scale   SubCutaneous every 6 hours  latanoprost 0.005% Ophthalmic Solution 1 Drop(s) Both EYES at bedtime  levothyroxine Injectable 12.5 MICROGram(s) IV Push at bedtime  metoprolol tartrate 25 milliGRAM(s) Oral every 8 hours  pantoprazole  Injectable 40 milliGRAM(s) IV Push daily  QUEtiapine 50 milliGRAM(s) Oral two times a day  simvastatin 20 milliGRAM(s) Oral at bedtime  timolol 0.5% Solution 1 Drop(s) Left EYE daily    MEDICATIONS  (PRN):  acetaminophen   Tablet .. 650 milliGRAM(s) Oral every 6 hours PRN Temp greater or equal to 38C (100.4F), Mild Pain (1 - 3)  dextrose 40% Gel 15 Gram(s) Oral once PRN Blood Glucose LESS THAN 70 milliGRAM(s)/deciliter  glucagon  Injectable 1 milliGRAM(s) IntraMuscular once PRN Glucose LESS THAN 70 milligrams/deciliter  hydrALAZINE Injectable 10 milliGRAM(s) IV Push every 6 hours PRN Hypertension  midazolam Injectable 2 milliGRAM(s) IV Push every 2 hours PRN Agitation  sodium chloride 0.9% lock flush 10 milliLiter(s) IV Push every 1 hour PRN Pre/post blood products, medications, blood draw, and to maintain line patency      Objective:  ICU Vital Signs Last 24 Hrs  T(C): 37.4 (10 Apr 2020 20:00), Max: 37.4 (10 Apr 2020 20:00)  T(F): 99.3 (10 Apr 2020 20:00), Max: 99.3 (10 Apr 2020 20:00)  HR: 112 (10 Apr 2020 22:17) (66 - 154)  BP: 130/49 (10 Apr 2020 22:00) (130/49 - 189/79)  BP(mean): 71 (10 Apr 2020 22:00) (71 - 128)  RR: 18 (10 Apr 2020 22:00) (18 - 33)  SpO2: 97% (10 Apr 2020 22:17) (70% - 97%)    Mode: AC/ CMV (Assist Control/ Continuous Mandatory Ventilation)  RR (machine): 18  TV (machine): 400  FiO2: 70  PEEP: 5  ITime: 1  MAP: 19  PIP: 45      Physical Exam:  GEN: Intubated sedated  HEENT: normocephalic and atraumatic. EOMI. OLGA. Moist mucosa. Clear Posterior pharynx.  NECK: Supple. No carotid bruits.  No lymphadenopathy or thyromegaly.  LUNGS: Decreased to auscultation.  HEART: Regular rate and rhythm without murmur.  ABDOMEN: Soft, nontender, and nondistended.  Positive bowel sounds.   EXTREMITIES: +edema.  NEUROLOGIC: Sedated   SKIN: No ulceration or induration present.      LABS:                        9.5    9.44  )-----------( 229      ( 10 Apr 2020 07:28 )             30.9   04-10    147<H>  |  112<H>  |  72<H>  ----------------------------<  157<H>  4.1   |  27  |  2.12<H>    Ca    9.1      10 Apr 2020 07:28  Phos  2.8     04-10  Mg     2.0     04-10    TPro  6.7  /  Alb  2.1<L>  /  TBili  0.2  /  DBili  x   /  AST  16  /  ALT  28  /  AlkPhos  65  04-10      MICROBIOLOGY:    Culture - Blood (collected 2020 12:56)  Source: .Blood Blood-Peripheral  Preliminary Report (2020 13:01):    No growth to date.    Culture - Blood (collected 2020 12:56)  Source: .Blood Blood-Peripheral  Preliminary Report (2020 13:01):    No growth to date.    Clostridium difficile Toxin by PCR (20 @ 11:10)    [facility]      COVID-19 PCR . (20 @ 22:21)    COVID-19 PCR: Detected: All “detected” results on this new test are considered presumptively  positive results, are clinically actionable, and specimens will be  forwarded to [facility] for confirmation testing.  Another report (corrected report) will only be issued if discordant  results occur.  This test has been validated by [employer] to be accurate;  though it has not been FDA cleared/approved by the usual pathway.  As with all laboratory tests, results should be correlated with clinical  findings.      RADIOLOGY & ADDITIONAL STUDIES:    EXAM:  XR KUB 1 VIEW                                  PROCEDURE DATE:  2020          INTERPRETATION:  EXAM:XR ABDOMEN KUB.     CLINICAL INDICATION: No Predefined Suggestions .     TECHNIQUE: Portable supine AP views of the chest and abdomen.     PRIOR EXAM: Chest x-ray dated 2020.     FINDINGS:      No evidence of bowel obstruction or perforation. 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    Improved pulmonary opacities bilaterally. NG tube extends into the proximal stomach. An endotracheal tube and a central line remain in stable position.      IMPRESSION:     No abnormal bowel distention.    Improving pulmonary opacities.    NG tube extending into the proximal stomach.      Assessment :  73 y/o F with hx of DM, HTN, HLD, hypothyroid, anxiety presented admitted with severe sepsis with septic shock and acute hypoxic respiratory failure with MSOF sec COVID 19 pneumonia. Sp Cardiorespiratory arrest.  ALIS stable. Anemic. Procalcitonin markedly elevated- concern for superimposed bacterial process sp course of Zosyn. Off pressors. Remains vented. Weaning trials.    Plan :   Completed Hydroxychloroquine x 5 days  Supportive care  Sp Zosyn stopped 2020  Vent per pulm ICU  Trend temps  Prognosis guarded  DNR  COVID-19---high risk period for decompensation  (7-14 days post symptom onset), avoid aerosolizing procedures, NSAIDs   -avoid excessive blood draws and frequent CXRs  -daily CBC w diff to follow eos, lymphocytes and neutrophils and daily NLR calculation (NLR <3 low vs >5 high)  -Other labs that may be selectively used to risk stratify and predict clinical course, ie: Ferritin (lower risk <450 vs >850) CRP (low risk <2 and higher risk >6) and LDH, D Dimer  -antibiotics only if there is concern for a bacterial process      D/w ICU team      Continue with present regiment.  Appropriate use of antibiotics and adverse effects reviewed.    I have discussed the above plan of care with patient/ family in detail. They expressed understanding of the the treatment plan . Risks, benefits and alternatives discussed in detail. I have asked if they have any questions or concerns and appropriately addressed them to the best of my ability .      Critical care time greater then 35 minutes reviewing notes, labs data/ imaging , discussion with multidisciplinary team.    Thank you for allowing me to participate in care of your patient .        Eder Brown MD  Infectious Disease  [phone]

## 2020-04-10 NOTE — PROGRESS NOTE ADULT - ASSESSMENT
75 y/o F with hx of DM, HTN, HLD, hypothyroid, anxiety biba with c/o fever and cough x 6 days. Admitted to ICU for acute respiratory failure secondary to  COVID 19 pNA requiring intubation.      Acute respiratory failure with hypoxia.  secondary to ARDS from COVID 19.  Continue ventilatory support as per pulmonary critical care.  Wean ventilator as tolerated.  Continue supportive care.   Patient has completed Plaquenil.  Continue isolation precautions.  Spontanous Breathing trials are in process      ALIS most likely secondary to ATN from shock.  Nephrology on Board  BUN/creat slightly better.   Monitor serial BMP.   1/2 ns dced.;  on free water flushes      DM (diabetes mellitus).   Plan: Continue patient on Lantus and Humalog sliding scale coverage.    Hypothyroidism, unspecified type.  Plan: Continue patient on Synthroid.       DVT ppx: with ppx dosing of heparin due to ALIS    Attending Attestation:   45 minutes spent on total encounter; more than 50% of the visit was spent counseling and/or coordinating care by the attending physician.

## 2020-04-11 DIAGNOSIS — U07.1 COVID-19: ICD-10-CM

## 2020-04-11 LAB
ALBUMIN SERPL ELPH-MCNC: 2.3 G/DL — LOW (ref 3.3–5)
ALP SERPL-CCNC: 67 U/L — SIGNIFICANT CHANGE UP (ref 30–120)
ALT FLD-CCNC: 30 U/L DA — SIGNIFICANT CHANGE UP (ref 10–60)
ANION GAP SERPL CALC-SCNC: 8 MMOL/L — SIGNIFICANT CHANGE UP (ref 5–17)
APTT BLD: 34.8 SEC — SIGNIFICANT CHANGE UP (ref 28.5–37)
AST SERPL-CCNC: 18 U/L — SIGNIFICANT CHANGE UP (ref 10–40)
BASE EXCESS BLDA CALC-SCNC: -0.2 MMOL/L — SIGNIFICANT CHANGE UP (ref -2–2)
BASOPHILS # BLD AUTO: 0.02 K/UL — SIGNIFICANT CHANGE UP (ref 0–0.2)
BASOPHILS NFR BLD AUTO: 0.2 % — SIGNIFICANT CHANGE UP (ref 0–2)
BILIRUB SERPL-MCNC: 0.3 MG/DL — SIGNIFICANT CHANGE UP (ref 0.2–1.2)
BLOOD GAS SOURCE: SIGNIFICANT CHANGE UP
BUN SERPL-MCNC: 68 MG/DL — HIGH (ref 7–23)
CALCIUM SERPL-MCNC: 9 MG/DL — SIGNIFICANT CHANGE UP (ref 8.4–10.5)
CHLORIDE SERPL-SCNC: 110 MMOL/L — HIGH (ref 96–108)
CO2 SERPL-SCNC: 27 MMOL/L — SIGNIFICANT CHANGE UP (ref 22–31)
CREAT SERPL-MCNC: 2.01 MG/DL — HIGH (ref 0.5–1.3)
CRP SERPL-MCNC: 2.86 MG/DL — HIGH (ref 0–0.4)
D DIMER BLD IA.RAPID-MCNC: 1750 NG/ML DDU — HIGH
EOSINOPHIL # BLD AUTO: 0.04 K/UL — SIGNIFICANT CHANGE UP (ref 0–0.5)
EOSINOPHIL NFR BLD AUTO: 0.4 % — SIGNIFICANT CHANGE UP (ref 0–6)
ERYTHROCYTE [SEDIMENTATION RATE] IN BLOOD: 104 MM/HR — HIGH (ref 0–20)
FERRITIN SERPL-MCNC: 466 NG/ML — HIGH (ref 15–150)
GLUCOSE BLDC GLUCOMTR-MCNC: 216 MG/DL — HIGH (ref 70–99)
GLUCOSE BLDC GLUCOMTR-MCNC: 218 MG/DL — HIGH (ref 70–99)
GLUCOSE BLDC GLUCOMTR-MCNC: 223 MG/DL — HIGH (ref 70–99)
GLUCOSE BLDC GLUCOMTR-MCNC: 239 MG/DL — HIGH (ref 70–99)
GLUCOSE BLDC GLUCOMTR-MCNC: 253 MG/DL — HIGH (ref 70–99)
GLUCOSE SERPL-MCNC: 227 MG/DL — HIGH (ref 70–99)
HCO3 BLDA-SCNC: 24 MMOL/L — SIGNIFICANT CHANGE UP (ref 21–29)
HCT VFR BLD CALC: 31.9 % — LOW (ref 34.5–45)
HGB BLD-MCNC: 9.9 G/DL — LOW (ref 11.5–15.5)
HOROWITZ INDEX BLDA+IHG-RTO: 50 — SIGNIFICANT CHANGE UP
IMM GRANULOCYTES NFR BLD AUTO: 0.7 % — SIGNIFICANT CHANGE UP (ref 0–1.5)
INR BLD: 1.17 RATIO — HIGH (ref 0.88–1.16)
LYMPHOCYTES # BLD AUTO: 1.4 K/UL — SIGNIFICANT CHANGE UP (ref 1–3.3)
LYMPHOCYTES # BLD AUTO: 15.2 % — SIGNIFICANT CHANGE UP (ref 13–44)
MAGNESIUM SERPL-MCNC: 3.1 MG/DL — HIGH (ref 1.6–2.6)
MCHC RBC-ENTMCNC: 28.4 PG — SIGNIFICANT CHANGE UP (ref 27–34)
MCHC RBC-ENTMCNC: 31 GM/DL — LOW (ref 32–36)
MCV RBC AUTO: 91.7 FL — SIGNIFICANT CHANGE UP (ref 80–100)
MONOCYTES # BLD AUTO: 0.55 K/UL — SIGNIFICANT CHANGE UP (ref 0–0.9)
MONOCYTES NFR BLD AUTO: 6 % — SIGNIFICANT CHANGE UP (ref 2–14)
NEUTROPHILS # BLD AUTO: 7.12 K/UL — SIGNIFICANT CHANGE UP (ref 1.8–7.4)
NEUTROPHILS NFR BLD AUTO: 77.5 % — HIGH (ref 43–77)
NRBC # BLD: 0 /100 WBCS — SIGNIFICANT CHANGE UP (ref 0–0)
PCO2 BLDA: 40 MMHG — SIGNIFICANT CHANGE UP (ref 32–46)
PH BLD: 7.39 — SIGNIFICANT CHANGE UP (ref 7.35–7.45)
PHOSPHATE SERPL-MCNC: 4.2 MG/DL — SIGNIFICANT CHANGE UP (ref 2.5–4.5)
PLATELET # BLD AUTO: 181 K/UL — SIGNIFICANT CHANGE UP (ref 150–400)
PO2 BLDA: 67 MMHG — LOW (ref 74–108)
POTASSIUM SERPL-MCNC: 4.7 MMOL/L — SIGNIFICANT CHANGE UP (ref 3.5–5.3)
POTASSIUM SERPL-SCNC: 4.7 MMOL/L — SIGNIFICANT CHANGE UP (ref 3.5–5.3)
PROCALCITONIN SERPL-MCNC: 0.73 NG/ML — HIGH (ref 0.02–0.1)
PROT SERPL-MCNC: 6.8 G/DL — SIGNIFICANT CHANGE UP (ref 6–8.3)
PROTHROM AB SERPL-ACNC: 13.1 SEC — HIGH (ref 10–12.9)
RBC # BLD: 3.48 M/UL — LOW (ref 3.8–5.2)
RBC # FLD: 15.3 % — HIGH (ref 10.3–14.5)
SAO2 % BLDA: 92 % — SIGNIFICANT CHANGE UP (ref 92–96)
SODIUM SERPL-SCNC: 145 MMOL/L — SIGNIFICANT CHANGE UP (ref 135–145)
WBC # BLD: 9.19 K/UL — SIGNIFICANT CHANGE UP (ref 3.8–10.5)
WBC # FLD AUTO: 9.19 K/UL — SIGNIFICANT CHANGE UP (ref 3.8–10.5)

## 2020-04-11 PROCEDURE — 99233 SBSQ HOSP IP/OBS HIGH 50: CPT

## 2020-04-11 RX ORDER — INSULIN GLARGINE 100 [IU]/ML
35 INJECTION, SOLUTION SUBCUTANEOUS AT BEDTIME
Refills: 0 | Status: DISCONTINUED | OUTPATIENT
Start: 2020-04-11 | End: 2020-04-16

## 2020-04-11 RX ORDER — AMLODIPINE BESYLATE 2.5 MG/1
5 TABLET ORAL ONCE
Refills: 0 | Status: COMPLETED | OUTPATIENT
Start: 2020-04-11 | End: 2020-04-11

## 2020-04-11 RX ORDER — AMLODIPINE BESYLATE 2.5 MG/1
10 TABLET ORAL DAILY
Refills: 0 | Status: DISCONTINUED | OUTPATIENT
Start: 2020-04-12 | End: 2020-04-16

## 2020-04-11 RX ORDER — AMLODIPINE BESYLATE 2.5 MG/1
5 TABLET ORAL DAILY
Refills: 0 | Status: DISCONTINUED | OUTPATIENT
Start: 2020-04-11 | End: 2020-04-11

## 2020-04-11 RX ORDER — AMLODIPINE BESYLATE 2.5 MG/1
10 TABLET ORAL DAILY
Refills: 0 | Status: DISCONTINUED | OUTPATIENT
Start: 2020-04-11 | End: 2020-04-11

## 2020-04-11 RX ADMIN — HEPARIN SODIUM 5000 UNIT(S): 5000 INJECTION INTRAVENOUS; SUBCUTANEOUS at 11:05

## 2020-04-11 RX ADMIN — QUETIAPINE FUMARATE 50 MILLIGRAM(S): 200 TABLET, FILM COATED ORAL at 17:19

## 2020-04-11 RX ADMIN — DEXMEDETOMIDINE HYDROCHLORIDE IN 0.9% SODIUM CHLORIDE 15.9 MICROGRAM(S)/KG/HR: 4 INJECTION INTRAVENOUS at 01:26

## 2020-04-11 RX ADMIN — AMLODIPINE BESYLATE 2.5 MILLIGRAM(S): 2.5 TABLET ORAL at 06:35

## 2020-04-11 RX ADMIN — SIMVASTATIN 20 MILLIGRAM(S): 20 TABLET, FILM COATED ORAL at 21:32

## 2020-04-11 RX ADMIN — Medication 25 MILLIGRAM(S): at 21:32

## 2020-04-11 RX ADMIN — INSULIN GLARGINE 35 UNIT(S): 100 INJECTION, SOLUTION SUBCUTANEOUS at 23:21

## 2020-04-11 RX ADMIN — MIDAZOLAM HYDROCHLORIDE 2 MILLIGRAM(S): 1 INJECTION, SOLUTION INTRAMUSCULAR; INTRAVENOUS at 05:49

## 2020-04-11 RX ADMIN — Medication 4: at 11:07

## 2020-04-11 RX ADMIN — CHLORHEXIDINE GLUCONATE 15 MILLILITER(S): 213 SOLUTION TOPICAL at 06:36

## 2020-04-11 RX ADMIN — CHLORHEXIDINE GLUCONATE 15 MILLILITER(S): 213 SOLUTION TOPICAL at 17:18

## 2020-04-11 RX ADMIN — CHLORHEXIDINE GLUCONATE 1 APPLICATION(S): 213 SOLUTION TOPICAL at 06:36

## 2020-04-11 RX ADMIN — BRIMONIDINE TARTRATE 1 DROP(S): 2 SOLUTION/ DROPS OPHTHALMIC at 11:06

## 2020-04-11 RX ADMIN — PANTOPRAZOLE SODIUM 40 MILLIGRAM(S): 20 TABLET, DELAYED RELEASE ORAL at 11:04

## 2020-04-11 RX ADMIN — HEPARIN SODIUM 5000 UNIT(S): 5000 INJECTION INTRAVENOUS; SUBCUTANEOUS at 06:35

## 2020-04-11 RX ADMIN — Medication 4: at 06:36

## 2020-04-11 RX ADMIN — Medication 10 MILLIGRAM(S): at 15:14

## 2020-04-11 RX ADMIN — HEPARIN SODIUM 5000 UNIT(S): 5000 INJECTION INTRAVENOUS; SUBCUTANEOUS at 21:31

## 2020-04-11 RX ADMIN — Medication 5 MILLIGRAM(S): at 06:36

## 2020-04-11 RX ADMIN — MIDAZOLAM HYDROCHLORIDE 2 MILLIGRAM(S): 1 INJECTION, SOLUTION INTRAMUSCULAR; INTRAVENOUS at 02:28

## 2020-04-11 RX ADMIN — LATANOPROST 1 DROP(S): 0.05 SOLUTION/ DROPS OPHTHALMIC; TOPICAL at 21:32

## 2020-04-11 RX ADMIN — DEXMEDETOMIDINE HYDROCHLORIDE IN 0.9% SODIUM CHLORIDE 15.9 MICROGRAM(S)/KG/HR: 4 INJECTION INTRAVENOUS at 04:27

## 2020-04-11 RX ADMIN — Medication 6: at 17:20

## 2020-04-11 RX ADMIN — MIDAZOLAM HYDROCHLORIDE 2 MILLIGRAM(S): 1 INJECTION, SOLUTION INTRAMUSCULAR; INTRAVENOUS at 23:05

## 2020-04-11 RX ADMIN — Medication 1 DROP(S): at 11:06

## 2020-04-11 RX ADMIN — QUETIAPINE FUMARATE 50 MILLIGRAM(S): 200 TABLET, FILM COATED ORAL at 08:04

## 2020-04-11 RX ADMIN — Medication 5 MILLIGRAM(S): at 23:05

## 2020-04-11 RX ADMIN — MIDAZOLAM HYDROCHLORIDE 2 MILLIGRAM(S): 1 INJECTION, SOLUTION INTRAMUSCULAR; INTRAVENOUS at 16:07

## 2020-04-11 RX ADMIN — Medication 25 MILLIGRAM(S): at 06:35

## 2020-04-11 RX ADMIN — Medication 4: at 23:21

## 2020-04-11 RX ADMIN — AMLODIPINE BESYLATE 5 MILLIGRAM(S): 2.5 TABLET ORAL at 17:17

## 2020-04-11 RX ADMIN — Medication 25 MILLIGRAM(S): at 11:05

## 2020-04-11 RX ADMIN — Medication 5 MILLIGRAM(S): at 11:06

## 2020-04-11 RX ADMIN — Medication 5 MILLIGRAM(S): at 17:19

## 2020-04-11 RX ADMIN — MIDAZOLAM HYDROCHLORIDE 2 MILLIGRAM(S): 1 INJECTION, SOLUTION INTRAMUSCULAR; INTRAVENOUS at 18:18

## 2020-04-11 RX ADMIN — DEXMEDETOMIDINE HYDROCHLORIDE IN 0.9% SODIUM CHLORIDE 15.9 MICROGRAM(S)/KG/HR: 4 INJECTION INTRAVENOUS at 23:27

## 2020-04-11 RX ADMIN — Medication 12.5 MICROGRAM(S): at 21:39

## 2020-04-11 NOTE — PROGRESS NOTE ADULT - SUBJECTIVE AND OBJECTIVE BOX
Patient is a 74y old  Female who presents with a chief complaint of SOB (31 Mar 2020 05:05)  Patient seen in follow up for KALE SNELL.      Chart reviewed.    PAST MEDICAL HISTORY:  DJD (degenerative joint disease)  DM (diabetes mellitus)  Hyperlipidemia  Hypothyroid  HTN (hypertension)  History of vitamin D deficiency  B12 deficiency  Bronchitis  Anxiety      MEDICATIONS  (STANDING):  amLODIPine   Tablet 5 milliGRAM(s) Oral once  brimonidine 0.2% Ophthalmic Solution 1 Drop(s) Left EYE daily  chlorhexidine 0.12% Liquid 15 milliLiter(s) Oral Mucosa every 12 hours  chlorhexidine 2% Cloths 1 Application(s) Topical <User Schedule>  dexAMETHasone  Injectable 5 milliGRAM(s) IV Push every 6 hours  dexMEDEtomidine Infusion 0.7 MICROgram(s)/kG/Hr (15.9 mL/Hr) IV Continuous <Continuous>  dextrose 5%. 1000 milliLiter(s) (50 mL/Hr) IV Continuous <Continuous>  dextrose 50% Injectable 12.5 Gram(s) IV Push once  dextrose 50% Injectable 25 Gram(s) IV Push once  dextrose 50% Injectable 25 Gram(s) IV Push once  fentaNYL   Infusion. 0.5 MICROgram(s)/kG/Hr (4.54 mL/Hr) IV Continuous <Continuous>  heparin  Injectable 5000 Unit(s) SubCutaneous every 8 hours  insulin glargine Injectable (LANTUS) 28 Unit(s) SubCutaneous at bedtime  insulin lispro (HumaLOG) corrective regimen sliding scale   SubCutaneous every 6 hours  latanoprost 0.005% Ophthalmic Solution 1 Drop(s) Both EYES at bedtime  levothyroxine Injectable 12.5 MICROGram(s) IV Push at bedtime  metoprolol tartrate 25 milliGRAM(s) Oral every 8 hours  pantoprazole  Injectable 40 milliGRAM(s) IV Push daily  QUEtiapine 50 milliGRAM(s) Oral two times a day  simvastatin 20 milliGRAM(s) Oral at bedtime  timolol 0.5% Solution 1 Drop(s) Left EYE daily    MEDICATIONS  (PRN):  acetaminophen   Tablet .. 650 milliGRAM(s) Oral every 6 hours PRN Temp greater or equal to 38C (100.4F), Mild Pain (1 - 3)  dextrose 40% Gel 15 Gram(s) Oral once PRN Blood Glucose LESS THAN 70 milliGRAM(s)/deciliter  glucagon  Injectable 1 milliGRAM(s) IntraMuscular once PRN Glucose LESS THAN 70 milligrams/deciliter  hydrALAZINE Injectable 10 milliGRAM(s) IV Push every 6 hours PRN Hypertension  midazolam Injectable 2 milliGRAM(s) IV Push every 2 hours PRN Agitation  sodium chloride 0.9% lock flush 10 milliLiter(s) IV Push every 1 hour PRN Pre/post blood products, medications, blood draw, and to maintain line patency    T(C): 36.9 (04-11-20 @ 16:00), Max: 37.4 (04-10-20 @ 20:00)  HR: 76 (04-11-20 @ 14:50) (57 - 154)  BP: 171/64 (04-11-20 @ 14:00) (96/42 - 189/79)  RR: 22 (04-11-20 @ 14:00)  SpO2: 90% (04-11-20 @ 14:50)  Wt(kg): --  I&O's Detail    10 Apr 2020 07:01  -  11 Apr 2020 07:00  --------------------------------------------------------  IN:    dexmedetomidine Infusion: 455.8 mL    fentaNYL Infusion.: 240.5 mL    Free Water: 330 mL    Glucerna 1.5: 990 mL  Total IN: 2016.3 mL    OUT:    Indwelling Catheter - Urethral: 2300 mL    Rectal Tube: 150 mL  Total OUT: 2450 mL    Total NET: -433.7 mL      11 Apr 2020 07:01  -  11 Apr 2020 16:08  --------------------------------------------------------  IN:    dexmedetomidine Infusion: 181.6 mL    fentaNYL Infusion.: 108.8 mL    Free Water: 120 mL    Glucerna 1.5: 360 mL  Total IN: 770.4 mL    OUT:  Total OUT: 0 mL    Total NET: 770.4 mL        PHYSICAL EXAM:  Pt on COVID precautions. Chart reviewed and previous physical examination noted.                          LABORATORY:                        9.9    9.19  )-----------( 181      ( 11 Apr 2020 06:28 )             31.9     04-11    145  |  110<H>  |  68<H>  ----------------------------<  227<H>  4.7   |  27  |  2.01<H>    Ca    9.0      11 Apr 2020 06:28  Phos  4.2     04-11  Mg     3.1     04-11    TPro  6.8  /  Alb  2.3<L>  /  TBili  0.3  /  DBili  x   /  AST  18  /  ALT  30  /  AlkPhos  67  04-11    Sodium, Serum: 145 mmol/L (04-11 @ 06:28)  Sodium, Serum: 147 mmol/L (04-10 @ 07:28)    Potassium, Serum: 4.7 mmol/L (04-11 @ 06:28)  Potassium, Serum: 4.1 mmol/L (04-10 @ 07:28)    Hemoglobin: 9.9 g/dL (04-11 @ 06:28)  Hemoglobin: 9.5 g/dL (04-10 @ 07:28)  Hemoglobin: 10.1 g/dL (04-09 @ 07:09)    Creatinine, Serum 2.01 (04-11 @ 06:28)  Creatinine, Serum 2.12 (04-10 @ 07:28)  Creatinine, Serum 2.68 (04-09 @ 07:09)        LIVER FUNCTIONS - ( 11 Apr 2020 06:28 )  Alb: 2.3 g/dL / Pro: 6.8 g/dL / ALK PHOS: 67 U/L / ALT: 30 U/L DA / AST: 18 U/L / GGT: x             ABG - ( 11 Apr 2020 06:35 )  pH, Arterial: x     pH, Blood: 7.39  /  pCO2: 40    /  pO2: 67    / HCO3: 24    / Base Excess: -0.2  /  SaO2: 92

## 2020-04-11 NOTE — PROGRESS NOTE ADULT - SUBJECTIVE AND OBJECTIVE BOX
patient seen and examined at bedside  doing well on vent  tolerated ps support trials for 4 hours      Review of Systems:  sedated  Objective:  Vitals  T(C): 36.9 (04-11-20 @ 16:00), Max: 37.4 (04-10-20 @ 20:00)  HR: 66 (04-11-20 @ 18:00) (59 - 112)  BP: 130/42 (04-11-20 @ 18:00) (127/47 - 186/72)  RR: 19 (04-11-20 @ 18:00) (15 - 30)  SpO2: 90% (04-11-20 @ 18:00) (70% - 97%)    Physical Exam:  General: comfortable, no acute distress, well nourished  HEENT: Atraumatic, no LAD, trachea midline, PERRLA  Cardiovascular: normal s1s2,  Pulmonary: decreased, no wheezing , rhonchi  Gastrointestinal: soft non tender non distended, no masses felt, no organomegally  Muscloskeletal: no lower extremity edema, intact bilateral lower extremity pulses  Neurological: sedated  Psychiatrical: sedated'  SKIN: no rash, lesions or ulcers    ~  Labs:                          9.9    9.19  )-----------( 181      ( 11 Apr 2020 06:28 )             31.9     04-11    145  |  110<H>  |  68<H>  ----------------------------<  227<H>  4.7   |  27  |  2.01<H>    Ca    9.0      11 Apr 2020 06:28  Phos  4.2     04-11  Mg     3.1     04-11    TPro  6.8  /  Alb  2.3<L>  /  TBili  0.3  /  DBili  x   /  AST  18  /  ALT  30  /  AlkPhos  67  04-11    LIVER FUNCTIONS - ( 11 Apr 2020 06:28 )  Alb: 2.3 g/dL / Pro: 6.8 g/dL / ALK PHOS: 67 U/L / ALT: 30 U/L DA / AST: 18 U/L / GGT: x           PT/INR - ( 11 Apr 2020 06:28 )   PT: 13.1 sec;   INR: 1.17 ratio         PTT - ( 11 Apr 2020 06:28 )  PTT:34.8 sec      Active Medications  MEDICATIONS  (STANDING):  brimonidine 0.2% Ophthalmic Solution 1 Drop(s) Left EYE daily  chlorhexidine 0.12% Liquid 15 milliLiter(s) Oral Mucosa every 12 hours  chlorhexidine 2% Cloths 1 Application(s) Topical <User Schedule>  dexAMETHasone  Injectable 5 milliGRAM(s) IV Push every 6 hours  dexMEDEtomidine Infusion 0.7 MICROgram(s)/kG/Hr (15.9 mL/Hr) IV Continuous <Continuous>  dextrose 5%. 1000 milliLiter(s) (50 mL/Hr) IV Continuous <Continuous>  dextrose 50% Injectable 12.5 Gram(s) IV Push once  dextrose 50% Injectable 25 Gram(s) IV Push once  dextrose 50% Injectable 25 Gram(s) IV Push once  fentaNYL   Infusion. 0.5 MICROgram(s)/kG/Hr (4.54 mL/Hr) IV Continuous <Continuous>  heparin  Injectable 5000 Unit(s) SubCutaneous every 8 hours  insulin glargine Injectable (LANTUS) 28 Unit(s) SubCutaneous at bedtime  insulin lispro (HumaLOG) corrective regimen sliding scale   SubCutaneous every 6 hours  latanoprost 0.005% Ophthalmic Solution 1 Drop(s) Both EYES at bedtime  levothyroxine Injectable 12.5 MICROGram(s) IV Push at bedtime  metoprolol tartrate 25 milliGRAM(s) Oral every 8 hours  pantoprazole  Injectable 40 milliGRAM(s) IV Push daily  QUEtiapine 50 milliGRAM(s) Oral two times a day  simvastatin 20 milliGRAM(s) Oral at bedtime  timolol 0.5% Solution 1 Drop(s) Left EYE daily    MEDICATIONS  (PRN):  acetaminophen   Tablet .. 650 milliGRAM(s) Oral every 6 hours PRN Temp greater or equal to 38C (100.4F), Mild Pain (1 - 3)  dextrose 40% Gel 15 Gram(s) Oral once PRN Blood Glucose LESS THAN 70 milliGRAM(s)/deciliter  glucagon  Injectable 1 milliGRAM(s) IntraMuscular once PRN Glucose LESS THAN 70 milligrams/deciliter  hydrALAZINE Injectable 10 milliGRAM(s) IV Push every 6 hours PRN Hypertension  midazolam Injectable 2 milliGRAM(s) IV Push every 2 hours PRN Agitation  sodium chloride 0.9% lock flush 10 milliLiter(s) IV Push every 1 hour PRN Pre/post blood products, medications, blood draw, and to maintain line patency

## 2020-04-11 NOTE — PROGRESS NOTE ADULT - SUBJECTIVE AND OBJECTIVE BOX
Date/Time Patient Seen:  		  Referring MD:   Data Reviewed	       Patient is a 74y old  Female who presents with a chief complaint of shob (2020 13:18)      Subjective/HPI     PAST MEDICAL & SURGICAL HISTORY:  DJD (degenerative joint disease)  DM (diabetes mellitus)  Hyperlipidemia  Hypothyroid  HTN (hypertension)  History of vitamin D deficiency  B12 deficiency  Bronchitis  Anxiety  S/P cataract surgery  S/P eye surgery: left eye retinal surgery x2  S/P  section: x2        Medication list         MEDICATIONS  (STANDING):  amLODIPine   Tablet 2.5 milliGRAM(s) Oral daily  brimonidine 0.2% Ophthalmic Solution 1 Drop(s) Left EYE daily  chlorhexidine 0.12% Liquid 15 milliLiter(s) Oral Mucosa every 12 hours  chlorhexidine 2% Cloths 1 Application(s) Topical <User Schedule>  dexAMETHasone  Injectable 5 milliGRAM(s) IV Push every 6 hours  dexMEDEtomidine Infusion 0.7 MICROgram(s)/kG/Hr (15.9 mL/Hr) IV Continuous <Continuous>  dextrose 5%. 1000 milliLiter(s) (50 mL/Hr) IV Continuous <Continuous>  dextrose 50% Injectable 12.5 Gram(s) IV Push once  dextrose 50% Injectable 25 Gram(s) IV Push once  dextrose 50% Injectable 25 Gram(s) IV Push once  fentaNYL   Infusion. 0.5 MICROgram(s)/kG/Hr (4.54 mL/Hr) IV Continuous <Continuous>  heparin  Injectable 5000 Unit(s) SubCutaneous every 8 hours  insulin glargine Injectable (LANTUS) 28 Unit(s) SubCutaneous at bedtime  insulin lispro (HumaLOG) corrective regimen sliding scale   SubCutaneous every 6 hours  latanoprost 0.005% Ophthalmic Solution 1 Drop(s) Both EYES at bedtime  levothyroxine Injectable 12.5 MICROGram(s) IV Push at bedtime  metoprolol tartrate 25 milliGRAM(s) Oral every 8 hours  pantoprazole  Injectable 40 milliGRAM(s) IV Push daily  QUEtiapine 50 milliGRAM(s) Oral two times a day  simvastatin 20 milliGRAM(s) Oral at bedtime  timolol 0.5% Solution 1 Drop(s) Left EYE daily    MEDICATIONS  (PRN):  acetaminophen   Tablet .. 650 milliGRAM(s) Oral every 6 hours PRN Temp greater or equal to 38C (100.4F), Mild Pain (1 - 3)  dextrose 40% Gel 15 Gram(s) Oral once PRN Blood Glucose LESS THAN 70 milliGRAM(s)/deciliter  glucagon  Injectable 1 milliGRAM(s) IntraMuscular once PRN Glucose LESS THAN 70 milligrams/deciliter  hydrALAZINE Injectable 10 milliGRAM(s) IV Push every 6 hours PRN Hypertension  midazolam Injectable 2 milliGRAM(s) IV Push every 2 hours PRN Agitation  sodium chloride 0.9% lock flush 10 milliLiter(s) IV Push every 1 hour PRN Pre/post blood products, medications, blood draw, and to maintain line patency         Vitals log        ICU Vital Signs Last 24 Hrs  T(C): 37.1 (2020 12:00), Max: 37.4 (10 Apr 2020 20:00)  T(F): 98.7 (2020 12:00), Max: 99.3 (10 Apr 2020 20:00)  HR: 70 (2020 12:00) (59 - 112)  BP: 164/63 (:00) (127/47 - 189/79)  BP(mean): 90 (2020 12:) (67 - 108)  ABP: --  ABP(mean): --  RR: 18 (2020 12:00) (15 - 30)  SpO2: 90% (2020 12:00) (70% - 97%)       Mode: AC/ CMV (Assist Control/ Continuous Mandatory Ventilation)  RR (machine): 18  TV (machine): 400  FiO2: 40  PEEP: 5  ITime: 1  MAP: 11  PIP: 24      Input and Output:  I&O's Detail    10 Apr 2020 07:01  -  2020 07:00  --------------------------------------------------------  IN:    dexmedetomidine Infusion: 455.8 mL    fentaNYL Infusion.: 240.5 mL    Free Water: 330 mL    Glucerna 1.5: 990 mL  Total IN: 2016.3 mL    OUT:    Indwelling Catheter - Urethral: 2300 mL    Rectal Tube: 150 mL  Total OUT: 2450 mL    Total NET: -433.7 mL      2020 07:01  -  2020 14:35  --------------------------------------------------------  IN:    dexmedetomidine Infusion: 158.9 mL    fentaNYL Infusion.: 95.2 mL    Free Water: 120 mL    Glucerna 1.5: 360 mL  Total IN: 734.1 mL    OUT:  Total OUT: 0 mL    Total NET: 734.1 mL          Lab Data                        9.9    9.19  )-----------( 181      ( 2020 06:28 )             31.9     04-11    145  |  110<H>  |  68<H>  ----------------------------<  227<H>  4.7   |  27  |  2.01<H>    Ca    9.0      2020 06:28  Phos  4.2       Mg     3.1         TPro  6.8  /  Alb  2.3<L>  /  TBili  0.3  /  DBili  x   /  AST  18  /  ALT  30  /  AlkPhos  67  04-11    ABG - ( 2020 06:35 )  pH, Arterial: x     pH, Blood: 7.39  /  pCO2: 40    /  pO2: 67    / HCO3: 24    / Base Excess: -0.2  /  SaO2: 92                      Review of Systems	      Objective     Physical Examination    heart s1s2  lung dec BS  obese  head nc  head at      Pertinent Lab findings & Imaging      Justin:  NO   Adequate UO     I&O's Detail    10 Apr 2020 07:01  -  2020 07:00  --------------------------------------------------------  IN:    dexmedetomidine Infusion: 455.8 mL    fentaNYL Infusion.: 240.5 mL    Free Water: 330 mL    Glucerna 1.5: 990 mL  Total IN: 2016.3 mL    OUT:    Indwelling Catheter - Urethral: 2300 mL    Rectal Tube: 150 mL  Total OUT: 2450 mL    Total NET: -433.7 mL      2020 07:01  -  2020 14:35  --------------------------------------------------------  IN:    dexmedetomidine Infusion: 158.9 mL    fentaNYL Infusion.: 95.2 mL    Free Water: 120 mL    Glucerna 1.5: 360 mL  Total IN: 734.1 mL    OUT:  Total OUT: 0 mL    Total NET: 734.1 mL               Discussed with:     Cultures:	        Radiology

## 2020-04-11 NOTE — PROGRESS NOTE ADULT - SUBJECTIVE AND OBJECTIVE BOX
Chief Complaint: Fevers, shortness of breath    Interval Events: No events overnight.    Review of Systems:  Unable to obtain    Physical Exam:  Vital Signs Last 24 Hrs  T(C): 36.8 (11 Apr 2020 08:00), Max: 37.4 (10 Apr 2020 20:00)  T(F): 98.2 (11 Apr 2020 08:00), Max: 99.3 (10 Apr 2020 20:00)  HR: 59 (11 Apr 2020 09:20) (59 - 154)  BP: 150/52 (11 Apr 2020 08:00) (127/47 - 189/79)  BP(mean): 78 (11 Apr 2020 08:00) (67 - 128)  RR: 15 (11 Apr 2020 08:00) (15 - 30)  SpO2: 90% (11 Apr 2020 09:20) (70% - 97%)  General: Sedated  HEENT: Intubated  Neck: No JVD, no carotid bruit  Extremities: No LE edema, no cyanosis  Neuro: Non-focal  Skin: No rash    Labs:    04-11    145  |  110<H>  |  68<H>  ----------------------------<  227<H>  4.7   |  27  |  2.01<H>    Ca    9.0      11 Apr 2020 06:28  Phos  4.2     04-11  Mg     3.1     04-11    TPro  6.8  /  Alb  2.3<L>  /  TBili  0.3  /  DBili  x   /  AST  18  /  ALT  30  /  AlkPhos  67  04-11                        9.9    9.19  )-----------( 181      ( 11 Apr 2020 06:28 )             31.9     Telemetry: Sinus rhythm

## 2020-04-11 NOTE — PROGRESS NOTE ADULT - SUBJECTIVE AND OBJECTIVE BOX
INTERVAL HPI/OVERNIGHT EVENTS:  chart reviewed     MEDICATIONS  (STANDING):  amLODIPine   Tablet 2.5 milliGRAM(s) Oral daily  brimonidine 0.2% Ophthalmic Solution 1 Drop(s) Left EYE daily  chlorhexidine 0.12% Liquid 15 milliLiter(s) Oral Mucosa every 12 hours  chlorhexidine 2% Cloths 1 Application(s) Topical <User Schedule>  dexAMETHasone  Injectable 5 milliGRAM(s) IV Push every 6 hours  dexMEDEtomidine Infusion 0.7 MICROgram(s)/kG/Hr (15.9 mL/Hr) IV Continuous <Continuous>  dextrose 5%. 1000 milliLiter(s) (50 mL/Hr) IV Continuous <Continuous>  dextrose 50% Injectable 12.5 Gram(s) IV Push once  dextrose 50% Injectable 25 Gram(s) IV Push once  dextrose 50% Injectable 25 Gram(s) IV Push once  fentaNYL   Infusion. 0.5 MICROgram(s)/kG/Hr (4.54 mL/Hr) IV Continuous <Continuous>  heparin  Injectable 5000 Unit(s) SubCutaneous every 8 hours  insulin glargine Injectable (LANTUS) 28 Unit(s) SubCutaneous at bedtime  insulin lispro (HumaLOG) corrective regimen sliding scale   SubCutaneous every 6 hours  latanoprost 0.005% Ophthalmic Solution 1 Drop(s) Both EYES at bedtime  levothyroxine Injectable 12.5 MICROGram(s) IV Push at bedtime  metoprolol tartrate 25 milliGRAM(s) Oral every 8 hours  pantoprazole  Injectable 40 milliGRAM(s) IV Push daily  QUEtiapine 50 milliGRAM(s) Oral two times a day  simvastatin 20 milliGRAM(s) Oral at bedtime  timolol 0.5% Solution 1 Drop(s) Left EYE daily    MEDICATIONS  (PRN):  acetaminophen   Tablet .. 650 milliGRAM(s) Oral every 6 hours PRN Temp greater or equal to 38C (100.4F), Mild Pain (1 - 3)  dextrose 40% Gel 15 Gram(s) Oral once PRN Blood Glucose LESS THAN 70 milliGRAM(s)/deciliter  glucagon  Injectable 1 milliGRAM(s) IntraMuscular once PRN Glucose LESS THAN 70 milligrams/deciliter  hydrALAZINE Injectable 10 milliGRAM(s) IV Push every 6 hours PRN Hypertension  midazolam Injectable 2 milliGRAM(s) IV Push every 2 hours PRN Agitation  sodium chloride 0.9% lock flush 10 milliLiter(s) IV Push every 1 hour PRN Pre/post blood products, medications, blood draw, and to maintain line patency      Allergies    No Known Allergies    Intolerances            Vital Signs Last 24 Hrs  T(C): 36.8 (11 Apr 2020 08:00), Max: 37.4 (10 Apr 2020 20:00)  T(F): 98.2 (11 Apr 2020 08:00), Max: 99.3 (10 Apr 2020 20:00)  HR: 59 (11 Apr 2020 09:20) (59 - 154)  BP: 150/52 (11 Apr 2020 08:00) (127/47 - 189/79)  BP(mean): 78 (11 Apr 2020 08:00) (67 - 128)  RR: 15 (11 Apr 2020 08:00) (15 - 30)  SpO2: 90% (11 Apr 2020 09:20) (70% - 97%)        LABS:                        9.9    9.19  )-----------( 181      ( 11 Apr 2020 06:28 )             31.9     04-11    145  |  110<H>  |  68<H>  ----------------------------<  227<H>  4.7   |  27  |  2.01<H>    Ca    9.0      11 Apr 2020 06:28  Phos  4.2     04-11  Mg     3.1     04-11    TPro  6.8  /  Alb  2.3<L>  /  TBili  0.3  /  DBili  x   /  AST  18  /  ALT  30  /  AlkPhos  67  04-11    PT/INR - ( 11 Apr 2020 06:28 )   PT: 13.1 sec;   INR: 1.17 ratio         PTT - ( 11 Apr 2020 06:28 )  PTT:34.8 sec      RADIOLOGY & ADDITIONAL TESTS:

## 2020-04-11 NOTE — PROGRESS NOTE ADULT - ASSESSMENT
75 y/o F with hx of DM, HTN, HLD, hypothyroid, anxiety biba with c/o fever and cough x 6 days. Admitted to ICU for acute respiratory failure secondary to  COVID 19 pNA requiring intubation.      Acute respiratory failure with hypoxia.  secondary to ARDS from COVID 19.  Continue ventilatory support as per pulmonary critical care.  Wean ventilator as tolerated.  Continue supportive care.   Patient has completed Plaquenil.  Continue isolation precautions.  Spontanous Breathing trials are in process      ALIS most likely secondary to ATN from shock.  Nephrology on Board  BUN/creat slightly better.   Monitor serial BMP.   1/2 ns dced.;  on free water flushes      DM (diabetes mellitus).   Plan: Continue patient on Lantus and Humalog sliding scale coverage.    Hypothyroidism, unspecified type.  Plan: Continue patient on Synthroid.       DVT ppx: with ppx dosing of heparin due to ALIS      CR IS IMPROVING:: WILL CHECK CR TOMORROW: MAY BE ABLE TO SWITCH TO LOVENOX

## 2020-04-11 NOTE — PROGRESS NOTE ADULT - ASSESSMENT
1.	ALIS: ATN, COVID renal injury  2.	SARS-COVID 19, Resp failure on vent  3.	Anemia  4.	Diabetes  5.	Hypokalemia    Improving renal indices. On vent support. To continue current meds. Treatment for COVID pneumonia. COVID precautions. Strict I & O. Good UO.   Monitor blood sugar levels. Insulin coverage as needed. Avoid nephrotoxic meds as possible. Free water with tube feeds.   Avoid ACEI, ARB, NSAIDs and IV contrast. Will follow electrolytes and renal function trend. Supportive care. ICU management.

## 2020-04-12 LAB
ANION GAP SERPL CALC-SCNC: 7 MMOL/L — SIGNIFICANT CHANGE UP (ref 5–17)
BASOPHILS # BLD AUTO: 0.02 K/UL — SIGNIFICANT CHANGE UP (ref 0–0.2)
BASOPHILS NFR BLD AUTO: 0.2 % — SIGNIFICANT CHANGE UP (ref 0–2)
BUN SERPL-MCNC: 67 MG/DL — HIGH (ref 7–23)
CALCIUM SERPL-MCNC: 9.2 MG/DL — SIGNIFICANT CHANGE UP (ref 8.4–10.5)
CHLORIDE SERPL-SCNC: 112 MMOL/L — HIGH (ref 96–108)
CO2 SERPL-SCNC: 28 MMOL/L — SIGNIFICANT CHANGE UP (ref 22–31)
CREAT SERPL-MCNC: 1.84 MG/DL — HIGH (ref 0.5–1.3)
EOSINOPHIL # BLD AUTO: 0.01 K/UL — SIGNIFICANT CHANGE UP (ref 0–0.5)
EOSINOPHIL NFR BLD AUTO: 0.1 % — SIGNIFICANT CHANGE UP (ref 0–6)
GLUCOSE BLDC GLUCOMTR-MCNC: 150 MG/DL — HIGH (ref 70–99)
GLUCOSE BLDC GLUCOMTR-MCNC: 198 MG/DL — HIGH (ref 70–99)
GLUCOSE BLDC GLUCOMTR-MCNC: 210 MG/DL — HIGH (ref 70–99)
GLUCOSE BLDC GLUCOMTR-MCNC: 226 MG/DL — HIGH (ref 70–99)
GLUCOSE BLDC GLUCOMTR-MCNC: 283 MG/DL — HIGH (ref 70–99)
GLUCOSE SERPL-MCNC: 243 MG/DL — HIGH (ref 70–99)
HCT VFR BLD CALC: 29.7 % — LOW (ref 34.5–45)
HGB BLD-MCNC: 9.3 G/DL — LOW (ref 11.5–15.5)
IMM GRANULOCYTES NFR BLD AUTO: 0.6 % — SIGNIFICANT CHANGE UP (ref 0–1.5)
LYMPHOCYTES # BLD AUTO: 1.27 K/UL — SIGNIFICANT CHANGE UP (ref 1–3.3)
LYMPHOCYTES # BLD AUTO: 11.7 % — LOW (ref 13–44)
MAGNESIUM SERPL-MCNC: 1.9 MG/DL — SIGNIFICANT CHANGE UP (ref 1.6–2.6)
MCHC RBC-ENTMCNC: 28.8 PG — SIGNIFICANT CHANGE UP (ref 27–34)
MCHC RBC-ENTMCNC: 31.3 GM/DL — LOW (ref 32–36)
MCV RBC AUTO: 92 FL — SIGNIFICANT CHANGE UP (ref 80–100)
MONOCYTES # BLD AUTO: 0.76 K/UL — SIGNIFICANT CHANGE UP (ref 0–0.9)
MONOCYTES NFR BLD AUTO: 7 % — SIGNIFICANT CHANGE UP (ref 2–14)
NEUTROPHILS # BLD AUTO: 8.69 K/UL — HIGH (ref 1.8–7.4)
NEUTROPHILS NFR BLD AUTO: 80.4 % — HIGH (ref 43–77)
NRBC # BLD: 0 /100 WBCS — SIGNIFICANT CHANGE UP (ref 0–0)
PHOSPHATE SERPL-MCNC: 4.1 MG/DL — SIGNIFICANT CHANGE UP (ref 2.5–4.5)
PLATELET # BLD AUTO: 181 K/UL — SIGNIFICANT CHANGE UP (ref 150–400)
POTASSIUM SERPL-MCNC: 4.3 MMOL/L — SIGNIFICANT CHANGE UP (ref 3.5–5.3)
POTASSIUM SERPL-SCNC: 4.3 MMOL/L — SIGNIFICANT CHANGE UP (ref 3.5–5.3)
RBC # BLD: 3.23 M/UL — LOW (ref 3.8–5.2)
RBC # FLD: 15.4 % — HIGH (ref 10.3–14.5)
SODIUM SERPL-SCNC: 147 MMOL/L — HIGH (ref 135–145)
WBC # BLD: 10.81 K/UL — HIGH (ref 3.8–10.5)
WBC # FLD AUTO: 10.81 K/UL — HIGH (ref 3.8–10.5)

## 2020-04-12 PROCEDURE — 99233 SBSQ HOSP IP/OBS HIGH 50: CPT

## 2020-04-12 RX ORDER — DEXAMETHASONE 0.5 MG/5ML
2 ELIXIR ORAL THREE TIMES A DAY
Refills: 0 | Status: DISCONTINUED | OUTPATIENT
Start: 2020-04-12 | End: 2020-04-13

## 2020-04-12 RX ORDER — FENTANYL CITRATE 50 UG/ML
50 INJECTION INTRAVENOUS ONCE
Refills: 0 | Status: DISCONTINUED | OUTPATIENT
Start: 2020-04-12 | End: 2020-04-12

## 2020-04-12 RX ORDER — PROPOFOL 10 MG/ML
15 INJECTION, EMULSION INTRAVENOUS
Qty: 1000 | Refills: 0 | Status: DISCONTINUED | OUTPATIENT
Start: 2020-04-12 | End: 2020-04-14

## 2020-04-12 RX ORDER — ACETAZOLAMIDE 250 MG/1
500 TABLET ORAL ONCE
Refills: 0 | Status: COMPLETED | OUTPATIENT
Start: 2020-04-12 | End: 2020-04-12

## 2020-04-12 RX ORDER — METOPROLOL TARTRATE 50 MG
10 TABLET ORAL ONCE
Refills: 0 | Status: COMPLETED | OUTPATIENT
Start: 2020-04-12 | End: 2020-04-12

## 2020-04-12 RX ADMIN — QUETIAPINE FUMARATE 50 MILLIGRAM(S): 200 TABLET, FILM COATED ORAL at 05:14

## 2020-04-12 RX ADMIN — Medication 12.5 MICROGRAM(S): at 21:07

## 2020-04-12 RX ADMIN — DEXMEDETOMIDINE HYDROCHLORIDE IN 0.9% SODIUM CHLORIDE 15.9 MICROGRAM(S)/KG/HR: 4 INJECTION INTRAVENOUS at 23:54

## 2020-04-12 RX ADMIN — CHLORHEXIDINE GLUCONATE 15 MILLILITER(S): 213 SOLUTION TOPICAL at 17:39

## 2020-04-12 RX ADMIN — Medication 25 MILLIGRAM(S): at 05:15

## 2020-04-12 RX ADMIN — LATANOPROST 1 DROP(S): 0.05 SOLUTION/ DROPS OPHTHALMIC; TOPICAL at 21:06

## 2020-04-12 RX ADMIN — QUETIAPINE FUMARATE 50 MILLIGRAM(S): 200 TABLET, FILM COATED ORAL at 17:39

## 2020-04-12 RX ADMIN — Medication 6: at 17:38

## 2020-04-12 RX ADMIN — BRIMONIDINE TARTRATE 1 DROP(S): 2 SOLUTION/ DROPS OPHTHALMIC at 11:57

## 2020-04-12 RX ADMIN — CHLORHEXIDINE GLUCONATE 1 APPLICATION(S): 213 SOLUTION TOPICAL at 05:15

## 2020-04-12 RX ADMIN — FENTANYL CITRATE 50 MICROGRAM(S): 50 INJECTION INTRAVENOUS at 17:38

## 2020-04-12 RX ADMIN — MIDAZOLAM HYDROCHLORIDE 2 MILLIGRAM(S): 1 INJECTION, SOLUTION INTRAMUSCULAR; INTRAVENOUS at 16:02

## 2020-04-12 RX ADMIN — DEXMEDETOMIDINE HYDROCHLORIDE IN 0.9% SODIUM CHLORIDE 15.9 MICROGRAM(S)/KG/HR: 4 INJECTION INTRAVENOUS at 01:39

## 2020-04-12 RX ADMIN — HEPARIN SODIUM 5000 UNIT(S): 5000 INJECTION INTRAVENOUS; SUBCUTANEOUS at 21:06

## 2020-04-12 RX ADMIN — Medication 5 MILLIGRAM(S): at 05:13

## 2020-04-12 RX ADMIN — PANTOPRAZOLE SODIUM 40 MILLIGRAM(S): 20 TABLET, DELAYED RELEASE ORAL at 12:01

## 2020-04-12 RX ADMIN — INSULIN GLARGINE 35 UNIT(S): 100 INJECTION, SOLUTION SUBCUTANEOUS at 21:10

## 2020-04-12 RX ADMIN — Medication 10 MILLIGRAM(S): at 17:38

## 2020-04-12 RX ADMIN — Medication 2 MILLIGRAM(S): at 21:06

## 2020-04-12 RX ADMIN — DEXMEDETOMIDINE HYDROCHLORIDE IN 0.9% SODIUM CHLORIDE 15.9 MICROGRAM(S)/KG/HR: 4 INJECTION INTRAVENOUS at 06:00

## 2020-04-12 RX ADMIN — Medication 2: at 11:57

## 2020-04-12 RX ADMIN — PROPOFOL 8.16 MICROGRAM(S)/KG/MIN: 10 INJECTION, EMULSION INTRAVENOUS at 17:20

## 2020-04-12 RX ADMIN — SIMVASTATIN 20 MILLIGRAM(S): 20 TABLET, FILM COATED ORAL at 21:06

## 2020-04-12 RX ADMIN — CHLORHEXIDINE GLUCONATE 15 MILLILITER(S): 213 SOLUTION TOPICAL at 05:13

## 2020-04-12 RX ADMIN — ACETAZOLAMIDE 110 MILLIGRAM(S): 250 TABLET ORAL at 17:38

## 2020-04-12 RX ADMIN — Medication 4: at 05:16

## 2020-04-12 RX ADMIN — Medication 1 DROP(S): at 11:57

## 2020-04-12 RX ADMIN — Medication 25 MILLIGRAM(S): at 12:02

## 2020-04-12 RX ADMIN — Medication 10 MILLIGRAM(S): at 15:17

## 2020-04-12 RX ADMIN — AMLODIPINE BESYLATE 10 MILLIGRAM(S): 2.5 TABLET ORAL at 05:13

## 2020-04-12 RX ADMIN — HEPARIN SODIUM 5000 UNIT(S): 5000 INJECTION INTRAVENOUS; SUBCUTANEOUS at 12:02

## 2020-04-12 RX ADMIN — HEPARIN SODIUM 5000 UNIT(S): 5000 INJECTION INTRAVENOUS; SUBCUTANEOUS at 05:14

## 2020-04-12 RX ADMIN — Medication 2 MILLIGRAM(S): at 11:59

## 2020-04-12 NOTE — GOALS OF CARE CONVERSATION - ADVANCED CARE PLANNING - CONVERSATION DETAILS
Spoke with son and daughter regarding possible extubation.  Explained that patient has done well with spontaneous breathing trials, but this is not a guarantee of successful spontaneous breathing off the ventilator.  In the event the patient is not able to maintain spontaneous breathing, we discussed whether or not we would proceed with reintubation.  Given the current clinical scenario, it was  further discussed that successful weaning from the ventilator a second time would be unlikely.  The difficulty of repeat intubation in the current clinical setting was also discussed.    Based on this conversation, the family clearly stated they did not want the patient reintubated in the event she should develop respiratory distress following extubation. Spoke with son and daughter regarding possible extubation.  Explained that patient has done well with spontaneous breathing trials, but this is not a guarantee of successful spontaneous breathing off the ventilator.  In the event the patient is not able to maintain spontaneous breathing, we discussed whether or not we would proceed with reintubation.  Given the current clinical scenario, it was  further discussed that successful weaning from the ventilator a second time would be unlikely.  The difficulty of repeat intubation in the current clinical setting was also discussed.    Based on this conversation, the family clearly stated they did not want the patient reintubated in the event she should develop respiratory distress following extubation.  They also clearly indicated desire to leave the existing DNR in place.

## 2020-04-12 NOTE — PROGRESS NOTE ADULT - SUBJECTIVE AND OBJECTIVE BOX
Patient is a 74y old  Female who presents with a chief complaint of SOB (31 Mar 2020 05:05)  Patient seen in follow up for KALE SNELL.      Chart reviewed.    PAST MEDICAL HISTORY:  DJD (degenerative joint disease)  DM (diabetes mellitus)  Hyperlipidemia  Hypothyroid  HTN (hypertension)  History of vitamin D deficiency  B12 deficiency  Bronchitis  Anxiety      MEDICATIONS  (STANDING):  amLODIPine   Tablet 10 milliGRAM(s) Oral daily  brimonidine 0.2% Ophthalmic Solution 1 Drop(s) Left EYE daily  chlorhexidine 0.12% Liquid 15 milliLiter(s) Oral Mucosa every 12 hours  chlorhexidine 2% Cloths 1 Application(s) Topical <User Schedule>  dexAMETHasone  Injectable 2 milliGRAM(s) IV Push three times a day  dexMEDEtomidine Infusion 0.7 MICROgram(s)/kG/Hr (15.9 mL/Hr) IV Continuous <Continuous>  dextrose 5%. 1000 milliLiter(s) (50 mL/Hr) IV Continuous <Continuous>  dextrose 50% Injectable 12.5 Gram(s) IV Push once  dextrose 50% Injectable 25 Gram(s) IV Push once  dextrose 50% Injectable 25 Gram(s) IV Push once  fentaNYL   Infusion. 0.5 MICROgram(s)/kG/Hr (4.54 mL/Hr) IV Continuous <Continuous>  heparin  Injectable 5000 Unit(s) SubCutaneous every 8 hours  insulin glargine Injectable (LANTUS) 35 Unit(s) SubCutaneous at bedtime  insulin lispro (HumaLOG) corrective regimen sliding scale   SubCutaneous every 6 hours  latanoprost 0.005% Ophthalmic Solution 1 Drop(s) Both EYES at bedtime  levothyroxine Injectable 12.5 MICROGram(s) IV Push at bedtime  metoprolol tartrate 25 milliGRAM(s) Oral every 8 hours  pantoprazole  Injectable 40 milliGRAM(s) IV Push daily  QUEtiapine 50 milliGRAM(s) Oral two times a day  simvastatin 20 milliGRAM(s) Oral at bedtime  timolol 0.5% Solution 1 Drop(s) Left EYE daily    MEDICATIONS  (PRN):  acetaminophen   Tablet .. 650 milliGRAM(s) Oral every 6 hours PRN Temp greater or equal to 38C (100.4F), Mild Pain (1 - 3)  dextrose 40% Gel 15 Gram(s) Oral once PRN Blood Glucose LESS THAN 70 milliGRAM(s)/deciliter  glucagon  Injectable 1 milliGRAM(s) IntraMuscular once PRN Glucose LESS THAN 70 milligrams/deciliter  hydrALAZINE Injectable 10 milliGRAM(s) IV Push every 6 hours PRN Hypertension  midazolam Injectable 2 milliGRAM(s) IV Push every 2 hours PRN Agitation  sodium chloride 0.9% lock flush 10 milliLiter(s) IV Push every 1 hour PRN Pre/post blood products, medications, blood draw, and to maintain line patency    T(C): 36.8 (04-12-20 @ 12:00), Max: 37.4 (04-10-20 @ 20:00)  HR: 99 (04-12-20 @ 14:00) (59 - 112)  BP: 186/80 (04-12-20 @ 14:00) (127/47 - 189/79)  RR: 30 (04-12-20 @ 14:00)  SpO2: 95% (04-12-20 @ 14:00)  Wt(kg): --  I&O's Detail    11 Apr 2020 07:01  -  12 Apr 2020 07:00  --------------------------------------------------------  IN:    dexmedetomidine Infusion: 478.7 mL    fentaNYL Infusion.: 292.9 mL    Free Water: 360 mL    Glucerna 1.5: 1080 mL  Total IN: 2211.6 mL    OUT:    Indwelling Catheter - Urethral: 1700 mL    Rectal Tube: 350 mL  Total OUT: 2050 mL    Total NET: 161.6 mL      12 Apr 2020 07:01  -  12 Apr 2020 15:18  --------------------------------------------------------  IN:    dexmedetomidine Infusion: 108.7 mL    fentaNYL Infusion.: 36.3 mL    Free Water: 60 mL    Glucerna 1.5: 180 mL  Total IN: 385 mL    OUT:  Total OUT: 0 mL    Total NET: 385 mL      PHYSICAL EXAM:  Pt on COVID precautions. Chart reviewed and previous physical examination noted.                          LABORATORY:                        9.3    10.81 )-----------( 181      ( 12 Apr 2020 06:21 )             29.7     04-12    147<H>  |  112<H>  |  67<H>  ----------------------------<  243<H>  4.3   |  28  |  1.84<H>    Ca    9.2      12 Apr 2020 06:21  Phos  4.1     04-12  Mg     1.9     04-12    TPro  6.8  /  Alb  2.3<L>  /  TBili  0.3  /  DBili  x   /  AST  18  /  ALT  30  /  AlkPhos  67  04-11    Sodium, Serum: 147 mmol/L (04-12 @ 06:21)  Sodium, Serum: 145 mmol/L (04-11 @ 06:28)    Potassium, Serum: 4.3 mmol/L (04-12 @ 06:21)  Potassium, Serum: 4.7 mmol/L (04-11 @ 06:28)    Hemoglobin: 9.3 g/dL (04-12 @ 06:21)  Hemoglobin: 9.9 g/dL (04-11 @ 06:28)  Hemoglobin: 9.5 g/dL (04-10 @ 07:28)    Creatinine, Serum 1.84 (04-12 @ 06:21)  Creatinine, Serum 2.01 (04-11 @ 06:28)  Creatinine, Serum 2.12 (04-10 @ 07:28)        LIVER FUNCTIONS - ( 11 Apr 2020 06:28 )  Alb: 2.3 g/dL / Pro: 6.8 g/dL / ALK PHOS: 67 U/L / ALT: 30 U/L DA / AST: 18 U/L / GGT: x             ABG - ( 11 Apr 2020 06:35 )  pH, Arterial: x     pH, Blood: 7.39  /  pCO2: 40    /  pO2: 67    / HCO3: 24    / Base Excess: -0.2  /  SaO2: 92

## 2020-04-12 NOTE — PROGRESS NOTE ADULT - SUBJECTIVE AND OBJECTIVE BOX
Date/Time Patient Seen:  		  Referring MD:   Data Reviewed	       Patient is a 74y old  Female who presents with a chief complaint of shob (2020 10:03)      Subjective/HPI     PAST MEDICAL & SURGICAL HISTORY:  DJD (degenerative joint disease)  DM (diabetes mellitus)  Hyperlipidemia  Hypothyroid  HTN (hypertension)  History of vitamin D deficiency  B12 deficiency  Bronchitis  Anxiety  S/P cataract surgery  S/P eye surgery: left eye retinal surgery x2  S/P  section: x2        Medication list         MEDICATIONS  (STANDING):  amLODIPine   Tablet 10 milliGRAM(s) Oral daily  brimonidine 0.2% Ophthalmic Solution 1 Drop(s) Left EYE daily  chlorhexidine 0.12% Liquid 15 milliLiter(s) Oral Mucosa every 12 hours  chlorhexidine 2% Cloths 1 Application(s) Topical <User Schedule>  dexAMETHasone  Injectable 2 milliGRAM(s) IV Push three times a day  dexMEDEtomidine Infusion 0.7 MICROgram(s)/kG/Hr (15.9 mL/Hr) IV Continuous <Continuous>  dextrose 5%. 1000 milliLiter(s) (50 mL/Hr) IV Continuous <Continuous>  dextrose 50% Injectable 12.5 Gram(s) IV Push once  dextrose 50% Injectable 25 Gram(s) IV Push once  dextrose 50% Injectable 25 Gram(s) IV Push once  fentaNYL   Infusion. 0.5 MICROgram(s)/kG/Hr (4.54 mL/Hr) IV Continuous <Continuous>  heparin  Injectable 5000 Unit(s) SubCutaneous every 8 hours  insulin glargine Injectable (LANTUS) 35 Unit(s) SubCutaneous at bedtime  insulin lispro (HumaLOG) corrective regimen sliding scale   SubCutaneous every 6 hours  latanoprost 0.005% Ophthalmic Solution 1 Drop(s) Both EYES at bedtime  levothyroxine Injectable 12.5 MICROGram(s) IV Push at bedtime  metoprolol tartrate 25 milliGRAM(s) Oral every 8 hours  pantoprazole  Injectable 40 milliGRAM(s) IV Push daily  QUEtiapine 50 milliGRAM(s) Oral two times a day  simvastatin 20 milliGRAM(s) Oral at bedtime  timolol 0.5% Solution 1 Drop(s) Left EYE daily    MEDICATIONS  (PRN):  acetaminophen   Tablet .. 650 milliGRAM(s) Oral every 6 hours PRN Temp greater or equal to 38C (100.4F), Mild Pain (1 - 3)  dextrose 40% Gel 15 Gram(s) Oral once PRN Blood Glucose LESS THAN 70 milliGRAM(s)/deciliter  glucagon  Injectable 1 milliGRAM(s) IntraMuscular once PRN Glucose LESS THAN 70 milligrams/deciliter  hydrALAZINE Injectable 10 milliGRAM(s) IV Push every 6 hours PRN Hypertension  midazolam Injectable 2 milliGRAM(s) IV Push every 2 hours PRN Agitation  sodium chloride 0.9% lock flush 10 milliLiter(s) IV Push every 1 hour PRN Pre/post blood products, medications, blood draw, and to maintain line patency         Vitals log        ICU Vital Signs Last 24 Hrs  T(C): 36.8 (2020 12:00), Max: 37.2 (2020 08:00)  T(F): 98.3 (2020 12:00), Max: 98.9 (2020 08:00)  HR: 86 (2020 12:00) (62 - 86)  BP: 169/77 (2020 12:00) (130/42 - 172/72)  BP(mean): 101 (2020 12:00) (64 - 101)  ABP: --  ABP(mean): --  RR: 23 (2020 12:00) (16 - 24)  SpO2: 95% (2020 12:00) (90% - 97%)       Mode: SIMV with PS  FiO2: 40  PEEP: 5  PS: 10      Input and Output:  I&O's Detail    2020 07:01  -  2020 07:00  --------------------------------------------------------  IN:    dexmedetomidine Infusion: 478.7 mL    fentaNYL Infusion.: 292.9 mL    Free Water: 360 mL    Glucerna 1.5: 1080 mL  Total IN: 2211.6 mL    OUT:    Indwelling Catheter - Urethral: 1700 mL    Rectal Tube: 350 mL  Total OUT: 2050 mL    Total NET: 161.6 mL      2020 07:01  -  2020 13:45  --------------------------------------------------------  IN:    dexmedetomidine Infusion: 108.7 mL    fentaNYL Infusion.: 36.3 mL    Free Water: 60 mL    Glucerna 1.5: 180 mL  Total IN: 385 mL    OUT:  Total OUT: 0 mL    Total NET: 385 mL          Lab Data                        9.3    10.81 )-----------( 181      ( 2020 06:21 )             29.7     04-12    147<H>  |  112<H>  |  67<H>  ----------------------------<  243<H>  4.3   |  28  |  1.84<H>    Ca    9.2      2020 06:21  Phos  4.1     -12  Mg     1.9     12    TPro  6.8  /  Alb  2.3<L>  /  TBili  0.3  /  DBili  x   /  AST  18  /  ALT  30  /  AlkPhos  67  04-11    ABG - ( 2020 06:35 )  pH, Arterial: x     pH, Blood: 7.39  /  pCO2: 40    /  pO2: 67    / HCO3: 24    / Base Excess: -0.2  /  SaO2: 92                      Review of Systems	      Objective     Physical Examination    heart s1s2  lung dec BS  abd soft  head nc  head at  obese      Pertinent Lab findings & Imaging      Justin:  NO   Adequate UO     I&O's Detail    2020 07:01  -  2020 07:00  --------------------------------------------------------  IN:    dexmedetomidine Infusion: 478.7 mL    fentaNYL Infusion.: 292.9 mL    Free Water: 360 mL    Glucerna 1.5: 1080 mL  Total IN: 2211.6 mL    OUT:    Indwelling Catheter - Urethral: 1700 mL    Rectal Tube: 350 mL  Total OUT: 2050 mL    Total NET: 161.6 mL      2020 07:01  -  2020 13:45  --------------------------------------------------------  IN:    dexmedetomidine Infusion: 108.7 mL    fentaNYL Infusion.: 36.3 mL    Free Water: 60 mL    Glucerna 1.5: 180 mL  Total IN: 385 mL    OUT:  Total OUT: 0 mL    Total NET: 385 mL               Discussed with:     Cultures:	        Radiology

## 2020-04-12 NOTE — PROGRESS NOTE ADULT - SUBJECTIVE AND OBJECTIVE BOX
Chief Complaint: Fevers, shortness of breath    Interval Events: No events overnight.    Review of Systems:  Unable to obtain    Physical Exam:  Vital Signs Last 24 Hrs  T(C): 37.2 (12 Apr 2020 08:00), Max: 37.2 (12 Apr 2020 08:00)  T(F): 98.9 (12 Apr 2020 08:00), Max: 98.9 (12 Apr 2020 08:00)  HR: 71 (12 Apr 2020 08:15) (62 - 84)  BP: 160/59 (12 Apr 2020 08:00) (130/42 - 172/72)  BP(mean): 85 (12 Apr 2020 08:00) (64 - 93)  RR: 19 (12 Apr 2020 08:00) (16 - 24)  SpO2: 96% (12 Apr 2020 08:15) (90% - 96%)  General: Sedated  HEENT: Intubated  Neck: No JVD, no carotid bruit  Extremities: No LE edema, no cyanosis  Neuro: Non-focal  Skin: No rash    Labs:    04-12    147<H>  |  112<H>  |  67<H>  ----------------------------<  243<H>  4.3   |  28  |  1.84<H>    Ca    9.2      12 Apr 2020 06:21  Phos  4.1     04-12  Mg     1.9     04-12    TPro  6.8  /  Alb  2.3<L>  /  TBili  0.3  /  DBili  x   /  AST  18  /  ALT  30  /  AlkPhos  67  04-11                        9.3    10.81 )-----------( 181      ( 12 Apr 2020 06:21 )             29.7     Telemetry: Sinus rhythm

## 2020-04-12 NOTE — PROGRESS NOTE ADULT - SUBJECTIVE AND OBJECTIVE BOX
INTERVAL HPI/OVERNIGHT EVENTS:  chart reviewed  MEDICATIONS  (STANDING):  amLODIPine   Tablet 10 milliGRAM(s) Oral daily  brimonidine 0.2% Ophthalmic Solution 1 Drop(s) Left EYE daily  chlorhexidine 0.12% Liquid 15 milliLiter(s) Oral Mucosa every 12 hours  chlorhexidine 2% Cloths 1 Application(s) Topical <User Schedule>  dexAMETHasone  Injectable 2 milliGRAM(s) IV Push three times a day  dexMEDEtomidine Infusion 0.7 MICROgram(s)/kG/Hr (15.9 mL/Hr) IV Continuous <Continuous>  dextrose 5%. 1000 milliLiter(s) (50 mL/Hr) IV Continuous <Continuous>  dextrose 50% Injectable 12.5 Gram(s) IV Push once  dextrose 50% Injectable 25 Gram(s) IV Push once  dextrose 50% Injectable 25 Gram(s) IV Push once  fentaNYL   Infusion. 0.5 MICROgram(s)/kG/Hr (4.54 mL/Hr) IV Continuous <Continuous>  heparin  Injectable 5000 Unit(s) SubCutaneous every 8 hours  insulin glargine Injectable (LANTUS) 35 Unit(s) SubCutaneous at bedtime  insulin lispro (HumaLOG) corrective regimen sliding scale   SubCutaneous every 6 hours  latanoprost 0.005% Ophthalmic Solution 1 Drop(s) Both EYES at bedtime  levothyroxine Injectable 12.5 MICROGram(s) IV Push at bedtime  metoprolol tartrate 25 milliGRAM(s) Oral every 8 hours  pantoprazole  Injectable 40 milliGRAM(s) IV Push daily  QUEtiapine 50 milliGRAM(s) Oral two times a day  simvastatin 20 milliGRAM(s) Oral at bedtime  timolol 0.5% Solution 1 Drop(s) Left EYE daily    MEDICATIONS  (PRN):  acetaminophen   Tablet .. 650 milliGRAM(s) Oral every 6 hours PRN Temp greater or equal to 38C (100.4F), Mild Pain (1 - 3)  dextrose 40% Gel 15 Gram(s) Oral once PRN Blood Glucose LESS THAN 70 milliGRAM(s)/deciliter  glucagon  Injectable 1 milliGRAM(s) IntraMuscular once PRN Glucose LESS THAN 70 milligrams/deciliter  hydrALAZINE Injectable 10 milliGRAM(s) IV Push every 6 hours PRN Hypertension  midazolam Injectable 2 milliGRAM(s) IV Push every 2 hours PRN Agitation  sodium chloride 0.9% lock flush 10 milliLiter(s) IV Push every 1 hour PRN Pre/post blood products, medications, blood draw, and to maintain line patency      Allergies    No Known Allergies    Intolerances        Review of Systems:    General:  No wt loss, fevers, chills, night sweats,fatigue,   Eyes:  Good vision, no reported pain  ENT:  No sore throat, pain, runny nose, dysphagia  CV:  No pain, palpitatioins, hypo/hypertension  Resp:  No dyspnea, cough, tachypnea, wheezing  GI:  No pain, No nausea, No vomiting, No diarrhea, No constipatiion, No weight loss, No fever, No pruritis, No rectal bleeding, No tarry stools, No dysphagia,  :  No pain, bleeding, incontinence, nocturia  Muscle:  No pain, weakness  Neuro:  No weakness, tingling, memory problems  Psych:  No fatigue, insomnia, mood problems, depression  Endocrine:  No polyuria, polydypsia, cold/heat intolerance  Heme:  No petechiae, ecchymosis, easy bruisability  Skin:  No rash, tattoos, scars, edema      Vital Signs Last 24 Hrs  T(C): 37.2 (12 Apr 2020 08:00), Max: 37.2 (12 Apr 2020 08:00)  T(F): 98.9 (12 Apr 2020 08:00), Max: 98.9 (12 Apr 2020 08:00)  HR: 71 (12 Apr 2020 08:15) (62 - 84)  BP: 160/59 (12 Apr 2020 08:00) (130/42 - 172/72)  BP(mean): 85 (12 Apr 2020 08:00) (64 - 93)  RR: 19 (12 Apr 2020 08:00) (16 - 24)  SpO2: 96% (12 Apr 2020 08:15) (90% - 96%)    PHYSICAL EXAM:    Constitutional: NAD, well-developed  HEENT: EOMI, throat clear  Neck: No LAD, supple  Respiratory: CTA and P  Cardiovascular: S1 and S2, RRR, no M  Gastrointestinal: BS+, soft, NT/ND, neg HSM,  Extremities: No peripheral edema, neg clubing, cyanosis  Vascular: 2+ peripheral pulses  Neurological: A/O x 3, no focal deficits  Psychiatric: Normal mood, normal affect  Skin: No rashes      LABS:                        9.3    10.81 )-----------( 181      ( 12 Apr 2020 06:21 )             29.7     04-12    147<H>  |  112<H>  |  67<H>  ----------------------------<  243<H>  4.3   |  28  |  1.84<H>    Ca    9.2      12 Apr 2020 06:21  Phos  4.1     04-12  Mg     1.9     04-12    TPro  6.8  /  Alb  2.3<L>  /  TBili  0.3  /  DBili  x   /  AST  18  /  ALT  30  /  AlkPhos  67  04-11    PT/INR - ( 11 Apr 2020 06:28 )   PT: 13.1 sec;   INR: 1.17 ratio         PTT - ( 11 Apr 2020 06:28 )  PTT:34.8 sec      RADIOLOGY & ADDITIONAL TESTS:

## 2020-04-12 NOTE — PROGRESS NOTE ADULT - SUBJECTIVE AND OBJECTIVE BOX
Patient seen and examined at bedside.  doing well  succesful at SBT      ICU team discussed case with patients family. due to diffcult airway and prolonged intubation, high risk of reintubation.  family would like to proceed with extubation, without trial of reintubation      Review of Systems:  unable    Objective:  Vitals  T(C): 36.6 (04-12-20 @ 16:00), Max: 37.2 (04-12-20 @ 08:00)  HR: 172 (04-12-20 @ 16:00) (62 - 172)  BP: 185/60 (04-12-20 @ 16:00) (130/42 - 186/80)  RR: 40 (04-12-20 @ 16:00) (16 - 40)  SpO2: 92% (04-12-20 @ 16:00) (90% - 97%)    Physical Exam:  General: comfortable, no acute distress, obese, sedatec  HEENT: Atraumatic, no LAD, trachea midline, PERRLA  Cardiovascular: normal s1s2,  Pulmonary: decreased, no wheezing , rhonchi  Gastrointestinal: soft non tender non distended, no masses felt, no organomegally  Muscloskeletal: no lower extremity edema,   Neurological:sedated  Psychiatrical: sedated  SKIN: no rash, lesions or ulcers    ~

## 2020-04-12 NOTE — PROGRESS NOTE ADULT - SUBJECTIVE AND OBJECTIVE BOX
HPI:  75 y/o F with hx of DM, HTN, HLD, hypothyroid, anxiety biba with c/o fever and cough x 6 days. Pt states that Tmax was 101.8F, took one tab of tylenol XS at 9am this morning. Denies Covid-19 exposure, CP, SOB, runny nose, sore throat, headache, rash, urinary symptoms.  She was sating 94% ON 4l and was then put on non rebreather and was transferred to spcu. Subsequently intubated for hypoxia.           Hosp day # 17  Vent day #  Vent settings:  ---> AC 18/400/40/+5  pF ratio:   --->     Vitalsigns/reviewed and physical exam performed where pertinent and urgently required    Lab/radiology studies/ABG/Meds reviewed and interpreted into the assesment and treatment plan.    A/P

## 2020-04-12 NOTE — PROGRESS NOTE ADULT - ASSESSMENT
The patient is a 74 year old female with a history of HTN, DM, HL, hypothyroidism, anxiety who presented with fevers, cough, shortness of breath in the setting of viral PNA, COVID-19, respiratory failure, septic shock.     Plan:  - CXR consistent with PNA  - COVID-19 positive  - Completed hydroxychloroquine  - On zosyn  - Off of IV pressors  - On amlodipine 2.5 mg daily  - AF/SVT - continue metoprolol tartrate 25 mg q8h  - If there is a recurrence of sustained SVT, give metoprolol 5 mg IV or attempt adenosine.  - Continue mechanical ventilation and wean  - Pressure support trial  - ICU care

## 2020-04-12 NOTE — PROGRESS NOTE ADULT - ASSESSMENT
73 y/o F with hx of DM, HTN, HLD, hypothyroid, anxiety biba with c/o fever and cough x 6 days. Admitted to ICU for acute respiratory failure secondary to  COVID 19 pNA requiring intubation.      Acute respiratory failure with hypoxia.  secondary to ARDS from COVID 19.  Continue ventilatory support as per pulmonary critical care.  Wean ventilator as tolerated.  Continue supportive care.   Patient has completed Plaquenil.  Continue isolation precautions.  Spontanous Breathing trials are in process      ALIS most likely secondary to ATN from shock.  Nephrology on Board  BUN/creat slightly better.   Monitor serial BMP.   1/2 ns dced.;  on free water flushes      DM (diabetes mellitus).   Plan: Continue patient on Lantus and Humalog sliding scale coverage.    Hypothyroidism, unspecified type.  Plan: Continue patient on Synthroid.       DVT ppx: with ppx dosing of heparin due to ALIS      At this time;  continue sbt  switch heparin drip to lovenox  Code status changed to DNI as per family. MOLST form updated    ~>>>15 minutes total face to face time spent with family and patient discussing advance care planning

## 2020-04-12 NOTE — PROGRESS NOTE ADULT - ASSESSMENT
1.	ALIS: ATN, COVID renal injury  2.	SARS-COVID 19, Resp failure on vent  3.	Anemia  4.	Diabetes  5.	Hypokalemia    Improving renal indices. To continue current meds. Treatment for COVID pneumonia. COVID precautions. Strict I & O. Good UO.   Monitor blood sugar levels. Insulin coverage as needed. Avoid nephrotoxic meds as possible. Wean as tolerated.   Avoid ACEI, ARB, NSAIDs and IV contrast. Will follow electrolytes and renal function trend. Supportive care. ICU management.

## 2020-04-12 NOTE — PROGRESS NOTE ADULT - SUBJECTIVE AND OBJECTIVE BOX
Date/Time Patient Seen:  		  Referring MD:   Data Reviewed	       Patient is a 74y old  Female who presents with a chief complaint of shob (2020 05:07)      Subjective/HPI     PAST MEDICAL & SURGICAL HISTORY:  DJD (degenerative joint disease)  DM (diabetes mellitus)  Hyperlipidemia  Hypothyroid  HTN (hypertension)  History of vitamin D deficiency  B12 deficiency  Bronchitis  Anxiety  S/P cataract surgery  S/P eye surgery: left eye retinal surgery x2  S/P  section: x2        Medication list         MEDICATIONS  (STANDING):  amLODIPine   Tablet 10 milliGRAM(s) Oral daily  brimonidine 0.2% Ophthalmic Solution 1 Drop(s) Left EYE daily  chlorhexidine 0.12% Liquid 15 milliLiter(s) Oral Mucosa every 12 hours  chlorhexidine 2% Cloths 1 Application(s) Topical <User Schedule>  dexAMETHasone  Injectable 5 milliGRAM(s) IV Push every 6 hours  dexMEDEtomidine Infusion 0.7 MICROgram(s)/kG/Hr (15.9 mL/Hr) IV Continuous <Continuous>  dextrose 5%. 1000 milliLiter(s) (50 mL/Hr) IV Continuous <Continuous>  dextrose 50% Injectable 12.5 Gram(s) IV Push once  dextrose 50% Injectable 25 Gram(s) IV Push once  dextrose 50% Injectable 25 Gram(s) IV Push once  fentaNYL   Infusion. 0.5 MICROgram(s)/kG/Hr (4.54 mL/Hr) IV Continuous <Continuous>  heparin  Injectable 5000 Unit(s) SubCutaneous every 8 hours  insulin glargine Injectable (LANTUS) 35 Unit(s) SubCutaneous at bedtime  insulin lispro (HumaLOG) corrective regimen sliding scale   SubCutaneous every 6 hours  latanoprost 0.005% Ophthalmic Solution 1 Drop(s) Both EYES at bedtime  levothyroxine Injectable 12.5 MICROGram(s) IV Push at bedtime  metoprolol tartrate 25 milliGRAM(s) Oral every 8 hours  pantoprazole  Injectable 40 milliGRAM(s) IV Push daily  QUEtiapine 50 milliGRAM(s) Oral two times a day  simvastatin 20 milliGRAM(s) Oral at bedtime  timolol 0.5% Solution 1 Drop(s) Left EYE daily    MEDICATIONS  (PRN):  acetaminophen   Tablet .. 650 milliGRAM(s) Oral every 6 hours PRN Temp greater or equal to 38C (100.4F), Mild Pain (1 - 3)  dextrose 40% Gel 15 Gram(s) Oral once PRN Blood Glucose LESS THAN 70 milliGRAM(s)/deciliter  glucagon  Injectable 1 milliGRAM(s) IntraMuscular once PRN Glucose LESS THAN 70 milligrams/deciliter  hydrALAZINE Injectable 10 milliGRAM(s) IV Push every 6 hours PRN Hypertension  midazolam Injectable 2 milliGRAM(s) IV Push every 2 hours PRN Agitation  sodium chloride 0.9% lock flush 10 milliLiter(s) IV Push every 1 hour PRN Pre/post blood products, medications, blood draw, and to maintain line patency         Vitals log        ICU Vital Signs Last 24 Hrs  T(C): 37 (2020 06:09), Max: 37.1 (2020 12:00)  T(F): 98.6 (2020 06:09), Max: 98.7 (2020 12:00)  HR: 63 (2020 06:09) (59 - 84)  BP: 160/57 (2020 06:09) (130/42 - 172/72)  BP(mean): 85 (2020 06:09) (64 - 93)  ABP: --  ABP(mean): --  RR: 16 (2020 06:09) (15 - 24)  SpO2: 95% (2020 06:09) (90% - 95%)       Mode: AC/ CMV (Assist Control/ Continuous Mandatory Ventilation)  RR (machine): 18  TV (machine): 400  FiO2: 40  PEEP: 5  ITime: 1  MAP: 10  PIP: 27      Input and Output:  I&O's Detail    2020 07:01  -  2020 07:00  --------------------------------------------------------  IN:    dexmedetomidine Infusion: 478.7 mL    fentaNYL Infusion.: 292.9 mL    Free Water: 360 mL    Glucerna 1.5: 1080 mL  Total IN: 2211.6 mL    OUT:    Indwelling Catheter - Urethral: 1700 mL    Rectal Tube: 350 mL  Total OUT: 2050 mL    Total NET: 161.6 mL          Lab Data                        9.3    10.81 )-----------( 181      ( 2020 06:21 )             29.7     04-12    147<H>  |  112<H>  |  67<H>  ----------------------------<  243<H>  4.3   |  28  |  1.84<H>    Ca    9.2      2020 06:21  Phos  4.2     04-11  Mg     3.1     -11    TPro  6.8  /  Alb  2.3<L>  /  TBili  0.3  /  DBili  x   /  AST  18  /  ALT  30  /  AlkPhos  67  04-11    ABG - ( 2020 06:35 )  pH, Arterial: x     pH, Blood: 7.39  /  pCO2: 40    /  pO2: 67    / HCO3: 24    / Base Excess: -0.2  /  SaO2: 92                      Review of Systems	      Objective     Physical Examination  heart s1s2  lung dec BS        Pertinent Lab findings & Imaging      Justin:  NO   Adequate UO     I&O's Detail    2020 07:01  -  2020 07:00  --------------------------------------------------------  IN:    dexmedetomidine Infusion: 478.7 mL    fentaNYL Infusion.: 292.9 mL    Free Water: 360 mL    Glucerna 1.5: 1080 mL  Total IN: 2211.6 mL    OUT:    Indwelling Catheter - Urethral: 1700 mL    Rectal Tube: 350 mL  Total OUT: 2050 mL    Total NET: 161.6 mL               Discussed with:     Cultures:	        Radiology

## 2020-04-13 LAB
ALBUMIN SERPL ELPH-MCNC: 2.3 G/DL — LOW (ref 3.3–5)
ALP SERPL-CCNC: 50 U/L — SIGNIFICANT CHANGE UP (ref 30–120)
ALT FLD-CCNC: 24 U/L DA — SIGNIFICANT CHANGE UP (ref 10–60)
ANION GAP SERPL CALC-SCNC: 9 MMOL/L — SIGNIFICANT CHANGE UP (ref 5–17)
APTT BLD: >200 SEC — CRITICAL HIGH (ref 28.5–37)
AST SERPL-CCNC: 15 U/L — SIGNIFICANT CHANGE UP (ref 10–40)
BASE EXCESS BLDA CALC-SCNC: 0.5 MMOL/L — SIGNIFICANT CHANGE UP (ref -2–2)
BILIRUB SERPL-MCNC: 0.2 MG/DL — SIGNIFICANT CHANGE UP (ref 0.2–1.2)
BLOOD GAS COMMENTS: SIGNIFICANT CHANGE UP
BLOOD GAS SOURCE: SIGNIFICANT CHANGE UP
BUN SERPL-MCNC: 61 MG/DL — HIGH (ref 7–23)
CALCIUM SERPL-MCNC: 9.1 MG/DL — SIGNIFICANT CHANGE UP (ref 8.4–10.5)
CHLORIDE SERPL-SCNC: 114 MMOL/L — HIGH (ref 96–108)
CO2 SERPL-SCNC: 25 MMOL/L — SIGNIFICANT CHANGE UP (ref 22–31)
CREAT SERPL-MCNC: 1.72 MG/DL — HIGH (ref 0.5–1.3)
CRP SERPL-MCNC: 0.88 MG/DL — HIGH (ref 0–0.4)
CRP SERPL-MCNC: 0.88 MG/DL — HIGH (ref 0–0.4)
D DIMER BLD IA.RAPID-MCNC: 1785 NG/ML DDU — HIGH
FERRITIN SERPL-MCNC: 420 NG/ML — HIGH (ref 15–150)
GLUCOSE BLDC GLUCOMTR-MCNC: 133 MG/DL — HIGH (ref 70–99)
GLUCOSE BLDC GLUCOMTR-MCNC: 198 MG/DL — HIGH (ref 70–99)
GLUCOSE BLDC GLUCOMTR-MCNC: 217 MG/DL — HIGH (ref 70–99)
GLUCOSE BLDC GLUCOMTR-MCNC: 222 MG/DL — HIGH (ref 70–99)
GLUCOSE SERPL-MCNC: 137 MG/DL — HIGH (ref 70–99)
HCO3 BLDA-SCNC: 25 MMOL/L — SIGNIFICANT CHANGE UP (ref 21–29)
HCT VFR BLD CALC: 28.8 % — LOW (ref 34.5–45)
HCT VFR BLD CALC: 33.8 % — LOW (ref 34.5–45)
HGB BLD-MCNC: 10.5 G/DL — LOW (ref 11.5–15.5)
HGB BLD-MCNC: 8.8 G/DL — LOW (ref 11.5–15.5)
HOROWITZ INDEX BLDA+IHG-RTO: 40 — SIGNIFICANT CHANGE UP
LDH SERPL L TO P-CCNC: 402 U/L — HIGH (ref 50–242)
MAGNESIUM SERPL-MCNC: 1.8 MG/DL — SIGNIFICANT CHANGE UP (ref 1.6–2.6)
MCHC RBC-ENTMCNC: 28.2 PG — SIGNIFICANT CHANGE UP (ref 27–34)
MCHC RBC-ENTMCNC: 28.5 PG — SIGNIFICANT CHANGE UP (ref 27–34)
MCHC RBC-ENTMCNC: 30.6 GM/DL — LOW (ref 32–36)
MCHC RBC-ENTMCNC: 31.1 GM/DL — LOW (ref 32–36)
MCV RBC AUTO: 91.6 FL — SIGNIFICANT CHANGE UP (ref 80–100)
MCV RBC AUTO: 92.3 FL — SIGNIFICANT CHANGE UP (ref 80–100)
NRBC # BLD: 0 /100 WBCS — SIGNIFICANT CHANGE UP (ref 0–0)
NRBC # BLD: 0 /100 WBCS — SIGNIFICANT CHANGE UP (ref 0–0)
PCO2 BLDA: 36 MMHG — SIGNIFICANT CHANGE UP (ref 32–46)
PH BLD: 7.44 — SIGNIFICANT CHANGE UP (ref 7.35–7.45)
PHOSPHATE SERPL-MCNC: 4.2 MG/DL — SIGNIFICANT CHANGE UP (ref 2.5–4.5)
PLATELET # BLD AUTO: 108 K/UL — LOW (ref 150–400)
PLATELET # BLD AUTO: 151 K/UL — SIGNIFICANT CHANGE UP (ref 150–400)
PO2 BLDA: 78 MMHG — SIGNIFICANT CHANGE UP (ref 74–108)
POTASSIUM SERPL-MCNC: 4.1 MMOL/L — SIGNIFICANT CHANGE UP (ref 3.5–5.3)
POTASSIUM SERPL-SCNC: 4.1 MMOL/L — SIGNIFICANT CHANGE UP (ref 3.5–5.3)
PROCALCITONIN SERPL-MCNC: 0.42 NG/ML — HIGH (ref 0.02–0.1)
PROT SERPL-MCNC: 6.3 G/DL — SIGNIFICANT CHANGE UP (ref 6–8.3)
RBC # BLD: 3.12 M/UL — LOW (ref 3.8–5.2)
RBC # BLD: 3.69 M/UL — LOW (ref 3.8–5.2)
RBC # FLD: 15.2 % — HIGH (ref 10.3–14.5)
RBC # FLD: 15.5 % — HIGH (ref 10.3–14.5)
SAO2 % BLDA: 94 % — SIGNIFICANT CHANGE UP (ref 92–96)
SODIUM SERPL-SCNC: 148 MMOL/L — HIGH (ref 135–145)
TRIGL SERPL-MCNC: 166 MG/DL — HIGH (ref 10–149)
WBC # BLD: 15.37 K/UL — HIGH (ref 3.8–10.5)
WBC # BLD: 8.98 K/UL — SIGNIFICANT CHANGE UP (ref 3.8–10.5)
WBC # FLD AUTO: 15.37 K/UL — HIGH (ref 3.8–10.5)
WBC # FLD AUTO: 8.98 K/UL — SIGNIFICANT CHANGE UP (ref 3.8–10.5)

## 2020-04-13 RX ORDER — HEPARIN SODIUM 5000 [USP'U]/ML
3500 INJECTION INTRAVENOUS; SUBCUTANEOUS EVERY 6 HOURS
Refills: 0 | Status: DISCONTINUED | OUTPATIENT
Start: 2020-04-13 | End: 2020-04-16

## 2020-04-13 RX ORDER — AMIODARONE HYDROCHLORIDE 400 MG/1
1 TABLET ORAL
Qty: 900 | Refills: 0 | Status: DISCONTINUED | OUTPATIENT
Start: 2020-04-13 | End: 2020-04-14

## 2020-04-13 RX ORDER — METOPROLOL TARTRATE 50 MG
5 TABLET ORAL ONCE
Refills: 0 | Status: COMPLETED | OUTPATIENT
Start: 2020-04-13 | End: 2020-04-13

## 2020-04-13 RX ORDER — PHENYLEPHRINE HYDROCHLORIDE 10 MG/ML
0.5 INJECTION INTRAVENOUS
Qty: 40 | Refills: 0 | Status: DISCONTINUED | OUTPATIENT
Start: 2020-04-13 | End: 2020-04-15

## 2020-04-13 RX ORDER — HEPARIN SODIUM 5000 [USP'U]/ML
7500 INJECTION INTRAVENOUS; SUBCUTANEOUS ONCE
Refills: 0 | Status: COMPLETED | OUTPATIENT
Start: 2020-04-13 | End: 2020-04-13

## 2020-04-13 RX ORDER — HEPARIN SODIUM 5000 [USP'U]/ML
7500 INJECTION INTRAVENOUS; SUBCUTANEOUS EVERY 6 HOURS
Refills: 0 | Status: DISCONTINUED | OUTPATIENT
Start: 2020-04-13 | End: 2020-04-16

## 2020-04-13 RX ORDER — AMIODARONE HYDROCHLORIDE 400 MG/1
150 TABLET ORAL ONCE
Refills: 0 | Status: COMPLETED | OUTPATIENT
Start: 2020-04-13 | End: 2020-04-14

## 2020-04-13 RX ORDER — HEPARIN SODIUM 5000 [USP'U]/ML
INJECTION INTRAVENOUS; SUBCUTANEOUS
Qty: 25000 | Refills: 0 | Status: DISCONTINUED | OUTPATIENT
Start: 2020-04-13 | End: 2020-04-16

## 2020-04-13 RX ORDER — ADENOSINE 3 MG/ML
6 INJECTION INTRAVENOUS ONCE
Refills: 0 | Status: COMPLETED | OUTPATIENT
Start: 2020-04-13 | End: 2020-04-13

## 2020-04-13 RX ADMIN — Medication 5 MILLIGRAM(S): at 22:20

## 2020-04-13 RX ADMIN — Medication 1 DROP(S): at 11:27

## 2020-04-13 RX ADMIN — PANTOPRAZOLE SODIUM 40 MILLIGRAM(S): 20 TABLET, DELAYED RELEASE ORAL at 11:27

## 2020-04-13 RX ADMIN — Medication 5 MILLIGRAM(S): at 22:00

## 2020-04-13 RX ADMIN — HEPARIN SODIUM 5000 UNIT(S): 5000 INJECTION INTRAVENOUS; SUBCUTANEOUS at 06:05

## 2020-04-13 RX ADMIN — CHLORHEXIDINE GLUCONATE 15 MILLILITER(S): 213 SOLUTION TOPICAL at 06:06

## 2020-04-13 RX ADMIN — INSULIN GLARGINE 35 UNIT(S): 100 INJECTION, SOLUTION SUBCUTANEOUS at 23:06

## 2020-04-13 RX ADMIN — DEXMEDETOMIDINE HYDROCHLORIDE IN 0.9% SODIUM CHLORIDE 15.9 MICROGRAM(S)/KG/HR: 4 INJECTION INTRAVENOUS at 08:16

## 2020-04-13 RX ADMIN — PROPOFOL 8.16 MICROGRAM(S)/KG/MIN: 10 INJECTION, EMULSION INTRAVENOUS at 01:09

## 2020-04-13 RX ADMIN — HEPARIN SODIUM 7500 UNIT(S): 5000 INJECTION INTRAVENOUS; SUBCUTANEOUS at 12:41

## 2020-04-13 RX ADMIN — Medication 12.5 MICROGRAM(S): at 20:58

## 2020-04-13 RX ADMIN — HEPARIN SODIUM 1700 UNIT(S)/HR: 5000 INJECTION INTRAVENOUS; SUBCUTANEOUS at 12:41

## 2020-04-13 RX ADMIN — PROPOFOL 8.16 MICROGRAM(S)/KG/MIN: 10 INJECTION, EMULSION INTRAVENOUS at 11:48

## 2020-04-13 RX ADMIN — DEXMEDETOMIDINE HYDROCHLORIDE IN 0.9% SODIUM CHLORIDE 15.9 MICROGRAM(S)/KG/HR: 4 INJECTION INTRAVENOUS at 06:06

## 2020-04-13 RX ADMIN — QUETIAPINE FUMARATE 50 MILLIGRAM(S): 200 TABLET, FILM COATED ORAL at 17:18

## 2020-04-13 RX ADMIN — CHLORHEXIDINE GLUCONATE 1 APPLICATION(S): 213 SOLUTION TOPICAL at 06:06

## 2020-04-13 RX ADMIN — SIMVASTATIN 20 MILLIGRAM(S): 20 TABLET, FILM COATED ORAL at 20:58

## 2020-04-13 RX ADMIN — Medication 25 MILLIGRAM(S): at 13:03

## 2020-04-13 RX ADMIN — Medication 2 MILLIGRAM(S): at 06:05

## 2020-04-13 RX ADMIN — Medication 4: at 11:28

## 2020-04-13 RX ADMIN — BRIMONIDINE TARTRATE 1 DROP(S): 2 SOLUTION/ DROPS OPHTHALMIC at 11:27

## 2020-04-13 RX ADMIN — LATANOPROST 1 DROP(S): 0.05 SOLUTION/ DROPS OPHTHALMIC; TOPICAL at 20:57

## 2020-04-13 RX ADMIN — AMLODIPINE BESYLATE 10 MILLIGRAM(S): 2.5 TABLET ORAL at 06:06

## 2020-04-13 RX ADMIN — HEPARIN SODIUM 0 UNIT(S)/HR: 5000 INJECTION INTRAVENOUS; SUBCUTANEOUS at 20:03

## 2020-04-13 RX ADMIN — Medication 25 MILLIGRAM(S): at 20:58

## 2020-04-13 RX ADMIN — DEXMEDETOMIDINE HYDROCHLORIDE IN 0.9% SODIUM CHLORIDE 15.9 MICROGRAM(S)/KG/HR: 4 INJECTION INTRAVENOUS at 13:03

## 2020-04-13 RX ADMIN — Medication 2: at 23:06

## 2020-04-13 RX ADMIN — HEPARIN SODIUM 1400 UNIT(S)/HR: 5000 INJECTION INTRAVENOUS; SUBCUTANEOUS at 21:02

## 2020-04-13 RX ADMIN — QUETIAPINE FUMARATE 50 MILLIGRAM(S): 200 TABLET, FILM COATED ORAL at 06:07

## 2020-04-13 RX ADMIN — Medication 4: at 17:18

## 2020-04-13 NOTE — PROGRESS NOTE ADULT - ASSESSMENT
1.	ALIS: ATN, COVID renal injury  2.	SARS-COVID 19, Resp failure on vent  3.	Anemia  4.	Diabetes  5.	Hypokalemia    Good Urine output. Improving renal indices. To continue current meds. Treatment for COVID pneumonia. COVID precautions. Strict I & O.  Monitor blood sugar levels. Insulin coverage as needed. Avoid nephrotoxic meds as possible. Wean as tolerated.   Avoid ACEI, ARB, NSAIDs and IV contrast. Monitor BP trend. Titrate BP meds as needed. Salt restriction. Will follow electrolytes and renal function trend.   Supportive care. ICU management.

## 2020-04-13 NOTE — PROGRESS NOTE ADULT - SUBJECTIVE AND OBJECTIVE BOX
Date/Time Patient Seen:  		  Referring MD:   Data Reviewed	       Patient is a 74y old  Female who presents with a chief complaint of shob (2020 16:35)      Subjective/HPI     PAST MEDICAL & SURGICAL HISTORY:  DJD (degenerative joint disease)  DM (diabetes mellitus)  Hyperlipidemia  Hypothyroid  HTN (hypertension)  History of vitamin D deficiency  B12 deficiency  Bronchitis  Anxiety  S/P cataract surgery  S/P eye surgery: left eye retinal surgery x2  S/P  section: x2        Medication list         MEDICATIONS  (STANDING):  amLODIPine   Tablet 10 milliGRAM(s) Oral daily  brimonidine 0.2% Ophthalmic Solution 1 Drop(s) Left EYE daily  chlorhexidine 0.12% Liquid 15 milliLiter(s) Oral Mucosa every 12 hours  chlorhexidine 2% Cloths 1 Application(s) Topical <User Schedule>  dexAMETHasone  Injectable 2 milliGRAM(s) IV Push three times a day  dexMEDEtomidine Infusion 0.7 MICROgram(s)/kG/Hr (15.9 mL/Hr) IV Continuous <Continuous>  dextrose 5%. 1000 milliLiter(s) (50 mL/Hr) IV Continuous <Continuous>  dextrose 50% Injectable 12.5 Gram(s) IV Push once  dextrose 50% Injectable 25 Gram(s) IV Push once  dextrose 50% Injectable 25 Gram(s) IV Push once  fentaNYL   Infusion. 0.5 MICROgram(s)/kG/Hr (4.54 mL/Hr) IV Continuous <Continuous>  heparin  Injectable 5000 Unit(s) SubCutaneous every 8 hours  insulin glargine Injectable (LANTUS) 35 Unit(s) SubCutaneous at bedtime  insulin lispro (HumaLOG) corrective regimen sliding scale   SubCutaneous every 6 hours  latanoprost 0.005% Ophthalmic Solution 1 Drop(s) Both EYES at bedtime  levothyroxine Injectable 12.5 MICROGram(s) IV Push at bedtime  metoprolol tartrate 25 milliGRAM(s) Oral every 8 hours  pantoprazole  Injectable 40 milliGRAM(s) IV Push daily  propofol Infusion 15 MICROgram(s)/kG/Min (8.16 mL/Hr) IV Continuous <Continuous>  QUEtiapine 50 milliGRAM(s) Oral two times a day  simvastatin 20 milliGRAM(s) Oral at bedtime  timolol 0.5% Solution 1 Drop(s) Left EYE daily    MEDICATIONS  (PRN):  acetaminophen   Tablet .. 650 milliGRAM(s) Oral every 6 hours PRN Temp greater or equal to 38C (100.4F), Mild Pain (1 - 3)  dextrose 40% Gel 15 Gram(s) Oral once PRN Blood Glucose LESS THAN 70 milliGRAM(s)/deciliter  glucagon  Injectable 1 milliGRAM(s) IntraMuscular once PRN Glucose LESS THAN 70 milligrams/deciliter  hydrALAZINE Injectable 10 milliGRAM(s) IV Push every 6 hours PRN Hypertension  midazolam Injectable 2 milliGRAM(s) IV Push every 2 hours PRN Agitation  sodium chloride 0.9% lock flush 10 milliLiter(s) IV Push every 1 hour PRN Pre/post blood products, medications, blood draw, and to maintain line patency         Vitals log        ICU Vital Signs Last 24 Hrs  T(C): 36.1 (2020 08:00), Max: 36.8 (2020 12:00)  T(F): 97 (2020 08:00), Max: 98.3 (2020 12:00)  HR: 92 (2020 11:00) (47 - 172)  BP: 126/49 (2020 11:00) (111/43 - 186/80)  BP(mean): 69 (2020 11:00) (48 - 108)  ABP: --  ABP(mean): --  RR: 25 (2020 11:00) (12 - 40)  SpO2: 96% (2020 11:00) (91% - 97%)       Mode: AC/ CMV (Assist Control/ Continuous Mandatory Ventilation)  RR (machine): 18  TV (machine): 400  FiO2: 40  PEEP: 5  ITime: 1  MAP: 12  PIP: 24      Input and Output:  I&O's Detail    2020 07:01  -  2020 07:00  --------------------------------------------------------  IN:    dexmedetomidine Infusion: 403.1 mL    fentaNYL Infusion.: 36.3 mL    Free Water: 660 mL    Glucerna 1.5: 720 mL    propofol Infusion: 114.8 mL    Solution: 50 mL  Total IN: 1984.2 mL    OUT:    Indwelling Catheter - Urethral: 1200 mL    Rectal Tube: 650 mL  Total OUT: 1850 mL    Total NET: 134.2 mL      2020 07:01  -  2020 11:05  --------------------------------------------------------  IN:    dexmedetomidine Infusion: 54.4 mL    Free Water: 120 mL    Glucerna 1.5: 180 mL    propofol Infusion: 32.8 mL  Total IN: 387.2 mL    OUT:  Total OUT: 0 mL    Total NET: 387.2 mL          Lab Data                        8.8    8.98  )-----------( 151      ( 2020 07:09 )             28.8     04-12    147<H>  |  112<H>  |  67<H>  ----------------------------<  243<H>  4.3   |  28  |  1.84<H>    Ca    9.2      2020 06:21  Phos  4.2     13  Mg     1.8     13      ABG - ( 2020 06:26 )  pH, Arterial: x     pH, Blood: 7.44  /  pCO2: 36    /  pO2: 78    / HCO3: 25    / Base Excess: 0.5   /  SaO2: 94                      Review of Systems	      Objective     Physical Examination    heart s1s2  lung dec BS  abd soft      Pertinent Lab findings & Imaging      Justin:  NO   Adequate UO     I&O's Detail    2020 07:01  -  2020 07:00  --------------------------------------------------------  IN:    dexmedetomidine Infusion: 403.1 mL    fentaNYL Infusion.: 36.3 mL    Free Water: 660 mL    Glucerna 1.5: 720 mL    propofol Infusion: 114.8 mL    Solution: 50 mL  Total IN: 1984.2 mL    OUT:    Indwelling Catheter - Urethral: 1200 mL    Rectal Tube: 650 mL  Total OUT: 1850 mL    Total NET: 134.2 mL      2020 07:01  -  2020 11:05  --------------------------------------------------------  IN:    dexmedetomidine Infusion: 54.4 mL    Free Water: 120 mL    Glucerna 1.5: 180 mL    propofol Infusion: 32.8 mL  Total IN: 387.2 mL    OUT:  Total OUT: 0 mL    Total NET: 387.2 mL               Discussed with:     Cultures:	        Radiology

## 2020-04-13 NOTE — PROGRESS NOTE ADULT - SUBJECTIVE AND OBJECTIVE BOX
INTERVAL HPI/OVERNIGHT EVENTS:  chart reviewed    MEDICATIONS  (STANDING):  amLODIPine   Tablet 10 milliGRAM(s) Oral daily  brimonidine 0.2% Ophthalmic Solution 1 Drop(s) Left EYE daily  chlorhexidine 2% Cloths 1 Application(s) Topical <User Schedule>  dexMEDEtomidine Infusion 0.7 MICROgram(s)/kG/Hr (15.9 mL/Hr) IV Continuous <Continuous>  dextrose 5%. 1000 milliLiter(s) (50 mL/Hr) IV Continuous <Continuous>  dextrose 50% Injectable 12.5 Gram(s) IV Push once  dextrose 50% Injectable 25 Gram(s) IV Push once  dextrose 50% Injectable 25 Gram(s) IV Push once  fentaNYL   Infusion. 0.5 MICROgram(s)/kG/Hr (4.54 mL/Hr) IV Continuous <Continuous>  heparin  Infusion.  Unit(s)/Hr (17 mL/Hr) IV Continuous <Continuous>  insulin glargine Injectable (LANTUS) 35 Unit(s) SubCutaneous at bedtime  insulin lispro (HumaLOG) corrective regimen sliding scale   SubCutaneous every 6 hours  latanoprost 0.005% Ophthalmic Solution 1 Drop(s) Both EYES at bedtime  levothyroxine Injectable 12.5 MICROGram(s) IV Push at bedtime  metoprolol tartrate 25 milliGRAM(s) Oral every 8 hours  pantoprazole  Injectable 40 milliGRAM(s) IV Push daily  propofol Infusion 15 MICROgram(s)/kG/Min (8.16 mL/Hr) IV Continuous <Continuous>  QUEtiapine 50 milliGRAM(s) Oral two times a day  simvastatin 20 milliGRAM(s) Oral at bedtime  timolol 0.5% Solution 1 Drop(s) Left EYE daily    MEDICATIONS  (PRN):  acetaminophen   Tablet .. 650 milliGRAM(s) Oral every 6 hours PRN Temp greater or equal to 38C (100.4F), Mild Pain (1 - 3)  dextrose 40% Gel 15 Gram(s) Oral once PRN Blood Glucose LESS THAN 70 milliGRAM(s)/deciliter  glucagon  Injectable 1 milliGRAM(s) IntraMuscular once PRN Glucose LESS THAN 70 milligrams/deciliter  heparin  Injectable 7500 Unit(s) IV Push every 6 hours PRN For aPTT less than 40  heparin  Injectable 3500 Unit(s) IV Push every 6 hours PRN For aPTT between 40 - 57  hydrALAZINE Injectable 10 milliGRAM(s) IV Push every 6 hours PRN Hypertension  midazolam Injectable 2 milliGRAM(s) IV Push every 2 hours PRN Agitation  sodium chloride 0.9% lock flush 10 milliLiter(s) IV Push every 1 hour PRN Pre/post blood products, medications, blood draw, and to maintain line patency      Allergies    No Known Allergies    Intolerances        Review of Systems:    General:  No wt loss, fevers, chills, night sweats,fatigue,   Eyes:  Good vision, no reported pain  ENT:  No sore throat, pain, runny nose, dysphagia  CV:  No pain, palpitatioins, hypo/hypertension  Resp:  No dyspnea, cough, tachypnea, wheezing  GI:  No pain, No nausea, No vomiting, No diarrhea, No constipatiion, No weight loss, No fever, No pruritis, No rectal bleeding, No tarry stools, No dysphagia,  :  No pain, bleeding, incontinence, nocturia  Muscle:  No pain, weakness  Neuro:  No weakness, tingling, memory problems  Psych:  No fatigue, insomnia, mood problems, depression  Endocrine:  No polyuria, polydypsia, cold/heat intolerance  Heme:  No petechiae, ecchymosis, easy bruisability  Skin:  No rash, tattoos, scars, edema      Vital Signs Last 24 Hrs  T(C): 36.7 (13 Apr 2020 12:00), Max: 36.7 (13 Apr 2020 12:00)  T(F): 98 (13 Apr 2020 12:00), Max: 98 (13 Apr 2020 12:00)  HR: 90 (13 Apr 2020 14:30) (47 - 172)  BP: 148/93 (13 Apr 2020 14:00) (111/43 - 185/60)  BP(mean): 99 (13 Apr 2020 14:00) (48 - 99)  RR: 20 (13 Apr 2020 14:00) (12 - 40)  SpO2: 98% (13 Apr 2020 14:30) (91% - 100%)    PHYSICAL EXAM:    Constitutional: NAD, well-developed  HEENT: EOMI, throat clear  Neck: No LAD, supple  Respiratory: CTA and P  Cardiovascular: S1 and S2, RRR, no M  Gastrointestinal: BS+, soft, NT/ND, neg HSM,  Extremities: No peripheral edema, neg clubing, cyanosis  Vascular: 2+ peripheral pulses  Neurological: A/O x 3, no focal deficits  Psychiatric: Normal mood, normal affect  Skin: No rashes      LABS:                        8.8    8.98  )-----------( 151      ( 13 Apr 2020 07:09 )             28.8     04-13    148<H>  |  114<H>  |  61<H>  ----------------------------<  137<H>  4.1   |  25  |  1.72<H>    Ca    9.1      13 Apr 2020 11:46  Phos  4.2     04-13  Mg     1.8     04-13    TPro  6.3  /  Alb  2.3<L>  /  TBili  0.2  /  DBili  x   /  AST  15  /  ALT  24  /  AlkPhos  50  04-13          RADIOLOGY & ADDITIONAL TESTS:

## 2020-04-13 NOTE — PROGRESS NOTE ADULT - ASSESSMENT
The patient is a 74 year old female with a history of HTN, DM, HL, hypothyroidism, anxiety who presented with fevers, cough, shortness of breath in the setting of viral PNA, COVID-19, respiratory failure, septic shock.     Plan:  - CXR consistent with PNA  - COVID-19 positive  - Completed hydroxychloroquine  - On zosyn  - Off of IV pressors  - On amlodipine 10 mg daily  - AF/SVT - continue metoprolol tartrate 25 mg q8h  - If there is a recurrence of sustained SVT, give metoprolol 5 mg IV or attempt adenosine.  - Continue mechanical ventilation and wean  - ICU care

## 2020-04-13 NOTE — PROGRESS NOTE ADULT - SUBJECTIVE AND OBJECTIVE BOX
ADELIA BALDWIN is a 74yFemale , patient examined and chart reviewed.     INTERVAL HPI/ OVERNIGHT EVENTS:  Extubated Lethargic  Afebrile.     Past Medical History--  PAST MEDICAL & SURGICAL HISTORY:  DJD (degenerative joint disease)  DM (diabetes mellitus)  Hyperlipidemia  Hypothyroid  HTN (hypertension)  History of vitamin D deficiency  B12 deficiency  Bronchitis  Anxiety  S/P cataract surgery  S/P eye surgery: left eye retinal surgery x2  S/P  section: x2      For details regarding the patient's social history, family history, and other miscellaneous elements, please refer the initial infectious diseases consultation and/or the admitting history and physical examination for this admission.      ROS:  Unable to obtain due to : pt's condition      Current inpatient medications :  MEDICATIONS  (STANDING):  amLODIPine   Tablet 10 milliGRAM(s) Oral daily  brimonidine 0.2% Ophthalmic Solution 1 Drop(s) Left EYE daily  chlorhexidine 2% Cloths 1 Application(s) Topical <User Schedule>  dexMEDEtomidine Infusion 0.7 MICROgram(s)/kG/Hr (15.9 mL/Hr) IV Continuous <Continuous>  dextrose 5%. 1000 milliLiter(s) (50 mL/Hr) IV Continuous <Continuous>  dextrose 50% Injectable 12.5 Gram(s) IV Push once  dextrose 50% Injectable 25 Gram(s) IV Push once  dextrose 50% Injectable 25 Gram(s) IV Push once  fentaNYL   Infusion. 0.5 MICROgram(s)/kG/Hr (4.54 mL/Hr) IV Continuous <Continuous>  heparin  Infusion.  Unit(s)/Hr (17 mL/Hr) IV Continuous <Continuous>  insulin glargine Injectable (LANTUS) 35 Unit(s) SubCutaneous at bedtime  insulin lispro (HumaLOG) corrective regimen sliding scale   SubCutaneous every 6 hours  latanoprost 0.005% Ophthalmic Solution 1 Drop(s) Both EYES at bedtime  levothyroxine Injectable 12.5 MICROGram(s) IV Push at bedtime  metoprolol tartrate 25 milliGRAM(s) Oral every 8 hours  pantoprazole  Injectable 40 milliGRAM(s) IV Push daily  propofol Infusion 15 MICROgram(s)/kG/Min (8.16 mL/Hr) IV Continuous <Continuous>  QUEtiapine 50 milliGRAM(s) Oral two times a day  simvastatin 20 milliGRAM(s) Oral at bedtime  timolol 0.5% Solution 1 Drop(s) Left EYE daily    MEDICATIONS  (PRN):  acetaminophen   Tablet .. 650 milliGRAM(s) Oral every 6 hours PRN Temp greater or equal to 38C (100.4F), Mild Pain (1 - 3)  dextrose 40% Gel 15 Gram(s) Oral once PRN Blood Glucose LESS THAN 70 milliGRAM(s)/deciliter  glucagon  Injectable 1 milliGRAM(s) IntraMuscular once PRN Glucose LESS THAN 70 milligrams/deciliter  heparin  Injectable 7500 Unit(s) IV Push every 6 hours PRN For aPTT less than 40  heparin  Injectable 3500 Unit(s) IV Push every 6 hours PRN For aPTT between 40 - 57  hydrALAZINE Injectable 10 milliGRAM(s) IV Push every 6 hours PRN Hypertension  midazolam Injectable 2 milliGRAM(s) IV Push every 2 hours PRN Agitation  sodium chloride 0.9% lock flush 10 milliLiter(s) IV Push every 1 hour PRN Pre/post blood products, medications, blood draw, and to maintain line patency      Objective:  ICU Vital Signs Last 24 Hrs  T(C): 36.7 (2020 12:00), Max: 36.7 (2020 12:00)  T(F): 98 (2020 12:00), Max: 98 (2020 12:00)  HR: 115 (2020 19:52) (47 - 118)  BP: 138/49 (2020 19:52) (111/43 - 163/75)  BP(mean): 72 (2020 19:52) (48 - 99)  RR: 27 (2020 19:52) (12 - 35)  SpO2: 98% (2020 19:52) (91% - 100%)        Physical Exam:  GEN: Lethargic Extubated  HEENT: normocephalic and atraumatic. EOMI. OLGA. Moist mucosa. Clear Posterior pharynx.  NECK: Supple. No carotid bruits.  No lymphadenopathy or thyromegaly.  LUNGS: Decreased to auscultation.  HEART: Regular rate and rhythm without murmur.  ABDOMEN: Soft, nontender, and nondistended.  Positive bowel sounds.   EXTREMITIES: +edema.  NEUROLOGIC: lethargic  SKIN: No ulceration or induration present.      LABS:                        10.5   15.37 )-----------( 108      ( 2020 19:01 )             33.8   04-13    148<H>  |  114<H>  |  61<H>  ----------------------------<  137<H>  4.1   |  25  |  1.72<H>    Ca    9.1      2020 11:46  Phos  4.2     -  Mg     1.8     -    TPro  6.3  /  Alb  2.3<L>  /  TBili  0.2  /  DBili  x   /  AST  15  /  ALT  24  /  AlkPhos  50  -13    MICROBIOLOGY:    Culture - Blood (collected 2020 12:56)  Source: .Blood Blood-Peripheral  Preliminary Report (2020 13:):    No growth to date.    Culture - Blood (collected 2020 12:56)  Source: .Blood Blood-Peripheral  Preliminary Report (2020 13:01):    No growth to date.    Clostridium difficile Toxin by PCR (20 @ 11:10)    C Diff by PCR [facility]: [facility]      COVID-19 PCR . (20 @ 22:21)    COVID-19 PCR: Detected: All “detected” results on this new test are considered presumptively  positive results, are clinically actionable, and specimens will be  forwarded to [facility] for confirmation testing.  Another report (corrected report) will only be issued if discordant  results occur.  This test has been validated by [employer] to be accurate;  though it has not been FDA cleared/approved by the usual pathway.  As with all laboratory tests, results should be correlated with clinical  findings.      RADIOLOGY & ADDITIONAL STUDIES:    EXAM:  XR KUB 1 VIEW                                  PROCEDURE DATE:  2020          INTERPRETATION:  EXAM:XR ABDOMEN KUB.     CLINICAL INDICATION: No Predefined Suggestions .     TECHNIQUE: Portable supine AP views of the chest and abdomen.     PRIOR EXAM: Chest x-ray dated 2020.     FINDINGS:      No evidence of bowel obstruction or perforation. 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    Improved pulmonary opacities bilaterally. NG tube extends into the proximal stomach. An endotracheal tube and a central line remain in stable position.      IMPRESSION:     No abnormal bowel distention.    Improving pulmonary opacities.    NG tube extending into the proximal stomach.      Assessment :  73 y/o F with hx of DM, HTN, HLD, hypothyroid, anxiety presented admitted with severe sepsis with septic shock and acute hypoxic respiratory failure with MSOF sec COVID 19 pneumonia. Sp Cardiorespiratory arrest.  ALIS stable. Anemic. Procalcitonin markedly elevated- concern for superimposed bacterial process sp course of Zosyn. Off pressors. Extubated today 2020    Plan :   Completed Hydroxychloroquine x 5 days  Supportive care  Sp Zosyn stopped 2020  Asp precautions  Trend temps  Prognosis guarded  DNR  COVID-19---high risk period for decompensation  (7-14 days post symptom onset), avoid aerosolizing procedures, NSAIDs   -avoid excessive blood draws and frequent CXRs  -daily CBC w diff to follow eos, lymphocytes and neutrophils and daily NLR calculation (NLR <3 low vs >5 high)  -Other labs that may be selectively used to risk stratify and predict clinical course, ie: Ferritin (lower risk <450 vs >850) CRP (low risk <2 and higher risk >6) and LDH, D Dimer  -antibiotics only if there is concern for a bacterial process      D/w ICU team      Continue with present regiment.  Appropriate use of antibiotics and adverse effects reviewed.    I have discussed the above plan of care with patient/ family in detail. They expressed understanding of the the treatment plan . Risks, benefits and alternatives discussed in detail. I have asked if they have any questions or concerns and appropriately addressed them to the best of my ability .      Critical care time greater then 35 minutes reviewing notes, labs data/ imaging , discussion with multidisciplinary team.    Thank you for allowing me to participate in care of your patient .        Eder Brown MD  Infectious Disease  [phone]

## 2020-04-13 NOTE — CHART NOTE - NSCHARTNOTEFT_GEN_A_CORE
labs and morning ABG reviewed, pt tolerated PS trial well yesterday, pt was made DNR and to be DNI after extubation per family wishes, pt propofol turned off this morning and started on PS trials did well on 10/5---->6/5---->5/5 with TV of 300-380s. GOC d/w daughter Scott Sweeney who also added her brother Rodrigo join the call regarding GOC and family is in agreement if pt extubated today pt would not be reintubated under any circumstances, family updated on pt weaning trials status and understands and agree again that they would not like the pt reintubated should her respiratory status worsen, all questions answered on the phone, pt was extubated based on this conversation, furthermore family dose not wish for pt to have trach as that is not something pt would want per family.

## 2020-04-13 NOTE — PROGRESS NOTE ADULT - SUBJECTIVE AND OBJECTIVE BOX
Patient is a 74y old  Female who presents with a chief complaint of SOB (31 Mar 2020 05:05)  Patient seen in follow up for KALE SNELL.      Chart reviewed.    PAST MEDICAL HISTORY:  DJD (degenerative joint disease)  DM (diabetes mellitus)  Hyperlipidemia  Hypothyroid  HTN (hypertension)  History of vitamin D deficiency  B12 deficiency  Bronchitis  Anxiety    MEDICATIONS  (STANDING):  amLODIPine   Tablet 10 milliGRAM(s) Oral daily  brimonidine 0.2% Ophthalmic Solution 1 Drop(s) Left EYE daily  chlorhexidine 0.12% Liquid 15 milliLiter(s) Oral Mucosa every 12 hours  chlorhexidine 2% Cloths 1 Application(s) Topical <User Schedule>  dexMEDEtomidine Infusion 0.7 MICROgram(s)/kG/Hr (15.9 mL/Hr) IV Continuous <Continuous>  dextrose 5%. 1000 milliLiter(s) (50 mL/Hr) IV Continuous <Continuous>  dextrose 50% Injectable 12.5 Gram(s) IV Push once  dextrose 50% Injectable 25 Gram(s) IV Push once  dextrose 50% Injectable 25 Gram(s) IV Push once  fentaNYL   Infusion. 0.5 MICROgram(s)/kG/Hr (4.54 mL/Hr) IV Continuous <Continuous>  heparin  Infusion.  Unit(s)/Hr (17 mL/Hr) IV Continuous <Continuous>  insulin glargine Injectable (LANTUS) 35 Unit(s) SubCutaneous at bedtime  insulin lispro (HumaLOG) corrective regimen sliding scale   SubCutaneous every 6 hours  latanoprost 0.005% Ophthalmic Solution 1 Drop(s) Both EYES at bedtime  levothyroxine Injectable 12.5 MICROGram(s) IV Push at bedtime  metoprolol tartrate 25 milliGRAM(s) Oral every 8 hours  pantoprazole  Injectable 40 milliGRAM(s) IV Push daily  propofol Infusion 15 MICROgram(s)/kG/Min (8.16 mL/Hr) IV Continuous <Continuous>  QUEtiapine 50 milliGRAM(s) Oral two times a day  simvastatin 20 milliGRAM(s) Oral at bedtime  timolol 0.5% Solution 1 Drop(s) Left EYE daily    MEDICATIONS  (PRN):  acetaminophen   Tablet .. 650 milliGRAM(s) Oral every 6 hours PRN Temp greater or equal to 38C (100.4F), Mild Pain (1 - 3)  dextrose 40% Gel 15 Gram(s) Oral once PRN Blood Glucose LESS THAN 70 milliGRAM(s)/deciliter  glucagon  Injectable 1 milliGRAM(s) IntraMuscular once PRN Glucose LESS THAN 70 milligrams/deciliter  heparin  Injectable 7500 Unit(s) IV Push every 6 hours PRN For aPTT less than 40  heparin  Injectable 3500 Unit(s) IV Push every 6 hours PRN For aPTT between 40 - 57  hydrALAZINE Injectable 10 milliGRAM(s) IV Push every 6 hours PRN Hypertension  midazolam Injectable 2 milliGRAM(s) IV Push every 2 hours PRN Agitation  sodium chloride 0.9% lock flush 10 milliLiter(s) IV Push every 1 hour PRN Pre/post blood products, medications, blood draw, and to maintain line patency    T(C): 36.7 (04-13-20 @ 12:00), Max: 37.2 (04-12-20 @ 08:00)  HR: 84 (04-13-20 @ 13:00) (47 - 172)  BP: 163/75 (04-13-20 @ 13:00) (111/43 - 186/80)  RR: 35 (04-13-20 @ 13:00)  SpO2: 95% (04-13-20 @ 13:00)  Wt(kg): --  I&O's Detail    12 Apr 2020 07:01  -  13 Apr 2020 07:00  --------------------------------------------------------  IN:    dexmedetomidine Infusion: 403.1 mL    fentaNYL Infusion.: 36.3 mL    Free Water: 660 mL    Glucerna 1.5: 720 mL    propofol Infusion: 114.8 mL    Solution: 50 mL  Total IN: 1984.2 mL    OUT:    Indwelling Catheter - Urethral: 1200 mL    Rectal Tube: 650 mL  Total OUT: 1850 mL    Total NET: 134.2 mL      13 Apr 2020 07:01  -  13 Apr 2020 14:00  --------------------------------------------------------  IN:    dexmedetomidine Infusion: 79.3 mL    Free Water: 120 mL    Glucerna 1.5: 180 mL    heparin  Infusion.: 51 mL    propofol Infusion: 49.2 mL  Total IN: 479.5 mL    OUT:  Total OUT: 0 mL    Total NET: 479.5 mL          PHYSICAL EXAM:  Pt on COVID precautions. Chart reviewed and previous physical examination noted.                          LABORATORY:                        8.8    8.98  )-----------( 151      ( 13 Apr 2020 07:09 )             28.8     04-13    148<H>  |  114<H>  |  61<H>  ----------------------------<  137<H>  4.1   |  25  |  1.72<H>    Ca    9.1      13 Apr 2020 11:46  Phos  4.2     04-13  Mg     1.8     04-13    TPro  6.3  /  Alb  2.3<L>  /  TBili  0.2  /  DBili  x   /  AST  15  /  ALT  24  /  AlkPhos  50  04-13    Sodium, Serum: 148 mmol/L (04-13 @ 11:46)  Sodium, Serum: 147 mmol/L (04-12 @ 06:21)    Potassium, Serum: 4.1 mmol/L (04-13 @ 11:46)  Potassium, Serum: 4.3 mmol/L (04-12 @ 06:21)    Hemoglobin: 8.8 g/dL (04-13 @ 07:09)  Hemoglobin: 9.3 g/dL (04-12 @ 06:21)  Hemoglobin: 9.9 g/dL (04-11 @ 06:28)    Creatinine, Serum 1.72 (04-13 @ 11:46)  Creatinine, Serum 1.84 (04-12 @ 06:21)  Creatinine, Serum 2.01 (04-11 @ 06:28)        LIVER FUNCTIONS - ( 13 Apr 2020 11:46 )  Alb: 2.3 g/dL / Pro: 6.3 g/dL / ALK PHOS: 50 U/L / ALT: 24 U/L DA / AST: 15 U/L / GGT: x             ABG - ( 13 Apr 2020 06:26 )  pH, Arterial: x     pH, Blood: 7.44  /  pCO2: 36    /  pO2: 78    / HCO3: 25    / Base Excess: 0.5   /  SaO2: 94

## 2020-04-13 NOTE — PROGRESS NOTE ADULT - SUBJECTIVE AND OBJECTIVE BOX
Chief Complaint: Fevers, shortness of breath    Interval Events: No events overnight.    Review of Systems:  Unable to obtain    Physical Exam:  Vital Signs Last 24 Hrs  T(C): 36.1 (13 Apr 2020 08:00), Max: 36.8 (12 Apr 2020 12:00)  T(F): 97 (13 Apr 2020 08:00), Max: 98.3 (12 Apr 2020 12:00)  HR: 47 (13 Apr 2020 08:00) (47 - 172)  BP: 132/47 (13 Apr 2020 08:00) (111/43 - 186/80)  BP(mean): 69 (13 Apr 2020 08:00) (48 - 108)  RR: 20 (13 Apr 2020 08:00) (12 - 40)  SpO2: 96% (13 Apr 2020 08:00) (91% - 97%)  General: Sedated  HEENT: Intubated  Neck: No JVD, no carotid bruit  Extremities: No LE edema, no cyanosis  Neuro: Non-focal  Skin: No rash    Labs:    04-12    147<H>  |  112<H>  |  67<H>  ----------------------------<  243<H>  4.3   |  28  |  1.84<H>    Ca    9.2      12 Apr 2020 06:21  Phos  4.2     04-13  Mg     1.8     04-13                          8.8    8.98  )-----------( 151      ( 13 Apr 2020 07:09 )             28.8       Telemetry: Sinus rhythm

## 2020-04-13 NOTE — PROVIDER CONTACT NOTE (CRITICAL VALUE NOTIFICATION) - SITUATION
Troponon .788
Pt ABG drawn. Critical value resulted. Lab notified RN.
potassium low - 2.5
pt started on heparin drip today

## 2020-04-14 LAB
ALBUMIN SERPL ELPH-MCNC: 2.6 G/DL — LOW (ref 3.3–5)
ALP SERPL-CCNC: 63 U/L — SIGNIFICANT CHANGE UP (ref 30–120)
ALT FLD-CCNC: 26 U/L DA — SIGNIFICANT CHANGE UP (ref 10–60)
ANION GAP SERPL CALC-SCNC: 9 MMOL/L — SIGNIFICANT CHANGE UP (ref 5–17)
APTT BLD: 178.4 SEC — CRITICAL HIGH (ref 28.5–37)
APTT BLD: 180.5 SEC — SIGNIFICANT CHANGE UP (ref 28.5–37)
APTT BLD: 89.1 SEC — HIGH (ref 28.5–37)
APTT BLD: >200 SEC — SIGNIFICANT CHANGE UP (ref 28.5–37)
AST SERPL-CCNC: 20 U/L — SIGNIFICANT CHANGE UP (ref 10–40)
BASOPHILS # BLD AUTO: 0 K/UL — SIGNIFICANT CHANGE UP (ref 0–0.2)
BASOPHILS NFR BLD AUTO: 0 % — SIGNIFICANT CHANGE UP (ref 0–2)
BILIRUB SERPL-MCNC: 0.4 MG/DL — SIGNIFICANT CHANGE UP (ref 0.2–1.2)
BUN SERPL-MCNC: 54 MG/DL — HIGH (ref 7–23)
CALCIUM SERPL-MCNC: 8.8 MG/DL — SIGNIFICANT CHANGE UP (ref 8.4–10.5)
CHLORIDE SERPL-SCNC: 110 MMOL/L — HIGH (ref 96–108)
CK SERPL-CCNC: 37 U/L — SIGNIFICANT CHANGE UP (ref 26–192)
CO2 SERPL-SCNC: 24 MMOL/L — SIGNIFICANT CHANGE UP (ref 22–31)
CREAT SERPL-MCNC: 1.79 MG/DL — HIGH (ref 0.5–1.3)
EOSINOPHIL # BLD AUTO: 0.76 K/UL — HIGH (ref 0–0.5)
EOSINOPHIL NFR BLD AUTO: 4 % — SIGNIFICANT CHANGE UP (ref 0–6)
ERYTHROCYTE [SEDIMENTATION RATE] IN BLOOD: 31 MM/HR — HIGH (ref 0–20)
FERRITIN SERPL-MCNC: 560 NG/ML — HIGH (ref 15–150)
GLUCOSE BLDC GLUCOMTR-MCNC: 239 MG/DL — HIGH (ref 70–99)
GLUCOSE BLDC GLUCOMTR-MCNC: 258 MG/DL — HIGH (ref 70–99)
GLUCOSE BLDC GLUCOMTR-MCNC: 272 MG/DL — HIGH (ref 70–99)
GLUCOSE BLDC GLUCOMTR-MCNC: 287 MG/DL — HIGH (ref 70–99)
GLUCOSE SERPL-MCNC: 271 MG/DL — HIGH (ref 70–99)
HCT VFR BLD CALC: 32.7 % — LOW (ref 34.5–45)
HGB BLD-MCNC: 10 G/DL — LOW (ref 11.5–15.5)
INR BLD: 1.22 RATIO — HIGH (ref 0.88–1.16)
LDH SERPL L TO P-CCNC: 776 U/L — HIGH (ref 50–242)
LYMPHOCYTES # BLD AUTO: 1.32 K/UL — SIGNIFICANT CHANGE UP (ref 1–3.3)
LYMPHOCYTES # BLD AUTO: 7 % — LOW (ref 13–44)
MAGNESIUM SERPL-MCNC: 1.6 MG/DL — SIGNIFICANT CHANGE UP (ref 1.6–2.6)
MANUAL SMEAR VERIFICATION: SIGNIFICANT CHANGE UP
MCHC RBC-ENTMCNC: 28.8 PG — SIGNIFICANT CHANGE UP (ref 27–34)
MCHC RBC-ENTMCNC: 30.6 GM/DL — LOW (ref 32–36)
MCV RBC AUTO: 94.2 FL — SIGNIFICANT CHANGE UP (ref 80–100)
MONOCYTES # BLD AUTO: 1.7 K/UL — HIGH (ref 0–0.9)
MONOCYTES NFR BLD AUTO: 9 % — SIGNIFICANT CHANGE UP (ref 2–14)
NEUTROPHILS # BLD AUTO: 15.11 K/UL — HIGH (ref 1.8–7.4)
NEUTROPHILS NFR BLD AUTO: 71 % — SIGNIFICANT CHANGE UP (ref 43–77)
NEUTS BAND # BLD: 9 % — HIGH (ref 0–8)
NRBC # BLD: 0 — SIGNIFICANT CHANGE UP
NRBC # BLD: SIGNIFICANT CHANGE UP /100 WBCS (ref 0–0)
PHOSPHATE SERPL-MCNC: 3.6 MG/DL — SIGNIFICANT CHANGE UP (ref 2.5–4.5)
PLAT MORPH BLD: NORMAL — SIGNIFICANT CHANGE UP
PLATELET # BLD AUTO: 84 K/UL — LOW (ref 150–400)
POTASSIUM SERPL-MCNC: 3.5 MMOL/L — SIGNIFICANT CHANGE UP (ref 3.5–5.3)
POTASSIUM SERPL-SCNC: 3.5 MMOL/L — SIGNIFICANT CHANGE UP (ref 3.5–5.3)
PROT SERPL-MCNC: 6.5 G/DL — SIGNIFICANT CHANGE UP (ref 6–8.3)
PROTHROM AB SERPL-ACNC: 13.6 SEC — HIGH (ref 10–12.9)
RBC # BLD: 3.47 M/UL — LOW (ref 3.8–5.2)
RBC # FLD: 15.7 % — HIGH (ref 10.3–14.5)
RBC BLD AUTO: NORMAL — SIGNIFICANT CHANGE UP
SODIUM SERPL-SCNC: 143 MMOL/L — SIGNIFICANT CHANGE UP (ref 135–145)
TRIGL SERPL-MCNC: 176 MG/DL — HIGH (ref 10–149)
TROPONIN I SERPL-MCNC: 0.4 NG/ML — HIGH (ref 0.02–0.06)
WBC # BLD: 18.89 K/UL — HIGH (ref 3.8–10.5)
WBC # FLD AUTO: 18.89 K/UL — HIGH (ref 3.8–10.5)

## 2020-04-14 PROCEDURE — 93010 ELECTROCARDIOGRAM REPORT: CPT

## 2020-04-14 PROCEDURE — 99233 SBSQ HOSP IP/OBS HIGH 50: CPT

## 2020-04-14 RX ORDER — MAGNESIUM SULFATE 500 MG/ML
2 VIAL (ML) INJECTION ONCE
Refills: 0 | Status: COMPLETED | OUTPATIENT
Start: 2020-04-14 | End: 2020-04-14

## 2020-04-14 RX ORDER — METOPROLOL TARTRATE 50 MG
5 TABLET ORAL ONCE
Refills: 0 | Status: COMPLETED | OUTPATIENT
Start: 2020-04-14 | End: 2020-04-14

## 2020-04-14 RX ORDER — METOPROLOL TARTRATE 50 MG
5 TABLET ORAL ONCE
Refills: 0 | Status: DISCONTINUED | OUTPATIENT
Start: 2020-04-14 | End: 2020-04-14

## 2020-04-14 RX ORDER — FUROSEMIDE 40 MG
80 TABLET ORAL DAILY
Refills: 0 | Status: DISCONTINUED | OUTPATIENT
Start: 2020-04-14 | End: 2020-04-15

## 2020-04-14 RX ORDER — POTASSIUM CHLORIDE 20 MEQ
40 PACKET (EA) ORAL ONCE
Refills: 0 | Status: COMPLETED | OUTPATIENT
Start: 2020-04-14 | End: 2020-04-14

## 2020-04-14 RX ORDER — METOPROLOL TARTRATE 50 MG
50 TABLET ORAL EVERY 8 HOURS
Refills: 0 | Status: DISCONTINUED | OUTPATIENT
Start: 2020-04-14 | End: 2020-04-16

## 2020-04-14 RX ORDER — ADENOSINE 3 MG/ML
12 INJECTION INTRAVENOUS ONCE
Refills: 0 | Status: DISCONTINUED | OUTPATIENT
Start: 2020-04-14 | End: 2020-04-14

## 2020-04-14 RX ORDER — DILTIAZEM HCL 120 MG
10 CAPSULE, EXT RELEASE 24 HR ORAL ONCE
Refills: 0 | Status: COMPLETED | OUTPATIENT
Start: 2020-04-14 | End: 2020-04-14

## 2020-04-14 RX ORDER — MAGNESIUM OXIDE 400 MG ORAL TABLET 241.3 MG
400 TABLET ORAL
Refills: 0 | Status: DISCONTINUED | OUTPATIENT
Start: 2020-04-14 | End: 2020-04-16

## 2020-04-14 RX ORDER — MORPHINE SULFATE 50 MG/1
2 CAPSULE, EXTENDED RELEASE ORAL ONCE
Refills: 0 | Status: COMPLETED | OUTPATIENT
Start: 2020-04-14 | End: 2020-04-14

## 2020-04-14 RX ADMIN — Medication 12.5 MICROGRAM(S): at 22:02

## 2020-04-14 RX ADMIN — Medication 80 MILLIGRAM(S): at 14:49

## 2020-04-14 RX ADMIN — MAGNESIUM OXIDE 400 MG ORAL TABLET 400 MILLIGRAM(S): 241.3 TABLET ORAL at 17:58

## 2020-04-14 RX ADMIN — Medication 40 MILLIEQUIVALENT(S): at 14:31

## 2020-04-14 RX ADMIN — Medication 4: at 05:57

## 2020-04-14 RX ADMIN — Medication 10 MILLIGRAM(S): at 11:29

## 2020-04-14 RX ADMIN — INSULIN GLARGINE 35 UNIT(S): 100 INJECTION, SOLUTION SUBCUTANEOUS at 22:06

## 2020-04-14 RX ADMIN — Medication 5 MILLIGRAM(S): at 06:51

## 2020-04-14 RX ADMIN — PANTOPRAZOLE SODIUM 40 MILLIGRAM(S): 20 TABLET, DELAYED RELEASE ORAL at 12:28

## 2020-04-14 RX ADMIN — Medication 6: at 11:55

## 2020-04-14 RX ADMIN — HEPARIN SODIUM 0 UNIT(S)/HR: 5000 INJECTION INTRAVENOUS; SUBCUTANEOUS at 03:34

## 2020-04-14 RX ADMIN — Medication 25 MILLIGRAM(S): at 04:33

## 2020-04-14 RX ADMIN — Medication 6: at 18:17

## 2020-04-14 RX ADMIN — SIMVASTATIN 20 MILLIGRAM(S): 20 TABLET, FILM COATED ORAL at 22:08

## 2020-04-14 RX ADMIN — HEPARIN SODIUM 1100 UNIT(S)/HR: 5000 INJECTION INTRAVENOUS; SUBCUTANEOUS at 04:27

## 2020-04-14 RX ADMIN — Medication 1 DROP(S): at 12:00

## 2020-04-14 RX ADMIN — Medication 50 GRAM(S): at 14:31

## 2020-04-14 RX ADMIN — QUETIAPINE FUMARATE 50 MILLIGRAM(S): 200 TABLET, FILM COATED ORAL at 04:33

## 2020-04-14 RX ADMIN — LATANOPROST 1 DROP(S): 0.05 SOLUTION/ DROPS OPHTHALMIC; TOPICAL at 22:01

## 2020-04-14 RX ADMIN — QUETIAPINE FUMARATE 50 MILLIGRAM(S): 200 TABLET, FILM COATED ORAL at 17:59

## 2020-04-14 RX ADMIN — HEPARIN SODIUM 800 UNIT(S)/HR: 5000 INJECTION INTRAVENOUS; SUBCUTANEOUS at 12:07

## 2020-04-14 RX ADMIN — SODIUM CHLORIDE 10 MILLILITER(S): 9 INJECTION INTRAMUSCULAR; INTRAVENOUS; SUBCUTANEOUS at 14:05

## 2020-04-14 RX ADMIN — HEPARIN SODIUM 0 UNIT(S)/HR: 5000 INJECTION INTRAVENOUS; SUBCUTANEOUS at 11:10

## 2020-04-14 RX ADMIN — Medication 50 MILLIGRAM(S): at 13:55

## 2020-04-14 RX ADMIN — HEPARIN SODIUM 800 UNIT(S)/HR: 5000 INJECTION INTRAVENOUS; SUBCUTANEOUS at 19:13

## 2020-04-14 RX ADMIN — BRIMONIDINE TARTRATE 1 DROP(S): 2 SOLUTION/ DROPS OPHTHALMIC at 13:57

## 2020-04-14 RX ADMIN — Medication 5 MILLIGRAM(S): at 11:00

## 2020-04-14 RX ADMIN — CHLORHEXIDINE GLUCONATE 1 APPLICATION(S): 213 SOLUTION TOPICAL at 04:32

## 2020-04-14 RX ADMIN — Medication 50 MILLIGRAM(S): at 22:08

## 2020-04-14 RX ADMIN — AMLODIPINE BESYLATE 10 MILLIGRAM(S): 2.5 TABLET ORAL at 04:31

## 2020-04-14 RX ADMIN — AMIODARONE HYDROCHLORIDE 618 MILLIGRAM(S): 400 TABLET ORAL at 06:58

## 2020-04-14 NOTE — PROGRESS NOTE ADULT - SUBJECTIVE AND OBJECTIVE BOX
Patient seen and examined at bedside.   S/P extubation. on non breather    Overnight patiet noted to have run of SVT. given lopressor, effective    This morning after Rounds patient was noted to have runs of ?SVt at 150 bpms  looking at tele monitor rate appears regular and narrow complex.    initially adenosine 12mg ordered to bedside.  EKG more irregular and consistnet with afib  ICU team and writer both spoke to Cardiology.  Recommended amio drip.    After discussing with ICU team plan was to trial lopressor.  noted POCUS b/l large pleural effusion. team ordered lasix 80mg IVP      Review of Systems:  unable    ~Objective:  Vitals  T(C): 37.1 (04-14-20 @ 10:00), Max: 37.8 (04-13-20 @ 19:52)  HR: 89 (04-14-20 @ 14:00) (89 - 166)  BP: 163/70 (04-14-20 @ 14:00) (120/46 - 163/70)  RR: 30 (04-14-20 @ 14:00) (18 - 34)  SpO2: 96% (04-14-20 @ 14:00) (96% - 100%)    Physical Exam:  General: on nonrebreather, tachpneic tachycardic  HEENT: Atraumatic, no LAD, trachea midline, PERRLA  Cardiovascular: tachycardic  Pulmonary:decreased  Gastrointestinal: soft non tender non distended, no masses felt, no organomegally:: RECTAL TUBE IN PlACE  Muscloskeletal: no lower extremity edema, intact bilateral lower extremity pulses  Neurological:unable to obtain paitent not following commands  Psychiatrical: patient not following commads  SKIN: no rash, lesions or ulcers    Labs:                          10.0   18.89 )-----------( 84       ( 14 Apr 2020 07:14 )             32.7     04-14    143  |  110<H>  |  54<H>  ----------------------------<  271<H>  3.5   |  24  |  1.79<H>    Ca    8.8      14 Apr 2020 07:06  Phos  3.6     04-14  Mg     1.6     04-14    TPro  6.5  /  Alb  2.6<L>  /  TBili  0.4  /  DBili  x   /  AST  20  /  ALT  26  /  AlkPhos  63  04-14    LIVER FUNCTIONS - ( 14 Apr 2020 07:06 )  Alb: 2.6 g/dL / Pro: 6.5 g/dL / ALK PHOS: 63 U/L / ALT: 26 U/L DA / AST: 20 U/L / GGT: x           PT/INR - ( 14 Apr 2020 07:06 )   PT: 13.6 sec;   INR: 1.22 ratio         PTT - ( 14 Apr 2020 10:05 )  PTT:178.4 sec      Active Medications  MEDICATIONS  (STANDING):  amLODIPine   Tablet 10 milliGRAM(s) Oral daily  brimonidine 0.2% Ophthalmic Solution 1 Drop(s) Left EYE daily  chlorhexidine 2% Cloths 1 Application(s) Topical <User Schedule>  dextrose 5%. 1000 milliLiter(s) (50 mL/Hr) IV Continuous <Continuous>  dextrose 50% Injectable 12.5 Gram(s) IV Push once  dextrose 50% Injectable 25 Gram(s) IV Push once  dextrose 50% Injectable 25 Gram(s) IV Push once  furosemide   Injectable 80 milliGRAM(s) IV Push daily  heparin  Infusion.  Unit(s)/Hr (17 mL/Hr) IV Continuous <Continuous>  insulin glargine Injectable (LANTUS) 35 Unit(s) SubCutaneous at bedtime  insulin lispro (HumaLOG) corrective regimen sliding scale   SubCutaneous every 6 hours  latanoprost 0.005% Ophthalmic Solution 1 Drop(s) Both EYES at bedtime  levothyroxine Injectable 12.5 MICROGram(s) IV Push at bedtime  magnesium oxide 400 milliGRAM(s) Oral three times a day with meals  metoprolol tartrate 50 milliGRAM(s) Enteral Tube every 8 hours  pantoprazole  Injectable 40 milliGRAM(s) IV Push daily  phenylephrine    Infusion 0.5 MICROgram(s)/kG/Min (17 mL/Hr) IV Continuous <Continuous>  QUEtiapine 50 milliGRAM(s) Oral two times a day  simvastatin 20 milliGRAM(s) Oral at bedtime  timolol 0.5% Solution 1 Drop(s) Left EYE daily    MEDICATIONS  (PRN):  acetaminophen   Tablet .. 650 milliGRAM(s) Oral every 6 hours PRN Temp greater or equal to 38C (100.4F), Mild Pain (1 - 3)  dextrose 40% Gel 15 Gram(s) Oral once PRN Blood Glucose LESS THAN 70 milliGRAM(s)/deciliter  glucagon  Injectable 1 milliGRAM(s) IntraMuscular once PRN Glucose LESS THAN 70 milligrams/deciliter  heparin  Injectable 7500 Unit(s) IV Push every 6 hours PRN For aPTT less than 40  heparin  Injectable 3500 Unit(s) IV Push every 6 hours PRN For aPTT between 40 - 57  hydrALAZINE Injectable 10 milliGRAM(s) IV Push every 6 hours PRN Hypertension  sodium chloride 0.9% lock flush 10 milliLiter(s) IV Push every 1 hour PRN Pre/post blood products, medications, blood draw, and to maintain line patency

## 2020-04-14 NOTE — PROGRESS NOTE ADULT - SUBJECTIVE AND OBJECTIVE BOX
Patient is a 74y old  Female who presents with a chief complaint of SOB (31 Mar 2020 05:05)  Patient seen in follow up for KALE SNELL.      Chart reviewed.    PAST MEDICAL HISTORY:  DJD (degenerative joint disease)  DM (diabetes mellitus)  Hyperlipidemia  Hypothyroid  HTN (hypertension)  History of vitamin D deficiency  B12 deficiency  Bronchitis  Anxiety      MEDICATIONS  (STANDING):  amLODIPine   Tablet 10 milliGRAM(s) Oral daily  brimonidine 0.2% Ophthalmic Solution 1 Drop(s) Left EYE daily  chlorhexidine 2% Cloths 1 Application(s) Topical <User Schedule>  dextrose 5%. 1000 milliLiter(s) (50 mL/Hr) IV Continuous <Continuous>  dextrose 50% Injectable 12.5 Gram(s) IV Push once  dextrose 50% Injectable 25 Gram(s) IV Push once  dextrose 50% Injectable 25 Gram(s) IV Push once  furosemide   Injectable 80 milliGRAM(s) IV Push daily  heparin  Infusion.  Unit(s)/Hr (17 mL/Hr) IV Continuous <Continuous>  insulin glargine Injectable (LANTUS) 35 Unit(s) SubCutaneous at bedtime  insulin lispro (HumaLOG) corrective regimen sliding scale   SubCutaneous every 6 hours  latanoprost 0.005% Ophthalmic Solution 1 Drop(s) Both EYES at bedtime  levothyroxine Injectable 12.5 MICROGram(s) IV Push at bedtime  magnesium oxide 400 milliGRAM(s) Oral three times a day with meals  metoprolol tartrate 50 milliGRAM(s) Enteral Tube every 8 hours  pantoprazole  Injectable 40 milliGRAM(s) IV Push daily  phenylephrine    Infusion 0.5 MICROgram(s)/kG/Min (17 mL/Hr) IV Continuous <Continuous>  QUEtiapine 50 milliGRAM(s) Oral two times a day  simvastatin 20 milliGRAM(s) Oral at bedtime  timolol 0.5% Solution 1 Drop(s) Left EYE daily    MEDICATIONS  (PRN):  acetaminophen   Tablet .. 650 milliGRAM(s) Oral every 6 hours PRN Temp greater or equal to 38C (100.4F), Mild Pain (1 - 3)  dextrose 40% Gel 15 Gram(s) Oral once PRN Blood Glucose LESS THAN 70 milliGRAM(s)/deciliter  glucagon  Injectable 1 milliGRAM(s) IntraMuscular once PRN Glucose LESS THAN 70 milligrams/deciliter  heparin  Injectable 7500 Unit(s) IV Push every 6 hours PRN For aPTT less than 40  heparin  Injectable 3500 Unit(s) IV Push every 6 hours PRN For aPTT between 40 - 57  hydrALAZINE Injectable 10 milliGRAM(s) IV Push every 6 hours PRN Hypertension  sodium chloride 0.9% lock flush 10 milliLiter(s) IV Push every 1 hour PRN Pre/post blood products, medications, blood draw, and to maintain line patency    T(C): 37.1 (04-14-20 @ 10:00), Max: 37.8 (04-13-20 @ 19:52)  HR: 89 (04-14-20 @ 14:00) (47 - 172)  BP: 163/70 (04-14-20 @ 14:00) (111/43 - 185/60)  RR: 30 (04-14-20 @ 14:00)  SpO2: 96% (04-14-20 @ 14:00)  Wt(kg): --  I&O's Detail    13 Apr 2020 07:01  -  14 Apr 2020 07:00  --------------------------------------------------------  IN:    dexmedetomidine Infusion: 86.1 mL    Free Water: 600 mL    Glucerna 1.5: 900 mL    heparin  Infusion.: 256 mL    propofol Infusion: 49.2 mL  Total IN: 1891.3 mL    OUT:    Indwelling Catheter - Urethral: 910 mL    Indwelling Catheter - Urethral: 500 mL  Total OUT: 1410 mL    Total NET: 481.3 mL      14 Apr 2020 07:01  -  14 Apr 2020 14:50  --------------------------------------------------------  IN:    Free Water: 240 mL    Glucerna 1.5: 360 mL    heparin  Infusion.: 60 mL    Solution: 50 mL  Total IN: 710 mL    OUT:    Indwelling Catheter - Urethral: 400 mL  Total OUT: 400 mL    Total NET: 310 mL        PHYSICAL EXAM:  Pt on COVID precautions. Chart reviewed and previous physical examination noted.                          LABORATORY:                        10.0   18.89 )-----------( 84       ( 14 Apr 2020 07:14 )             32.7     04-14    143  |  110<H>  |  54<H>  ----------------------------<  271<H>  3.5   |  24  |  1.79<H>    Ca    8.8      14 Apr 2020 07:06  Phos  3.6     04-14  Mg     1.6     04-14    TPro  6.5  /  Alb  2.6<L>  /  TBili  0.4  /  DBili  x   /  AST  20  /  ALT  26  /  AlkPhos  63  04-14    Sodium, Serum: 143 mmol/L (04-14 @ 07:06)  Sodium, Serum: 148 mmol/L (04-13 @ 11:46)    Potassium, Serum: 3.5 mmol/L (04-14 @ 07:06)  Potassium, Serum: 4.1 mmol/L (04-13 @ 11:46)    Hemoglobin: 10.0 g/dL (04-14 @ 07:14)  Hemoglobin: 10.5 g/dL (04-13 @ 19:01)  Hemoglobin: 8.8 g/dL (04-13 @ 07:09)  Hemoglobin: 9.3 g/dL (04-12 @ 06:21)    Creatinine, Serum 1.79 (04-14 @ 07:06)  Creatinine, Serum 1.72 (04-13 @ 11:46)  Creatinine, Serum 1.84 (04-12 @ 06:21)    CARDIAC MARKERS ( 14 Apr 2020 07:06 )  .404 ng/mL / x     / 37 U/L / x     / x          LIVER FUNCTIONS - ( 14 Apr 2020 07:06 )  Alb: 2.6 g/dL / Pro: 6.5 g/dL / ALK PHOS: 63 U/L / ALT: 26 U/L DA / AST: 20 U/L / GGT: x             ABG - ( 13 Apr 2020 06:26 )  pH, Arterial: x     pH, Blood: 7.44  /  pCO2: 36    /  pO2: 78    / HCO3: 25    / Base Excess: 0.5   /  SaO2: 94

## 2020-04-14 NOTE — PROGRESS NOTE ADULT - SUBJECTIVE AND OBJECTIVE BOX
Date/Time Patient Seen:  		  Referring MD:   Data Reviewed	       Patient is a 74y old  Female who presents with a chief complaint of shob (2020 17:59)      Subjective/HPI     PAST MEDICAL & SURGICAL HISTORY:  DJD (degenerative joint disease)  DM (diabetes mellitus)  Hyperlipidemia  Hypothyroid  HTN (hypertension)  History of vitamin D deficiency  B12 deficiency  Bronchitis  Anxiety  S/P cataract surgery  S/P eye surgery: left eye retinal surgery x2  S/P  section: x2        Medication list         MEDICATIONS  (STANDING):  aMIOdarone Infusion 1 mG/Min (33.3 mL/Hr) IV Continuous <Continuous>  amLODIPine   Tablet 10 milliGRAM(s) Oral daily  brimonidine 0.2% Ophthalmic Solution 1 Drop(s) Left EYE daily  chlorhexidine 2% Cloths 1 Application(s) Topical <User Schedule>  dexMEDEtomidine Infusion 0.7 MICROgram(s)/kG/Hr (15.9 mL/Hr) IV Continuous <Continuous>  dextrose 5%. 1000 milliLiter(s) (50 mL/Hr) IV Continuous <Continuous>  dextrose 50% Injectable 12.5 Gram(s) IV Push once  dextrose 50% Injectable 25 Gram(s) IV Push once  dextrose 50% Injectable 25 Gram(s) IV Push once  heparin  Infusion.  Unit(s)/Hr (17 mL/Hr) IV Continuous <Continuous>  insulin glargine Injectable (LANTUS) 35 Unit(s) SubCutaneous at bedtime  insulin lispro (HumaLOG) corrective regimen sliding scale   SubCutaneous every 6 hours  latanoprost 0.005% Ophthalmic Solution 1 Drop(s) Both EYES at bedtime  levothyroxine Injectable 12.5 MICROGram(s) IV Push at bedtime  metoprolol tartrate 25 milliGRAM(s) Oral every 8 hours  pantoprazole  Injectable 40 milliGRAM(s) IV Push daily  phenylephrine    Infusion 0.5 MICROgram(s)/kG/Min (17 mL/Hr) IV Continuous <Continuous>  propofol Infusion 15 MICROgram(s)/kG/Min (8.16 mL/Hr) IV Continuous <Continuous>  QUEtiapine 50 milliGRAM(s) Oral two times a day  simvastatin 20 milliGRAM(s) Oral at bedtime  timolol 0.5% Solution 1 Drop(s) Left EYE daily    MEDICATIONS  (PRN):  acetaminophen   Tablet .. 650 milliGRAM(s) Oral every 6 hours PRN Temp greater or equal to 38C (100.4F), Mild Pain (1 - 3)  dextrose 40% Gel 15 Gram(s) Oral once PRN Blood Glucose LESS THAN 70 milliGRAM(s)/deciliter  glucagon  Injectable 1 milliGRAM(s) IntraMuscular once PRN Glucose LESS THAN 70 milligrams/deciliter  heparin  Injectable 7500 Unit(s) IV Push every 6 hours PRN For aPTT less than 40  heparin  Injectable 3500 Unit(s) IV Push every 6 hours PRN For aPTT between 40 - 57  hydrALAZINE Injectable 10 milliGRAM(s) IV Push every 6 hours PRN Hypertension  midazolam Injectable 2 milliGRAM(s) IV Push every 2 hours PRN Agitation  sodium chloride 0.9% lock flush 10 milliLiter(s) IV Push every 1 hour PRN Pre/post blood products, medications, blood draw, and to maintain line patency         Vitals log        ICU Vital Signs Last 24 Hrs  T(C): 36.8 (2020 05:55), Max: 37.8 (2020 19:52)  T(F): 98.3 (2020 05:55), Max: 100 (2020 19:52)  HR: 100 (2020 05:00) (50 - 118)  BP: 138/58 (2020 05:00) (120/46 - 163/75)  BP(mean): 65 (2020 03:00) (62 - 99)  ABP: --  ABP(mean): --  RR: 24 (2020 05:00) (20 - 35)  SpO2: 98% (2020 05:00) (95% - 100%)       Mode: CPAP with PS  FiO2: 40  PEEP: 5  PS: 5  ITime: 1      Input and Output:  I&O's Detail    2020 07:01  -  2020 07:00  --------------------------------------------------------  IN:    dexmedetomidine Infusion: 86.1 mL    Free Water: 600 mL    Glucerna 1.5: 900 mL    heparin  Infusion.: 256 mL    propofol Infusion: 49.2 mL  Total IN: 1891.3 mL    OUT:    Indwelling Catheter - Urethral: 910 mL    Indwelling Catheter - Urethral: 500 mL  Total OUT: 1410 mL    Total NET: 481.3 mL          Lab Data                        10.0   18.89 )-----------( x        ( 2020 07:14 )             32.7     0414    143  |  110<H>  |  54<H>  ----------------------------<  271<H>  3.5   |  24  |  1.79<H>    Ca    8.8      2020 07:06  Phos  3.6       Mg     1.6         TPro  6.5  /  Alb  2.6<L>  /  TBili  0.4  /  DBili  x   /  AST  20  /  ALT  26  /  AlkPhos  63      ABG - ( 2020 06:26 )  pH, Arterial: x     pH, Blood: 7.44  /  pCO2: 36    /  pO2: 78    / HCO3: 25    / Base Excess: 0.5   /  SaO2: 94                CARDIAC MARKERS ( 2020 07:06 )  .404 ng/mL / x     / 37 U/L / x     / x            Review of Systems	      Objective     Physical Examination    heart s1s2  lung dec BS  abd soft  obese  head nc  rij tlc      Pertinent Lab findings & Imaging      Justin:  NO   Adequate UO     I&O's Detail    2020 07:01  -  2020 07:00  --------------------------------------------------------  IN:    dexmedetomidine Infusion: 86.1 mL    Free Water: 600 mL    Glucerna 1.5: 900 mL    heparin  Infusion.: 256 mL    propofol Infusion: 49.2 mL  Total IN: 1891.3 mL    OUT:    Indwelling Catheter - Urethral: 910 mL    Indwelling Catheter - Urethral: 500 mL  Total OUT: 1410 mL    Total NET: 481.3 mL               Discussed with:     Cultures:	        Radiology

## 2020-04-14 NOTE — PROGRESS NOTE ADULT - SUBJECTIVE AND OBJECTIVE BOX
INTERVAL HPI/OVERNIGHT EVENTS:  chart reviewed    MEDICATIONS  (STANDING):  amLODIPine   Tablet 10 milliGRAM(s) Oral daily  brimonidine 0.2% Ophthalmic Solution 1 Drop(s) Left EYE daily  chlorhexidine 2% Cloths 1 Application(s) Topical <User Schedule>  dextrose 5%. 1000 milliLiter(s) (50 mL/Hr) IV Continuous <Continuous>  dextrose 50% Injectable 12.5 Gram(s) IV Push once  dextrose 50% Injectable 25 Gram(s) IV Push once  dextrose 50% Injectable 25 Gram(s) IV Push once  furosemide   Injectable 80 milliGRAM(s) IV Push daily  heparin  Infusion.  Unit(s)/Hr (17 mL/Hr) IV Continuous <Continuous>  insulin glargine Injectable (LANTUS) 35 Unit(s) SubCutaneous at bedtime  insulin lispro (HumaLOG) corrective regimen sliding scale   SubCutaneous every 6 hours  latanoprost 0.005% Ophthalmic Solution 1 Drop(s) Both EYES at bedtime  levothyroxine Injectable 12.5 MICROGram(s) IV Push at bedtime  magnesium oxide 400 milliGRAM(s) Oral three times a day with meals  metoprolol tartrate 50 milliGRAM(s) Enteral Tube every 8 hours  pantoprazole  Injectable 40 milliGRAM(s) IV Push daily  phenylephrine    Infusion 0.5 MICROgram(s)/kG/Min (17 mL/Hr) IV Continuous <Continuous>  QUEtiapine 50 milliGRAM(s) Oral two times a day  simvastatin 20 milliGRAM(s) Oral at bedtime  timolol 0.5% Solution 1 Drop(s) Left EYE daily    MEDICATIONS  (PRN):  acetaminophen   Tablet .. 650 milliGRAM(s) Oral every 6 hours PRN Temp greater or equal to 38C (100.4F), Mild Pain (1 - 3)  dextrose 40% Gel 15 Gram(s) Oral once PRN Blood Glucose LESS THAN 70 milliGRAM(s)/deciliter  glucagon  Injectable 1 milliGRAM(s) IntraMuscular once PRN Glucose LESS THAN 70 milligrams/deciliter  heparin  Injectable 7500 Unit(s) IV Push every 6 hours PRN For aPTT less than 40  heparin  Injectable 3500 Unit(s) IV Push every 6 hours PRN For aPTT between 40 - 57  hydrALAZINE Injectable 10 milliGRAM(s) IV Push every 6 hours PRN Hypertension  sodium chloride 0.9% lock flush 10 milliLiter(s) IV Push every 1 hour PRN Pre/post blood products, medications, blood draw, and to maintain line patency      Allergies    No Known Allergies    Intolerances        Review of Systems:    General:  No wt loss, fevers, chills, night sweats,fatigue,   Eyes:  Good vision, no reported pain  ENT:  No sore throat, pain, runny nose, dysphagia  CV:  No pain, palpitatioins, hypo/hypertension  Resp:  No dyspnea, cough, tachypnea, wheezing  GI:  No pain, No nausea, No vomiting, No diarrhea, No constipatiion, No weight loss, No fever, No pruritis, No rectal bleeding, No tarry stools, No dysphagia,  :  No pain, bleeding, incontinence, nocturia  Muscle:  No pain, weakness  Neuro:  No weakness, tingling, memory problems  Psych:  No fatigue, insomnia, mood problems, depression  Endocrine:  No polyuria, polydypsia, cold/heat intolerance  Heme:  No petechiae, ecchymosis, easy bruisability  Skin:  No rash, tattoos, scars, edema      Vital Signs Last 24 Hrs  T(C): 36.2 (14 Apr 2020 20:34), Max: 37.6 (13 Apr 2020 23:30)  T(F): 97.2 (14 Apr 2020 20:34), Max: 99.6 (13 Apr 2020 23:30)  HR: 86 (14 Apr 2020 20:34) (72 - 166)  BP: 140/59 (14 Apr 2020 20:34) (120/46 - 163/70)  BP(mean): 88 (14 Apr 2020 10:00) (62 - 88)  RR: 36 (14 Apr 2020 20:34) (18 - 40)  SpO2: 95% (14 Apr 2020 20:34) (90% - 100%)    PHYSICAL EXAM:    Constitutional: NAD, well-developed  HEENT: EOMI, throat clear  Neck: No LAD, supple  Respiratory: CTA and P  Cardiovascular: S1 and S2, RRR, no M  Gastrointestinal: BS+, soft, NT/ND, neg HSM,  Extremities: No peripheral edema, neg clubing, cyanosis  Vascular: 2+ peripheral pulses  Neurological: A/O x 3, no focal deficits  Psychiatric: Normal mood, normal affect  Skin: No rashes      LABS:                        10.0   18.89 )-----------( 84       ( 14 Apr 2020 07:14 )             32.7     04-14    143  |  110<H>  |  54<H>  ----------------------------<  271<H>  3.5   |  24  |  1.79<H>    Ca    8.8      14 Apr 2020 07:06  Phos  3.6     04-14  Mg     1.6     04-14    TPro  6.5  /  Alb  2.6<L>  /  TBili  0.4  /  DBili  x   /  AST  20  /  ALT  26  /  AlkPhos  63  04-14    PT/INR - ( 14 Apr 2020 07:06 )   PT: 13.6 sec;   INR: 1.22 ratio         PTT - ( 14 Apr 2020 17:46 )  PTT:89.1 sec      RADIOLOGY & ADDITIONAL TESTS:

## 2020-04-14 NOTE — PROGRESS NOTE ADULT - SUBJECTIVE AND OBJECTIVE BOX
Chief Complaint: Fevers, shortness of breath    Interval Events: Extubated yesterday. Episodes of rapid atrial fibrillation.    Review of Systems:  Unable to obtain    Physical Exam:  Vital Signs Last 24 Hrs  T(C): 37.1 (14 Apr 2020 10:00), Max: 37.8 (13 Apr 2020 19:52)  T(F): 98.7 (14 Apr 2020 10:00), Max: 100 (13 Apr 2020 19:52)  HR: 94 (14 Apr 2020 11:50) (52 - 166)  BP: 123/52 (14 Apr 2020 11:50) (120/46 - 163/75)  BP(mean): 88 (14 Apr 2020 10:00) (62 - 99)  RR: 27 (14 Apr 2020 11:00) (18 - 35)  SpO2: 96% (14 Apr 2020 11:00) (95% - 100%)  General: NAD  HEENT: MMM  Neck: No JVD, no carotid bruit  Extremities: No LE edema, no cyanosis  Neuro: Non-focal  Skin: No rash    Labs:    04-14    143  |  110<H>  |  54<H>  ----------------------------<  271<H>  3.5   |  24  |  1.79<H>    Ca    8.8      14 Apr 2020 07:06  Phos  3.6     04-14  Mg     1.6     04-14    TPro  6.5  /  Alb  2.6<L>  /  TBili  0.4  /  DBili  x   /  AST  20  /  ALT  26  /  AlkPhos  63  04-14                        10.0   18.89 )-----------( 84       ( 14 Apr 2020 07:14 )             32.7       Telemetry: Sinus rhythm

## 2020-04-14 NOTE — CHART NOTE - NSCHARTNOTEFT_GEN_A_CORE
called to bedside by RN pt noted to be in SVT, EKG done pt in afib, pt w/ stable BP, Pt had 2 prior episodes overnight for which she was given lopressor 5mg IVP w/ improvement, pt was given another dose of Lopressor 5mg IVP x1 with no improvement, was given 10mg IVP cardizem w/ improvement and back in NSR, d/w cards and recommendations noted if reoccurs will start amio bolus and gtt, in the mean time will increase lopressor from 25mg---->50mg q8h for rate control. Furthermore POCUS b/l large pleural effusion pt is appx 18L net +, will order lasix 80mg IVP monitor I&O closely, k/mag repletetion ordered.

## 2020-04-14 NOTE — PROGRESS NOTE ADULT - ASSESSMENT
The patient is a 74 year old female with a history of HTN, DM, HL, hypothyroidism, anxiety who presented with fevers, cough, shortness of breath in the setting of viral PNA, COVID-19, respiratory failure, septic shock.     Plan:  - COVID-19 positive  - Completed hydroxychloroquine  - Off of antibiotics  - Off of IV pressors  - On amlodipine 10 mg daily  - Extubated. On NRB.  - Episodes of rapid atrial fibrillation - give amiodarone 150 mg now and start drip at 1 mg/min for 6 hours then 0.5 mg/min for 18 hours. Will likely transition to PO amiodarone after. Eventually will be tapered off.  - On heparin drip  - Consider changing to apixaban 2.5 mg bid as treatment for both high risk VTE with COVID and atrial fibrillation  - ICU care

## 2020-04-14 NOTE — CHART NOTE - NSCHARTNOTEFT_GEN_A_CORE
Assessment: Pt for nutrition f/u.  Pt is a 73 yo female with hx DM2, HTN, afib, hypothyroidism admitted with + COVID19,  Pt required  intubation on admission, is s/p extubation on 4/13.  Pt on 100% NRB; now is DNI/DNR.  Pt continues to require enteral nutrition.  Per discussion with nursing and as indicated in intake flowsheet, pt receiving and tolerating Glu 1.5 180ml every 4 hrs with 60ml free water flush before and after each bolus; also receiving 200ml addtl free water per provider to RN order.  Noted pt continues to have loose stools, likely multifactorial; +rectal tube.  RD to continue to follow.     Factors impacting intake: [ ] none [ ] nausea  [ ] vomiting [ ] diarrhea [ ] constipation  [ ]chewing problems [ ] swallowing issues  [ ] other:     Diet Presciption: Diet, NPO with Tube Feed:   Tube Feeding Modality: Nasogastric  Glucerna 1.5 Car  Total Volume for 24 Hours (mL): 1080  Bolus  Total Volume of Bolus (mL):  90  Total # of Feeds: 6  Tube Feed Frequency: Every 2 hours   Tube Feed Start Time: 05:00  Bolus Feed Rate (mL per Hour): 180   Bolus Feed Duration (in Hours): 1  Bolus Feed Instructions:  give bolus every 4 hours with 60mL water flush before and after each bolus (04-08-20 @ 09:43)    Intake: bolus feeds, flushes, free water     Current Weight: no updated bw to assess   % Weight Change    Pertinent Medications: MEDICATIONS  (STANDING):  amLODIPine   Tablet 10 milliGRAM(s) Oral daily  brimonidine 0.2% Ophthalmic Solution 1 Drop(s) Left EYE daily  chlorhexidine 2% Cloths 1 Application(s) Topical <User Schedule>  dextrose 5%. 1000 milliLiter(s) (50 mL/Hr) IV Continuous <Continuous>  dextrose 50% Injectable 12.5 Gram(s) IV Push once  dextrose 50% Injectable 25 Gram(s) IV Push once  dextrose 50% Injectable 25 Gram(s) IV Push once  furosemide   Injectable 80 milliGRAM(s) IV Push daily  heparin  Infusion.  Unit(s)/Hr (17 mL/Hr) IV Continuous <Continuous>  insulin glargine Injectable (LANTUS) 35 Unit(s) SubCutaneous at bedtime  insulin lispro (HumaLOG) corrective regimen sliding scale   SubCutaneous every 6 hours  latanoprost 0.005% Ophthalmic Solution 1 Drop(s) Both EYES at bedtime  levothyroxine Injectable 12.5 MICROGram(s) IV Push at bedtime  magnesium oxide 400 milliGRAM(s) Oral three times a day with meals  metoprolol tartrate 50 milliGRAM(s) Enteral Tube every 8 hours  pantoprazole  Injectable 40 milliGRAM(s) IV Push daily  phenylephrine    Infusion 0.5 MICROgram(s)/kG/Min (17 mL/Hr) IV Continuous <Continuous>  QUEtiapine 50 milliGRAM(s) Oral two times a day  simvastatin 20 milliGRAM(s) Oral at bedtime  timolol 0.5% Solution 1 Drop(s) Left EYE daily    MEDICATIONS  (PRN):  acetaminophen   Tablet .. 650 milliGRAM(s) Oral every 6 hours PRN Temp greater or equal to 38C (100.4F), Mild Pain (1 - 3)  dextrose 40% Gel 15 Gram(s) Oral once PRN Blood Glucose LESS THAN 70 milliGRAM(s)/deciliter  glucagon  Injectable 1 milliGRAM(s) IntraMuscular once PRN Glucose LESS THAN 70 milligrams/deciliter  heparin  Injectable 7500 Unit(s) IV Push every 6 hours PRN For aPTT less than 40  heparin  Injectable 3500 Unit(s) IV Push every 6 hours PRN For aPTT between 40 - 57  hydrALAZINE Injectable 10 milliGRAM(s) IV Push every 6 hours PRN Hypertension  sodium chloride 0.9% lock flush 10 milliLiter(s) IV Push every 1 hour PRN Pre/post blood products, medications, blood draw, and to maintain line patency    Pertinent Labs: 04-14 Na143 mmol/L Glu 271 mg/dL<H> K+ 3.5 mmol/L Cr  1.79 mg/dL<H> BUN 54 mg/dL<H> 04-14 Phos 3.6 mg/dL 04-14 Alb 2.6 g/dL<L> 03-27 [id number] 8.1 %<H> 04-14 Chol --    LDL --    HDL --    Trig 176 mg/dL<H>     CAPILLARY BLOOD GLUCOSE      POCT Blood Glucose.: 287 mg/dL (14 Apr 2020 11:43)  POCT Blood Glucose.: 239 mg/dL (14 Apr 2020 05:37)  POCT Blood Glucose.: 198 mg/dL (13 Apr 2020 22:52)  POCT Blood Glucose.: 222 mg/dL (13 Apr 2020 17:07)    Skin: intact    Estimated Needs:   [ x] no change since previous assessment  [ ] recalculated:     Previous Nutrition Diagnosis:   [ ] Inadequate Energy Intake [ x]Inadequate Oral Intake [ ] Excessive Energy Intake   [ ] Underweight [ ] Increased Nutrient Needs [ ] Overweight/Obesity   [ ] Altered GI Function [ ] Unintended Weight Loss [ ] Food & Nutrition Related Knowledge Deficit [ ] Malnutrition     Nutrition Diagnosis is [ x] ongoing  [ ] resolved [ ] not applicable     New Nutrition Diagnosis: [ ] not applicable       Interventions: bolus feeds, flushes, free water  Recommend  [ ] Change Diet To:  [ ] Nutrition Supplement  [ ] Nutrition Support  [ ] Other:     Monitoring and Evaluation:   [ ] PO intake [ x ] Tolerance to diet prescription/bolus feeds [ x ] weights [ x ] labs[ x ] follow up per protocol  [ ] other:

## 2020-04-14 NOTE — PROGRESS NOTE ADULT - SUBJECTIVE AND OBJECTIVE BOX
ADELIA BALDWIN is a 74yFemale , patient examined and chart reviewed.     INTERVAL HPI/ OVERNIGHT EVENTS:  Lethargic On NRB.  Afebrile.     Past Medical History--  PAST MEDICAL & SURGICAL HISTORY:  DJD (degenerative joint disease)  DM (diabetes mellitus)  Hyperlipidemia  Hypothyroid  HTN (hypertension)  History of vitamin D deficiency  B12 deficiency  Bronchitis  Anxiety  S/P cataract surgery  S/P eye surgery: left eye retinal surgery x2  S/P  section: x2      For details regarding the patient's social history, family history, and other miscellaneous elements, please refer the initial infectious diseases consultation and/or the admitting history and physical examination for this admission.      ROS:  Unable to obtain due to : pt's condition      Current inpatient medications :  MEDICATIONS  (STANDING):  amLODIPine   Tablet 10 milliGRAM(s) Oral daily  brimonidine 0.2% Ophthalmic Solution 1 Drop(s) Left EYE daily  chlorhexidine 2% Cloths 1 Application(s) Topical <User Schedule>  dextrose 5%. 1000 milliLiter(s) (50 mL/Hr) IV Continuous <Continuous>  dextrose 50% Injectable 12.5 Gram(s) IV Push once  dextrose 50% Injectable 25 Gram(s) IV Push once  dextrose 50% Injectable 25 Gram(s) IV Push once  furosemide   Injectable 80 milliGRAM(s) IV Push daily  heparin  Infusion.  Unit(s)/Hr (17 mL/Hr) IV Continuous <Continuous>  insulin glargine Injectable (LANTUS) 35 Unit(s) SubCutaneous at bedtime  insulin lispro (HumaLOG) corrective regimen sliding scale   SubCutaneous every 6 hours  latanoprost 0.005% Ophthalmic Solution 1 Drop(s) Both EYES at bedtime  levothyroxine Injectable 12.5 MICROGram(s) IV Push at bedtime  magnesium oxide 400 milliGRAM(s) Oral three times a day with meals  metoprolol tartrate 50 milliGRAM(s) Enteral Tube every 8 hours  pantoprazole  Injectable 40 milliGRAM(s) IV Push daily  phenylephrine    Infusion 0.5 MICROgram(s)/kG/Min (17 mL/Hr) IV Continuous <Continuous>  QUEtiapine 50 milliGRAM(s) Oral two times a day  simvastatin 20 milliGRAM(s) Oral at bedtime  timolol 0.5% Solution 1 Drop(s) Left EYE daily    MEDICATIONS  (PRN):  acetaminophen   Tablet .. 650 milliGRAM(s) Oral every 6 hours PRN Temp greater or equal to 38C (100.4F), Mild Pain (1 - 3)  dextrose 40% Gel 15 Gram(s) Oral once PRN Blood Glucose LESS THAN 70 milliGRAM(s)/deciliter  glucagon  Injectable 1 milliGRAM(s) IntraMuscular once PRN Glucose LESS THAN 70 milligrams/deciliter  heparin  Injectable 7500 Unit(s) IV Push every 6 hours PRN For aPTT less than 40  heparin  Injectable 3500 Unit(s) IV Push every 6 hours PRN For aPTT between 40 - 57  hydrALAZINE Injectable 10 milliGRAM(s) IV Push every 6 hours PRN Hypertension  sodium chloride 0.9% lock flush 10 milliLiter(s) IV Push every 1 hour PRN Pre/post blood products, medications, blood draw, and to maintain line patency      Objective:  ICU Vital Signs Last 24 Hrs  T(C): 36.7 (2020 12:00), Max: 36.7 (2020 12:00)  T(F): 98 (2020 12:00), Max: 98 (2020 12:00)  HR: 115 (2020 19:52) (47 - 118)  BP: 138/49 (2020 19:52) (111/43 - 163/75)  BP(mean): 72 (2020 19:52) (48 - 99)  RR: 27 (2020 19:52) (12 - 35)  SpO2: 98% (2020 19:52) (91% - 100%)        Physical Exam:  GEN: Lethargic NRB  HEENT: normocephalic and atraumatic. EOMI. OLGA. Moist mucosa. Clear Posterior pharynx.  NECK: Supple. No carotid bruits.  No lymphadenopathy or thyromegaly.  LUNGS: Decreased to auscultation.  HEART: Regular rate and rhythm without murmur.  ABDOMEN: Soft, nontender, and nondistended.  Positive bowel sounds.   EXTREMITIES: +edema.  NEUROLOGIC: lethargic  SKIN: No ulceration or induration present.      LABS:                        10.0   18.89 )-----------( 84       ( 2020 07:14 )             32.7   04-14    143  |  110<H>  |  54<H>  ----------------------------<  271<H>  3.5   |  24  |  1.79<H>    Ca    8.8      2020 07:06  Phos  3.6     -  Mg     1.6         TPro  6.5  /  Alb  2.6<L>  /  TBili  0.4  /  DBili  x   /  AST  20  /  ALT  26  /  AlkPhos  63      MICROBIOLOGY:    Culture - Blood (collected 2020 12:56)  Source: .Blood Blood-Peripheral  Preliminary Report (2020 13:01):    No growth to date.    Culture - Blood (collected 2020 12:56)  Source: .Blood Blood-Peripheral  Preliminary Report (2020 13:01):    No growth to date.    Clostridium difficile Toxin by PCR (20 @ 11:10)    C Diff by PCR Result: [facility]      COVID-19 PCR . (20 @ 22:21)    COVID-19 PCR: Detected: All “detected” results on this new test are considered presumptively  positive results, are clinically actionable, and specimens will be  forwarded to [facility] for confirmation testing.  Another report (corrected report) will only be issued if discordant  results occur.  This test has been validated by [facility] to be accurate;  though it has not been FDA cleared/approved by the usual pathway.  As with all laboratory tests, results should be correlated with clinical  findings.      RADIOLOGY & ADDITIONAL STUDIES:    EXAM:  XR KUB 1 VIEW                            PROCEDURE DATE:  2020        INTERPRETATION:  EXAM:XR ABDOMEN KUB.     CLINICAL INDICATION: No Predefined Suggestions .     TECHNIQUE: Portable supine AP views of the chest and abdomen.     PRIOR EXAM: Chest x-ray dated 2020.     FINDINGS:      No evidence of bowel obstruction or perforation. 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    Improved pulmonary opacities bilaterally. NG tube extends into the proximal stomach. An endotracheal tube and a central line remain in stable position.      IMPRESSION:     No abnormal bowel distention.    Improving pulmonary opacities.    NG tube extending into the proximal stomach.      Assessment :  73 y/o F with hx of DM, HTN, HLD, hypothyroid, anxiety presented admitted with severe sepsis with septic shock and acute hypoxic respiratory failure with MSOF sec COVID 19 pneumonia. Sp Cardiorespiratory arrest.  ALIS stable. Anemic. Procalcitonin markedly elevated- concern for superimposed bacterial process sp course of Zosyn. Off pressors. Extubated 2020. Doing poorly On NRB. Lethargic    Plan :   Completed Hydroxychloroquine x 5 days  Supportive care  Sp Zosyn stopped 2020  Asp precautions  Trend temps  COVID-19---high risk period for decompensation  (7-14 days post symptom onset), avoid aerosolizing procedures, NSAIDs   -avoid excessive blood draws and frequent CXRs  -daily CBC w diff to follow eos, lymphocytes and neutrophils and daily NLR calculation (NLR <3 low vs >5 high)  -Other labs that may be selectively used to risk stratify and predict clinical course, ie: Ferritin (lower risk <450 vs >850) CRP (low risk <2 and higher risk >6) and LDH, D Dimer  -antibiotics only if there is concern for a bacterial process  -DNRDNI  -Prognosis poor    D/w ICU team      Continue with present regiment.  Appropriate use of antibiotics and adverse effects reviewed.    I have discussed the above plan of care with patient/ family in detail. They expressed understanding of the the treatment plan . Risks, benefits and alternatives discussed in detail. I have asked if they have any questions or concerns and appropriately addressed them to the best of my ability .      Critical care time greater then 35 minutes reviewing notes, labs data/ imaging , discussion with multidisciplinary team.    Thank you for allowing me to participate in care of your patient .        Eder Brown MD  Infectious Disease  [phone]

## 2020-04-14 NOTE — PROGRESS NOTE ADULT - ASSESSMENT
75 y/o F with hx of DM, HTN, HLD, hypothyroid, anxiety biba with c/o fever and cough x 6 days. Admitted to ICU for acute respiratory failure secondary to  COVID 19 pNA requiring intubation.      Acute respiratory failure with hypoxia.  secondary to ARDS from COVID 19.  s/p course of plaquenil  doing well on spontaneously breathing trials now  now s/p extubation 4/13 to nonbreather  patient now DNI as per family. family wishes against trach at this time  Dimer markers very elevated    Plan:    Continue nonbreather  Heparin drip for ppx   Continue to trend biomarkers    Rapid AFIB  Patient started on IV Lopressor  amio on standby  Pleural effusions noted on POCUS        ALIS most likely secondary to ATN from shock.  Nephrology on Board  BUN/creat slightly better.   Patient noted to be 18 liters net positive  stop all free water flushes  lasix 80 now      Diabetes  Sliding Scale  Target Fingerstick 140-180          Hypothyroidism, unspecified type.  Plan: Continue patient on Synthroid.       DVT ppx: heparin drip due to Alis    Diet: NPO while on nonbreather  Code: DNR/DNI

## 2020-04-14 NOTE — CHART NOTE - NSCHARTNOTESELECT_GEN_ALL_CORE
Event Note
Nutrition Services
[facility] add./Event Note
2020

## 2020-04-14 NOTE — PROGRESS NOTE ADULT - ASSESSMENT
1.	ALIS on CKD 3: ATN, COVID renal injury  2.	SARS-COVID 19, Resp failure on vent  3.	Anemia  4.	Diabetes  5.	Hypokalemia    Renal indices likely at baseline. Started on IV lasix for pleural effusions. To continue current meds. Treatment for COVID pneumonia. COVID precautions. Strict I & O.  Monitor blood sugar levels. Insulin coverage as needed. Avoid nephrotoxic meds as possible. Wean as tolerated. Avoid ACEI, ARB, NSAIDs and IV contrast.   Monitor BP trend. Titrate BP meds as needed. Salt restriction. Will follow electrolytes and renal function trend. Supportive care. ICU management.

## 2020-04-15 LAB
ALBUMIN SERPL ELPH-MCNC: 2.6 G/DL — LOW (ref 3.3–5)
ALP SERPL-CCNC: 73 U/L — SIGNIFICANT CHANGE UP (ref 30–120)
ALT FLD-CCNC: 29 U/L DA — SIGNIFICANT CHANGE UP (ref 10–60)
ANION GAP SERPL CALC-SCNC: 8 MMOL/L — SIGNIFICANT CHANGE UP (ref 5–17)
APTT BLD: 42.1 SEC — HIGH (ref 28.5–37)
APTT BLD: 54.7 SEC — HIGH (ref 28.5–37)
APTT BLD: 68.8 SEC — HIGH (ref 28.5–37)
AST SERPL-CCNC: 17 U/L — SIGNIFICANT CHANGE UP (ref 10–40)
BASOPHILS # BLD AUTO: 0.04 K/UL — SIGNIFICANT CHANGE UP (ref 0–0.2)
BASOPHILS NFR BLD AUTO: 0.3 % — SIGNIFICANT CHANGE UP (ref 0–2)
BILIRUB SERPL-MCNC: 0.4 MG/DL — SIGNIFICANT CHANGE UP (ref 0.2–1.2)
BUN SERPL-MCNC: 51 MG/DL — HIGH (ref 7–23)
CALCIUM SERPL-MCNC: 8.7 MG/DL — SIGNIFICANT CHANGE UP (ref 8.4–10.5)
CHLORIDE SERPL-SCNC: 108 MMOL/L — SIGNIFICANT CHANGE UP (ref 96–108)
CK SERPL-CCNC: 33 U/L — SIGNIFICANT CHANGE UP (ref 26–192)
CO2 SERPL-SCNC: 29 MMOL/L — SIGNIFICANT CHANGE UP (ref 22–31)
CREAT SERPL-MCNC: 1.55 MG/DL — HIGH (ref 0.5–1.3)
CRP SERPL-MCNC: 3.46 MG/DL — HIGH (ref 0–0.4)
D DIMER BLD IA.RAPID-MCNC: 935 NG/ML DDU — HIGH
EOSINOPHIL # BLD AUTO: 0.71 K/UL — HIGH (ref 0–0.5)
EOSINOPHIL NFR BLD AUTO: 4.6 % — SIGNIFICANT CHANGE UP (ref 0–6)
ERYTHROCYTE [SEDIMENTATION RATE] IN BLOOD: 31 MM/HR — HIGH (ref 0–20)
FERRITIN SERPL-MCNC: 539 NG/ML — HIGH (ref 15–150)
GLUCOSE BLDC GLUCOMTR-MCNC: 251 MG/DL — HIGH (ref 70–99)
GLUCOSE BLDC GLUCOMTR-MCNC: 254 MG/DL — HIGH (ref 70–99)
GLUCOSE BLDC GLUCOMTR-MCNC: 304 MG/DL — HIGH (ref 70–99)
GLUCOSE BLDC GLUCOMTR-MCNC: 308 MG/DL — HIGH (ref 70–99)
GLUCOSE SERPL-MCNC: 315 MG/DL — HIGH (ref 70–99)
HCT VFR BLD CALC: 32.8 % — LOW (ref 34.5–45)
HGB BLD-MCNC: 10.1 G/DL — LOW (ref 11.5–15.5)
IMM GRANULOCYTES NFR BLD AUTO: 0.9 % — SIGNIFICANT CHANGE UP (ref 0–1.5)
INR BLD: 1.06 RATIO — SIGNIFICANT CHANGE UP (ref 0.88–1.16)
LYMPHOCYTES # BLD AUTO: 1.44 K/UL — SIGNIFICANT CHANGE UP (ref 1–3.3)
LYMPHOCYTES # BLD AUTO: 9.2 % — LOW (ref 13–44)
MAGNESIUM SERPL-MCNC: 1.9 MG/DL — SIGNIFICANT CHANGE UP (ref 1.6–2.6)
MCHC RBC-ENTMCNC: 28.3 PG — SIGNIFICANT CHANGE UP (ref 27–34)
MCHC RBC-ENTMCNC: 30.8 GM/DL — LOW (ref 32–36)
MCV RBC AUTO: 91.9 FL — SIGNIFICANT CHANGE UP (ref 80–100)
MONOCYTES # BLD AUTO: 1.16 K/UL — HIGH (ref 0–0.9)
MONOCYTES NFR BLD AUTO: 7.5 % — SIGNIFICANT CHANGE UP (ref 2–14)
NEUTROPHILS # BLD AUTO: 12.08 K/UL — HIGH (ref 1.8–7.4)
NEUTROPHILS NFR BLD AUTO: 77.5 % — HIGH (ref 43–77)
NRBC # BLD: 0 /100 WBCS — SIGNIFICANT CHANGE UP (ref 0–0)
PHOSPHATE SERPL-MCNC: 2.7 MG/DL — SIGNIFICANT CHANGE UP (ref 2.5–4.5)
PLATELET # BLD AUTO: 72 K/UL — LOW (ref 150–400)
POTASSIUM SERPL-MCNC: 3.8 MMOL/L — SIGNIFICANT CHANGE UP (ref 3.5–5.3)
POTASSIUM SERPL-SCNC: 3.8 MMOL/L — SIGNIFICANT CHANGE UP (ref 3.5–5.3)
PROCALCITONIN SERPL-MCNC: 0.27 NG/ML — HIGH (ref 0.02–0.1)
PROT SERPL-MCNC: 6.6 G/DL — SIGNIFICANT CHANGE UP (ref 6–8.3)
PROTHROM AB SERPL-ACNC: 11.8 SEC — SIGNIFICANT CHANGE UP (ref 10–12.9)
RBC # BLD: 3.57 M/UL — LOW (ref 3.8–5.2)
RBC # FLD: 15.5 % — HIGH (ref 10.3–14.5)
SODIUM SERPL-SCNC: 145 MMOL/L — SIGNIFICANT CHANGE UP (ref 135–145)
TRIGL SERPL-MCNC: 268 MG/DL — HIGH (ref 10–149)
TROPONIN I SERPL-MCNC: 0.25 NG/ML — HIGH (ref 0.02–0.06)
WBC # BLD: 15.57 K/UL — HIGH (ref 3.8–10.5)
WBC # FLD AUTO: 15.57 K/UL — HIGH (ref 3.8–10.5)

## 2020-04-15 PROCEDURE — 71045 X-RAY EXAM CHEST 1 VIEW: CPT | Mod: 26

## 2020-04-15 PROCEDURE — 99233 SBSQ HOSP IP/OBS HIGH 50: CPT | Mod: CS

## 2020-04-15 RX ORDER — FUROSEMIDE 40 MG
5 TABLET ORAL
Qty: 500 | Refills: 0 | Status: DISCONTINUED | OUTPATIENT
Start: 2020-04-15 | End: 2020-04-16

## 2020-04-15 RX ORDER — HYDROMORPHONE HYDROCHLORIDE 2 MG/ML
0.5 INJECTION INTRAMUSCULAR; INTRAVENOUS; SUBCUTANEOUS ONCE
Refills: 0 | Status: DISCONTINUED | OUTPATIENT
Start: 2020-04-15 | End: 2020-04-15

## 2020-04-15 RX ORDER — METOPROLOL TARTRATE 50 MG
5 TABLET ORAL ONCE
Refills: 0 | Status: COMPLETED | OUTPATIENT
Start: 2020-04-15 | End: 2020-04-15

## 2020-04-15 RX ORDER — HYDROMORPHONE HYDROCHLORIDE 2 MG/ML
0.3 INJECTION INTRAMUSCULAR; INTRAVENOUS; SUBCUTANEOUS ONCE
Refills: 0 | Status: DISCONTINUED | OUTPATIENT
Start: 2020-04-15 | End: 2020-04-15

## 2020-04-15 RX ADMIN — LATANOPROST 1 DROP(S): 0.05 SOLUTION/ DROPS OPHTHALMIC; TOPICAL at 22:00

## 2020-04-15 RX ADMIN — Medication 6: at 11:49

## 2020-04-15 RX ADMIN — QUETIAPINE FUMARATE 50 MILLIGRAM(S): 200 TABLET, FILM COATED ORAL at 05:26

## 2020-04-15 RX ADMIN — Medication 5 MILLIGRAM(S): at 23:30

## 2020-04-15 RX ADMIN — HEPARIN SODIUM 1000 UNIT(S)/HR: 5000 INJECTION INTRAVENOUS; SUBCUTANEOUS at 16:51

## 2020-04-15 RX ADMIN — MAGNESIUM OXIDE 400 MG ORAL TABLET 400 MILLIGRAM(S): 241.3 TABLET ORAL at 08:10

## 2020-04-15 RX ADMIN — HYDROMORPHONE HYDROCHLORIDE 0.3 MILLIGRAM(S): 2 INJECTION INTRAMUSCULAR; INTRAVENOUS; SUBCUTANEOUS at 02:06

## 2020-04-15 RX ADMIN — SIMVASTATIN 20 MILLIGRAM(S): 20 TABLET, FILM COATED ORAL at 21:38

## 2020-04-15 RX ADMIN — Medication 8: at 18:11

## 2020-04-15 RX ADMIN — PANTOPRAZOLE SODIUM 40 MILLIGRAM(S): 20 TABLET, DELAYED RELEASE ORAL at 09:59

## 2020-04-15 RX ADMIN — Medication 50 MILLIGRAM(S): at 13:04

## 2020-04-15 RX ADMIN — Medication 40 MILLIGRAM(S): at 05:17

## 2020-04-15 RX ADMIN — CHLORHEXIDINE GLUCONATE 1 APPLICATION(S): 213 SOLUTION TOPICAL at 05:29

## 2020-04-15 RX ADMIN — AMLODIPINE BESYLATE 10 MILLIGRAM(S): 2.5 TABLET ORAL at 06:43

## 2020-04-15 RX ADMIN — Medication 6: at 00:32

## 2020-04-15 RX ADMIN — Medication 2.5 MG/HR: at 12:59

## 2020-04-15 RX ADMIN — HEPARIN SODIUM 800 UNIT(S)/HR: 5000 INJECTION INTRAVENOUS; SUBCUTANEOUS at 01:21

## 2020-04-15 RX ADMIN — HYDROMORPHONE HYDROCHLORIDE 0.5 MILLIGRAM(S): 2 INJECTION INTRAMUSCULAR; INTRAVENOUS; SUBCUTANEOUS at 03:02

## 2020-04-15 RX ADMIN — Medication 5 MILLIGRAM(S): at 23:42

## 2020-04-15 RX ADMIN — Medication 8: at 06:01

## 2020-04-15 RX ADMIN — INSULIN GLARGINE 35 UNIT(S): 100 INJECTION, SOLUTION SUBCUTANEOUS at 21:36

## 2020-04-15 RX ADMIN — Medication 12.5 MICROGRAM(S): at 22:00

## 2020-04-15 RX ADMIN — BRIMONIDINE TARTRATE 1 DROP(S): 2 SOLUTION/ DROPS OPHTHALMIC at 10:58

## 2020-04-15 RX ADMIN — MAGNESIUM OXIDE 400 MG ORAL TABLET 400 MILLIGRAM(S): 241.3 TABLET ORAL at 13:05

## 2020-04-15 RX ADMIN — Medication 1 DROP(S): at 09:55

## 2020-04-15 RX ADMIN — Medication 50 MILLIGRAM(S): at 05:25

## 2020-04-15 RX ADMIN — MAGNESIUM OXIDE 400 MG ORAL TABLET 400 MILLIGRAM(S): 241.3 TABLET ORAL at 16:25

## 2020-04-15 RX ADMIN — HEPARIN SODIUM 3500 UNIT(S): 5000 INJECTION INTRAVENOUS; SUBCUTANEOUS at 17:01

## 2020-04-15 RX ADMIN — Medication 50 MILLIGRAM(S): at 21:38

## 2020-04-15 NOTE — PROGRESS NOTE ADULT - ASSESSMENT
74 year old female with a history of HTN, DM, HL, hypothyroidism, anxiety who presented with fevers, cough, shortness of breath in the setting of viral PNA, COVID-19, respiratory failure, septic shock (resolved) extubated 2 days ago, now DNR DNI.    1. Acute hypoxic respiratory failure secondary to COVID-19 PNA  2. Septic Shock (resolved)  3. IDDM2  4. HTN/HLD/Paroxysmal Atrial Fibrillation  5. Hypothyroidism  6. Anemia  7. Anxiety    Neuro: d/c seroquel for now given encephalopathy. may need CT head/neuro eval if mental status does not improve by tomorrow.  Cardio: shock resolved, now hypertensive. continue norvasc, hydralazine and metoprolol. Afib with RVR resolved and now in SR - continue with PO lopressor 50mg TID. Off amio gtt. Elevated troponin likely demand, trended down. On Heparin gtt for now. continue statin.  Pulm: Extubated day 3, now DNR/DNI, 100% NRB goal sp02 90-96. Started on lasix gtt for diuresis to optimize respiratory status.  Renal: ALSI on CKD3. Likely ATN. Cr improving - appears around baseline renal function. avoid nephrotoxins, daily chemistry to trend Cr, optimize lytes. net positive UOP. strict I/Os. renal follow re: lasix.  GI: NPO with tube feeding.  Protonix for GI ppx  ID: COVID 19 pneumonia s/p hydroxychloroqine and azithro, s/p course Zosyn. all cultures with no growth to date. Septic shock resolved -  off midodrine and HD stable.  monitor daily chemistry, CBC with Diff.  trend CRP, D dimer, LDH, CPK, Coags  APAP prn fever, avoid NSAIDs  Heme: on full dose AC with heparin gtt - monitor for bleeding. Anemia stable. Got 2U PRBC this hospitalization - none since 4/3  Endo: DM2 monitor FS, goal 140 to 180 - at goal in ICU setting. continue moderate dose insulin corrective scale, lantus 35u qhs. monitor blood glucose, hypoglycemia protocol. continue synthroid for hypothyroidism.  Dispo: Overall poor prognosis. DNR/DNI. Family was updated.

## 2020-04-15 NOTE — PROGRESS NOTE ADULT - SUBJECTIVE AND OBJECTIVE BOX
Chief Complaint: Fevers, shortness of breath    Interval Events: No events overnight.    Review of Systems:  Unable to obtain    Physical Exam:  Vital Signs Last 24 Hrs  T(C): 37.1 (14 Apr 2020 10:00), Max: 37.8 (13 Apr 2020 19:52)  T(F): 98.7 (14 Apr 2020 10:00), Max: 100 (13 Apr 2020 19:52)  HR: 94 (14 Apr 2020 11:50) (52 - 166)  BP: 123/52 (14 Apr 2020 11:50) (120/46 - 163/75)  BP(mean): 88 (14 Apr 2020 10:00) (62 - 99)  RR: 27 (14 Apr 2020 11:00) (18 - 35)  SpO2: 96% (14 Apr 2020 11:00) (95% - 100%)  General: NAD  HEENT: MMM  Neck: No JVD, no carotid bruit  Extremities: No LE edema, no cyanosis  Neuro: Non-focal  Skin: No rash    Labs:    04-14    143  |  110<H>  |  54<H>  ----------------------------<  271<H>  3.5   |  24  |  1.79<H>    Ca    8.8      14 Apr 2020 07:06  Phos  3.6     04-14  Mg     1.6     04-14    TPro  6.5  /  Alb  2.6<L>  /  TBili  0.4  /  DBili  x   /  AST  20  /  ALT  26  /  AlkPhos  63  04-14                        10.0   18.89 )-----------( 84       ( 14 Apr 2020 07:14 )             32.7       Telemetry: Sinus rhythm

## 2020-04-15 NOTE — PROGRESS NOTE ADULT - SUBJECTIVE AND OBJECTIVE BOX
Patient is a 74y old  Female who presents with a chief complaint of SOB (31 Mar 2020 05:05)  Patient seen in follow up for KALE SNELL.      Chart reviewed.    PAST MEDICAL HISTORY:  DJD (degenerative joint disease)  DM (diabetes mellitus)  Hyperlipidemia  Hypothyroid  HTN (hypertension)  History of vitamin D deficiency  B12 deficiency  Bronchitis  Anxiety       MEDICATIONS  (STANDING):  amLODIPine   Tablet 10 milliGRAM(s) Oral daily  brimonidine 0.2% Ophthalmic Solution 1 Drop(s) Left EYE daily  chlorhexidine 2% Cloths 1 Application(s) Topical <User Schedule>  dextrose 5%. 1000 milliLiter(s) (50 mL/Hr) IV Continuous <Continuous>  dextrose 50% Injectable 12.5 Gram(s) IV Push once  dextrose 50% Injectable 25 Gram(s) IV Push once  dextrose 50% Injectable 25 Gram(s) IV Push once  furosemide Infusion 5 mG/Hr (2.5 mL/Hr) IV Continuous <Continuous>  heparin  Infusion.  Unit(s)/Hr (17 mL/Hr) IV Continuous <Continuous>  insulin glargine Injectable (LANTUS) 35 Unit(s) SubCutaneous at bedtime  insulin lispro (HumaLOG) corrective regimen sliding scale   SubCutaneous every 6 hours  latanoprost 0.005% Ophthalmic Solution 1 Drop(s) Both EYES at bedtime  levothyroxine Injectable 12.5 MICROGram(s) IV Push at bedtime  magnesium oxide 400 milliGRAM(s) Oral three times a day with meals  metoprolol tartrate 50 milliGRAM(s) Enteral Tube every 8 hours  pantoprazole  Injectable 40 milliGRAM(s) IV Push daily  simvastatin 20 milliGRAM(s) Oral at bedtime  timolol 0.5% Solution 1 Drop(s) Left EYE daily    MEDICATIONS  (PRN):  acetaminophen   Tablet .. 650 milliGRAM(s) Oral every 6 hours PRN Temp greater or equal to 38C (100.4F), Mild Pain (1 - 3)  dextrose 40% Gel 15 Gram(s) Oral once PRN Blood Glucose LESS THAN 70 milliGRAM(s)/deciliter  glucagon  Injectable 1 milliGRAM(s) IntraMuscular once PRN Glucose LESS THAN 70 milligrams/deciliter  heparin  Injectable 7500 Unit(s) IV Push every 6 hours PRN For aPTT less than 40  heparin  Injectable 3500 Unit(s) IV Push every 6 hours PRN For aPTT between 40 - 57  sodium chloride 0.9% lock flush 10 milliLiter(s) IV Push every 1 hour PRN Pre/post blood products, medications, blood draw, and to maintain line patency    T(C): 36.6 (04-15-20 @ 06:04), Max: 37.8 (04-13-20 @ 19:52)  HR: 90 (04-15-20 @ 13:01) (72 - 166)  BP: 159/64 (04-15-20 @ 13:01) (120/46 - 163/70)  RR: 42 (04-15-20 @ 13:01)  SpO2: 94% (04-15-20 @ 13:01)  Wt(kg): --  I&O's Detail    14 Apr 2020 07:01  -  15 Apr 2020 07:00  --------------------------------------------------------  IN:    Free Water: 720 mL    Glucerna 1.5: 1080 mL    heparin  Infusion.: 124 mL    Solution: 50 mL  Total IN: 1974 mL    OUT:    Indwelling Catheter - Urethral: 2900 mL    Indwelling Catheter - Urethral: 1200 mL    Rectal Tube: 300 mL  Total OUT: 4400 mL    Total NET: -2426 mL      15 Apr 2020 07:01  -  15 Apr 2020 13:24  --------------------------------------------------------  IN:    Free Water: 400 mL    furosemide Infusion: 5 mL    Glucerna 1.5: 500 mL    heparin  Infusion.: 56 mL  Total IN: 961 mL    OUT:    Indwelling Catheter - Urethral: 1700 mL  Total OUT: 1700 mL    Total NET: -739 mL      PHYSICAL EXAM:  Pt on COVID precautions. Chart reviewed and previous physical examination noted.                          LABORATORY:                        10.1   15.57 )-----------( 72       ( 15 Apr 2020 06:18 )             32.8     04-15    145  |  108  |  51<H>  ----------------------------<  315<H>  3.8   |  29  |  1.55<H>    Ca    8.7      15 Apr 2020 06:18  Phos  2.7     04-15  Mg     1.9     04-15    TPro  6.6  /  Alb  2.6<L>  /  TBili  0.4  /  DBili  x   /  AST  17  /  ALT  29  /  AlkPhos  73  04-15    Sodium, Serum: 145 mmol/L (04-15 @ 06:18)  Sodium, Serum: 143 mmol/L (04-14 @ 07:06)    Potassium, Serum: 3.8 mmol/L (04-15 @ 06:18)  Potassium, Serum: 3.5 mmol/L (04-14 @ 07:06)    Hemoglobin: 10.1 g/dL (04-15 @ 06:18)  Hemoglobin: 10.0 g/dL (04-14 @ 07:14)  Hemoglobin: 10.5 g/dL (04-13 @ 19:01)  Hemoglobin: 8.8 g/dL (04-13 @ 07:09)    Creatinine, Serum 1.55 (04-15 @ 06:18)  Creatinine, Serum 1.79 (04-14 @ 07:06)  Creatinine, Serum 1.72 (04-13 @ 11:46)    CARDIAC MARKERS ( 15 Apr 2020 06:18 )  .251 ng/mL / x     / 33 U/L / x     / x      CARDIAC MARKERS ( 14 Apr 2020 07:06 )  .404 ng/mL / x     / 37 U/L / x     / x          LIVER FUNCTIONS - ( 15 Apr 2020 06:18 )  Alb: 2.6 g/dL / Pro: 6.6 g/dL / ALK PHOS: 73 U/L / ALT: 29 U/L DA / AST: 17 U/L / GGT: x

## 2020-04-15 NOTE — PROGRESS NOTE ADULT - SUBJECTIVE AND OBJECTIVE BOX
74y year old Female admitted for Patient is a 74y old  Female who presents with a chief complaint of sob (2020 21:48)        The patient is a 74 year old female with a history of HTN, DM, HL, hypothyroidism, anxiety who presented with fevers, cough, shortness of breath in the setting of viral PNA, COVID-19, respiratory failure, septic shock (resolved) extubated 2 days ago, now DNR DNI on 100% NRB with sp02 94% tachypnic 35-40, she opens her eyes but does not respond to questions. Breathing appears uncomfortable.,       PMH:  Coronavirus infection  Yes  Yes  Handoff  MEWS Score  DJD (degenerative joint disease)  DM (diabetes mellitus)  Hyperlipidemia  Hypothyroid  HTN (hypertension)  History of vitamin D deficiency  B12 deficiency  Bronchitis  Anxiety  Infection due to 2019 novel coronavirus  Fever  COVID-19  Prophylactic measure  Renal failure  Infection due to 2019 novel coronavirus  Hypokalemia  Anemia, unspecified type  History of vitamin D deficiency  B12 deficiency  HTN (hypertension)  Atrial fibrillation, unspecified type  Hypothyroidism, unspecified type  Afib  Shock  Anxiety  Hypothyroid  Hyperlipidemia  DM (diabetes mellitus)  SARS (severe acute respiratory syndrome)  Acute respiratory failure with hypoxia  Insertion of central venous access device with ultrasound guidance  S/P cataract surgery  S/P eye surgery  S/P  section  FEVER COUGH        MEDICATIONS  (STANDING):  amLODIPine   Tablet 10 milliGRAM(s) Oral daily  brimonidine 0.2% Ophthalmic Solution 1 Drop(s) Left EYE daily  chlorhexidine 2% Cloths 1 Application(s) Topical <User Schedule>  dextrose 5%. 1000 milliLiter(s) (50 mL/Hr) IV Continuous <Continuous>  dextrose 50% Injectable 12.5 Gram(s) IV Push once  dextrose 50% Injectable 25 Gram(s) IV Push once  dextrose 50% Injectable 25 Gram(s) IV Push once  furosemide   Injectable 80 milliGRAM(s) IV Push daily  heparin  Infusion.  Unit(s)/Hr (17 mL/Hr) IV Continuous <Continuous>  HYDROmorphone  Injectable 0.5 milliGRAM(s) IV Push once  insulin glargine Injectable (LANTUS) 35 Unit(s) SubCutaneous at bedtime  insulin lispro (HumaLOG) corrective regimen sliding scale   SubCutaneous every 6 hours  latanoprost 0.005% Ophthalmic Solution 1 Drop(s) Both EYES at bedtime  levothyroxine Injectable 12.5 MICROGram(s) IV Push at bedtime  magnesium oxide 400 milliGRAM(s) Oral three times a day with meals  metoprolol tartrate 50 milliGRAM(s) Enteral Tube every 8 hours  pantoprazole  Injectable 40 milliGRAM(s) IV Push daily  phenylephrine    Infusion 0.5 MICROgram(s)/kG/Min (17 mL/Hr) IV Continuous <Continuous>  QUEtiapine 50 milliGRAM(s) Oral two times a day  simvastatin 20 milliGRAM(s) Oral at bedtime  timolol 0.5% Solution 1 Drop(s) Left EYE daily    MEDICATIONS  (PRN):  acetaminophen   Tablet .. 650 milliGRAM(s) Oral every 6 hours PRN Temp greater or equal to 38C (100.4F), Mild Pain (1 - 3)  dextrose 40% Gel 15 Gram(s) Oral once PRN Blood Glucose LESS THAN 70 milliGRAM(s)/deciliter  glucagon  Injectable 1 milliGRAM(s) IntraMuscular once PRN Glucose LESS THAN 70 milligrams/deciliter  heparin  Injectable 7500 Unit(s) IV Push every 6 hours PRN For aPTT less than 40  heparin  Injectable 3500 Unit(s) IV Push every 6 hours PRN For aPTT between 40 - 57  hydrALAZINE Injectable 10 milliGRAM(s) IV Push every 6 hours PRN Hypertension  sodium chloride 0.9% lock flush 10 milliLiter(s) IV Push every 1 hour PRN Pre/post blood products, medications, blood draw, and to maintain line patency      I&O's Summary    2020 07:  -  2020 07:00  --------------------------------------------------------  IN: 1891.3 mL / OUT: 1410 mL / NET: 481.3 mL    :  -  15 2020 02:42  --------------------------------------------------------  IN: 1634 mL / OUT: 3450 mL / NET: -1816 mL      ICU Vital Signs Last 24 Hrs  T(C): 36.7 (15 Apr 2020 01:44), Max: 37.1 (2020 10:00)  T(F): 98 (15 Apr 2020 01:44), Max: 98.7 (2020 10:00)  HR: 79 (15 Apr 2020 02:00) (72 - 166)  BP: 160/60 (15 Apr 2020 02:00) (120/46 - 163/70)  BP(mean): 88 (2020 10:00) (65 - 88)  ABP: --  ABP(mean): --  RR: 28 (15 Apr 2020 02:00) (18 - 40)  SpO2: 95% (15 Apr 2020 02:00) (90% - 99%)      PHYSICAL EXAM:  100% NRB, tachypnic, mildly labored  RIJ TLC  Breath sounds bilaterally, diminished at bases not wheezing  right hand and right foot edemetous        14    143  |  110<H>  |  54<H>  ----------------------------<  271<H>  3.5   |  24  |  1.79<H>    Ca    8.8      2020 07:06  Phos  3.6     04-14  Mg     1.6     04-14    TPro  6.5  /  Alb  2.6<L>  /  TBili  0.4  /  DBili  x   /  AST  20  /  ALT  26  /  AlkPhos  63  04-14                          10.0   18.89 )-----------( 84       ( 2020 07:14 )             32.7     ABG - ( 2020 06:26 )  pH, Arterial: x     pH, Blood: 7.44  /  pCO2: 36    /  pO2: 78    / HCO3: 25    / Base Excess: 0.5   /  SaO2: 94                CARDIAC MARKERS ( 2020 07:06 )  .404 ng/mL / x     / 37 U/L / x     / x                       The patient is a 74 year old female with a history of HTN, DM, HL, hypothyroidism, anxiety who presented with fevers, cough, shortness of breath in the setting of viral PNA, COVID-19, respiratory failure, septic shock (resolved) extubated 2 days ago, now DNR DNI on 100% NRB with sp02 94%, breathing appears labored this evening.     Plan:  Neuro: Continue seroquel, give 0.5mg IV dilaudid for labored breathing. if tolerates will start prn. avoid morphine given renal failure    Cardio: shock resolved, now hypertensive. continue norvasc, hydralazine and metoprolol.  SHe had episode of a fib with RVR today that resolved with push of metoprolol currently in SR. was on amio gtt now held.  Heparin gtt, moniotor PTT and titrate per protocol.  continue statin    Pulm:  Extubated 2 days ago, now DNR/DNI, 100% NRB goal sp02 90-96. Continue lasix.     Renal: ALIS, Cr improving , avoid nephrotoxins, daily chemistry to trend Cr, optimize lytes.  Lasix. strict I/Os.     GI: NPO with tube feeding.  Protonix for GI Ppx    ID: COVID 19 pneumonia s/p hydroxychloroqine and azithro, s/p Zosyn 3/27-. all cultures with no growth thus far  monitor daily chemistry, CBC with Diff.  trend CRP, D dimer, LDH, CPK, Coags  APAP prn fever, avoid NSAIDs    Heme:  full AC with heparin gtt. daily CBC.     Endo: monitor FS, goal 140 to 180. insulin therapy     Dispo: DNR DNI,     --

## 2020-04-15 NOTE — PROGRESS NOTE ADULT - SUBJECTIVE AND OBJECTIVE BOX
24 hour events: had afib rvr yesterday which resolved with IV lopressor. net positive UOP. Mental status is poor and appears lethargic. on NRB, hemodynamically stable and in no apparent distress.    Review of Systems:  unable to obtain      Wt(kg): --      04-14-20 @ 07:01  -  04-15-20 @ 07:00  --------------------------------------------------------  IN: 1974 mL / OUT: 4400 mL / NET: -2426 mL    04-15-20 @ 07:01  -  04-15-20 @ 12:27  --------------------------------------------------------  IN: 116 mL / OUT: 0 mL / NET: 116 mL        CAPILLARY BLOOD GLUCOSE      POCT Blood Glucose.: 251 mg/dL (15 Apr 2020 11:43)      I&O's Summary    14 Apr 2020 07:01  -  15 Apr 2020 07:00  --------------------------------------------------------  IN: 1974 mL / OUT: 4400 mL / NET: -2426 mL    15 Apr 2020 07:01  -  15 Apr 2020 12:27  --------------------------------------------------------  IN: 116 mL / OUT: 0 mL / NET: 116 mL        Physical Exam:  T(F): 97.9 (04-15-20 @ 06:04), Max: 98 (04-15-20 @ 01:44)  HR: 73 (04-15-20 @ 11:09) (72 - 94)  BP: 150/73 (04-15-20 @ 11:09) (133/52 - 163/70)  RR: 32 (04-15-20 @ 11:09) (28 - 40)  SpO2: 96% (04-15-20 @ 11:09) (90% - 97%)    Gen: comfortable but appears lethargic on NRB   Neuro: lethargic, moving all extremities. nonfocal  HEENT: pupils minimally reactive, NRB in place, NGT in place  Resp: decreased breath sounds with crackles  CVS: S1S2 RRR  Abd: soft, nondistended, bowel sounds present  Ext: peripheral edema  Skin: warm, well perfused; right IJ central line    Meds:    amLODIPine   Tablet Oral  furosemide Infusion IV Continuous  metoprolol tartrate Enteral Tube    dextrose 40% Gel Oral PRN  dextrose 50% Injectable IV Push  dextrose 50% Injectable IV Push  dextrose 50% Injectable IV Push  glucagon  Injectable IntraMuscular PRN  insulin glargine Injectable (LANTUS) SubCutaneous  insulin lispro (HumaLOG) corrective regimen sliding scale SubCutaneous  levothyroxine Injectable IV Push  simvastatin Oral      acetaminophen   Tablet .. Oral PRN      heparin  Infusion. IV Continuous  heparin  Injectable IV Push PRN  heparin  Injectable IV Push PRN    pantoprazole  Injectable IV Push      dextrose 5%. IV Continuous  magnesium oxide Oral  sodium chloride 0.9% lock flush IV Push PRN      brimonidine 0.2% Ophthalmic Solution Left EYE  chlorhexidine 2% Cloths Topical  latanoprost 0.005% Ophthalmic Solution Both EYES  timolol 0.5% Solution Left EYE                              10.1   15.57 )-----------( 72       ( 15 Apr 2020 06:18 )             32.8       04-15    145  |  108  |  51<H>  ----------------------------<  315<H>  3.8   |  29  |  1.55<H>    Ca    8.7      15 Apr 2020 06:18  Phos  2.7     04-15  Mg     1.9     04-15    TPro  6.6  /  Alb  2.6<L>  /  TBili  0.4  /  DBili  x   /  AST  17  /  ALT  29  /  AlkPhos  73  04-15      CARDIAC MARKERS ( 15 Apr 2020 06:18 )  .251 ng/mL / x     / 33 U/L / x     / x      CARDIAC MARKERS ( 14 Apr 2020 07:06 )  .404 ng/mL / x     / 37 U/L / x     / x          PT/INR - ( 15 Apr 2020 06:18 )   PT: 11.8 sec;   INR: 1.06 ratio         PTT - ( 15 Apr 2020 06:18 )  PTT:54.7 sec          Radiology:  < from: Xray Chest 1 View- PORTABLE-Routine (04.15.20 @ 04:59) >    EXAM:  XR CHEST PORTABLE ROUTINE 1V                            PROCEDURE DATE:  04/15/2020        INTERPRETATION:  CLINICAL INFORMATION:  Shortness of Breath    TECHNIQUE:  AP view of the chest.    COMPARISON:  4/4/2020    FINDINGS:  Enteric tube courses below the left hemidiaphragm. Right IJ approach central venous catheter tip is in the region of the right atrium. There is haziness throughout both lung fields with obscuration of the hemidiaphragms likely representing layering effusions. There are prominent interstitial markings and perihilar airspace opacities. Cardiomediastinal silhouette is exaggerated by AP technique. There are degenerative changes of the spine.    IMPRESSION:    Haziness throughout both lung fields with obscuration of the hemidiaphragms likely representing layering effusions. Prominent interstitial markings and perihilar airspace opacities.      MEÑO HENAO D.O. ATTENDING RADIOLOGIST  This document has been electronically signed. Apr 15 2020  5:27AM    < end of copied text >    Bedside US: B-lines bilaterally    CENTRAL LINE: Y RIKAVON    REMOVE: Y  VAZQUEZ: Y  A-LINE: N  RECTAL TUBE: Y    GLOBAL ISSUE/BEST PRACTICE:  Analgesia: N  Sedation: N  CAM-ICU: BILLIE  HOB elevation: yes  Stress ulcer prophylaxis: Y  VTE prophylaxis: Y  Glycemic control: Y  Nutrition: TF    CODE STATUS: DNR/DNI

## 2020-04-15 NOTE — PROGRESS NOTE ADULT - ASSESSMENT
1.	ALIS on CKD 3: ATN, COVID renal injury  2.	SARS-COVID 19, Resp failure on vent  3.	Anemia  4.	Diabetes  5.	Hypokalemia    Renal indices improving. on IV lasix drip for pleural effusions. To continue current meds. Treatment for COVID pneumonia. COVID precautions. Strict I & O.  Monitor blood sugar levels. Insulin coverage as needed. Avoid nephrotoxic meds as possible. Avoid ACEI, ARB, NSAIDs and IV contrast.   Monitor BP trend. Titrate BP meds as needed. Salt restriction. Will follow electrolytes and renal function trend. Supportive care. ICU management. Overall prognosis poor.

## 2020-04-15 NOTE — PROGRESS NOTE ADULT - ASSESSMENT
The patient is a 74 year old female with a history of HTN, DM, HL, hypothyroidism, anxiety who presented with fevers, cough, shortness of breath in the setting of viral PNA, COVID-19, respiratory failure, septic shock.     Plan:  - COVID-19 positive  - Completed hydroxychloroquine  - Off of antibiotics  - Off of IV pressors  - On amlodipine 10 mg daily  - Extubated. On NRB.  - Episodes of rapid atrial fibrillation - off of amiodarone. BP improved. Continue metoprolol tartrate 50 mg q8h.  - On heparin drip  - Consider changing to apixaban 2.5 mg bid as treatment for both high risk VTE with COVID and atrial fibrillation  - On furosemide 80 mg IV daily

## 2020-04-15 NOTE — PROGRESS NOTE ADULT - SUBJECTIVE AND OBJECTIVE BOX
Date/Time Patient Seen:  		  Referring MD:   Data Reviewed	       Patient is a 74y old  Female who presents with a chief complaint of shob (15 Apr 2020 02:41)      Subjective/HPI     PAST MEDICAL & SURGICAL HISTORY:  DJD (degenerative joint disease)  DM (diabetes mellitus)  Hyperlipidemia  Hypothyroid  HTN (hypertension)  History of vitamin D deficiency  B12 deficiency  Bronchitis  Anxiety  S/P cataract surgery  S/P eye surgery: left eye retinal surgery x2  S/P  section: x2        Medication list         MEDICATIONS  (STANDING):  amLODIPine   Tablet 10 milliGRAM(s) Oral daily  brimonidine 0.2% Ophthalmic Solution 1 Drop(s) Left EYE daily  chlorhexidine 2% Cloths 1 Application(s) Topical <User Schedule>  dextrose 5%. 1000 milliLiter(s) (50 mL/Hr) IV Continuous <Continuous>  dextrose 50% Injectable 12.5 Gram(s) IV Push once  dextrose 50% Injectable 25 Gram(s) IV Push once  dextrose 50% Injectable 25 Gram(s) IV Push once  furosemide   Injectable 80 milliGRAM(s) IV Push daily  heparin  Infusion.  Unit(s)/Hr (17 mL/Hr) IV Continuous <Continuous>  insulin glargine Injectable (LANTUS) 35 Unit(s) SubCutaneous at bedtime  insulin lispro (HumaLOG) corrective regimen sliding scale   SubCutaneous every 6 hours  latanoprost 0.005% Ophthalmic Solution 1 Drop(s) Both EYES at bedtime  levothyroxine Injectable 12.5 MICROGram(s) IV Push at bedtime  magnesium oxide 400 milliGRAM(s) Oral three times a day with meals  metoprolol tartrate 50 milliGRAM(s) Enteral Tube every 8 hours  pantoprazole  Injectable 40 milliGRAM(s) IV Push daily  phenylephrine    Infusion 0.5 MICROgram(s)/kG/Min (17 mL/Hr) IV Continuous <Continuous>  QUEtiapine 50 milliGRAM(s) Oral two times a day  simvastatin 20 milliGRAM(s) Oral at bedtime  timolol 0.5% Solution 1 Drop(s) Left EYE daily    MEDICATIONS  (PRN):  acetaminophen   Tablet .. 650 milliGRAM(s) Oral every 6 hours PRN Temp greater or equal to 38C (100.4F), Mild Pain (1 - 3)  dextrose 40% Gel 15 Gram(s) Oral once PRN Blood Glucose LESS THAN 70 milliGRAM(s)/deciliter  glucagon  Injectable 1 milliGRAM(s) IntraMuscular once PRN Glucose LESS THAN 70 milligrams/deciliter  heparin  Injectable 7500 Unit(s) IV Push every 6 hours PRN For aPTT less than 40  heparin  Injectable 3500 Unit(s) IV Push every 6 hours PRN For aPTT between 40 - 57  hydrALAZINE Injectable 10 milliGRAM(s) IV Push every 6 hours PRN Hypertension  sodium chloride 0.9% lock flush 10 milliLiter(s) IV Push every 1 hour PRN Pre/post blood products, medications, blood draw, and to maintain line patency         Vitals log        ICU Vital Signs Last 24 Hrs  T(C): 36.6 (15 Apr 2020 06:04), Max: 37.1 (2020 10:00)  T(F): 97.9 (15 Apr 2020 06:04), Max: 98.7 (2020 10:00)  HR: 77 (15 Apr 2020 07:22) (72 - 166)  BP: 138/49 (15 Apr 2020 07:22) (122/758 - 163/70)  BP(mean): 88 (2020 10:00) (88 - 88)  ABP: --  ABP(mean): --  RR: 32 (15 Apr 2020 07:22) (18 - 40)  SpO2: 97% (15 Apr 2020 07:22) (90% - 98%)           Input and Output:  I&O's Detail    2020 07:01  -  15 Apr 2020 07:00  --------------------------------------------------------  IN:    Free Water: 720 mL    Glucerna 1.5: 1080 mL    heparin  Infusion.: 124 mL    Solution: 50 mL  Total IN: 1974 mL    OUT:    Indwelling Catheter - Urethral: 2900 mL    Indwelling Catheter - Urethral: 1200 mL    Rectal Tube: 300 mL  Total OUT: 4400 mL    Total NET: -2426 mL          Lab Data                        10.1   15.57 )-----------( 72       ( 15 Apr 2020 06:18 )             32.8     04-15    145  |  108  |  51<H>  ----------------------------<  315<H>  3.8   |  29  |  1.55<H>    Ca    8.7      15 Apr 2020 06:18  Phos  2.7     04-15  Mg     1.9     04-15    TPro  6.6  /  Alb  2.6<L>  /  TBili  0.4  /  DBili  x   /  AST  17  /  ALT  29  /  AlkPhos  73  04-15      CARDIAC MARKERS ( 2020 07:06 )  .404 ng/mL / x     / 37 U/L / x     / x            Review of Systems	      Objective     Physical Examination    heart s1s2  lung dec BS      Pertinent Lab findings & Imaging      Justin:  NO   Adequate UO     I&O's Detail    2020 07:01  -  15 2020 07:00  --------------------------------------------------------  IN:    Free Water: 720 mL    Glucerna 1.5: 1080 mL    heparin  Infusion.: 124 mL    Solution: 50 mL  Total IN: 1974 mL    OUT:    Indwelling Catheter - Urethral: 2900 mL    Indwelling Catheter - Urethral: 1200 mL    Rectal Tube: 300 mL  Total OUT: 4400 mL    Total NET: -2426 mL               Discussed with:     Cultures:	        Radiology

## 2020-04-16 VITALS
DIASTOLIC BLOOD PRESSURE: 55 MMHG | SYSTOLIC BLOOD PRESSURE: 99 MMHG | TEMPERATURE: 98 F | RESPIRATION RATE: 41 BRPM | OXYGEN SATURATION: 87 % | HEART RATE: 77 BPM

## 2020-04-16 LAB
ALBUMIN SERPL ELPH-MCNC: 2.7 G/DL — LOW (ref 3.3–5)
ALP SERPL-CCNC: 82 U/L — SIGNIFICANT CHANGE UP (ref 30–120)
ALT FLD-CCNC: 30 U/L DA — SIGNIFICANT CHANGE UP (ref 10–60)
AMMONIA BLD-MCNC: 13 UMOL/L — SIGNIFICANT CHANGE UP (ref 11–32)
ANION GAP SERPL CALC-SCNC: 7 MMOL/L — SIGNIFICANT CHANGE UP (ref 5–17)
APTT BLD: 101.2 SEC — HIGH (ref 28.5–37)
APTT BLD: 67.2 SEC — HIGH (ref 28.5–37)
AST SERPL-CCNC: 21 U/L — SIGNIFICANT CHANGE UP (ref 10–40)
BASOPHILS # BLD AUTO: 0.05 K/UL — SIGNIFICANT CHANGE UP (ref 0–0.2)
BASOPHILS NFR BLD AUTO: 0.2 % — SIGNIFICANT CHANGE UP (ref 0–2)
BILIRUB SERPL-MCNC: 0.4 MG/DL — SIGNIFICANT CHANGE UP (ref 0.2–1.2)
BUN SERPL-MCNC: 55 MG/DL — HIGH (ref 7–23)
CALCIUM SERPL-MCNC: 8.9 MG/DL — SIGNIFICANT CHANGE UP (ref 8.4–10.5)
CHLORIDE SERPL-SCNC: 106 MMOL/L — SIGNIFICANT CHANGE UP (ref 96–108)
CO2 SERPL-SCNC: 34 MMOL/L — HIGH (ref 22–31)
CREAT SERPL-MCNC: 1.68 MG/DL — HIGH (ref 0.5–1.3)
CULTURE RESULTS: SIGNIFICANT CHANGE UP
EOSINOPHIL # BLD AUTO: 0.44 K/UL — SIGNIFICANT CHANGE UP (ref 0–0.5)
EOSINOPHIL NFR BLD AUTO: 1.5 % — SIGNIFICANT CHANGE UP (ref 0–6)
GLUCOSE BLDC GLUCOMTR-MCNC: 275 MG/DL — HIGH (ref 70–99)
GLUCOSE BLDC GLUCOMTR-MCNC: 280 MG/DL — HIGH (ref 70–99)
GLUCOSE SERPL-MCNC: 330 MG/DL — HIGH (ref 70–99)
HCT VFR BLD CALC: 35.7 % — SIGNIFICANT CHANGE UP (ref 34.5–45)
HGB BLD-MCNC: 11.1 G/DL — LOW (ref 11.5–15.5)
IMM GRANULOCYTES NFR BLD AUTO: 0.9 % — SIGNIFICANT CHANGE UP (ref 0–1.5)
INR BLD: 1.12 RATIO — SIGNIFICANT CHANGE UP (ref 0.88–1.16)
LYMPHOCYTES # BLD AUTO: 1.21 K/UL — SIGNIFICANT CHANGE UP (ref 1–3.3)
LYMPHOCYTES # BLD AUTO: 4.2 % — LOW (ref 13–44)
MAGNESIUM SERPL-MCNC: 1.9 MG/DL — SIGNIFICANT CHANGE UP (ref 1.6–2.6)
MCHC RBC-ENTMCNC: 28.4 PG — SIGNIFICANT CHANGE UP (ref 27–34)
MCHC RBC-ENTMCNC: 31.1 GM/DL — LOW (ref 32–36)
MCV RBC AUTO: 91.3 FL — SIGNIFICANT CHANGE UP (ref 80–100)
MONOCYTES # BLD AUTO: 1.48 K/UL — HIGH (ref 0–0.9)
MONOCYTES NFR BLD AUTO: 5.1 % — SIGNIFICANT CHANGE UP (ref 2–14)
NEUTROPHILS # BLD AUTO: 25.63 K/UL — HIGH (ref 1.8–7.4)
NEUTROPHILS NFR BLD AUTO: 88.1 % — HIGH (ref 43–77)
NRBC # BLD: 0 /100 WBCS — SIGNIFICANT CHANGE UP (ref 0–0)
PHOSPHATE SERPL-MCNC: 2.9 MG/DL — SIGNIFICANT CHANGE UP (ref 2.5–4.5)
PLATELET # BLD AUTO: 68 K/UL — LOW (ref 150–400)
POTASSIUM SERPL-MCNC: 3.4 MMOL/L — LOW (ref 3.5–5.3)
POTASSIUM SERPL-SCNC: 3.4 MMOL/L — LOW (ref 3.5–5.3)
PROCALCITONIN SERPL-MCNC: 0.43 NG/ML — HIGH (ref 0.02–0.1)
PROT SERPL-MCNC: 7.3 G/DL — SIGNIFICANT CHANGE UP (ref 6–8.3)
PROTHROM AB SERPL-ACNC: 12.5 SEC — SIGNIFICANT CHANGE UP (ref 10–12.9)
RBC # BLD: 3.91 M/UL — SIGNIFICANT CHANGE UP (ref 3.8–5.2)
RBC # FLD: 15.3 % — HIGH (ref 10.3–14.5)
SODIUM SERPL-SCNC: 147 MMOL/L — HIGH (ref 135–145)
SPECIMEN SOURCE: SIGNIFICANT CHANGE UP
TSH SERPL-MCNC: 0.84 UIU/ML — SIGNIFICANT CHANGE UP (ref 0.27–4.2)
WBC # BLD: 29.06 K/UL — HIGH (ref 3.8–10.5)
WBC # FLD AUTO: 29.06 K/UL — HIGH (ref 3.8–10.5)

## 2020-04-16 PROCEDURE — 99239 HOSP IP/OBS DSCHRG MGMT >30: CPT | Mod: CS

## 2020-04-16 RX ORDER — METOPROLOL TARTRATE 50 MG
5 TABLET ORAL ONCE
Refills: 0 | Status: COMPLETED | OUTPATIENT
Start: 2020-04-16 | End: 2020-04-16

## 2020-04-16 RX ORDER — PHENYLEPHRINE HYDROCHLORIDE 10 MG/ML
6.7 INJECTION INTRAVENOUS
Qty: 160 | Refills: 0 | Status: DISCONTINUED | OUTPATIENT
Start: 2020-04-16 | End: 2020-04-16

## 2020-04-16 RX ORDER — HYDROMORPHONE HYDROCHLORIDE 2 MG/ML
2 INJECTION INTRAMUSCULAR; INTRAVENOUS; SUBCUTANEOUS
Qty: 100 | Refills: 0 | Status: DISCONTINUED | OUTPATIENT
Start: 2020-04-16 | End: 2020-04-16

## 2020-04-16 RX ORDER — AMIODARONE HYDROCHLORIDE 400 MG/1
150 TABLET ORAL ONCE
Refills: 0 | Status: COMPLETED | OUTPATIENT
Start: 2020-04-16 | End: 2020-04-16

## 2020-04-16 RX ORDER — DILTIAZEM HCL 120 MG
10 CAPSULE, EXT RELEASE 24 HR ORAL ONCE
Refills: 0 | Status: COMPLETED | OUTPATIENT
Start: 2020-04-16 | End: 2020-04-16

## 2020-04-16 RX ORDER — METOPROLOL TARTRATE 50 MG
5 TABLET ORAL ONCE
Refills: 0 | Status: COMPLETED | OUTPATIENT
Start: 2020-04-16 | End: 2020-04-15

## 2020-04-16 RX ORDER — ACETAMINOPHEN 500 MG
650 TABLET ORAL EVERY 6 HOURS
Refills: 0 | Status: DISCONTINUED | OUTPATIENT
Start: 2020-04-16 | End: 2020-04-16

## 2020-04-16 RX ORDER — POTASSIUM CHLORIDE 20 MEQ
40 PACKET (EA) ORAL ONCE
Refills: 0 | Status: COMPLETED | OUTPATIENT
Start: 2020-04-16 | End: 2020-04-16

## 2020-04-16 RX ORDER — AMIODARONE HYDROCHLORIDE 400 MG/1
1 TABLET ORAL
Qty: 900 | Refills: 0 | Status: DISCONTINUED | OUTPATIENT
Start: 2020-04-16 | End: 2020-04-16

## 2020-04-16 RX ORDER — HYDROMORPHONE HYDROCHLORIDE 2 MG/ML
0.5 INJECTION INTRAMUSCULAR; INTRAVENOUS; SUBCUTANEOUS ONCE
Refills: 0 | Status: DISCONTINUED | OUTPATIENT
Start: 2020-04-16 | End: 2020-04-16

## 2020-04-16 RX ORDER — DIGOXIN 250 MCG
0.5 TABLET ORAL ONCE
Refills: 0 | Status: COMPLETED | OUTPATIENT
Start: 2020-04-16 | End: 2020-04-16

## 2020-04-16 RX ORDER — PIPERACILLIN AND TAZOBACTAM 4; .5 G/20ML; G/20ML
3.38 INJECTION, POWDER, LYOPHILIZED, FOR SOLUTION INTRAVENOUS ONCE
Refills: 0 | Status: COMPLETED | OUTPATIENT
Start: 2020-04-16 | End: 2020-04-16

## 2020-04-16 RX ORDER — HYDROMORPHONE HYDROCHLORIDE 2 MG/ML
1 INJECTION INTRAMUSCULAR; INTRAVENOUS; SUBCUTANEOUS
Refills: 0 | Status: DISCONTINUED | OUTPATIENT
Start: 2020-04-16 | End: 2020-04-16

## 2020-04-16 RX ORDER — PIPERACILLIN AND TAZOBACTAM 4; .5 G/20ML; G/20ML
3.38 INJECTION, POWDER, LYOPHILIZED, FOR SOLUTION INTRAVENOUS EVERY 8 HOURS
Refills: 0 | Status: DISCONTINUED | OUTPATIENT
Start: 2020-04-16 | End: 2020-04-16

## 2020-04-16 RX ADMIN — Medication 6: at 12:26

## 2020-04-16 RX ADMIN — HYDROMORPHONE HYDROCHLORIDE 1 MILLIGRAM(S): 2 INJECTION INTRAMUSCULAR; INTRAVENOUS; SUBCUTANEOUS at 16:48

## 2020-04-16 RX ADMIN — Medication 5 MILLIGRAM(S): at 12:50

## 2020-04-16 RX ADMIN — HEPARIN SODIUM 1000 UNIT(S)/HR: 5000 INJECTION INTRAVENOUS; SUBCUTANEOUS at 14:24

## 2020-04-16 RX ADMIN — Medication 5 MILLIGRAM(S): at 13:05

## 2020-04-16 RX ADMIN — HEPARIN SODIUM 1000 UNIT(S)/HR: 5000 INJECTION INTRAVENOUS; SUBCUTANEOUS at 07:47

## 2020-04-16 RX ADMIN — Medication 650 MILLIGRAM(S): at 15:05

## 2020-04-16 RX ADMIN — BRIMONIDINE TARTRATE 1 DROP(S): 2 SOLUTION/ DROPS OPHTHALMIC at 12:21

## 2020-04-16 RX ADMIN — Medication 10 MILLIGRAM(S): at 13:29

## 2020-04-16 RX ADMIN — CHLORHEXIDINE GLUCONATE 1 APPLICATION(S): 213 SOLUTION TOPICAL at 05:21

## 2020-04-16 RX ADMIN — Medication 1 DROP(S): at 12:23

## 2020-04-16 RX ADMIN — HEPARIN SODIUM 1200 UNIT(S)/HR: 5000 INJECTION INTRAVENOUS; SUBCUTANEOUS at 01:59

## 2020-04-16 RX ADMIN — HYDROMORPHONE HYDROCHLORIDE 0.5 MILLIGRAM(S): 2 INJECTION INTRAMUSCULAR; INTRAVENOUS; SUBCUTANEOUS at 07:40

## 2020-04-16 RX ADMIN — Medication 6: at 05:46

## 2020-04-16 RX ADMIN — MAGNESIUM OXIDE 400 MG ORAL TABLET 400 MILLIGRAM(S): 241.3 TABLET ORAL at 12:29

## 2020-04-16 RX ADMIN — Medication 10 MILLIGRAM(S): at 14:17

## 2020-04-16 RX ADMIN — AMIODARONE HYDROCHLORIDE 618 MILLIGRAM(S): 400 TABLET ORAL at 00:27

## 2020-04-16 RX ADMIN — Medication 40 MILLIEQUIVALENT(S): at 15:40

## 2020-04-16 RX ADMIN — MAGNESIUM OXIDE 400 MG ORAL TABLET 400 MILLIGRAM(S): 241.3 TABLET ORAL at 08:41

## 2020-04-16 RX ADMIN — MAGNESIUM OXIDE 400 MG ORAL TABLET 400 MILLIGRAM(S): 241.3 TABLET ORAL at 15:40

## 2020-04-16 RX ADMIN — AMLODIPINE BESYLATE 10 MILLIGRAM(S): 2.5 TABLET ORAL at 05:20

## 2020-04-16 RX ADMIN — Medication 6: at 00:13

## 2020-04-16 RX ADMIN — AMIODARONE HYDROCHLORIDE 33.3 MG/MIN: 400 TABLET ORAL at 01:08

## 2020-04-16 RX ADMIN — PIPERACILLIN AND TAZOBACTAM 200 GRAM(S): 4; .5 INJECTION, POWDER, LYOPHILIZED, FOR SOLUTION INTRAVENOUS at 15:05

## 2020-04-16 RX ADMIN — PANTOPRAZOLE SODIUM 40 MILLIGRAM(S): 20 TABLET, DELAYED RELEASE ORAL at 12:23

## 2020-04-16 NOTE — DISCHARGE NOTE FOR THE EXPIRED PATIENT - SECONDARY DIAGNOSIS.
COVID-19 Atrial fibrillation, unspecified type DM (diabetes mellitus) Anemia, unspecified type HTN (hypertension) Hypothyroidism, unspecified type Renal failure Pneumonia

## 2020-04-16 NOTE — PROGRESS NOTE ADULT - NSREFPHYEXREFTO_GEN_ALL_CORE
Inpatient Physical Exam
ED Physical Exam

## 2020-04-16 NOTE — PROGRESS NOTE ADULT - SUBJECTIVE AND OBJECTIVE BOX
Date/Time Patient Seen:  		  Referring MD:   Data Reviewed	       Patient is a 74y old  Female who presents with a chief complaint of shob (2020 10:13)      Subjective/HPI     PAST MEDICAL & SURGICAL HISTORY:  DJD (degenerative joint disease)  DM (diabetes mellitus)  Hyperlipidemia  Hypothyroid  HTN (hypertension)  History of vitamin D deficiency  B12 deficiency  Bronchitis  Anxiety  S/P cataract surgery  S/P eye surgery: left eye retinal surgery x2  S/P  section: x2        Medication list         MEDICATIONS  (STANDING):  aMIOdarone Infusion 1 mG/Min (33.3 mL/Hr) IV Continuous <Continuous>  amLODIPine   Tablet 10 milliGRAM(s) Oral daily  brimonidine 0.2% Ophthalmic Solution 1 Drop(s) Left EYE daily  chlorhexidine 2% Cloths 1 Application(s) Topical <User Schedule>  dextrose 5%. 1000 milliLiter(s) (50 mL/Hr) IV Continuous <Continuous>  dextrose 50% Injectable 12.5 Gram(s) IV Push once  dextrose 50% Injectable 25 Gram(s) IV Push once  dextrose 50% Injectable 25 Gram(s) IV Push once  furosemide Infusion 5 mG/Hr (2.5 mL/Hr) IV Continuous <Continuous>  heparin  Infusion.  Unit(s)/Hr (17 mL/Hr) IV Continuous <Continuous>  insulin glargine Injectable (LANTUS) 35 Unit(s) SubCutaneous at bedtime  insulin lispro (HumaLOG) corrective regimen sliding scale   SubCutaneous every 6 hours  latanoprost 0.005% Ophthalmic Solution 1 Drop(s) Both EYES at bedtime  levothyroxine Injectable 12.5 MICROGram(s) IV Push at bedtime  magnesium oxide 400 milliGRAM(s) Oral three times a day with meals  metoprolol tartrate 50 milliGRAM(s) Enteral Tube every 8 hours  pantoprazole  Injectable 40 milliGRAM(s) IV Push daily  simvastatin 20 milliGRAM(s) Oral at bedtime  timolol 0.5% Solution 1 Drop(s) Left EYE daily    MEDICATIONS  (PRN):  acetaminophen   Tablet .. 650 milliGRAM(s) Oral every 6 hours PRN Temp greater or equal to 38C (100.4F), Mild Pain (1 - 3)  dextrose 40% Gel 15 Gram(s) Oral once PRN Blood Glucose LESS THAN 70 milliGRAM(s)/deciliter  glucagon  Injectable 1 milliGRAM(s) IntraMuscular once PRN Glucose LESS THAN 70 milligrams/deciliter  heparin  Injectable 7500 Unit(s) IV Push every 6 hours PRN For aPTT less than 40  heparin  Injectable 3500 Unit(s) IV Push every 6 hours PRN For aPTT between 40 - 57  sodium chloride 0.9% lock flush 10 milliLiter(s) IV Push every 1 hour PRN Pre/post blood products, medications, blood draw, and to maintain line patency         Vitals log        ICU Vital Signs Last 24 Hrs  T(C): 37.2 (2020 08:00), Max: 37.5 (2020 04:00)  T(F): 99 (2020 08:00), Max: 99.5 (2020 04:00)  HR: 93 (2020 08:00) (76 - 165)  BP: 133/42 (2020 08:00) (104/72 - 169/78)  BP(mean): 64 (2020 08:00) (64 - 64)  ABP: --  ABP(mean): --  RR: 42 (2020 08:00) (32 - 47)  SpO2: 92% (2020 08:00) (91% - 97%)           Input and Output:  I&O's Detail    15 Apr 2020 07:01  -  2020 07:00  --------------------------------------------------------  IN:    amiodarone Infusion: 216.5 mL    Free Water: 760 mL    furosemide Infusion: 44.8 mL    Glucerna 1.5: 1220 mL    heparin  Infusion.: 234 mL    Solution: 100 mL  Total IN: 2575.3 mL    OUT:    Indwelling Catheter - Urethral: 3875 mL  Total OUT: 3875 mL    Total NET: -1299.7 mL          Lab Data                        11.1   29.06 )-----------( 68       ( 2020 06:56 )             35.7     04-16    147<H>  |  106  |  55<H>  ----------------------------<  330<H>  3.4<L>   |  34<H>  |  1.68<H>    Ca    8.9      2020 06:56  Phos  2.9     -  Mg     1.9     -16    TPro  7.3  /  Alb  2.7<L>  /  TBili  0.4  /  DBili  x   /  AST  21  /  ALT  30  /  AlkPhos  82  -      CARDIAC MARKERS ( 15 Apr 2020 06:18 )  .251 ng/mL / x     / 33 U/L / x     / x            Review of Systems	      Objective     Physical Examination    heart s1s2  lung dec BS      Pertinent Lab findings & Imaging      Justin:  NO   Adequate UO     I&O's Detail    15 Apr 2020 07:01  -  2020 07:00  --------------------------------------------------------  IN:    amiodarone Infusion: 216.5 mL    Free Water: 760 mL    furosemide Infusion: 44.8 mL    Glucerna 1.5: 1220 mL    heparin  Infusion.: 234 mL    Solution: 100 mL  Total IN: 2575.3 mL    OUT:    Indwelling Catheter - Urethral: 3875 mL  Total OUT: 3875 mL    Total NET: -1299.7 mL               Discussed with:     Cultures:	  Culture Results:   GI PCR Results: NOT detected  *******Please Note:*******  GI panel PCR evaluates for:  Campylobacter, Plesiomonas shigelloides, Salmonella,  Vibrio, Yersinia enterocolitica, Enteroaggregative  Escherichia coli (EAEC), Enteropathogenic E.coli (EPEC),  Enterotoxigenic E. coli (ETEC) lt/st, Shiga-like  toxin-producing E. coli (STEC) stx1/stx2,  Shigella/ Enteroinvasive E. coli (EIEC), Cryptosporidium,  Cyclospora cayetanensis, Entamoeba histolytica,  Giardia lamblia, Adenovirus F 40/41, Astrovirus,  Norovirus GI/GII, Rotavirus A, Sapovirus ( @ 01:08)        Radiology

## 2020-04-16 NOTE — PROGRESS NOTE ADULT - REASON FOR ADMISSION
SOB
shob
sob
covid-19, hypoxic respiratory failure
shob
sob
shob
sob
sob
shob

## 2020-04-16 NOTE — PROGRESS NOTE ADULT - SUBJECTIVE AND OBJECTIVE BOX
ADELIA BALDWIN is a 74yFemale , patient examined and chart reviewed.     INTERVAL HPI/ OVERNIGHT EVENTS:  Lethargic On NRB.  Afebrile.   Rise in WBC noted.    Past Medical History--  PAST MEDICAL & SURGICAL HISTORY:  DJD (degenerative joint disease)  DM (diabetes mellitus)  Hyperlipidemia  Hypothyroid  HTN (hypertension)  History of vitamin D deficiency  B12 deficiency  Bronchitis  Anxiety  S/P cataract surgery  S/P eye surgery: left eye retinal surgery x2  S/P  section: x2      For details regarding the patient's social history, family history, and other miscellaneous elements, please refer the initial infectious diseases consultation and/or the admitting history and physical examination for this admission.      ROS:  Unable to obtain due to : pt's condition      Current inpatient medications :  MEDICATIONS  (STANDING):  aMIOdarone Infusion 1 mG/Min (33.3 mL/Hr) IV Continuous <Continuous>  amLODIPine   Tablet 10 milliGRAM(s) Oral daily  brimonidine 0.2% Ophthalmic Solution 1 Drop(s) Left EYE daily  chlorhexidine 2% Cloths 1 Application(s) Topical <User Schedule>  dextrose 5%. 1000 milliLiter(s) (50 mL/Hr) IV Continuous <Continuous>  dextrose 50% Injectable 12.5 Gram(s) IV Push once  dextrose 50% Injectable 25 Gram(s) IV Push once  dextrose 50% Injectable 25 Gram(s) IV Push once  digoxin  Injectable 0.5 milliGRAM(s) IV Push once  heparin  Infusion.  Unit(s)/Hr (17 mL/Hr) IV Continuous <Continuous>  insulin glargine Injectable (LANTUS) 35 Unit(s) SubCutaneous at bedtime  insulin lispro (HumaLOG) corrective regimen sliding scale   SubCutaneous every 6 hours  latanoprost 0.005% Ophthalmic Solution 1 Drop(s) Both EYES at bedtime  levothyroxine Injectable 12.5 MICROGram(s) IV Push at bedtime  magnesium oxide 400 milliGRAM(s) Oral three times a day with meals  metoprolol tartrate 50 milliGRAM(s) Enteral Tube every 8 hours  pantoprazole  Injectable 40 milliGRAM(s) IV Push daily  simvastatin 20 milliGRAM(s) Oral at bedtime  timolol 0.5% Solution 1 Drop(s) Left EYE daily    MEDICATIONS  (PRN):  acetaminophen   Tablet .. 650 milliGRAM(s) Oral every 6 hours PRN Temp greater or equal to 38C (100.4F), Mild Pain (1 - 3)  dextrose 40% Gel 15 Gram(s) Oral once PRN Blood Glucose LESS THAN 70 milliGRAM(s)/deciliter  glucagon  Injectable 1 milliGRAM(s) IntraMuscular once PRN Glucose LESS THAN 70 milligrams/deciliter  heparin  Injectable 7500 Unit(s) IV Push every 6 hours PRN For aPTT less than 40  heparin  Injectable 3500 Unit(s) IV Push every 6 hours PRN For aPTT between 40 - 57  sodium chloride 0.9% lock flush 10 milliLiter(s) IV Push every 1 hour PRN Pre/post blood products, medications, blood draw, and to maintain line patency    Objective:  ICU Vital Signs Last 24 Hrs  T(C): 37.2 (2020 08:00), Max: 37.5 (2020 04:00)  T(F): 99 (2020 08:00), Max: 99.5 (2020 04:00)  HR: 93 (2020 08:00) (76 - 165)  BP: 133/42 (2020 08:00) (104/72 - 169/78)  BP(mean): 64 (2020 08:00) (64 - 64)  RR: 42 (2020 08:00) (33 - 47)  SpO2: 92% (2020 08:00) (91% - 97%)      Physical Exam:  GEN: Lethargic NRB  HEENT: normocephalic and atraumatic. EOMI. OLGA. Moist mucosa. Clear Posterior pharynx.  NECK: Supple. No carotid bruits.  No lymphadenopathy or thyromegaly.  LUNGS: Decreased to auscultation.  HEART: Regular rate and rhythm without murmur.  ABDOMEN: Soft, nontender, and nondistended.  Positive bowel sounds.   EXTREMITIES: +edema.  NEUROLOGIC: lethargic  SKIN: No ulceration or induration present.      LABS:                11.1   29.06 )-----------( 68       ( 2020 06:56 )             35.7   04-16    147<H>  |  106  |  55<H>  ----------------------------<  330<H>  3.4<L>   |  34<H>  |  1.68<H>    Ca    8.9      2020 06:56  Phos  2.9     -  Mg     1.9     -16    TPro  7.3  /  Alb  2.7<L>  /  TBili  0.4  /  DBili  x   /  AST  21  /  ALT  30  /  AlkPhos  82  -      MICROBIOLOGY:    Culture - Blood (collected 2020 12:56)  Source: .Blood Blood-Peripheral  Preliminary Report (2020 13:01):    No growth to date.    Culture - Blood (collected 2020 12:56)  Source: .Blood Blood-Peripheral  Preliminary Report (2020 13:01):    No growth to date.    Clostridium difficile Toxin by PCR (20 @ 11:10)    C Diff by PCR Result: NotDetec      COVID-19 PCR . (20 @ 22:21)    COVID-19 PCR: Detected: All “detected” results on this new test are considered presumptively  positive results, are clinically actionable, and specimens will be  forwarded to [facility] for confirmation testing.  Another report (corrected report) will only be issued if discordant  results occur.  This test has been validated by [employer] to be accurate;  though it has not been FDA cleared/approved by the usual pathway.  As with all laboratory tests, results should be correlated with clinical  findings.      RADIOLOGY & ADDITIONAL STUDIES:    EXAM:  XR CHEST PORTABLE ROUTINE 1V                                  PROCEDURE DATE:  04/15/2020          INTERPRETATION:  CLINICAL INFORMATION:  Shortness of Breath    TECHNIQUE:  AP view of the chest.    COMPARISON:  2020    FINDINGS:  Enteric tube courses below the left hemidiaphragm. Right IJ approach central venous catheter tip is in the region of the right atrium. There is haziness throughout both lung fields with obscuration of the hemidiaphragms likely representing layering effusions. There are prominent interstitial markings and perihilar airspace opacities. Cardiomediastinal silhouette is exaggerated by AP technique. There are degenerative changes of the spine.    IMPRESSION:    Haziness throughout both lung fields with obscuration of the hemidiaphragms likely representing layering effusions. Prominent interstitial markings and perihilar airspace opacities.      Assessment :  73 y/o F with hx of DM, HTN, HLD, hypothyroid, anxiety presented admitted with severe sepsis with septic shock and acute hypoxic respiratory failure with MSOF sec COVID 19 pneumonia. Sp Cardiorespiratory arrest.  ALIS stable. Anemic. Procalcitonin markedly elevated- concern for superimposed bacterial process sp course of Zosyn. Off pressors. Extubated 2020. Doing poorly On NRB. Lethargic. Rise in WBC noted. Likely superimposed bacterial process.    Plan :   Restart Zosyn  Completed Hydroxychloroquine x 5 days  Supportive care  Asp precautions  Trend temps  COVID-19---high risk period for decompensation  (7-14 days post symptom onset), avoid aerosolizing procedures, NSAIDs   -avoid excessive blood draws and frequent CXRs  -daily CBC w diff to follow eos, lymphocytes and neutrophils and daily NLR calculation (NLR <3 low vs >5 high)  -Other labs that may be selectively used to risk stratify and predict clinical course, ie: Ferritin (lower risk <450 vs >850) CRP (low risk <2 and higher risk >6) and LDH, D Dimer  -antibiotics only if there is concern for a bacterial process  -DNRDNI  -Prognosis poor    D/w ICU team      Continue with present regiment.  Appropriate use of antibiotics and adverse effects reviewed.    I have discussed the above plan of care with patient/ family in detail. They expressed understanding of the the treatment plan . Risks, benefits and alternatives discussed in detail. I have asked if they have any questions or concerns and appropriately addressed them to the best of my ability .      Critical care time greater then 35 minutes reviewing notes, labs data/ imaging , discussion with multidisciplinary team.    Thank you for allowing me to participate in care of your patient .        Eder Brown MD  Infectious Disease  [phone]

## 2020-04-16 NOTE — PROGRESS NOTE ADULT - SUBJECTIVE AND OBJECTIVE BOX
INTERVAL HPI/OVERNIGHT EVENTS:  chart reviewed  MEDICATIONS  (STANDING):  aMIOdarone Infusion 1 mG/Min (33.3 mL/Hr) IV Continuous <Continuous>  amLODIPine   Tablet 10 milliGRAM(s) Oral daily  brimonidine 0.2% Ophthalmic Solution 1 Drop(s) Left EYE daily  chlorhexidine 2% Cloths 1 Application(s) Topical <User Schedule>  dextrose 5%. 1000 milliLiter(s) (50 mL/Hr) IV Continuous <Continuous>  dextrose 50% Injectable 12.5 Gram(s) IV Push once  dextrose 50% Injectable 25 Gram(s) IV Push once  dextrose 50% Injectable 25 Gram(s) IV Push once  heparin  Infusion.  Unit(s)/Hr (17 mL/Hr) IV Continuous <Continuous>  insulin glargine Injectable (LANTUS) 35 Unit(s) SubCutaneous at bedtime  insulin lispro (HumaLOG) corrective regimen sliding scale   SubCutaneous every 6 hours  latanoprost 0.005% Ophthalmic Solution 1 Drop(s) Both EYES at bedtime  levothyroxine Injectable 12.5 MICROGram(s) IV Push at bedtime  magnesium oxide 400 milliGRAM(s) Oral three times a day with meals  metoprolol tartrate 50 milliGRAM(s) Enteral Tube every 8 hours  pantoprazole  Injectable 40 milliGRAM(s) IV Push daily  simvastatin 20 milliGRAM(s) Oral at bedtime  timolol 0.5% Solution 1 Drop(s) Left EYE daily    MEDICATIONS  (PRN):  acetaminophen   Tablet .. 650 milliGRAM(s) Oral every 6 hours PRN Temp greater or equal to 38C (100.4F), Mild Pain (1 - 3)  dextrose 40% Gel 15 Gram(s) Oral once PRN Blood Glucose LESS THAN 70 milliGRAM(s)/deciliter  glucagon  Injectable 1 milliGRAM(s) IntraMuscular once PRN Glucose LESS THAN 70 milligrams/deciliter  heparin  Injectable 7500 Unit(s) IV Push every 6 hours PRN For aPTT less than 40  heparin  Injectable 3500 Unit(s) IV Push every 6 hours PRN For aPTT between 40 - 57  sodium chloride 0.9% lock flush 10 milliLiter(s) IV Push every 1 hour PRN Pre/post blood products, medications, blood draw, and to maintain line patency      Allergies    No Known Allergies    Intolerances        Review of Systems:    General:  No wt loss, fevers, chills, night sweats,fatigue,   Eyes:  Good vision, no reported pain  ENT:  No sore throat, pain, runny nose, dysphagia  CV:  No pain, palpitatioins, hypo/hypertension  Resp:  No dyspnea, cough, tachypnea, wheezing  GI:  No pain, No nausea, No vomiting, No diarrhea, No constipatiion, No weight loss, No fever, No pruritis, No rectal bleeding, No tarry stools, No dysphagia,  :  No pain, bleeding, incontinence, nocturia  Muscle:  No pain, weakness  Neuro:  No weakness, tingling, memory problems  Psych:  No fatigue, insomnia, mood problems, depression  Endocrine:  No polyuria, polydypsia, cold/heat intolerance  Heme:  No petechiae, ecchymosis, easy bruisability  Skin:  No rash, tattoos, scars, edema      Vital Signs Last 24 Hrs  T(C): 37.2 (16 Apr 2020 08:00), Max: 37.5 (16 Apr 2020 04:00)  T(F): 99 (16 Apr 2020 08:00), Max: 99.5 (16 Apr 2020 04:00)  HR: 93 (16 Apr 2020 08:00) (76 - 165)  BP: 133/42 (16 Apr 2020 08:00) (104/72 - 169/78)  BP(mean): 64 (16 Apr 2020 08:00) (64 - 64)  RR: 42 (16 Apr 2020 08:00) (32 - 47)  SpO2: 92% (16 Apr 2020 08:00) (91% - 97%)    PHYSICAL EXAM:    Constitutional: NAD, well-developed  HEENT: EOMI, throat clear  Neck: No LAD, supple  Respiratory: CTA and P  Cardiovascular: S1 and S2, RRR, no M  Gastrointestinal: BS+, soft, NT/ND, neg HSM,  Extremities: No peripheral edema, neg clubing, cyanosis  Vascular: 2+ peripheral pulses  Neurological: A/O x 3, no focal deficits  Psychiatric: Normal mood, normal affect  Skin: No rashes      LABS:                        11.1   29.06 )-----------( 68       ( 16 Apr 2020 06:56 )             35.7     04-16    147<H>  |  106  |  55<H>  ----------------------------<  330<H>  3.4<L>   |  34<H>  |  1.68<H>    Ca    8.9      16 Apr 2020 06:56  Phos  2.9     04-16  Mg     1.9     04-16    TPro  7.3  /  Alb  2.7<L>  /  TBili  0.4  /  DBili  x   /  AST  21  /  ALT  30  /  AlkPhos  82  04-16    PT/INR - ( 16 Apr 2020 06:56 )   PT: 12.5 sec;   INR: 1.12 ratio         PTT - ( 16 Apr 2020 06:56 )  PTT:101.2 sec      RADIOLOGY & ADDITIONAL TESTS:

## 2020-04-16 NOTE — PROGRESS NOTE ADULT - ASSESSMENT
The patient is a 74 year old female with a history of HTN, DM, HL, hypothyroidism, anxiety who presented with fevers, cough, shortness of breath in the setting of viral PNA, COVID-19, respiratory failure, septic shock (resolved) extubated 2 days ago, now DNR DNI, found to have:    1. Covid 19+  2. acute hypoxic resp failure   3. afib with rvr    Plan:  Neuro: off sedation, may need CT head if mental status does not improve   Cardio: 10mg lopressor given for afib with rvr, did not respond so amio drip was started. conversion to NSR and rate control. shock resolved   Pulm: Extubated 3 days ago, patient now DNR/DNI. cont NRB cont lasix  GI: tube feeding, cont PPI  Renal: Renally dose meds, strict I/Os on lasix drip for volume overload. 5mg/hr  ID: covid 19  =, now off plaquinel, azithro, s/p zosyn all cultures negative.   Heme: heparin ggt for afib   endo: ISS with lantus

## 2020-04-16 NOTE — PROGRESS NOTE ADULT - ASSESSMENT
The patient is a 74 year old female with a history of HTN, DM, HL, hypothyroidism, anxiety who presented with fevers, cough, shortness of breath in the setting of viral PNA, COVID-19, respiratory failure, septic shock.     Plan:  - COVID-19 positive  - Completed hydroxychloroquine  - Off of antibiotics  - Off of IV pressors  - On amlodipine 10 mg daily  - Extubated. On NRB.  - Episodes of rapid atrial fibrillation - another episode of AF overnight, now back in sinus rhythm. Continue amiodarone drip. If no plans for comfort care, change to amiodarone 400 mg bid via NGT.  - On heparin drip  - Consider changing to apixaban 2.5 mg bid as treatment for both high risk VTE with COVID and atrial fibrillation  - On furosemide drip  - Remains lethargic and not responsive  - Overall prognosis remains poor The patient is a 74 year old female with a history of HTN, DM, HL, hypothyroidism, anxiety who presented with fevers, cough, shortness of breath in the setting of viral PNA, COVID-19, respiratory failure, septic shock.     Plan:  - COVID-19 positive  - Completed hydroxychloroquine  - Off of antibiotics  - Off of IV pressors  - On amlodipine 10 mg daily  - Extubated. On NRB.  - Episodes of rapid atrial fibrillation - another episode of AF overnight, now back in sinus rhythm. Continue amiodarone drip. If no plans for comfort care, change to amiodarone 400 mg bid via NGT.  - On heparin drip  - Consider changing to apixaban 2.5 mg bid as treatment for both high risk VTE with COVID and atrial fibrillation  - On furosemide drip  - Remains lethargic and not responsive  - Overall prognosis remains poor    ADDENDUM:  - Patient back in rapid atrial fibrillation. Given GFR still on low side (although improved Cr), will give digoxin 0.5 mg x1 and assess response.  - Continue amiodarone drip until midnight, then transition to amiodarone 400 mg bid via NGT

## 2020-04-16 NOTE — PROGRESS NOTE ADULT - PROVIDER SPECIALTY LIST ADULT
Cardiology
Critical Care
Gastroenterology
Hospitalist
Infectious Disease
MICU
Nephrology
Neurology
Pulmonology
SICU
Pulmonology
Hospitalist

## 2020-04-16 NOTE — DISCHARGE NOTE FOR THE EXPIRED PATIENT - HOSPITAL COURSE
FROM ADMISSION H+P:   HPI:  Patient is a 74y old  Female who presents with a chief complaint of shob    HPI:  75 y/o F with hx of DM, HTN, HLD, hypothyroid, anxiety biba with c/o fever and cough x 6 days. Pt states that Tmax was 101.8F, took one tab of tylenol XS at 9am this morning. Pt states that she has associated dry cough, body aches and mild generalized weakness. States that she has had one episode of loose stool daily. Denies recent travel, sick contacts, known covid-19 exposure, CP, SOB, runny nose, sore throat, headache, rash, urinary symptoms.  she was sating 94% ON 4l and was then put on non rebreather and was transferred to spcu.    2 hr after initial exam around 7pm  due to increased work of breathing and shob anesthesisa was called for intubation.  covid was sent.    ---  HOSPITAL COURSE: Patient was admitted for acute hypoxic respiratory failure secondary to COVID-19 pneumonia, developed septic shock. extubated. with increasing leukocytosis and concern for superimposed bacterial pneumonia. Was made DNR DNI on 100% NRB with sp02 94% tachypnic 35-40, less responsive. Breathing appears uncomfortable. Had multiple episodes of afib with rvr controlled with medications. On 4/16, was noted to be very tachypneic and labored with SpO2 declining in 80s on NRB. Dr. Castillo spoke to family regarding prognosis and family opted to make patient comfort measures only. Palliative measures were initiated. Patient was pronounced dead at 16:54 on 4/16/20.    Gen: unresponsive to verbal stimuli  HEENT: pupils fixed and dilated  Cardio: absent heart sounds  Pulm: absent breath sounds  Vasc: no palpable radial, carotid or femoral pulses  Neuro: unresponsive to tactile stimuli, absent corneal reflex    Time of Death: 16:54 on 4/16/20.   ---  TIME SPENT:  The total amount of time spent reviewing the hospital notes, laboratory values, imaging findings, assessing/counseling the patient, discussing with consultant physicians, social work, nursing staff was 35 minutes

## 2020-04-16 NOTE — PROGRESS NOTE ADULT - SUBJECTIVE AND OBJECTIVE BOX
Chief Complaint: Fevers, shortness of breath    Interval Events: No events overnight.    Review of Systems:  Unable to obtain    Physical Exam:  Vital Signs Last 24 Hrs  T(C): 37.2 (16 Apr 2020 08:00), Max: 37.5 (16 Apr 2020 04:00)  T(F): 99 (16 Apr 2020 08:00), Max: 99.5 (16 Apr 2020 04:00)  HR: 93 (16 Apr 2020 08:00) (73 - 165)  BP: 133/42 (16 Apr 2020 08:00) (104/72 - 169/78)  BP(mean): 64 (16 Apr 2020 08:00) (64 - 64)  RR: 42 (16 Apr 2020 08:00) (32 - 47)  SpO2: 92% (16 Apr 2020 08:00) (91% - 97%)  General: NAD  HEENT: MMM  Neck: No JVD, no carotid bruit  Extremities: No LE edema, no cyanosis  Neuro: Non-focal  Skin: No rash    Labs:    04-16    147<H>  |  106  |  55<H>  ----------------------------<  330<H>  3.4<L>   |  34<H>  |  1.68<H>    Ca    8.9      16 Apr 2020 06:56  Phos  2.9     04-16  Mg     1.9     04-16    TPro  7.3  /  Alb  2.7<L>  /  TBili  0.4  /  DBili  x   /  AST  21  /  ALT  30  /  AlkPhos  82  04-16                        11.1   29.06 )-----------( 68       ( 16 Apr 2020 06:56 )             35.7     Telemetry: Sinus rhythm

## 2020-04-16 NOTE — PROGRESS NOTE ADULT - SUBJECTIVE AND OBJECTIVE BOX
Patient is a 74y old  Female who presents with a chief complaint of SOB (31 Mar 2020 05:05)  Patient seen in follow up for KALE SNELL.      Chart reviewed.    PAST MEDICAL HISTORY:  DJD (degenerative joint disease)  DM (diabetes mellitus)  Hyperlipidemia  Hypothyroid  HTN (hypertension)  History of vitamin D deficiency  B12 deficiency  Bronchitis  Anxiety      MEDICATIONS  (STANDING):  aMIOdarone Infusion 1 mG/Min (33.3 mL/Hr) IV Continuous <Continuous>  amLODIPine   Tablet 10 milliGRAM(s) Oral daily  brimonidine 0.2% Ophthalmic Solution 1 Drop(s) Left EYE daily  chlorhexidine 2% Cloths 1 Application(s) Topical <User Schedule>  dextrose 5%. 1000 milliLiter(s) (50 mL/Hr) IV Continuous <Continuous>  dextrose 50% Injectable 12.5 Gram(s) IV Push once  dextrose 50% Injectable 25 Gram(s) IV Push once  dextrose 50% Injectable 25 Gram(s) IV Push once  digoxin  Injectable 0.5 milliGRAM(s) IV Push once  heparin  Infusion.  Unit(s)/Hr (17 mL/Hr) IV Continuous <Continuous>  insulin glargine Injectable (LANTUS) 35 Unit(s) SubCutaneous at bedtime  insulin lispro (HumaLOG) corrective regimen sliding scale   SubCutaneous every 6 hours  latanoprost 0.005% Ophthalmic Solution 1 Drop(s) Both EYES at bedtime  levothyroxine Injectable 12.5 MICROGram(s) IV Push at bedtime  magnesium oxide 400 milliGRAM(s) Oral three times a day with meals  metoprolol tartrate 50 milliGRAM(s) Enteral Tube every 8 hours  pantoprazole  Injectable 40 milliGRAM(s) IV Push daily  piperacillin/tazobactam IVPB. 3.375 Gram(s) IV Intermittent once  piperacillin/tazobactam IVPB.. 3.375 Gram(s) IV Intermittent every 8 hours  simvastatin 20 milliGRAM(s) Oral at bedtime  timolol 0.5% Solution 1 Drop(s) Left EYE daily    MEDICATIONS  (PRN):  acetaminophen    Suspension .. 650 milliGRAM(s) Oral every 6 hours PRN Temp greater or equal to 38C (100.4F)  dextrose 40% Gel 15 Gram(s) Oral once PRN Blood Glucose LESS THAN 70 milliGRAM(s)/deciliter  glucagon  Injectable 1 milliGRAM(s) IntraMuscular once PRN Glucose LESS THAN 70 milligrams/deciliter  heparin  Injectable 7500 Unit(s) IV Push every 6 hours PRN For aPTT less than 40  heparin  Injectable 3500 Unit(s) IV Push every 6 hours PRN For aPTT between 40 - 57  sodium chloride 0.9% lock flush 10 milliLiter(s) IV Push every 1 hour PRN Pre/post blood products, medications, blood draw, and to maintain line patency    T(C): 38.3 (04-16-20 @ 14:00), Max: 38.3 (04-16-20 @ 14:00)  HR: 148 (04-16-20 @ 14:00) (72 - 165)  BP: 99/55 (04-16-20 @ 14:00) (99/55 - 169/78)  RR: 60 (04-16-20 @ 14:00)  SpO2: 87% (04-16-20 @ 14:00)  Wt(kg): --  I&O's Detail    15 Apr 2020 07:01  -  16 Apr 2020 07:00  --------------------------------------------------------  IN:    amiodarone Infusion: 216.5 mL    Free Water: 760 mL    furosemide Infusion: 44.8 mL    Glucerna 1.5: 1220 mL    heparin  Infusion.: 234 mL    Solution: 100 mL  Total IN: 2575.3 mL    OUT:    Indwelling Catheter - Urethral: 3875 mL  Total OUT: 3875 mL    Total NET: -1299.7 mL      PHYSICAL EXAM:  Pt on COVID precautions. Chart reviewed and previous physical examination noted.                      LABORATORY:                        11.1   29.06 )-----------( 68       ( 16 Apr 2020 06:56 )             35.7     04-16    147<H>  |  106  |  55<H>  ----------------------------<  330<H>  3.4<L>   |  34<H>  |  1.68<H>    Ca    8.9      16 Apr 2020 06:56  Phos  2.9     04-16  Mg     1.9     04-16    TPro  7.3  /  Alb  2.7<L>  /  TBili  0.4  /  DBili  x   /  AST  21  /  ALT  30  /  AlkPhos  82  04-16    Sodium, Serum: 147 mmol/L (04-16 @ 06:56)  Sodium, Serum: 145 mmol/L (04-15 @ 06:18)    Potassium, Serum: 3.4 mmol/L (04-16 @ 06:56)  Potassium, Serum: 3.8 mmol/L (04-15 @ 06:18)    Hemoglobin: 11.1 g/dL (04-16 @ 06:56)  Hemoglobin: 10.1 g/dL (04-15 @ 06:18)  Hemoglobin: 10.0 g/dL (04-14 @ 07:14)  Hemoglobin: 10.5 g/dL (04-13 @ 19:01)    Creatinine, Serum 1.68 (04-16 @ 06:56)  Creatinine, Serum 1.55 (04-15 @ 06:18)  Creatinine, Serum 1.79 (04-14 @ 07:06)    CARDIAC MARKERS ( 15 Apr 2020 06:18 )  .251 ng/mL / x     / 33 U/L / x     / x          LIVER FUNCTIONS - ( 16 Apr 2020 06:56 )  Alb: 2.7 g/dL / Pro: 7.3 g/dL / ALK PHOS: 82 U/L / ALT: 30 U/L DA / AST: 21 U/L / GGT: x

## 2020-04-16 NOTE — PROGRESS NOTE ADULT - PROBLEM SELECTOR PLAN 1
remains on Bicarb infusion - am Labs and am ABG pending  imaging and labs reviewed  I and O noted  renal follow up reviewed  fio2 and peep titration in progress - keep sat > 90 pct, P Plateau monitoring  HOB elev - suction prn  sedation vacation as tolerated  VS and HD monitoring  FS monitoring   am Cr pending   prognosis remains guarded - pt is DNR  will follow  ICU management in progress
pt is intubated  continue rocpehin and azithromycin with plaquenil  pt continues to have high O2 requirement  continue propofol sedation, restraints
covid   resp failure  ckd  anemia  obesity  lavell  I and O noted - in balance - cr better - labs reviewed  ABG shows improved  fio2 and peep weaning in progress  will assess CPAP PS  keep sat > 90 pct  TF  DVT p  pt is DNR  prognosis guarded
covid -   resp failure - comorbidities - htn, obesity, ckd,   loki -   I and O noted -   pressors as needed - keep MAP > 60  keep sat > 90 pct on vent - will attempt to titrate fio2 and peep -   monitor VS and HD and Sat  sedation in place -   HOB elev - asp prec - oral hygiene -   will repeat ABG in am   replete lytes  dvt p  on ABX - Zosyn - role remains to be determined  on Plaquenil  remains critically ill  isol precs  will follow
covid - obesity - resp failure - ckd - loki - lavell - htn hx -  on IVF  I and O noted  will give one dose of Diamox today  s/p steroids and plaquenil  supportive medical regimen  labs and imaging reviewed  prognosis very poor  pt with prolonged mech ventilation  will assess with awake from sedation   dvt p  tube feeds
covid - resp failure - atelectasis - obesity - ckd - anemia -   DM care and management  AF - cardio follow up noted - cvs rx regimen optimization - rate control  DVT p with Heparin  labs and imaging reviewed  started on Decadron for poss airway edema  oral hygiene  skin care  on sedation protocol - sedation vacation - wean as tolerated - SBT  fio2 and peep weaning as tolerated  GI follow up noted  ABG noted
covid - resp failure - lavell - obesity - ckd - anemia -   weaning as tolerated on daily basis - SBT - tolerating low fio2 and peep - keep sat > 90 pct - monitor VS and HD and Sat  I and O reviewed    labs reviewed - CKD - ALIS -cr better -   sedation vacation  will monitor TG on propofol  D dimer reviewed - will add full dose AC - Heparin gtt -   prognosis remains guarded - daily weaning trials - pt is DNR and DNI if she is extubated - discussed with family
covid - resp failure - obesity - ckd - loki  will dc lasix gtt - pt with worsening Na - and Cr   renal follow up pending, will confer with renal and monitor I and O to determine best diuresis approach  vs and HD and Sat noted  WBC - increased - doubled - will check Biomarkers - concern for secondary infections  on AC full dose  supportive care and regimen  on NRB - keep sat > 90 pct  rotate - prone pt as tolerated  prognosis remains poor  pt is DNR DNI  will follow
in bed  seen and examined  vs and meds reviewed  labs reviewed  VS and HD and Sat reviewed  DM care and management  dvt p  cr better  spoke with family about weaning and CPAP PS  pt is DNR  prognosis guarded to poor  intubated for more than 2 weeks  pt is tolerating low fio2 and peep
on NRB - sat > 90 pct  VS and HD and Sat reviewed  pt is DNR DNI  extubated day 3  resp status remains tenuous - family does not want reintubation  remains on high dose LASIX - neg on I and O balance  renal follow up - renal indices better - replete lytes  on Heparin gtt - full dose AC  mental status remains foggy - will consider CT head when feasible and neuro eval  prognosis guarded to poor - pt is DNR DNI
remains in resp failure  peep and fio2 titration in progress  I and O noted - in balance  on sedation - prop and precedex  will need serial labs, TG while on propofol  oral and skin care  s/p Mucolytic instilled last night via ET tube  HD stable - not on pressors  remains with met acidosis - mostly due to renal failure - LABS reviewed - ABG reviewed -   may need gentle IVF  met acidosis may also be in part due to Propofol
remains on high peep and fio2 - paralyzed - remains ventilated -   renal indices worsening  on abx  on plaquneil  covid positive  severe resp failure - ards -   discussed plan of care and status with children  prognosis remains poor -   pt is DNR
remains ventilated, peep and fio2 reviewed, I and O noted, labs and imaging reviewed  DM, HTN, HLD, hypothyroid, anxiety admitted 3/26 with acute hypoxemic respiratory failure and ARDS from COVID19 pneumonia, complicated by ALIS.  Intubated on 3/26  met acidosis - renal failure - cr and labs reviewed - I and O noted -  sedation vacation  check TG - while on propofol  cxr noted
resp failure  ckd loki  on IVF  I and O noted  labs and imaging reviewed  oral and skin care  assist with ADL  afebrile - VS reviewed  overnight events noted  fio2 and peep titration in progress  keep sat > 90 pct  s/p steroids and plaquenil  will follow
resp failure - covid - s/p extubation   remains with mild resp distress  pt is DNR DNI  I and O reviewed  HOB elev - asp prec - assist with ADL  Dm care and management -   extubation day 2 -   cvs rx regimen optimization -   spoke with Family -   on full dose of Heparin
sedation on HOLD  weaning in effect  tolerating low fio2 and peep  I and O reviewed  cr better  renal following  labs and imaging reviewed  Dm care and management  dvt p  updated family  pt is DNR and will be DNI if extubated - spoke with DTR and SON
As per orders
CXR today shows Increasing basilar infiltrates  s/p hydroxychloroquine+azithromycin regimen  pt continues to have high O2 requirement  continue propofol sedation, restraints, ventilator
Patient with acute respiratory failure with hypoxemia, secondary to ARDS from COVID 19.  Continue ventilatory support as per pulmonary critical care.  Wean ventilator as tolerated.  Continue supportive care.
intubated and monitored on unit  s/p hydroxychloroquine+azithromycin regimen  continue ceftriaxone as per ID, renally dosed  pt continues to have high O2 requirement  continue propofol sedation, restraints  chest Xray in AM as per family request
pt continues to have high O2 requirement  continue propofol sedation, added versed   will resume bicarb after correction of hypokalemia, nephrology following  continue restraints, ventilator settings as per pulm ICU,  PEEP increased today
pt is intubated  rocpehin and azithro  on plaquenil  follow cx  pulm recs  will get cardio input
pt is intubated  rocpehin and azithro with plaquenil  cont ot have high o2 requirement
pt is intubated  rocpehin and azithro with plaquenil  cont to have high o2 requirement
s/p hydroxychloroquine+azithromycin regimen  pt continues to have high O2 requirement  continue propofol sedation, added precedex   continue bicarb TID, nephrology following  contiune restraints, ventilator settings as per pulm ICU
Patient with acute respiratory failure with hypoxemia, secondary to ARDS from COVID 19.  Continue ventilatory support as per pulmonary critical care.  Wean ventilator as tolerated.  Continue supportive care.

## 2020-04-16 NOTE — PROGRESS NOTE ADULT - PROBLEM SELECTOR PROBLEM 1
Acute respiratory failure with hypoxia
Acute respiratory failure with hypoxia
COVID-19
Infection due to 2019 novel coronavirus
SARS (severe acute respiratory syndrome)
Acute respiratory failure with hypoxia
Shock
Acute respiratory failure with hypoxia

## 2020-04-16 NOTE — PROGRESS NOTE ADULT - SUBJECTIVE AND OBJECTIVE BOX
24 hour events:     Review of Systems:  unable to obtain  Wt(kg): --      04-15-20 @ 07:01  -  04-16-20 @ 07:00  --------------------------------------------------------  IN: 2575.3 mL / OUT: 3875 mL / NET: -1299.7 mL        CAPILLARY BLOOD GLUCOSE      POCT Blood Glucose.: 275 mg/dL (16 Apr 2020 11:22)      I&O's Summary    15 Apr 2020 07:01  -  16 Apr 2020 07:00  --------------------------------------------------------  IN: 2575.3 mL / OUT: 3875 mL / NET: -1299.7 mL        Physical Exam:  T(F): 100.9 (04-16-20 @ 14:00), Max: 100.9 (04-16-20 @ 14:00)  HR: 148 (04-16-20 @ 14:00) (76 - 165)  BP: 99/55 (04-16-20 @ 14:00) (99/55 - 169/78)  RR: 60 (04-16-20 @ 14:00) (33 - 60)  SpO2: 87% (04-16-20 @ 14:00) (87% - 97%)    Gen: appears lethargic and labored on NRB   Neuro: lethargic, withdraws to painful stimuli, does not follow commands  HEENT: pupils minimally reactive, NRB in place, NGT in place  Resp: decreased breath sounds with crackles  CVS: S1S2 irregular tachycardic  Abd: soft, nondistended, bowel sounds present  Ext: peripheral edema  Skin: warm, well perfused; right IJ central line    Meds:  piperacillin/tazobactam IVPB. IV Intermittent  piperacillin/tazobactam IVPB.. IV Intermittent    aMIOdarone Infusion IV Continuous  amLODIPine   Tablet Oral  digoxin  Injectable IV Push  metoprolol tartrate Enteral Tube    dextrose 40% Gel Oral PRN  dextrose 50% Injectable IV Push  dextrose 50% Injectable IV Push  dextrose 50% Injectable IV Push  glucagon  Injectable IntraMuscular PRN  insulin glargine Injectable (LANTUS) SubCutaneous  insulin lispro (HumaLOG) corrective regimen sliding scale SubCutaneous  levothyroxine Injectable IV Push  simvastatin Oral      acetaminophen   Tablet .. Oral PRN      heparin  Infusion. IV Continuous  heparin  Injectable IV Push PRN  heparin  Injectable IV Push PRN    pantoprazole  Injectable IV Push      dextrose 5%. IV Continuous  magnesium oxide Oral  sodium chloride 0.9% lock flush IV Push PRN      brimonidine 0.2% Ophthalmic Solution Left EYE  chlorhexidine 2% Cloths Topical  latanoprost 0.005% Ophthalmic Solution Both EYES  timolol 0.5% Solution Left EYE                              11.1   29.06 )-----------( 68       ( 16 Apr 2020 06:56 )             35.7       04-16    147<H>  |  106  |  55<H>  ----------------------------<  330<H>  3.4<L>   |  34<H>  |  1.68<H>    Ca    8.9      16 Apr 2020 06:56  Phos  2.9     04-16  Mg     1.9     04-16    TPro  7.3  /  Alb  2.7<L>  /  TBili  0.4  /  DBili  x   /  AST  21  /  ALT  30  /  AlkPhos  82  04-16      CARDIAC MARKERS ( 15 Apr 2020 06:18 )  .251 ng/mL / x     / 33 U/L / x     / x          PT/INR - ( 16 Apr 2020 06:56 )   PT: 12.5 sec;   INR: 1.12 ratio         PTT - ( 16 Apr 2020 13:24 )  PTT:67.2 sec    .Stool Feces   GI PCR Results: NOT detected  *******Please Note:*******  GI panel PCR evaluates for:  Campylobacter, Plesiomonas shigelloides, Salmonella,  Vibrio, Yersinia enterocolitica, Enteroaggregative  Escherichia coli (EAEC), Enteropathogenic E.coli (EPEC),  Enterotoxigenic E. coli (ETEC) lt/st, Shiga-like  toxin-producing E. coli (STEC) stx1/stx2,  Shigella/ Enteroinvasive E. coli (EIEC), Cryptosporidium,  Cyclospora cayetanensis, Entamoeba histolytica,  Giardia lamblia, Adenovirus F 40/41, Astrovirus,  Norovirus GI/GII, Rotavirus A, Sapovirus -- 04-16 @ 01:08              Radiology: ***  Bedside ultrasound: ***    CENTRAL LINE: N/Y          DATE INSERTED:              REMOVE: Y/N  VAZQUEZ: N/Y                       DATE INSERTED:              REMOVE: Y/N  A-LINE: N/Y                       DATE INSERTED:              REMOVE: Y/N    GLOBAL ISSUE/BEST PRACTICE:  Analgesia:  Sedation:  CAM-ICU:   [facility] elevation: yes  Stress ulcer prophylaxis:  VTE prophylaxis:  Glycemic control:  Nutrition:    CODE STATUS: *** 24 hour events: afebrile. lethargic and appearing labored on NRB. With episodes of AF with RVR, on amio gtt and refractory to IV lopressor and cardizem. Improved with digoxin.    Review of Systems:  unable to obtain  Wt(kg): --      04-15-20 @ 07:01  -  04-16-20 @ 07:00  --------------------------------------------------------  IN: 2575.3 mL / OUT: 3875 mL / NET: -1299.7 mL        CAPILLARY BLOOD GLUCOSE      POCT Blood Glucose.: 275 mg/dL (16 Apr 2020 11:22)      I&O's Summary    15 Apr 2020 07:01  -  16 Apr 2020 07:00  --------------------------------------------------------  IN: 2575.3 mL / OUT: 3875 mL / NET: -1299.7 mL        Physical Exam:  T(F): 100.9 (04-16-20 @ 14:00), Max: 100.9 (04-16-20 @ 14:00)  HR: 148 (04-16-20 @ 14:00) (76 - 165)  BP: 99/55 (04-16-20 @ 14:00) (99/55 - 169/78)  RR: 60 (04-16-20 @ 14:00) (33 - 60)  SpO2: 87% (04-16-20 @ 14:00) (87% - 97%)    Gen: appears lethargic and labored on NRB   Neuro: lethargic, withdraws to painful stimuli, does not follow commands  HEENT: pupils minimally reactive, NRB in place, NGT in place  Resp: decreased breath sounds with crackles  CVS: S1S2 irregular tachycardic  Abd: soft, nondistended, bowel sounds present  Ext: peripheral edema  Skin: warm, well perfused; right IJ central line    Meds:  piperacillin/tazobactam IVPB. IV Intermittent  piperacillin/tazobactam IVPB.. IV Intermittent    aMIOdarone Infusion IV Continuous  amLODIPine   Tablet Oral  digoxin  Injectable IV Push  metoprolol tartrate Enteral Tube    dextrose 40% Gel Oral PRN  dextrose 50% Injectable IV Push  dextrose 50% Injectable IV Push  dextrose 50% Injectable IV Push  glucagon  Injectable IntraMuscular PRN  insulin glargine Injectable (LANTUS) SubCutaneous  insulin lispro (HumaLOG) corrective regimen sliding scale SubCutaneous  levothyroxine Injectable IV Push  simvastatin Oral      acetaminophen   Tablet .. Oral PRN      heparin  Infusion. IV Continuous  heparin  Injectable IV Push PRN  heparin  Injectable IV Push PRN    pantoprazole  Injectable IV Push      dextrose 5%. IV Continuous  magnesium oxide Oral  sodium chloride 0.9% lock flush IV Push PRN      brimonidine 0.2% Ophthalmic Solution Left EYE  chlorhexidine 2% Cloths Topical  latanoprost 0.005% Ophthalmic Solution Both EYES  timolol 0.5% Solution Left EYE                              11.1   29.06 )-----------( 68       ( 16 Apr 2020 06:56 )             35.7       04-16    147<H>  |  106  |  55<H>  ----------------------------<  330<H>  3.4<L>   |  34<H>  |  1.68<H>    Ca    8.9      16 Apr 2020 06:56  Phos  2.9     04-16  Mg     1.9     04-16    TPro  7.3  /  Alb  2.7<L>  /  TBili  0.4  /  DBili  x   /  AST  21  /  ALT  30  /  AlkPhos  82  04-16      CARDIAC MARKERS ( 15 Apr 2020 06:18 )  .251 ng/mL / x     / 33 U/L / x     / x          PT/INR - ( 16 Apr 2020 06:56 )   PT: 12.5 sec;   INR: 1.12 ratio         PTT - ( 16 Apr 2020 13:24 )  PTT:67.2 sec    .Stool Feces   GI PCR Results: NOT detected  *******Please Note:*******  GI panel PCR evaluates for:  Campylobacter, Plesiomonas shigelloides, Salmonella,  Vibrio, Yersinia enterocolitica, Enteroaggregative  Escherichia coli (EAEC), Enteropathogenic E.coli (EPEC),  Enterotoxigenic E. coli (ETEC) lt/st, Shiga-like  toxin-producing E. coli (STEC) stx1/stx2,  Shigella/ Enteroinvasive E. coli (EIEC), Cryptosporidium,  Cyclospora cayetanensis, Entamoeba histolytica,  Giardia lamblia, Adenovirus F 40/41, Astrovirus,  Norovirus GI/GII, Rotavirus A, Sapovirus -- 04-16 @ 01:08        CENTRAL LINE: Y HUGO MILLEREY: Y  A-LINE: N    GLOBAL ISSUE/BEST PRACTICE:  Analgesia: N  Sedation: N  CAM-ICU: BILLIE  HOB elevation: yes  Stress ulcer prophylaxis: Y  VTE prophylaxis: Y  Glycemic control: Y  Nutrition: TF    CODE STATUS: DNR/DNI 24 hour events: afebrile. lethargic and appearing labored on NRB. With episodes of AF with RVR, on amio gtt and resolved with IV lopressor and cardizem.    Review of Systems:  unable to obtain  Wt(kg): --      04-15-20 @ 07:01  -  04-16-20 @ 07:00  --------------------------------------------------------  IN: 2575.3 mL / OUT: 3875 mL / NET: -1299.7 mL        CAPILLARY BLOOD GLUCOSE      POCT Blood Glucose.: 275 mg/dL (16 Apr 2020 11:22)      I&O's Summary    15 Apr 2020 07:01  -  16 Apr 2020 07:00  --------------------------------------------------------  IN: 2575.3 mL / OUT: 3875 mL / NET: -1299.7 mL        Physical Exam:  T(F): 100.9 (04-16-20 @ 14:00), Max: 100.9 (04-16-20 @ 14:00)  HR: 148 (04-16-20 @ 14:00) (76 - 165)  BP: 99/55 (04-16-20 @ 14:00) (99/55 - 169/78)  RR: 60 (04-16-20 @ 14:00) (33 - 60)  SpO2: 87% (04-16-20 @ 14:00) (87% - 97%)    Gen: appears lethargic and labored on NRB   Neuro: lethargic, withdraws to painful stimuli, does not follow commands  HEENT: pupils minimally reactive, NRB in place, NGT in place  Resp: decreased breath sounds with crackles  CVS: S1S2 irregular tachycardic  Abd: soft, nondistended, bowel sounds present  Ext: peripheral edema  Skin: warm, well perfused; right IJ central line    Meds:  piperacillin/tazobactam IVPB. IV Intermittent  piperacillin/tazobactam IVPB.. IV Intermittent    aMIOdarone Infusion IV Continuous  amLODIPine   Tablet Oral  digoxin  Injectable IV Push  metoprolol tartrate Enteral Tube    dextrose 40% Gel Oral PRN  dextrose 50% Injectable IV Push  dextrose 50% Injectable IV Push  dextrose 50% Injectable IV Push  glucagon  Injectable IntraMuscular PRN  insulin glargine Injectable (LANTUS) SubCutaneous  insulin lispro (HumaLOG) corrective regimen sliding scale SubCutaneous  levothyroxine Injectable IV Push  simvastatin Oral      acetaminophen   Tablet .. Oral PRN      heparin  Infusion. IV Continuous  heparin  Injectable IV Push PRN  heparin  Injectable IV Push PRN    pantoprazole  Injectable IV Push      dextrose 5%. IV Continuous  magnesium oxide Oral  sodium chloride 0.9% lock flush IV Push PRN      brimonidine 0.2% Ophthalmic Solution Left EYE  chlorhexidine 2% Cloths Topical  latanoprost 0.005% Ophthalmic Solution Both EYES  timolol 0.5% Solution Left EYE                              11.1   29.06 )-----------( 68       ( 16 Apr 2020 06:56 )             35.7       04-16    147<H>  |  106  |  55<H>  ----------------------------<  330<H>  3.4<L>   |  34<H>  |  1.68<H>    Ca    8.9      16 Apr 2020 06:56  Phos  2.9     04-16  Mg     1.9     04-16    TPro  7.3  /  Alb  2.7<L>  /  TBili  0.4  /  DBili  x   /  AST  21  /  ALT  30  /  AlkPhos  82  04-16      CARDIAC MARKERS ( 15 Apr 2020 06:18 )  .251 ng/mL / x     / 33 U/L / x     / x          PT/INR - ( 16 Apr 2020 06:56 )   PT: 12.5 sec;   INR: 1.12 ratio         PTT - ( 16 Apr 2020 13:24 )  PTT:67.2 sec    .Stool Feces   GI PCR Results: NOT detected  *******Please Note:*******  GI panel PCR evaluates for:  Campylobacter, Plesiomonas shigelloides, Salmonella,  Vibrio, Yersinia enterocolitica, Enteroaggregative  Escherichia coli (EAEC), Enteropathogenic E.coli (EPEC),  Enterotoxigenic E. coli (ETEC) lt/st, Shiga-like  toxin-producing E. coli (STEC) stx1/stx2,  Shigella/ Enteroinvasive E. coli (EIEC), Cryptosporidium,  Cyclospora cayetanensis, Entamoeba histolytica,  Giardia lamblia, Adenovirus F 40/41, Astrovirus,  Norovirus GI/GII, Rotavirus A, Sapovirus -- 04-16 @ 01:08        CENTRAL LINE: Y RIJ  VAZQUEZ: Y  A-LINE: N    GLOBAL ISSUE/BEST PRACTICE:  Analgesia: N  Sedation: N  CAM-ICU: BILLIE  HOB elevation: yes  Stress ulcer prophylaxis: Y  VTE prophylaxis: Y  Glycemic control: Y  Nutrition: TF    CODE STATUS: DNR/DNI

## 2020-04-16 NOTE — PROGRESS NOTE ADULT - SUBJECTIVE AND OBJECTIVE BOX
Neurology follow up note    ADELIA [id number]      Interval History:    Patient resting in bed     MEDICATIONS    acetaminophen   Tablet .. 650 milliGRAM(s) Oral every 6 hours PRN  aMIOdarone Infusion 1 mG/Min IV Continuous <Continuous>  amLODIPine   Tablet 10 milliGRAM(s) Oral daily  brimonidine 0.2% Ophthalmic Solution 1 Drop(s) Left EYE daily  chlorhexidine 2% Cloths 1 Application(s) Topical <User Schedule>  dextrose 40% Gel 15 Gram(s) Oral once PRN  dextrose 5%. 1000 milliLiter(s) IV Continuous <Continuous>  dextrose 50% Injectable 12.5 Gram(s) IV Push once  dextrose 50% Injectable 25 Gram(s) IV Push once  dextrose 50% Injectable 25 Gram(s) IV Push once  furosemide Infusion 5 mG/Hr IV Continuous <Continuous>  glucagon  Injectable 1 milliGRAM(s) IntraMuscular once PRN  heparin  Infusion.  Unit(s)/Hr IV Continuous <Continuous>  heparin  Injectable 7500 Unit(s) IV Push every 6 hours PRN  heparin  Injectable 3500 Unit(s) IV Push every 6 hours PRN  insulin glargine Injectable (LANTUS) 35 Unit(s) SubCutaneous at bedtime  insulin lispro (HumaLOG) corrective regimen sliding scale   SubCutaneous every 6 hours  latanoprost 0.005% Ophthalmic Solution 1 Drop(s) Both EYES at bedtime  levothyroxine Injectable 12.5 MICROGram(s) IV Push at bedtime  magnesium oxide 400 milliGRAM(s) Oral three times a day with meals  metoprolol tartrate 50 milliGRAM(s) Enteral Tube every 8 hours  pantoprazole  Injectable 40 milliGRAM(s) IV Push daily  simvastatin 20 milliGRAM(s) Oral at bedtime  sodium chloride 0.9% lock flush 10 milliLiter(s) IV Push every 1 hour PRN  timolol 0.5% Solution 1 Drop(s) Left EYE daily      Allergies    No Known Allergies    Intolerances            Vital Signs Last 24 Hrs  T(C): 37.2 (16 Apr 2020 08:00), Max: 37.5 (16 Apr 2020 04:00)  T(F): 99 (16 Apr 2020 08:00), Max: 99.5 (16 Apr 2020 04:00)  HR: 93 (16 Apr 2020 08:00) (73 - 165)  BP: 133/42 (16 Apr 2020 08:00) (104/72 - 169/78)  BP(mean): 64 (16 Apr 2020 08:00) (64 - 64)  RR: 42 (16 Apr 2020 08:00) (32 - 47)  SpO2: 92% (16 Apr 2020 08:00) (91% - 97%)    REVIEW OF SYSTEMS:  Could not be obtained secondary to the patient being lethargic.    PHYSICAL EXAMINATION:   HEENT:  Head:  Normocephalic, atraumatic.  Eyes:  No scleral icterus.  Ears:  Hard to evaluate secondary to the patient being lethargic.  NECK:  Supple.  RESPIRATORY:  Decreased breath sounds bilaterally.  CARDIOVASCULAR:  S1 and S2 heard.  ABDOMEN:  Soft and nontender.  EXTREMITIES:  No clubbing or cyanosis were noted.      NEUROLOGIC:  The patient is lethargic, to verbal stimuli, no response.  I attempted to open the patient's eyes, the patient actively resisted, keep eyes close.  Does not follow any commands.  I applied painful stimuli to all four extremities with slight movement.              LABS:  CBC Full  -  ( 16 Apr 2020 06:56 )  WBC Count : 29.06 K/uL  RBC Count : 3.91 M/uL  Hemoglobin : 11.1 g/dL  Hematocrit : 35.7 %  Platelet Count - Automated : 68 K/uL  Mean Cell Volume : 91.3 fl  Mean Cell Hemoglobin : 28.4 pg  Mean Cell Hemoglobin Concentration : 31.1 gm/dL  Auto Neutrophil # : 25.63 K/uL  Auto Lymphocyte # : 1.21 K/uL  Auto Monocyte # : 1.48 K/uL  Auto Eosinophil # : 0.44 K/uL  Auto Basophil # : 0.05 K/uL  Auto Neutrophil % : 88.1 %  Auto Lymphocyte % : 4.2 %  Auto Monocyte % : 5.1 %  Auto Eosinophil % : 1.5 %  Auto Basophil % : 0.2 %      04-16    147<H>  |  106  |  55<H>  ----------------------------<  330<H>  3.4<L>   |  34<H>  |  1.68<H>    Ca    8.9      16 Apr 2020 06:56  Phos  2.9     04-16  Mg     1.9     04-16    TPro  7.3  /  Alb  2.7<L>  /  TBili  0.4  /  DBili  x   /  AST  21  /  ALT  30  /  AlkPhos  82  04-16    Hemoglobin A1C:   Lipid Panel 04-15 @ 12:04  Total Cholesterol, Serum --  LDL --  Triglycerides 268    LIVER FUNCTIONS - ( 16 Apr 2020 06:56 )  Alb: 2.7 g/dL / Pro: 7.3 g/dL / ALK PHOS: 82 U/L / ALT: 30 U/L DA / AST: 21 U/L / GGT: x           Vitamin B12   PT/INR - ( 16 Apr 2020 06:56 )   PT: 12.5 sec;   INR: 1.12 ratio         PTT - ( 16 Apr 2020 06:56 )  PTT:101.2 sec      RADIOLOGY      ANALYSIS AND PLAN:  A 74-year-old with an episode of changes in mental status, altered mental status.  1.	For altered mental status, suspect this could be secondary to lingering effects of sedation medication along with a history of respiratory failure, along with COVID-19 infection, which is known to cause encephalopathy, cognitive changes along with a slight increase in BUN and creatinine function, questionable could be secondary to also the use of Seroquel.  For now, we will discontinue the patient's Seroquel.  I would recommend to rule out other electrolyte abnormalities.  2.	For history of hypothyroidism, continue the patient on Synthroid.  3.	For history of high cholesterol, continue the patient on her cholesterol medication.  4.	If the patient still continues to remain lethargic, I would recommend further investigations with CT imaging of the brain.  5.	Advanced directives were discussed with the family members.  6.	Primary  will be daughter, Zahra, at [phone] and son 4/16/2020  7.	Greater than 40 minutes of time was spent with the patient, plan of care, reviewing data, speaking to the family and multidisciplinary healthcare team.

## 2020-04-16 NOTE — PROGRESS NOTE ADULT - ATTENDING COMMENTS
73 y/o F with hx of DM, HTN, HLD, hypothyroid, anxiety who presents acute hypoxemic respiratory failure and ARDS from COVID19 pneumonia, complicated by ALIS.      --continue sedation with fentanyl and propofol  add precedex to decrease propfol  --hemodynamically stable, d/c midodrine  --acute hypoxemic respiratory failure  low tidal volume ventilation  currently on AC/15/400/40/5  hold diuresis in setting of ALIS  --ALIS likely from ATN  Cr appears to be plateauing  acidosis, continue PO bicarb to minimize fluid intake  --NPO with tube feeds  LFTs stable  --COVID19, s/p treatment with plaquinel and azithro  COVID19 specific consideration and therapeutic options based on the available and rapidly changing literature  --glucose uncontrolled, add lantus 12 to moderate insulin coverage scale  --pt critically ill. CC time 30min
75 y/o F with hx of DM, HTN, HLD, hypothyroid, anxiety who presents acute hypoxemic respiratory failure and ARDS from COVID19 pneumonia, complicated by ALIS.      --continue sedation with fentanyl and propofol  add precedex to decrease propfol  --hemodynamically stable, d/c midodrine  --acute hypoxemic respiratory failure  low tidal volume ventilation, though increased to 400 cc to aid with possible air hunger  hold diuresis in setting of ALIS  --ALIS likely from ATN  Cr appears to be plateauing  acidosis, continue PO bicarb to minimize fluid intake  --NPO with tube feeds  LFTs stable  --COVID19, s/p treatment with plaquinel and azithro  COVID19 specific consideration and therapeutic options based on the available and rapidly changing literature  --glucose uncontrolled, add lantus 12 to moderate insulin coverage scale  --pt critically ill
75 y/o F with hx of DM, HTN, HLD, hypothyroid, anxiety who presents acute hypoxemic respiratory failure and ARDS from COVID19 pneumonia, complicated by ALIS.      --continue sedation with fentanyl and propofol  add precedex to decrease propofol; continue PRN analgesia as needed  --hemodynamically stable  --acute hypoxemic respiratory failure  low tidal volume ventilation  hold diuresis in setting of ALIS  --ALIS likely from ATN  Cr appears to be plateauing  --NPO with tube feeds  --COVID19, continue treatment with   COVID19 specific consideration and therapeutic options based on rapidly changing literature and drug availability  --glucose control  --insert IJ line, d/c femoral line
31 minutes of critical care time spent with the patient and coordinating care with nursing, hospitalist, and consultants. Patient is critically ill requiring ICU care due to respiratory failure, ALIS, shock. The patient is high risk for deterioration and death.
31 minutes of critical care time spent with the patient and coordinating care with nursing, hospitalist, and consultants. Patient is critically ill requiring ICU care due to respiratory failure, arrhythmias, ALIS. The patient is high risk for deterioration and death.
32 minutes of critical care time spent with the patient and coordinating care with nursing, hospitalist, and consultants. Patient is critically ill requiring ICU care due to respiratory failure, ALIS, shock. The patient is high risk for deterioration and death.
35 minutes of critical care time spent with the patient and coordinating care with nursing, hospitalist, and consultants. Patient is critically ill requiring ICU care due to respiratory failure, rapid atrial fibrillation. The patient is high risk for deterioration and death.
36 minutes of critical care time spent with the patient and coordinating care with nursing, hospitalist, and consultants. Patient is critically ill requiring ICU care due to rapid atrial fibrillation, respiratory failure, septic shock, ALIS. The patient is high risk for deterioration and death.
36 minutes of critical care time spent with the patient and coordinating care with nursing, hospitalist, and consultants. Patient is critically ill requiring ICU care due to respiratory failure, ALIS, shock. The patient is high risk for deterioration and death.
36 minutes of critical care time spent with the patient and coordinating care with nursing, hospitalist, and consultants. Patient is critically ill requiring ICU care. The patient is high risk for deterioration and death.
38 minutes of critical care time spent with the patient and coordinating care with nursing, hospitalist, and consultants. Patient is critically ill requiring ICU care due to respiratory failure, ALIS, shock. The patient is high risk for deterioration and death.
75 y/o F with hx of DM, HTN, HLD, hypothyroid, anxiety who presents acute hypoxemic respiratory failure and ARDS from COVID19 pneumonia, complicated by ALIS.    --continue sedation with fentanyl and propofol  --distributive shock likely from sedation requirments, on tiny dose of levophed, wean off  continue midodrine  --acute hypoxemic respiratory failure  low tidal volume ventilation  currently on AC/15/400/40/8  plateau pressure 22  hold diuresis in setting of ALIS  --ALIS likely from ATN  Cr appears to be plateauing  acidosis, start PO bicarb to minimize fluid intake  --NPO with tube feeds  LFTs stable  --COVID19, s/p treatment with plaquinel and azithro  COVID19 specific consideration and therapeutic options based on the available and rapidly changing literature  --glucose uncontrolled, increase to moderate scale  --pt critically ill. CC time 30min
Advanced care planning was discussed with patient and family.  Advanced care planning forms were reviewed and discussed.  Risks, benefits and alternatives of gastroenterologic procedures were discussed in detail and all questions were answered.    30 minutes spent.
Patient prognosis remains very guarded.  Patient is DNR.
I have personally seen, examined, and participated in the care of this patient. I have reviewed all pertinent clinical information, including history, physical exam, and plan.
Patient prognosis remains very guarded.  Patient is DNR.
I have personally seen, examined, and participated in the care of this patient. I have reviewed all pertinent clinical information, including history, physical exam, and plan.

## 2020-04-16 NOTE — PROGRESS NOTE ADULT - ASSESSMENT
74 year old female with a history of HTN, DM, HL, hypothyroidism, anxiety who presented with fevers, cough, shortness of breath in the setting of viral PNA, COVID-19, respiratory failure, septic shock (resolved) extubated 2 days ago, now DNR DNI.    1. Acute hypoxic respiratory failure secondary to COVID-19 PNA  2. Septic Shock (resolved)  3. IDDM2  4. HTN/HLD/Paroxysmal Atrial Fibrillation  5. Hypothyroidism  6. Anemia  7. Anxiety    Neuro: d/c seroquel for now given encephalopathy. may need CT head/neuro eval if mental status does not improve by tomorrow.  Cardio: shock resolved, now hypertensive. continue norvasc, hydralazine and metoprolol. Afib with RVR resolved and now in SR - continue with PO lopressor 50mg TID. Off amio gtt. Elevated troponin likely demand, trended down. On Heparin gtt for now. continue statin.  Pulm: Extubated day 3, now DNR/DNI, 100% NRB goal sp02 90-96. Started on lasix gtt for diuresis to optimize respiratory status.  Renal: ALIS on CKD3. Likely ATN. Cr improving - appears around baseline renal function. avoid nephrotoxins, daily chemistry to trend Cr, optimize lytes. net positive UOP. strict I/Os. renal follow re: lasix.  GI: NPO with tube feeding.  Protonix for GI ppx  ID: COVID 19 pneumonia s/p hydroxychloroqine and azithro, s/p course Zosyn. all cultures with no growth to date. Septic shock resolved -  off midodrine and HD stable.  monitor daily chemistry, CBC with Diff.  trend CRP, D dimer, LDH, CPK, Coags  APAP prn fever, avoid NSAIDs  Heme: on full dose AC with heparin gtt - monitor for bleeding. Anemia stable. Got 2U PRBC this hospitalization - none since 4/3  Endo: DM2 monitor FS, goal 140 to 180 - at goal in ICU setting. continue moderate dose insulin corrective scale, lantus 35u qhs. monitor blood glucose, hypoglycemia protocol. continue synthroid for hypothyroidism.  Dispo: Overall poor prognosis. DNR/DNI. Family was updated. 74 year old female with a history of HTN, DM, HL, hypothyroidism, anxiety who presented with fevers, cough, shortness of breath in the setting of viral PNA, COVID-19, respiratory failure, septic shock (resolved) extubated 2 days ago, now DNR DNI.    1. Acute hypoxic respiratory failure secondary to COVID-19 PNA  2. Septic Shock (resolved)  3. IDDM2  4. HTN/HLD/Paroxysmal Atrial Fibrillation  5. Hypothyroidism  6. Anemia  7. Anxiety    Neuro: d/c seroquel for now given encephalopathy. neuro following - suspects metabolic encephalopathy from COVID + effect of seroquel. If persistent will need brain imaging.  Cardio: hold norvasc given hypotension (likely from aggressive IV AVN blockers) - to give more room for HR control. continue metoprolol with parameters. Afib with RVR refractory to AV nodals (IV lopressor and cardizem) - continue lopressor 50mg TID, amio gtt (switch to PO amio in AM), s/p digoxin 0.5mg IV push x1 with resolution. Elevated troponin likely demand, trended down. On Heparin gtt for now. continue statin.  Pulm: Extubated day 4, now DNR/DNI, 100% NRB goal sp02 90-96. Saturating high 80s. Lasix gtt discontinued.  Renal: ALIS on CKD3. Likely ATN. Cr about stable - appears around baseline renal function. avoid nephrotoxins, daily chemistry to trend Cr, optimize lytes. net negative UOP. strict I/Os.  GI: NPO with tube feeding.  Protonix for GI ppx  ID: COVID 19 pneumonia s/p hydroxychloroqine and azithro. all cultures with no growth to date. Septic shock resolved -  off midodrine and HD stable. Worsening leukocytosis noted - Zosyn resumed.  Heme: on full dose AC with heparin gtt - monitor for bleeding. Anemia stable. Got 2U PRBC this hospitalization - none since 4/3  Endo: DM2 monitor FS, goal 140 to 180 - at goal in ICU setting. continue moderate dose insulin corrective scale, lantus 35u qhs. monitor blood glucose, hypoglycemia protocol. continue synthroid for hypothyroidism.  Dispo: Overall poor prognosis. DNR/DNI. Ongoing GOC with family. 74 year old female with a history of HTN, DM, HL, hypothyroidism, anxiety who presented with fevers, cough, shortness of breath in the setting of viral PNA, COVID-19, respiratory failure, septic shock (resolved) extubated 2 days ago, now DNR DNI.    1. Acute hypoxic respiratory failure secondary to COVID-19 PNA  2. Septic Shock (resolved)  3. IDDM2  4. HTN/HLD/Paroxysmal Atrial Fibrillation  5. Hypothyroidism  6. Anemia  7. Anxiety    Neuro: d/c seroquel for now given encephalopathy. neuro following - suspects metabolic encephalopathy from COVID + effect of seroquel. If persistent will need brain imaging.  Cardio: hold norvasc given hypotension (likely from aggressive IV AVN blockers) - to give more room for HR control. continue metoprolol with parameters. Afib with RVR responded to AV nodals (IV lopressor and cardizem) - continue lopressor 50mg TID, amio gtt (switch to PO amio in AM), did not receive digoxin as resolved. Elevated troponin likely demand, trended down. On Heparin gtt for now. continue statin.  Pulm: Extubated day 4, now DNR/DNI, 100% NRB goal sp02 90-96. Saturating high 80s. Lasix gtt discontinued.  Renal: ALIS on CKD3. Likely ATN. Cr about stable - appears around baseline renal function. avoid nephrotoxins, daily chemistry to trend Cr, optimize lytes. net negative UOP. strict I/Os.  GI: NPO with tube feeding.  Protonix for GI ppx  ID: COVID 19 pneumonia s/p hydroxychloroqine and azithro. all cultures with no growth to date. Septic shock resolved -  off midodrine and HD stable. Worsening leukocytosis noted - Zosyn resumed.  Heme: on full dose AC with heparin gtt - monitor for bleeding. Anemia stable. Got 2U PRBC this hospitalization - none since 4/3  Endo: DM2 monitor FS, goal 140 to 180 - at goal in ICU setting. continue moderate dose insulin corrective scale, lantus 35u qhs. monitor blood glucose, hypoglycemia protocol. continue synthroid for hypothyroidism.  Dispo: Overall poor prognosis. DNR/DNI. Ongoing GOC with family.

## 2020-04-16 NOTE — PROGRESS NOTE ADULT - SUBJECTIVE AND OBJECTIVE BOX
Patient is a 74y old  Female who presents with a chief complaint of shob (15 Apr 2020 15:55)      BRIEF HOSPITAL COURSE:   The patient is a 74 year old female with a history of HTN, DM, HL, hypothyroidism, anxiety who presented with fevers, cough, shortness of breath in the setting of viral PNA, COVID-19, respiratory failure, septic shock (resolved) extubated 2 days ago, now DNR DNI on 100% NRB with sp02 94% tachypnic 35-40, she opens her eyes but does not respond to questions. Breathing appears uncomfortable.,      Events last 24 hours:   - episode of afib with RVR  - did not respond to 10mg lopressor  -started on amio drip as patient was previously in NSR      PAST MEDICAL & SURGICAL HISTORY:  DJD (degenerative joint disease)  DM (diabetes mellitus)  Hyperlipidemia  Hypothyroid  HTN (hypertension)  History of vitamin D deficiency  B12 deficiency  Bronchitis  Anxiety  S/P cataract surgery  S/P eye surgery: left eye retinal surgery x2  S/P  section: x2    Allergies    No Known Allergies    Intolerances      FAMILY HISTORY:  unable to assess    Review of Systems:  unable to assess patient intubated     Medications:  aMIOdarone Infusion 1 mG/Min IV Continuous <Continuous>  amLODIPine   Tablet 10 milliGRAM(s) Oral daily  furosemide Infusion 5 mG/Hr IV Continuous <Continuous>  metoprolol tartrate 50 milliGRAM(s) Enteral Tube every 8 hours  metoprolol tartrate Injectable 5 milliGRAM(s) IV Push once  acetaminophen   Tablet .. 650 milliGRAM(s) Oral every 6 hours PRN  heparin  Infusion.  Unit(s)/Hr IV Continuous <Continuous>  heparin  Injectable 7500 Unit(s) IV Push every 6 hours PRN  heparin  Injectable 3500 Unit(s) IV Push every 6 hours PRN  pantoprazole  Injectable 40 milliGRAM(s) IV Push daily  dextrose 40% Gel 15 Gram(s) Oral once PRN  dextrose 50% Injectable 12.5 Gram(s) IV Push once  dextrose 50% Injectable 25 Gram(s) IV Push once  dextrose 50% Injectable 25 Gram(s) IV Push once  glucagon  Injectable 1 milliGRAM(s) IntraMuscular once PRN  insulin glargine Injectable (LANTUS) 35 Unit(s) SubCutaneous at bedtime  insulin lispro (HumaLOG) corrective regimen sliding scale   SubCutaneous every 6 hours  levothyroxine Injectable 12.5 MICROGram(s) IV Push at bedtime  simvastatin 20 milliGRAM(s) Oral at bedtime  dextrose 5%. 1000 milliLiter(s) IV Continuous <Continuous>  magnesium oxide 400 milliGRAM(s) Oral three times a day with meals  sodium chloride 0.9% lock flush 10 milliLiter(s) IV Push every 1 hour PRN  brimonidine 0.2% Ophthalmic Solution 1 Drop(s) Left EYE daily  chlorhexidine 2% Cloths 1 Application(s) Topical <User Schedule>  latanoprost 0.005% Ophthalmic Solution 1 Drop(s) Both EYES at bedtime  timolol 0.5% Solution 1 Drop(s) Left EYE daily      ICU Vital Signs Last 24 Hrs  T(C): 37.4 (15 Apr 2020 23:30), Max: 37.4 (15 Apr 2020 23:30)  T(F): 99.4 (15 Apr 2020 23:30), Max: 99.4 (15 Apr 2020 23:30)  HR: 95 (2020 00:39) (73 - 165)  BP: 154/70 (2020 00:39) (104/72 - 168/60)  BP(mean): --  ABP: --  ABP(mean): --  RR: 44 (2020 00:39) (28 - 44)  SpO2: 94% (2020 00:39) (91% - 97%)    Vital Signs Last 24 Hrs  T(C): 37.4 (15 Apr 2020 23:30), Max: 37.4 (15 Apr 2020 23:30)  T(F): 99.4 (15 Apr 2020 23:30), Max: 99.4 (15 Apr 2020 23:30)  HR: 95 (2020 00:39) (73 - 165)  BP: 154/70 (2020 00:39) (104/72 - 168/60)  BP(mean): --  RR: 44 (2020 00:39) (28 - 44)  SpO2: 94% (2020 00:39) (91% - 97%)        I&O's Detail    2020 07:01  -  15 Apr 2020 07:00  --------------------------------------------------------  IN:    Free Water: 720 mL    Glucerna 1.5: 1080 mL    heparin  Infusion.: 124 mL    Solution: 50 mL  Total IN: 1974 mL    OUT:    Indwelling Catheter - Urethral: 1200 mL    Indwelling Catheter - Urethral: 2900 mL    Rectal Tube: 300 mL  Total OUT: 4400 mL    Total NET: -2426 mL      15 Apr 2020 07:01  -  2020 00:54  --------------------------------------------------------  IN:    Free Water: 520 mL    furosemide Infusion: 23.5 mL    Glucerna 1.5: 860 mL    heparin  Infusion.: 144 mL    Solution: 100 mL  Total IN: 1647.5 mL    OUT:    Indwelling Catheter - Urethral: 2875 mL  Total OUT: 2875 mL    Total NET: -1227.5 mL            LABS:                        10.1   15.57 )-----------( 72       ( 15 Apr 2020 06:18 )             32.8     04-15    145  |  108  |  51<H>  ----------------------------<  315<H>  3.8   |  29  |  1.55<H>    Ca    8.7      15 Apr 2020 06:18  Phos  2.7     04-15  Mg     1.9     04-15    TPro  6.6  /  Alb  2.6<L>  /  TBili  0.4  /  DBili  x   /  AST  17  /  ALT  29  /  AlkPhos  73  04-15      CARDIAC MARKERS ( 15 Apr 2020 06:18 )  .251 ng/mL / x     / 33 U/L / x     / x      CARDIAC MARKERS ( 2020 07:06 )  .404 ng/mL / x     / 37 U/L / x     / x          CAPILLARY BLOOD GLUCOSE      POCT Blood Glucose.: 254 mg/dL (15 Apr 2020 23:51)    PT/INR - ( 15 Apr 2020 06:18 )   PT: 11.8 sec;   INR: 1.06 ratio         PTT - ( 15 Apr 2020 22:26 )  PTT:42.1 sec    CULTURES:        RADIOLOGY:   < from: Xray Chest 1 View- PORTABLE-Routine (04.15.20 @ 04:59) >  INTERPRETATION:  CLINICAL INFORMATION:  Shortness of Breath    TECHNIQUE:  AP view of the chest.    COMPARISON:  2020    FINDINGS:  Enteric tube courses below the left hemidiaphragm. Right IJ approach central venous catheter tip is in the region of the right atrium. There is haziness throughout both lung fields with obscuration of the hemidiaphragms likely representing layering effusions. There are prominent interstitial markings and perihilar airspace opacities. Cardiomediastinal silhouette is exaggerated by AP technique. There are degenerative changes of the spine.    IMPRESSION:    Haziness throughout both lung fields with obscuration of the hemidiaphragms likely representing layering effusions. Prominent interstitial markings and perihilar airspace opacities.    < end of copied text >      CRITICAL CARE TIME SPENT:   Critical Care time: 35 mins assessing presenting problems of acute illness that poses high probability of life threatening deterioration or end organ damage/dysfunction.  Medical decision making inculding Initiating plan of care, reviewing data, reviewing radiology,direct patient bedside evaluation and interpretation of vital signs, any necessary ventilator management , discusion with multidisciplinary team, discussing goals of care with patient/family, all non inclusive of procedures

## 2020-04-16 NOTE — PROGRESS NOTE ADULT - ASSESSMENT
1.	ALIS on CKD 3: ATN, COVID renal injury  2.	SARS-COVID 19, Resp failure on vent  3.	Anemia  4.	Diabetes  5.	Hypokalemia    Diuresed well with lasix. Agree with holding lasix. Avoid aggressive diuresis. To continue current meds. Treatment for COVID pneumonia. COVID precautions. Strict I & O.  Monitor blood sugar levels. Insulin coverage as needed. Avoid nephrotoxic meds as possible. Avoid ACEI, ARB, NSAIDs and IV contrast.   Monitor BP trend. Titrate BP meds as needed. Salt restriction. Will follow electrolytes and renal function trend. Supportive care. ICU management.

## 2020-04-17 LAB
C DIFF BY PCR RESULT: SIGNIFICANT CHANGE UP
C DIFF TOX GENS STL QL NAA+PROBE: SIGNIFICANT CHANGE UP
CRP SERPL-MCNC: 2.13 MG/DL — HIGH (ref 0–0.4)

## 2020-04-21 NOTE — PHYSICAL EXAM
[Alert] : alert [No Acute Distress] : no acute distress [Well Nourished] : well nourished [Normal Sclera/Conjunctiva] : normal sclera/conjunctiva [Well Developed] : well developed [EOMI] : extra ocular movement intact [No Proptosis] : no proptosis [Normal Oropharynx] : the oropharynx was normal [No Thyroid Nodules] : there were no palpable thyroid nodules [Thyroid Not Enlarged] : the thyroid was not enlarged [No Accessory Muscle Use] : no accessory muscle use [No Respiratory Distress] : no respiratory distress [Clear to Auscultation] : lungs were clear to auscultation bilaterally [Normal Rate] : heart rate was normal  [Normal S1, S2] : normal S1 and S2 [Regular Rhythm] : with a regular rhythm [Pedal Pulses Normal] : the pedal pulses are present [No Edema] : there was no peripheral edema [Normal Bowel Sounds] : normal bowel sounds [Not Tender] : non-tender [Soft] : abdomen soft [Not Distended] : not distended [Post Cervical Nodes] : posterior cervical nodes [Anterior Cervical Nodes] : anterior cervical nodes [Normal] : normal and non tender [Axillary Nodes] : axillary nodes [No Spinal Tenderness] : no spinal tenderness [Spine Straight] : spine straight [No Stigmata of Cushings Syndrome] : no stigmata of cushings syndrome [Normal Gait] : normal gait [Normal Strength/Tone] : muscle strength and tone were normal [No Rash] : no rash [Normal Reflexes] : deep tendon reflexes were 2+ and symmetric [Oriented x3] : oriented to person, place, and time [No Tremors] : no tremors [Acanthosis Nigricans] : no acanthosis nigricans

## 2020-04-21 NOTE — HISTORY OF PRESENT ILLNESS
[FreeTextEntry1] : Ms. BALDWIN is a 73 year year old  female who  returns today in follow up with regard to a history of type 2 diabetes mellitus.There is no known history of  nephropathy. She  too denies any history of neuropathy but does have retinopathy and follows with Dr. Richards-stable -has had alsers -no injections -had surg for detached retina. Current dm medication include Metformin 1000 mmg bid and Januvia 100mg  along with Novolin N 45-55   .  HGM of late has shown values to be running 110-240 and hs from 140-250 .There has been no significant hypoglycemia.  denies any chest pain, sob, neurologic or ophthalmologic complaints. She  too denies any new podiatric concerns. She  is up to date with his ophthalmologic visit.\par Additional medical history includes that of   hyperlipidemia, hypertension, along with vitamin d defic and hypothyroidism.\par Feels discomfort both legs from gluteal region down to below the knee. Prior arterial study from 2017 show lless than 50 % stenosis on rt and 75 % on left. More of her discomfort is on the left. No discomfort when sitting-only when walking.\par In reference to her hypothyrodi status, she is taking synthrodi (brand name) takes 50 mcg alt with 25 mcg Is on 81 ng asa daily\par She had stopped her fluoxetine 20 mg about 6 months ago but she feels she was function better on the prozc and would like to restart.\par \par \par

## 2020-05-22 NOTE — GOALS OF CARE CONVERSATION - ADVANCED CARE PLANNING - TREATMENT GUIDELINES
[place] [address]  Phone: [phone]  Fax: [phone]  Follow Up Time:
DNR Order

## 2020-06-02 PROCEDURE — 80053 COMPREHEN METABOLIC PANEL: CPT

## 2020-06-02 PROCEDURE — 86803 HEPATITIS C AB TEST: CPT

## 2020-06-02 PROCEDURE — 85730 THROMBOPLASTIN TIME PARTIAL: CPT

## 2020-06-02 PROCEDURE — P9016: CPT

## 2020-06-02 PROCEDURE — 87486 CHLMYD PNEUM DNA AMP PROBE: CPT

## 2020-06-02 PROCEDURE — 85027 COMPLETE CBC AUTOMATED: CPT

## 2020-06-02 PROCEDURE — 94003 VENT MGMT INPAT SUBQ DAY: CPT

## 2020-06-02 PROCEDURE — 83036 HEMOGLOBIN GLYCOSYLATED A1C: CPT

## 2020-06-02 PROCEDURE — 94002 VENT MGMT INPAT INIT DAY: CPT

## 2020-06-02 PROCEDURE — 93005 ELECTROCARDIOGRAM TRACING: CPT

## 2020-06-02 PROCEDURE — 84478 ASSAY OF TRIGLYCERIDES: CPT

## 2020-06-02 PROCEDURE — 81001 URINALYSIS AUTO W/SCOPE: CPT

## 2020-06-02 PROCEDURE — 83605 ASSAY OF LACTIC ACID: CPT

## 2020-06-02 PROCEDURE — 87635 SARS-COV-2 COVID-19 AMP PRB: CPT

## 2020-06-02 PROCEDURE — 82550 ASSAY OF CK (CPK): CPT

## 2020-06-02 PROCEDURE — 82272 OCCULT BLD FECES 1-3 TESTS: CPT

## 2020-06-02 PROCEDURE — 83735 ASSAY OF MAGNESIUM: CPT

## 2020-06-02 PROCEDURE — P9047: CPT

## 2020-06-02 PROCEDURE — 87086 URINE CULTURE/COLONY COUNT: CPT

## 2020-06-02 PROCEDURE — 94799 UNLISTED PULMONARY SVC/PX: CPT

## 2020-06-02 PROCEDURE — 86923 COMPATIBILITY TEST ELECTRIC: CPT

## 2020-06-02 PROCEDURE — 96360 HYDRATION IV INFUSION INIT: CPT

## 2020-06-02 PROCEDURE — 87449 NOS EACH ORGANISM AG IA: CPT

## 2020-06-02 PROCEDURE — 36415 COLL VENOUS BLD VENIPUNCTURE: CPT

## 2020-06-02 PROCEDURE — 36600 WITHDRAWAL OF ARTERIAL BLOOD: CPT

## 2020-06-02 PROCEDURE — 82140 ASSAY OF AMMONIA: CPT

## 2020-06-02 PROCEDURE — 82803 BLOOD GASES ANY COMBINATION: CPT

## 2020-06-02 PROCEDURE — 87070 CULTURE OTHR SPECIMN AEROBIC: CPT

## 2020-06-02 PROCEDURE — 80048 BASIC METABOLIC PNL TOTAL CA: CPT

## 2020-06-02 PROCEDURE — 87040 BLOOD CULTURE FOR BACTERIA: CPT

## 2020-06-02 PROCEDURE — 71045 X-RAY EXAM CHEST 1 VIEW: CPT

## 2020-06-02 PROCEDURE — 74018 RADEX ABDOMEN 1 VIEW: CPT

## 2020-06-02 PROCEDURE — 99285 EMERGENCY DEPT VISIT HI MDM: CPT | Mod: 25

## 2020-06-02 PROCEDURE — 87798 DETECT AGENT NOS DNA AMP: CPT

## 2020-06-02 PROCEDURE — 36430 TRANSFUSION BLD/BLD COMPNT: CPT

## 2020-06-02 PROCEDURE — 83615 LACTATE (LD) (LDH) ENZYME: CPT

## 2020-06-02 PROCEDURE — 87493 C DIFF AMPLIFIED PROBE: CPT

## 2020-06-02 PROCEDURE — 86900 BLOOD TYPING SEROLOGIC ABO: CPT

## 2020-06-02 PROCEDURE — 82962 GLUCOSE BLOOD TEST: CPT

## 2020-06-02 PROCEDURE — 86901 BLOOD TYPING SEROLOGIC RH(D): CPT

## 2020-06-02 PROCEDURE — 85652 RBC SED RATE AUTOMATED: CPT

## 2020-06-02 PROCEDURE — 86140 C-REACTIVE PROTEIN: CPT

## 2020-06-02 PROCEDURE — 84100 ASSAY OF PHOSPHORUS: CPT

## 2020-06-02 PROCEDURE — 85610 PROTHROMBIN TIME: CPT

## 2020-06-02 PROCEDURE — 86850 RBC ANTIBODY SCREEN: CPT

## 2020-06-02 PROCEDURE — 84484 ASSAY OF TROPONIN QUANT: CPT

## 2020-06-02 PROCEDURE — 84145 PROCALCITONIN (PCT): CPT

## 2020-06-02 PROCEDURE — 87581 M.PNEUMON DNA AMP PROBE: CPT

## 2020-06-02 PROCEDURE — 87633 RESP VIRUS 12-25 TARGETS: CPT

## 2020-06-02 PROCEDURE — 87507 IADNA-DNA/RNA PROBE TQ 12-25: CPT

## 2020-06-02 PROCEDURE — 84443 ASSAY THYROID STIM HORMONE: CPT

## 2020-06-02 PROCEDURE — 82728 ASSAY OF FERRITIN: CPT

## 2020-06-02 PROCEDURE — 85379 FIBRIN DEGRADATION QUANT: CPT

## 2020-07-29 NOTE — PROGRESS NOTE ADULT - PROBLEM SELECTOR PLAN 2
INTERVAL HPI:  60 yr male, reports being  diagnosed of latent TB(not clear how) and started on Rifampin and Isoniazid 3 months ago while in [facility]. Reports had cough and fever in April.  [place]ives in [place], went to [facility] in January but could not return due to COVID.  Came to [place] 2 weeks ago,  daughter noticed 20 pound  wt loss, weakness, poor appetite, constantly thirsty, increased urination. They attributed it to the TB medications Took  him to  PMD, who recommended that he go to ED, passed out-( slumped over while sitting in a chair ) and did not respond for couple of minutes.  In ED noted severe hyperglycemia(new diagnosis of DM) but not in DKA. IVF and Insulin given. (08 Jul 2020 16:55).  AS above had cough and fever in April which improved after getting started on INH+ Rifampin.   Denied fever, chills,  cough , sputum production or night sweats.  07/24/20: Bronchoscopy with BAL and biopsy.    OVERNIGHT EVENTS:  No change in clinical status.    Vital Signs Last 24 Hrs  T(C): 37.1 (29 Jul 2020 12:00), Max: 37.1 (29 Jul 2020 12:00)  T(F): 98.8 (29 Jul 2020 12:00), Max: 98.8 (29 Jul 2020 12:00)  HR: 84 (29 Jul 2020 13:51) (79 - 91)  BP: 115/66 (29 Jul 2020 13:51) (104/64 - 115/66)  BP(mean): --  RR: 16 (29 Jul 2020 12:00) (16 - 16)  SpO2: 98% (29 Jul 2020 12:00) (97% - 99%)    PHYSICAL EXAM:  GEN:         Awake, responsive and comfortable.  HEENT:    Normal.    RESP:       no distress  CVS:             Regular rate and rhythm.   ABD:         Soft, non-tender, non-distended;     MEDICATIONS  (STANDING):  aspirin enteric coated 81 milliGRAM(s) Oral daily  atorvastatin 40 milliGRAM(s) Oral at bedtime  benzocaine 15 mG/menthol 3.6 mG (Sugar-Free) Lozenge 1 Lozenge Oral four times a day  dextrose 5%. 1000 milliLiter(s) (50 mL/Hr) IV Continuous <Continuous>  dextrose 50% Injectable 12.5 Gram(s) IV Push once  dextrose 50% Injectable 25 Gram(s) IV Push once  dextrose 50% Injectable 25 Gram(s) IV Push once  enoxaparin Injectable 40 milliGRAM(s) SubCutaneous daily  insulin glargine Injectable (LANTUS) 6 Unit(s) SubCutaneous at bedtime  insulin lispro (HumaLOG) corrective regimen sliding scale   SubCutaneous three times a day before meals  insulin lispro Injectable (HumaLOG) 2 Unit(s) SubCutaneous three times a day before meals  metFORMIN 500 milliGRAM(s) Oral two times a day with meals  midodrine 5 milliGRAM(s) Oral every 8 hours    MEDICATIONS  (PRN):  acetaminophen   Tablet .. 650 milliGRAM(s) Oral every 6 hours PRN Moderate Pain (4 - 6)  dextrose 40% Gel 15 Gram(s) Oral once PRN Blood Glucose LESS THAN 70 milliGRAM(s)/deciliter  glucagon  Injectable 1 milliGRAM(s) IntraMuscular once PRN Glucose LESS THAN 70 milligrams/deciliter    07-25 @ 01:10  pH: 7.39  pCO2: 42  pO2: 198  SaO2: 100    ASSESSMENT AND PLAN:  ·	RUL opacities .  ·	AFB growing in one of  broth culture( not MTB or ARELY).  ·	Positive QuantiFeron Gold and weight loss.  ·	Newly diagnosed DM  ·	Hyperglycemia.    Was on INH+ Rifampin for about 4 months which were started in [facility] for presumably latent TB( had cough and fever at that time).  Admitted this time with new onset DM and weight loss. Chest CT with RUL opacities. One of the induced sputum grew AFB from broth culture( not PTB or ARELY) per microbiology. INH+ Rifampin stopped by ID to increase yield.  Sedrate less than 5 x 2.    HIV negative.  ACE level normal.  DANIELLE, C-ANCA, P- ANCA  negative.  Hepatitis negative.  06/24/20: BAL smear negative for AFB.    ID follow up note. Still no identification on AFB(non TB and non MAI0.  ID with follow pt in office and also final AFB identification .  Pulmonary status stable. Patient with COVID 19.  Patient has completed Plaquenil.  Continue supportive care.  Continue isolation precautions.  NLR today is 18.75

## 2021-09-24 NOTE — PROGRESS NOTE ADULT - SUBJECTIVE AND OBJECTIVE BOX
Chief Complaint: Fevers, shortness of breath    Interval Events: No events overnight.    Review of Systems:  Unable to obtain    Physical Exam:  Vital Signs Last 24 Hrs  T(C): 36.1 (07 Apr 2020 07:53), Max: 37.1 (07 Apr 2020 04:00)  T(F): 97 (07 Apr 2020 07:53), Max: 98.7 (07 Apr 2020 04:00)  HR: 71 (07 Apr 2020 09:05) (53 - 112)  BP: 146/51 (07 Apr 2020 07:53) (114/51 - 166/72)  BP(mean): 74 (07 Apr 2020 07:53) (66 - 93)  RR: 22 (07 Apr 2020 07:53) (22 - 27)  SpO2: 90% (07 Apr 2020 09:05) (89% - 99%)  General: Sedated  HEENT: Intubated  Neck: No JVD, no carotid bruit  Extremities: No LE edema, no cyanosis  Neuro: Non-focal  Skin: No rash    Labs:    04-07    138  |  103  |  101<H>  ----------------------------<  283<H>  4.2   |  19<L>  |  3.82<H>    Ca    8.9      07 Apr 2020 07:43  Phos  6.1     04-07  Mg     2.4     04-07    TPro  6.2  /  Alb  2.2<L>  /  TBili  0.4  /  DBili  x   /  AST  23  /  ALT  27  /  AlkPhos  82  04-07                        9.6    12.61 )-----------( 472      ( 07 Apr 2020 07:43 )             29.7       Telemetry: Sinus rhythm epigastric area

## 2021-09-30 NOTE — PROGRESS NOTE ADULT - PROBLEM SELECTOR PLAN 4
Fax received for open orders for lab results 9/23/2021 for TSH, mag, lipids, CMP, CBC found in [facility] Everywhere [facility] tab.      Please review and advise.     TSH/Reflex FT4/Reflex FT3  Specimen:  Blood - Vein sample (specimen)   Ref Range & Units 7 d ago   TSH 0.30 - 5.33 µIU/mL 0.03 Low     Resulting Agency  [facility]   Specimen Collected: 09/23/21  8:32 AM Last Resulted: 09/23/21  3:36 PM   Received From: [facility]  Result Received     Magnesium Level  Specimen:  Blood - Vein sample (specimen)   Ref Range & Units 7 d ago   Magnesium 1.7 - 2.8 mg/dL 1.9    Resulting Agency  [facility]   Specimen Collected: 09/23/21  8:32 AM Last Resulted: 09/23/21  6:06 PM   Received From: [facility]  Result Received: 09/30/21  9:08 AM        Lipid Panel(AMA) w/LDL Calculated  Specimen:  Blood - Vein sample (specimen)   Ref Range & Units 7 d ago Comments   Total Cholesterol 0 - 199 mg/dL 197     Triglycerides 0.00 - 150.00 mg/dL 145  NCEP Reference Values for Triglycerides:   Normal:             <150 mg/dL   Borderline High:    150 - 199 mg/dL   High:               200 - 499 mg/dL   Very High:          >/= 500 mg/dL   HDL Cholesterol >40 mg/dL 50     LDL Cholesterol 0 - 99 mg/dL 118 High   Cutoff values recommended by the National Cholesterol Education Program:   DESIRABLE:    Cholesterol <200 mg/dL           LDL <100 mg/dL   BORDERLINE:   Cholesterol 200-239 mg/dL        -159 mg/dL   HIGHER RISK:  Cholesterol >240 mg/dL           LDL >160 mg/dL, HDL <40 mg/dL   Non-HDL Cholesterol No Reference Range mg/dL 147  A reasonable goal for non-HDL cholesterol is one that is 30 mg/dL higher than the LDL cholesterol goal.   CHOL/HDL Ratio 0.0 - 5.0 3.9     Resulting Agency  [facility]    Specimen Collected: 09/23/21  8:32 AM Last Resulted: 09/23/21  6:06 PM   Received From: [facility]  Result Received: 09/30/21  9:08 AM   View Encounter     Comp Metabolic  Panel  Specimen:  Blood - Vein sample (specimen)   Ref Range & Units 7 d ago   Sodium 133 - 146 mmol/L 144    Potassium 3.5 - 5.1 mmol/L 4.6    Chloride 98 - 107 mmol/L 106    Carbon Dioxide 21 - 31 mmol/L 28    Anion Gap 4 - 13 mmol/L 10    Blood Urea Nitrogen 7 - 25 mg/dL 10    Creatinine 0.60 - 1.30 mg/dL 0.69    GFR (African American) 60 - 300 mL/min/1.73 m² 101    GFR (Others) 60 - 300 mL/min/1.73 m² 83    Calcium 8.3 - 10.5 mg/dL 9.5    Glucose 70 - 100 mg/dL 96    Protein, Total 6.4 - 8.3 g/dL 6.6    Albumin 3.5 - 5.0 g/dL 3.8    ALT 9 - 43 units/L 15    Alkaline Phosphatase 34 - 104 units/L 94    AST 13 - 39 units/L 14    Bilirubin, Total 0.2 - 1.2 mg/dL 0.5    Resulting Agency  [facility]   Narrative  Performed by [facility]  GFR() is reported as 21% greater than GFR(Other). The use of race in kidney function estimating equations is no longer recommended and may result in overestimation. In the near future an approach that disregards race will be implemented.  Specimen Collected: 09/23/21  8:32 AM Last Resulted: 09/23/21  6:06 PM   Received From: [facility]  Result Received: 09/30/21  9:08 AM     CBC with Differential  Specimen:  Blood - Vein sample (specimen)   Ref Range & Units 7 d ago   WBC 3.6 - 10.2 10ˆ3/µL 7.8    RBC (Based on documented legal sex) 4.10-5.30 10ˆ6/µL 4.60    HGB (Based on documented legal sex) 11.9-15.8 g/dL 14.2    HCT (Based on documented legal sex) 37.4-48.3 % 44.9    MCV 82.0 - 99.0 fL 97.0    MCH 27.0 - 33.0 pg 31.0    MCHC 32.0 - 36.0 g/dL 32.0    RDW 11.0 - 15.0 % 13.0     - 450 10ˆ3/µL 343    MPV 9.8 - 12.7 fL 10.9    NRBC's 0 % 0.00    Absolute NRBCs 0 10ˆ3/µL 0.0    Neutrophils 37.0 - 72.0 % 61.0    Lymphocytes 16.0 - 48.0 % 27.0    Monocytes 4.0 - 14.0 % 7.0    Eosinophils 0.0 - 9.0 % 4.0    Basophils 0.0 - 2.0 % 1.0    Immature Granulocytes No defined reference range % 0.0    Absolute Neutrophils 1.1 - 6.0 10ˆ3/µL 4.8    Absolute  Lymphocytes 0.7 - 3.4 10ˆ3/µL 2.1    Absolute Monocytes 0.3 - 1.0 10ˆ3/µL 0.6    Absolute Eosinophils 0.0 - 0.6 10ˆ3/µL 0.3    Absolute Basophils 0.0 - 0.1 10ˆ3/µL 0.1    Absolute Immature Granulocytes 0.00 - 0.10 10ˆ3/µL 0.00    Resulting Agency  [facility]   Narrative  Performed by [facility]  9/23/2021 3:34 PM: P indicates partial results on a panel have been released. Additional results will follow.   9/23/2021 3:34 PM: This result has been final verified. No additional or changed results are expected.  Specimen Collected: 09/23/21  8:32 AM Last Resulted: 09/23/21  3:34 PM   Received From: [facility]  Result Received: 09/30/21  9:08 AM        As per orders

## 2021-10-07 NOTE — PROCEDURE NOTE - NSSPECDEVICE_VASC_A_CORE
How Severe Is Your Skin Cancer?: mild
Is This A New Presentation, Or A Follow-Up?: Follow Up Basal Cell Carcinoma
Additional History: BCC has positive margins
monitoring device

## 2022-01-20 NOTE — ED PROVIDER NOTE - OBJECTIVE STATEMENT
[facility]  Progress Note - Bedelia Half 1952, 71 y o  male MRN: [id number]  Unit/Bed#: S -01 Encounter: [id number]  Primary Care Provider: MARTÍN Martinez   Date and time admitted to hospital: 1/11/2022  6:00 PM    * Acute hypoxemic respiratory failure due to COVID-19 [facility])  [facility] [address] )  [facility] Debby Ohs, [address]      furosemide  40 mg Intravenous Once Edil Shahid PA-C      guaiFENesin  1,200 mg Oral Q12H [facility] Debby Ohs, CRNP      75 y/o F with hx of DM, HTN, HLD, hypothyroid, anxiety biba with c/o fever and cough x 6 days. Pt states that Tmax was 101.8F, took one tab of tylenol XS at 9am this morning. Pt states that she has associated dry cough, body aches and mild generalized weakness. States that she has had one episode of loose stool daily. Denies recent travel, sick contacts, known covid-19 exposure, CP, SOB, runny nose, sore throat, headache, rash, urinary symptoms, n/v, abdominal pain neck stiffness, photophobia.  PMD: Dr. Patric Mercer 73 y/o F with hx of DM, HTN, HLD, hypothyroid, anxiety biba with c/o fever and cough x 6 days. Pt states that Tmax was 101.8F, took one tab of tylenol XS at 9am this morning. Pt states that she has associated dry cough, body aches and mild generalized weakness. States that she has had one episode of loose stool daily. Denies recent travel, sick contacts, known covid-19 exposure, CP, SOB, runny nose, sore throat, headache, rash, urinary symptoms, n/v, abdominal pain, urinary symptoms, neck stiffness, photophobia.  PMD: Dr. Patric Mercer

## 2023-07-26 NOTE — PROGRESS NOTE ADULT - SUBJECTIVE AND OBJECTIVE BOX
ADELIA BALDWIN is a 74yFemale , patient examined and chart reviewed.     INTERVAL HPI/ OVERNIGHT EVENTS:  Lethargic On NRB.  Afebrile.     Past Medical History--  PAST MEDICAL & SURGICAL HISTORY:  DJD (degenerative joint disease)  DM (diabetes mellitus)  Hyperlipidemia  Hypothyroid  HTN (hypertension)  History of vitamin D deficiency  B12 deficiency  Bronchitis  Anxiety  S/P cataract surgery  S/P eye surgery: left eye retinal surgery x2  S/P  section: x2      For details regarding the patient's social history, family history, and other miscellaneous elements, please refer the initial infectious diseases consultation and/or the admitting history and physical examination for this admission.      ROS:  Unable to obtain due to : pt's condition      Current inpatient medications :  MEDICATIONS  (STANDING):  amLODIPine   Tablet 10 milliGRAM(s) Oral daily  brimonidine 0.2% Ophthalmic Solution 1 Drop(s) Left EYE daily  chlorhexidine 2% Cloths 1 Application(s) Topical <User Schedule>  dextrose 5%. 1000 milliLiter(s) (50 mL/Hr) IV Continuous <Continuous>  dextrose 50% Injectable 12.5 Gram(s) IV Push once  dextrose 50% Injectable 25 Gram(s) IV Push once  dextrose 50% Injectable 25 Gram(s) IV Push once  furosemide Infusion 5 mG/Hr (2.5 mL/Hr) IV Continuous <Continuous>  heparin  Infusion.  Unit(s)/Hr (17 mL/Hr) IV Continuous <Continuous>  insulin glargine Injectable (LANTUS) 35 Unit(s) SubCutaneous at bedtime  insulin lispro (HumaLOG) corrective regimen sliding scale   SubCutaneous every 6 hours  latanoprost 0.005% Ophthalmic Solution 1 Drop(s) Both EYES at bedtime  levothyroxine Injectable 12.5 MICROGram(s) IV Push at bedtime  magnesium oxide 400 milliGRAM(s) Oral three times a day with meals  metoprolol tartrate 50 milliGRAM(s) Enteral Tube every 8 hours  pantoprazole  Injectable 40 milliGRAM(s) IV Push daily  simvastatin 20 milliGRAM(s) Oral at bedtime  timolol 0.5% Solution 1 Drop(s) Left EYE daily    MEDICATIONS  (PRN):  acetaminophen   Tablet .. 650 milliGRAM(s) Oral every 6 hours PRN Temp greater or equal to 38C (100.4F), Mild Pain (1 - 3)  dextrose 40% Gel 15 Gram(s) Oral once PRN Blood Glucose LESS THAN 70 milliGRAM(s)/deciliter  glucagon  Injectable 1 milliGRAM(s) IntraMuscular once PRN Glucose LESS THAN 70 milligrams/deciliter  heparin  Injectable 7500 Unit(s) IV Push every 6 hours PRN For aPTT less than 40  heparin  Injectable 3500 Unit(s) IV Push every 6 hours PRN For aPTT between 40 - 57  sodium chloride 0.9% lock flush 10 milliLiter(s) IV Push every 1 hour PRN Pre/post blood products, medications, blood draw, and to maintain line patency    Objective:  ICU Vital Signs Last 24 Hrs  T(C): 37.1 (15 Apr 2020 16:02), Max: 37.1 (15 Apr 2020 16:02)  T(F): 98.7 (15 Apr 2020 16:02), Max: 98.7 (15 Apr 2020 16:02)  HR: 76 (15 Apr 2020 16:02) (73 - 92)  BP: 157/45 (15 Apr 2020 16:02) (133/52 - 160/60)  RR: 36 (15 Apr 2020 16:02) (28 - 42)  SpO2: 96% (15 Apr 2020 16:02) (92% - 97%)    Physical Exam:  GEN: Lethargic NRB  HEENT: normocephalic and atraumatic. EOMI. OLGA. Moist mucosa. Clear Posterior pharynx.  NECK: Supple. No carotid bruits.  No lymphadenopathy or thyromegaly.  LUNGS: Decreased to auscultation.  HEART: Regular rate and rhythm without murmur.  ABDOMEN: Soft, nontender, and nondistended.  Positive bowel sounds.   EXTREMITIES: +edema.  NEUROLOGIC: lethargic  SKIN: No ulceration or induration present.      LABS:                        10.1   15.57 )-----------( 72       ( 15 Apr 2020 06:18 )             32.8   04-15    145  |  108  |  51<H>  ----------------------------<  315<H>  3.8   |  29  |  1.55<H>    Ca    8.7      15 Apr 2020 06:18  Phos  2.7     04-15  Mg     1.9     04-15    TPro  6.6  /  Alb  2.6<L>  /  TBili  0.4  /  DBili  x   /  AST  17  /  ALT  29  /  AlkPhos  73  04-15      MICROBIOLOGY:    Culture - Blood (collected 2020 12:56)  Source: .Blood Blood-Peripheral  Preliminary Report (2020 13:01):    No growth to date.    Culture - Blood (collected 2020 12:56)  Source: .Blood Blood-Peripheral  Preliminary Report (2020 13:01):    No growth to date.    Clostridium difficile Toxin by PCR (20 @ 11:10)    C Diff by PCR Result: NotDetec      COVID-19 PCR . (20 @ 22:21)    COVID-19 PCR: Detected: All “detected” results on this new test are considered presumptively  positive results, are clinically actionable, and specimens will be  forwarded to [facility] for confirmation testing.  Another report (corrected report) will only be issued if discordant  results occur.  This test has been validated by [employer] to be accurate;  though it has not been FDA cleared/approved by the usual pathway.  As with all laboratory tests, results should be correlated with clinical  findings.      RADIOLOGY & ADDITIONAL STUDIES:    EXAM:  XR KUB 1 VIEW                            PROCEDURE DATE:  2020        INTERPRETATION:  EXAM:XR ABDOMEN KUB.     CLINICAL INDICATION: No Predefined Suggestions .     TECHNIQUE: Portable supine AP views of the chest and abdomen.     PRIOR EXAM: Chest x-ray dated 2020.     FINDINGS:      No evidence of bowel obstruction or perforation. 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    Improved pulmonary opacities bilaterally. NG tube extends into the proximal stomach. An endotracheal tube and a central line remain in stable position.      IMPRESSION:     No abnormal bowel distention.    Improving pulmonary opacities.    NG tube extending into the proximal stomach.      Assessment :  75 y/o F with hx of DM, HTN, HLD, hypothyroid, anxiety presented admitted with severe sepsis with septic shock and acute hypoxic respiratory failure with MSOF sec COVID 19 pneumonia. Sp Cardiorespiratory arrest.  ALIS stable. Anemic. Procalcitonin markedly elevated- concern for superimposed bacterial process sp course of Zosyn. Off pressors. Extubated 2020. Doing poorly On NRB. Lethargic    Plan :   Completed Hydroxychloroquine x 5 days  Supportive care  Sp Zosyn stopped 2020  Asp precautions  Trend temps  COVID-19---high risk period for decompensation  (7-14 days post symptom onset), avoid aerosolizing procedures, NSAIDs   -avoid excessive blood draws and frequent CXRs  -daily CBC w diff to follow eos, lymphocytes and neutrophils and daily NLR calculation (NLR <3 low vs >5 high)  -Other labs that may be selectively used to risk stratify and predict clinical course, ie: Ferritin (lower risk <450 vs >850) CRP (low risk <2 and higher risk >6) and LDH, D Dimer  -antibiotics only if there is concern for a bacterial process  -DNRDNI  -Prognosis poor    D/w ICU team      Continue with present regiment.  Appropriate use of antibiotics and adverse effects reviewed.    I have discussed the above plan of care with patient/ family in detail. They expressed understanding of the the treatment plan . Risks, benefits and alternatives discussed in detail. I have asked if they have any questions or concerns and appropriately addressed them to the best of my ability .      Critical care time greater then 35 minutes reviewing notes, labs data/ imaging , discussion with multidisciplinary team.    Thank you for allowing me to participate in care of your patient .        Eder Brown MD  Infectious Disease  [phone] No

## 2023-11-20 NOTE — ED ADULT NURSE REASSESSMENT NOTE - NS ED NURSE REASSESS COMMENT FT1
Received report from Jacqueline CHAN pt was calling that she was having trouble breathing , Jacqueline CHAN put a call out attending to notify pt is desaturating pt went down to 77% on 4L nc , pt placed on non rebreather 15L and is saturating 88%, MD aware. show

## 2024-03-30 NOTE — DIETITIAN INITIAL EVALUATION ADULT. - PROBLEM SELECTOR PROBLEM 5
Heart Failure Resources:  Heart Failure Interactive Workbook:  Go to https://hhgreggitalSunnyBump.Plivo/publication/?n=669374 for a Free Heart Failure Interactive Workbook provided by The [employer] Association. This interactive workbook will provide information on Healthier Living with Heart Failure. Please copy and paste link into search bar. Use your mouse to scroll through the pages.    HF [place] loretta:   Heart Failure Free smart phone loretta available for iPhone and Android download. Use your phone to track sodium intake, fluid intake, symptoms, and weight.     Low Sodium Diet / Recipes:  Go to [email or web address] website for “renal” diet which is Low Sodium! [employer] is a dialysis company, but this website offers free seasonal cookbooks. Each quarter, they will release 25-30 new recipes with a breakdown of calories, sodium, and glucose. You can also go to [email or web address] website for free recipes.     Home Exercise Program:   Identification of Green/Yellow/Red zones:  You should be able to identify when you feel good (green zone), if you have 1-2 symptoms of HF (yellow zone), or if you are in need of medical attention (red zone).  In your CHF education folder you were provided a “stop light tool” to outline this information.     We want to you to rate your exertion levels:    Our therapy team has discussed means of identification with you such as the \"Pratik scale.\"  The Pratik rating scale ranges from 6 to 20, where 6 means \"no exertion at all\" and 20 means \"maximal exertion.\" The goal is to use this to gauge how much effort it is taking for you to do your normal daily tasks.   You should be able to recognize when too much exertion is being expended.    Elements of Energy Conservation:   Prioritize/Plan: Decide what needs to be done today, and what can wait for a later date, write to do lists, plan ahead to avoid extra trips, and gather supplies and equipment needed before starting an activity.   Position:  Hypothyroid

## 2025-03-19 NOTE — PROGRESS NOTE ADULT - PROBLEM SELECTOR PLAN 6
Continue patient on Lantus and Humalog sliding scale coverage.